# Patient Record
Sex: FEMALE | Race: WHITE | NOT HISPANIC OR LATINO | Employment: OTHER | URBAN - METROPOLITAN AREA
[De-identification: names, ages, dates, MRNs, and addresses within clinical notes are randomized per-mention and may not be internally consistent; named-entity substitution may affect disease eponyms.]

---

## 2017-09-29 ENCOUNTER — CONVERSION ENCOUNTER (OUTPATIENT)
Dept: MAMMOGRAPHY | Facility: HOSPITAL | Age: 62
End: 2017-09-29

## 2018-01-19 ENCOUNTER — OFFICE VISIT CONVERTED (OUTPATIENT)
Dept: ORTHOPEDIC SURGERY | Facility: CLINIC | Age: 63
End: 2018-01-19
Attending: PHYSICIAN ASSISTANT

## 2018-02-16 ENCOUNTER — OFFICE VISIT CONVERTED (OUTPATIENT)
Dept: ORTHOPEDIC SURGERY | Facility: CLINIC | Age: 63
End: 2018-02-16
Attending: ORTHOPAEDIC SURGERY

## 2018-04-13 ENCOUNTER — OFFICE VISIT CONVERTED (OUTPATIENT)
Dept: INTERNAL MEDICINE | Facility: CLINIC | Age: 63
End: 2018-04-13
Attending: INTERNAL MEDICINE

## 2018-04-25 ENCOUNTER — OFFICE VISIT CONVERTED (OUTPATIENT)
Dept: INTERNAL MEDICINE | Facility: CLINIC | Age: 63
End: 2018-04-25
Attending: PHYSICIAN ASSISTANT

## 2018-04-27 ENCOUNTER — OFFICE VISIT CONVERTED (OUTPATIENT)
Dept: ORTHOPEDIC SURGERY | Facility: CLINIC | Age: 63
End: 2018-04-27
Attending: PHYSICIAN ASSISTANT

## 2018-05-25 ENCOUNTER — CONVERSION ENCOUNTER (OUTPATIENT)
Dept: INTERNAL MEDICINE | Facility: CLINIC | Age: 63
End: 2018-05-25

## 2018-05-25 ENCOUNTER — OFFICE VISIT CONVERTED (OUTPATIENT)
Dept: INTERNAL MEDICINE | Facility: CLINIC | Age: 63
End: 2018-05-25
Attending: INTERNAL MEDICINE

## 2018-08-06 ENCOUNTER — OFFICE VISIT CONVERTED (OUTPATIENT)
Dept: INTERNAL MEDICINE | Facility: CLINIC | Age: 63
End: 2018-08-06
Attending: INTERNAL MEDICINE

## 2018-08-24 ENCOUNTER — OFFICE VISIT CONVERTED (OUTPATIENT)
Dept: CARDIOLOGY | Facility: CLINIC | Age: 63
End: 2018-08-24
Attending: INTERNAL MEDICINE

## 2018-08-31 ENCOUNTER — OFFICE VISIT CONVERTED (OUTPATIENT)
Dept: INTERNAL MEDICINE | Facility: CLINIC | Age: 63
End: 2018-08-31
Attending: INTERNAL MEDICINE

## 2018-11-06 ENCOUNTER — OFFICE VISIT CONVERTED (OUTPATIENT)
Dept: ORTHOPEDIC SURGERY | Facility: CLINIC | Age: 63
End: 2018-11-06
Attending: ORTHOPAEDIC SURGERY

## 2018-11-06 ENCOUNTER — CONVERSION ENCOUNTER (OUTPATIENT)
Dept: MAMMOGRAPHY | Facility: HOSPITAL | Age: 63
End: 2018-11-06

## 2018-11-30 ENCOUNTER — CONVERSION ENCOUNTER (OUTPATIENT)
Dept: INTERNAL MEDICINE | Facility: CLINIC | Age: 63
End: 2018-11-30

## 2018-11-30 ENCOUNTER — OFFICE VISIT CONVERTED (OUTPATIENT)
Dept: INTERNAL MEDICINE | Facility: CLINIC | Age: 63
End: 2018-11-30
Attending: INTERNAL MEDICINE

## 2018-12-03 ENCOUNTER — CONVERSION ENCOUNTER (OUTPATIENT)
Dept: MAMMOGRAPHY | Facility: HOSPITAL | Age: 63
End: 2018-12-03

## 2019-01-07 ENCOUNTER — HOSPITAL ENCOUNTER (OUTPATIENT)
Dept: PREADMISSION TESTING | Facility: HOSPITAL | Age: 64
Discharge: HOME | End: 2019-01-07
Attending: ORTHOPAEDIC SURGERY

## 2019-01-07 LAB
ANION GAP SERPL CALC-SCNC: 16 MMOL/L (ref 8–19)
APTT BLD: 27 S (ref 22.2–34.2)
BASOPHILS # BLD AUTO: 0.03 10*3/UL (ref 0–0.2)
BASOPHILS NFR BLD AUTO: 0.31 % (ref 0–3)
BUN SERPL-MCNC: 23 MG/DL (ref 5–25)
BUN/CREAT SERPL: 30 {RATIO} (ref 6–20)
CALCIUM SERPL-MCNC: 9.9 MG/DL (ref 8.7–10.4)
CHLORIDE SERPL-SCNC: 102 MMOL/L (ref 99–111)
CONV CO2: 26 MMOL/L (ref 22–32)
CREAT UR-MCNC: 0.77 MG/DL (ref 0.5–0.9)
EOSINOPHIL # BLD AUTO: 0.18 10*3/UL (ref 0–0.7)
EOSINOPHIL # BLD AUTO: 2.12 % (ref 0–7)
ERYTHROCYTE [DISTWIDTH] IN BLOOD BY AUTOMATED COUNT: 12.7 % (ref 11.5–14.5)
GFR SERPLBLD BASED ON 1.73 SQ M-ARVRAT: >60 ML/MIN/{1.73_M2}
GLUCOSE SERPL-MCNC: 110 MG/DL (ref 65–99)
HBA1C MFR BLD: 14.4 G/DL (ref 12–16)
HCT VFR BLD AUTO: 42.5 % (ref 37–47)
INR PPP: 1.01 (ref 2–3)
LYMPHOCYTES # BLD AUTO: 1.36 10*3/UL (ref 1–5)
MCH RBC QN AUTO: 29.6 PG (ref 27–31)
MCHC RBC AUTO-ENTMCNC: 33.8 G/DL (ref 33–37)
MCV RBC AUTO: 87.6 FL (ref 81–99)
MONOCYTES # BLD AUTO: 0.44 10*3/UL (ref 0.2–1.2)
MONOCYTES NFR BLD AUTO: 5.17 % (ref 3–10)
NEUTROPHILS # BLD AUTO: 6.58 10*3/UL (ref 2–8)
NEUTROPHILS NFR BLD AUTO: 76.6 % (ref 30–85)
NRBC BLD AUTO-RTO: 0 % (ref 0–0.01)
OSMOLALITY SERPL CALC.SUM OF ELEC: 294 MOSM/KG (ref 273–304)
PLATELET # BLD AUTO: 360 10*3/UL (ref 130–400)
PMV BLD AUTO: 6.2 FL (ref 7.4–10.4)
POTASSIUM SERPL-SCNC: 3.5 MMOL/L (ref 3.5–5.3)
PROTHROMBIN TIME: 10.5 S (ref 9.4–12)
RBC # BLD AUTO: 4.85 10*6/UL (ref 4.2–5.4)
SODIUM SERPL-SCNC: 140 MMOL/L (ref 135–147)
VARIANT LYMPHS NFR BLD MANUAL: 15.8 % (ref 20–45)
WBC # BLD AUTO: 8.59 10*3/UL (ref 4.8–10.8)

## 2019-02-05 ENCOUNTER — OFFICE VISIT CONVERTED (OUTPATIENT)
Dept: ORTHOPEDIC SURGERY | Facility: CLINIC | Age: 64
End: 2019-02-05
Attending: PHYSICIAN ASSISTANT

## 2019-02-26 ENCOUNTER — HOSPITAL ENCOUNTER (OUTPATIENT)
Dept: OTHER | Facility: HOSPITAL | Age: 64
Discharge: HOME | End: 2019-02-26
Attending: INTERNAL MEDICINE

## 2019-02-26 LAB
25(OH)D3 SERPL-MCNC: 46.1 NG/ML (ref 30–100)
ALBUMIN SERPL-MCNC: 3.9 G/DL (ref 3.5–5)
ALBUMIN/GLOB SERPL: 1.4 {RATIO} (ref 1.4–2.6)
ALP SERPL-CCNC: 120 U/L (ref 43–160)
ALT SERPL-CCNC: 14 U/L (ref 10–40)
ANION GAP SERPL CALC-SCNC: 19 MMOL/L (ref 8–19)
AST SERPL-CCNC: 14 U/L (ref 15–50)
BASOPHILS # BLD AUTO: 0.11 10*3/UL (ref 0–0.2)
BASOPHILS NFR BLD AUTO: 1.4 % (ref 0–3)
BILIRUB SERPL-MCNC: 0.22 MG/DL (ref 0.2–1.3)
BUN SERPL-MCNC: 24 MG/DL (ref 5–25)
BUN/CREAT SERPL: 31 {RATIO} (ref 6–20)
CALCIUM SERPL-MCNC: 10.2 MG/DL (ref 8.7–10.4)
CHLORIDE SERPL-SCNC: 101 MMOL/L (ref 99–111)
CHOLEST SERPL-MCNC: 214 MG/DL (ref 107–200)
CHOLEST/HDLC SERPL: 3.8 {RATIO} (ref 3–6)
CONV ABS IMM GRAN: 0.02 10*3/UL (ref 0–0.2)
CONV CO2: 26 MMOL/L (ref 22–32)
CONV IMMATURE GRAN: 0.3 % (ref 0–1.8)
CONV TOTAL PROTEIN: 6.7 G/DL (ref 6.3–8.2)
CREAT UR-MCNC: 0.78 MG/DL (ref 0.5–0.9)
DEPRECATED RDW RBC AUTO: 45.2 FL (ref 36.4–46.3)
EOSINOPHIL # BLD AUTO: 0.47 10*3/UL (ref 0–0.7)
EOSINOPHIL # BLD AUTO: 5.9 % (ref 0–7)
ERYTHROCYTE [DISTWIDTH] IN BLOOD BY AUTOMATED COUNT: 13.9 % (ref 11.7–14.4)
EST. AVERAGE GLUCOSE BLD GHB EST-MCNC: 108 MG/DL
GFR SERPLBLD BASED ON 1.73 SQ M-ARVRAT: >60 ML/MIN/{1.73_M2}
GLOBULIN UR ELPH-MCNC: 2.8 G/DL (ref 2–3.5)
GLUCOSE SERPL-MCNC: 98 MG/DL (ref 65–99)
HBA1C MFR BLD: 13.2 G/DL (ref 12–16)
HBA1C MFR BLD: 5.4 % (ref 3.5–5.7)
HCT VFR BLD AUTO: 41.7 % (ref 37–47)
HDLC SERPL-MCNC: 57 MG/DL (ref 40–60)
IRON SATN MFR SERPL: 18 % (ref 20–55)
IRON SERPL-MCNC: 57 UG/DL (ref 60–170)
LDLC SERPL CALC-MCNC: 132 MG/DL (ref 70–100)
LYMPHOCYTES # BLD AUTO: 1.77 10*3/UL (ref 1–5)
MCH RBC QN AUTO: 28.4 PG (ref 27–31)
MCHC RBC AUTO-ENTMCNC: 31.7 G/DL (ref 33–37)
MCV RBC AUTO: 89.9 FL (ref 81–99)
MONOCYTES # BLD AUTO: 0.51 10*3/UL (ref 0.2–1.2)
MONOCYTES NFR BLD AUTO: 6.4 % (ref 3–10)
NEUTROPHILS # BLD AUTO: 5.09 10*3/UL (ref 2–8)
NEUTROPHILS NFR BLD AUTO: 63.8 % (ref 30–85)
NRBC CBCN: 0 % (ref 0–0.7)
OSMOLALITY SERPL CALC.SUM OF ELEC: 298 MOSM/KG (ref 273–304)
PLATELET # BLD AUTO: 419 10*3/UL (ref 130–400)
PMV BLD AUTO: 9.3 FL (ref 9.4–12.3)
POTASSIUM SERPL-SCNC: 3.7 MMOL/L (ref 3.5–5.3)
RBC # BLD AUTO: 4.64 10*6/UL (ref 4.2–5.4)
SODIUM SERPL-SCNC: 142 MMOL/L (ref 135–147)
TIBC SERPL-MCNC: 309 UG/DL (ref 245–450)
TRANSFERRIN SERPL-MCNC: 216 MG/DL (ref 250–380)
TRIGL SERPL-MCNC: 123 MG/DL (ref 40–150)
VARIANT LYMPHS NFR BLD MANUAL: 22.2 % (ref 20–45)
VIT B12 SERPL-MCNC: 431 PG/ML (ref 211–911)
VLDLC SERPL-MCNC: 25 MG/DL (ref 5–37)
WBC # BLD AUTO: 7.97 10*3/UL (ref 4.8–10.8)

## 2019-02-27 ENCOUNTER — HOSPITAL ENCOUNTER (OUTPATIENT)
Dept: PHYSICAL THERAPY | Facility: CLINIC | Age: 64
Setting detail: RECURRING SERIES
Discharge: HOME | End: 2019-04-05
Attending: ORTHOPAEDIC SURGERY

## 2019-03-01 ENCOUNTER — OFFICE VISIT CONVERTED (OUTPATIENT)
Dept: INTERNAL MEDICINE | Facility: CLINIC | Age: 64
End: 2019-03-01
Attending: INTERNAL MEDICINE

## 2019-03-01 ENCOUNTER — CONVERSION ENCOUNTER (OUTPATIENT)
Dept: INTERNAL MEDICINE | Facility: CLINIC | Age: 64
End: 2019-03-01

## 2019-03-05 ENCOUNTER — CONVERSION ENCOUNTER (OUTPATIENT)
Dept: ORTHOPEDIC SURGERY | Facility: CLINIC | Age: 64
End: 2019-03-05

## 2019-03-05 ENCOUNTER — OFFICE VISIT CONVERTED (OUTPATIENT)
Dept: ORTHOPEDIC SURGERY | Facility: CLINIC | Age: 64
End: 2019-03-05
Attending: PHYSICIAN ASSISTANT

## 2019-04-16 ENCOUNTER — CONVERSION ENCOUNTER (OUTPATIENT)
Dept: ORTHOPEDIC SURGERY | Facility: CLINIC | Age: 64
End: 2019-04-16

## 2019-04-16 ENCOUNTER — OFFICE VISIT CONVERTED (OUTPATIENT)
Dept: ORTHOPEDIC SURGERY | Facility: CLINIC | Age: 64
End: 2019-04-16
Attending: PHYSICIAN ASSISTANT

## 2019-05-23 ENCOUNTER — CONVERSION ENCOUNTER (OUTPATIENT)
Dept: INTERNAL MEDICINE | Facility: CLINIC | Age: 64
End: 2019-05-23

## 2019-05-23 ENCOUNTER — OFFICE VISIT CONVERTED (OUTPATIENT)
Dept: INTERNAL MEDICINE | Facility: CLINIC | Age: 64
End: 2019-05-23
Attending: INTERNAL MEDICINE

## 2019-05-23 ENCOUNTER — HOSPITAL ENCOUNTER (OUTPATIENT)
Dept: OTHER | Facility: HOSPITAL | Age: 64
Discharge: HOME | End: 2019-05-23
Attending: INTERNAL MEDICINE

## 2019-05-29 ENCOUNTER — HOSPITAL ENCOUNTER (OUTPATIENT)
Dept: OTHER | Facility: HOSPITAL | Age: 64
Discharge: HOME | End: 2019-05-29
Attending: INTERNAL MEDICINE

## 2019-05-29 LAB
25(OH)D3 SERPL-MCNC: 50.1 NG/ML (ref 30–100)
ALBUMIN SERPL-MCNC: 4.1 G/DL (ref 3.5–5)
ALBUMIN/GLOB SERPL: 1.3 {RATIO} (ref 1.4–2.6)
ALP SERPL-CCNC: 137 U/L (ref 43–160)
ALT SERPL-CCNC: 12 U/L (ref 10–40)
ANION GAP SERPL CALC-SCNC: 17 MMOL/L (ref 8–19)
AST SERPL-CCNC: 13 U/L (ref 15–50)
BASOPHILS # BLD AUTO: 0.07 10*3/UL (ref 0–0.2)
BASOPHILS NFR BLD AUTO: 0.7 % (ref 0–3)
BILIRUB SERPL-MCNC: 0.28 MG/DL (ref 0.2–1.3)
BUN SERPL-MCNC: 27 MG/DL (ref 5–25)
BUN/CREAT SERPL: 28 {RATIO} (ref 6–20)
CALCIUM SERPL-MCNC: 9.8 MG/DL (ref 8.7–10.4)
CHLORIDE SERPL-SCNC: 98 MMOL/L (ref 99–111)
CHOLEST SERPL-MCNC: 228 MG/DL (ref 107–200)
CHOLEST/HDLC SERPL: 3.7 {RATIO} (ref 3–6)
CONV ABS IMM GRAN: 0.05 10*3/UL (ref 0–0.2)
CONV CO2: 27 MMOL/L (ref 22–32)
CONV IMMATURE GRAN: 0.5 % (ref 0–1.8)
CONV TOTAL PROTEIN: 7.2 G/DL (ref 6.3–8.2)
CREAT UR-MCNC: 0.96 MG/DL (ref 0.5–0.9)
DEPRECATED RDW RBC AUTO: 48.2 FL (ref 36.4–46.3)
EOSINOPHIL # BLD AUTO: 0.36 10*3/UL (ref 0–0.7)
EOSINOPHIL # BLD AUTO: 3.8 % (ref 0–7)
ERYTHROCYTE [DISTWIDTH] IN BLOOD BY AUTOMATED COUNT: 14.6 % (ref 11.7–14.4)
EST. AVERAGE GLUCOSE BLD GHB EST-MCNC: 108 MG/DL
GFR SERPLBLD BASED ON 1.73 SQ M-ARVRAT: >60 ML/MIN/{1.73_M2}
GLOBULIN UR ELPH-MCNC: 3.1 G/DL (ref 2–3.5)
GLUCOSE SERPL-MCNC: 84 MG/DL (ref 65–99)
HBA1C MFR BLD: 13.1 G/DL (ref 12–16)
HBA1C MFR BLD: 5.4 % (ref 3.5–5.7)
HCT VFR BLD AUTO: 43.1 % (ref 37–47)
HDLC SERPL-MCNC: 61 MG/DL (ref 40–60)
IRON SATN MFR SERPL: 22 % (ref 20–55)
IRON SERPL-MCNC: 75 UG/DL (ref 60–170)
LDLC SERPL CALC-MCNC: 150 MG/DL (ref 70–100)
LYMPHOCYTES # BLD AUTO: 1.91 10*3/UL (ref 1–5)
MCH RBC QN AUTO: 27.6 PG (ref 27–31)
MCHC RBC AUTO-ENTMCNC: 30.4 G/DL (ref 33–37)
MCV RBC AUTO: 90.7 FL (ref 81–99)
MONOCYTES # BLD AUTO: 0.62 10*3/UL (ref 0.2–1.2)
MONOCYTES NFR BLD AUTO: 6.5 % (ref 3–10)
NEUTROPHILS # BLD AUTO: 6.57 10*3/UL (ref 2–8)
NEUTROPHILS NFR BLD AUTO: 68.6 % (ref 30–85)
NRBC CBCN: 0 % (ref 0–0.7)
OSMOLALITY SERPL CALC.SUM OF ELEC: 290 MOSM/KG (ref 273–304)
PLATELET # BLD AUTO: 450 10*3/UL (ref 130–400)
PMV BLD AUTO: 9.8 FL (ref 9.4–12.3)
POTASSIUM SERPL-SCNC: 4.1 MMOL/L (ref 3.5–5.3)
RBC # BLD AUTO: 4.75 10*6/UL (ref 4.2–5.4)
SODIUM SERPL-SCNC: 138 MMOL/L (ref 135–147)
TIBC SERPL-MCNC: 339 UG/DL (ref 245–450)
TRANSFERRIN SERPL-MCNC: 237 MG/DL (ref 250–380)
TRIGL SERPL-MCNC: 85 MG/DL (ref 40–150)
VARIANT LYMPHS NFR BLD MANUAL: 19.9 % (ref 20–45)
VIT B12 SERPL-MCNC: 388 PG/ML (ref 211–911)
VLDLC SERPL-MCNC: 17 MG/DL (ref 5–37)
WBC # BLD AUTO: 9.58 10*3/UL (ref 4.8–10.8)

## 2019-06-04 ENCOUNTER — HOSPITAL ENCOUNTER (OUTPATIENT)
Dept: OTHER | Facility: HOSPITAL | Age: 64
Discharge: HOME | End: 2019-06-04
Attending: INTERNAL MEDICINE

## 2019-06-04 ENCOUNTER — OFFICE VISIT CONVERTED (OUTPATIENT)
Dept: INTERNAL MEDICINE | Facility: CLINIC | Age: 64
End: 2019-06-04
Attending: INTERNAL MEDICINE

## 2019-06-04 LAB
PTH-INTACT SERPL-MCNC: 42 PG/ML (ref 11.1–79.5)
TSH SERPL-ACNC: 1.46 M[IU]/L (ref 0.27–4.2)

## 2019-06-13 ENCOUNTER — HOSPITAL ENCOUNTER (OUTPATIENT)
Dept: MAMMOGRAPHY | Facility: HOSPITAL | Age: 64
Discharge: HOME | End: 2019-06-13
Attending: INTERNAL MEDICINE

## 2019-06-17 ENCOUNTER — HOSPITAL ENCOUNTER (OUTPATIENT)
Dept: CT IMAGING | Facility: HOSPITAL | Age: 64
Discharge: HOME | End: 2019-06-17
Attending: INTERNAL MEDICINE

## 2019-10-04 ENCOUNTER — HOSPITAL ENCOUNTER (OUTPATIENT)
Dept: OTHER | Facility: HOSPITAL | Age: 64
Discharge: HOME | End: 2019-10-04
Attending: INTERNAL MEDICINE

## 2019-10-04 ENCOUNTER — OFFICE VISIT CONVERTED (OUTPATIENT)
Dept: INTERNAL MEDICINE | Facility: CLINIC | Age: 64
End: 2019-10-04
Attending: INTERNAL MEDICINE

## 2019-10-04 ENCOUNTER — CONVERSION ENCOUNTER (OUTPATIENT)
Dept: INTERNAL MEDICINE | Facility: CLINIC | Age: 64
End: 2019-10-04

## 2019-10-04 LAB
25(OH)D3 SERPL-MCNC: 54.7 NG/ML (ref 30–100)
ALBUMIN SERPL-MCNC: 4.4 G/DL (ref 3.5–5)
ALBUMIN/GLOB SERPL: 1.5 {RATIO} (ref 1.4–2.6)
ALP SERPL-CCNC: 142 U/L (ref 43–160)
ALT SERPL-CCNC: 16 U/L (ref 10–40)
ANION GAP SERPL CALC-SCNC: 18 MMOL/L (ref 8–19)
AST SERPL-CCNC: 19 U/L (ref 15–50)
BASOPHILS # BLD AUTO: 0.1 10*3/UL (ref 0–0.2)
BASOPHILS NFR BLD AUTO: 1.1 % (ref 0–3)
BILIRUB SERPL-MCNC: 0.23 MG/DL (ref 0.2–1.3)
BUN SERPL-MCNC: 21 MG/DL (ref 5–25)
BUN/CREAT SERPL: 24 {RATIO} (ref 6–20)
CALCIUM SERPL-MCNC: 10.2 MG/DL (ref 8.7–10.4)
CHLORIDE SERPL-SCNC: 101 MMOL/L (ref 99–111)
CHOLEST SERPL-MCNC: 214 MG/DL (ref 107–200)
CHOLEST/HDLC SERPL: 3.8 {RATIO} (ref 3–6)
CONV ABS IMM GRAN: 0.04 10*3/UL (ref 0–0.2)
CONV CO2: 26 MMOL/L (ref 22–32)
CONV IMMATURE GRAN: 0.4 % (ref 0–1.8)
CONV TOTAL PROTEIN: 7.3 G/DL (ref 6.3–8.2)
CREAT UR-MCNC: 0.88 MG/DL (ref 0.5–0.9)
DEPRECATED RDW RBC AUTO: 43.7 FL (ref 36.4–46.3)
EOSINOPHIL # BLD AUTO: 0.28 10*3/UL (ref 0–0.7)
EOSINOPHIL # BLD AUTO: 3 % (ref 0–7)
ERYTHROCYTE [DISTWIDTH] IN BLOOD BY AUTOMATED COUNT: 13.4 % (ref 11.7–14.4)
EST. AVERAGE GLUCOSE BLD GHB EST-MCNC: 108 MG/DL
GFR SERPLBLD BASED ON 1.73 SQ M-ARVRAT: >60 ML/MIN/{1.73_M2}
GLOBULIN UR ELPH-MCNC: 2.9 G/DL (ref 2–3.5)
GLUCOSE SERPL-MCNC: 81 MG/DL (ref 65–99)
HBA1C MFR BLD: 5.4 % (ref 3.5–5.7)
HCT VFR BLD AUTO: 43.5 % (ref 37–47)
HDLC SERPL-MCNC: 56 MG/DL (ref 40–60)
HGB BLD-MCNC: 13.5 G/DL (ref 12–16)
LDLC SERPL CALC-MCNC: 137 MG/DL (ref 70–100)
LYMPHOCYTES # BLD AUTO: 2.09 10*3/UL (ref 1–5)
LYMPHOCYTES NFR BLD AUTO: 22.5 % (ref 20–45)
MCH RBC QN AUTO: 27.8 PG (ref 27–31)
MCHC RBC AUTO-ENTMCNC: 31 G/DL (ref 33–37)
MCV RBC AUTO: 89.7 FL (ref 81–99)
MONOCYTES # BLD AUTO: 0.56 10*3/UL (ref 0.2–1.2)
MONOCYTES NFR BLD AUTO: 6 % (ref 3–10)
NEUTROPHILS # BLD AUTO: 6.2 10*3/UL (ref 2–8)
NEUTROPHILS NFR BLD AUTO: 67 % (ref 30–85)
NRBC CBCN: 0 % (ref 0–0.7)
OSMOLALITY SERPL CALC.SUM OF ELEC: 294 MOSM/KG (ref 273–304)
PLATELET # BLD AUTO: 467 10*3/UL (ref 130–400)
PMV BLD AUTO: 9.5 FL (ref 9.4–12.3)
POTASSIUM SERPL-SCNC: 3.8 MMOL/L (ref 3.5–5.3)
RBC # BLD AUTO: 4.85 10*6/UL (ref 4.2–5.4)
SODIUM SERPL-SCNC: 141 MMOL/L (ref 135–147)
TRIGL SERPL-MCNC: 107 MG/DL (ref 40–150)
VLDLC SERPL-MCNC: 21 MG/DL (ref 5–37)
WBC # BLD AUTO: 9.27 10*3/UL (ref 4.8–10.8)

## 2019-10-24 ENCOUNTER — CONVERSION ENCOUNTER (OUTPATIENT)
Dept: OTOLARYNGOLOGY | Facility: CLINIC | Age: 64
End: 2019-10-24

## 2019-10-24 ENCOUNTER — OFFICE VISIT CONVERTED (OUTPATIENT)
Dept: OTOLARYNGOLOGY | Facility: CLINIC | Age: 64
End: 2019-10-24
Attending: OTOLARYNGOLOGY

## 2019-12-09 ENCOUNTER — HOSPITAL ENCOUNTER (OUTPATIENT)
Dept: SLEEP MEDICINE | Facility: HOSPITAL | Age: 64
Discharge: HOME | End: 2019-12-09
Attending: INTERNAL MEDICINE

## 2019-12-09 ENCOUNTER — OUTSIDE FACILITY SERVICE (OUTPATIENT)
Dept: SLEEP MEDICINE | Facility: HOSPITAL | Age: 64
End: 2019-12-09

## 2019-12-09 PROCEDURE — 99244 OFF/OP CNSLTJ NEW/EST MOD 40: CPT | Performed by: INTERNAL MEDICINE

## 2019-12-16 ENCOUNTER — HOSPITAL ENCOUNTER (OUTPATIENT)
Dept: SLEEP MEDICINE | Facility: HOSPITAL | Age: 64
Discharge: HOME | End: 2019-12-16
Attending: INTERNAL MEDICINE

## 2019-12-16 ENCOUNTER — HOSPITAL ENCOUNTER (OUTPATIENT)
Dept: MAMMOGRAPHY | Facility: HOSPITAL | Age: 64
Discharge: HOME | End: 2019-12-16
Attending: INTERNAL MEDICINE

## 2019-12-18 ENCOUNTER — OUTSIDE FACILITY SERVICE (OUTPATIENT)
Dept: SLEEP MEDICINE | Facility: HOSPITAL | Age: 64
End: 2019-12-18

## 2019-12-18 PROCEDURE — 95806 SLEEP STUDY UNATT&RESP EFFT: CPT | Performed by: INTERNAL MEDICINE

## 2019-12-30 ENCOUNTER — OUTSIDE FACILITY SERVICE (OUTPATIENT)
Dept: SLEEP MEDICINE | Facility: HOSPITAL | Age: 64
End: 2019-12-30

## 2019-12-30 ENCOUNTER — HOSPITAL ENCOUNTER (OUTPATIENT)
Dept: SLEEP MEDICINE | Facility: HOSPITAL | Age: 64
Discharge: HOME | End: 2019-12-30
Attending: INTERNAL MEDICINE

## 2019-12-30 PROCEDURE — 99213 OFFICE O/P EST LOW 20 MIN: CPT | Performed by: INTERNAL MEDICINE

## 2020-01-28 ENCOUNTER — OFFICE VISIT CONVERTED (OUTPATIENT)
Dept: ORTHOPEDIC SURGERY | Facility: CLINIC | Age: 65
End: 2020-01-28
Attending: ORTHOPAEDIC SURGERY

## 2020-01-30 ENCOUNTER — OFFICE VISIT CONVERTED (OUTPATIENT)
Dept: OTOLARYNGOLOGY | Facility: CLINIC | Age: 65
End: 2020-01-30
Attending: OTOLARYNGOLOGY

## 2020-02-03 ENCOUNTER — HOSPITAL ENCOUNTER (OUTPATIENT)
Dept: MAMMOGRAPHY | Facility: HOSPITAL | Age: 65
Discharge: HOME | End: 2020-02-03
Attending: INTERNAL MEDICINE

## 2020-02-06 ENCOUNTER — OFFICE VISIT CONVERTED (OUTPATIENT)
Dept: INTERNAL MEDICINE | Facility: CLINIC | Age: 65
End: 2020-02-06
Attending: INTERNAL MEDICINE

## 2020-02-06 ENCOUNTER — HOSPITAL ENCOUNTER (OUTPATIENT)
Dept: OTHER | Facility: HOSPITAL | Age: 65
Discharge: HOME | End: 2020-02-06
Attending: INTERNAL MEDICINE

## 2020-02-06 LAB
25(OH)D3 SERPL-MCNC: 57 NG/ML (ref 30–100)
ALBUMIN SERPL-MCNC: 4.3 G/DL (ref 3.5–5)
ALBUMIN/GLOB SERPL: 1.5 {RATIO} (ref 1.4–2.6)
ALP SERPL-CCNC: 130 U/L (ref 43–160)
ALT SERPL-CCNC: 14 U/L (ref 10–40)
ANION GAP SERPL CALC-SCNC: 21 MMOL/L (ref 8–19)
AST SERPL-CCNC: 14 U/L (ref 15–50)
BASOPHILS # BLD AUTO: 0.03 10*3/UL (ref 0–0.2)
BASOPHILS NFR BLD AUTO: 0.2 % (ref 0–3)
BILIRUB SERPL-MCNC: <0.15 MG/DL (ref 0.2–1.3)
BUN SERPL-MCNC: 23 MG/DL (ref 5–25)
BUN/CREAT SERPL: 29 {RATIO} (ref 6–20)
CALCIUM SERPL-MCNC: 10.5 MG/DL (ref 8.7–10.4)
CHLORIDE SERPL-SCNC: 102 MMOL/L (ref 99–111)
CHOLEST SERPL-MCNC: 216 MG/DL (ref 107–200)
CHOLEST/HDLC SERPL: 3.3 {RATIO} (ref 3–6)
CONV ABS IMM GRAN: 0.18 10*3/UL (ref 0–0.2)
CONV CO2: 23 MMOL/L (ref 22–32)
CONV IMMATURE GRAN: 1 % (ref 0–1.8)
CONV TOTAL PROTEIN: 7.2 G/DL (ref 6.3–8.2)
CREAT UR-MCNC: 0.79 MG/DL (ref 0.5–0.9)
DEPRECATED RDW RBC AUTO: 44.8 FL (ref 36.4–46.3)
EOSINOPHIL # BLD AUTO: 0 % (ref 0–7)
EOSINOPHIL # BLD AUTO: 0 10*3/UL (ref 0–0.7)
ERYTHROCYTE [DISTWIDTH] IN BLOOD BY AUTOMATED COUNT: 14.3 % (ref 11.7–14.4)
GFR SERPLBLD BASED ON 1.73 SQ M-ARVRAT: >60 ML/MIN/{1.73_M2}
GLOBULIN UR ELPH-MCNC: 2.9 G/DL (ref 2–3.5)
GLUCOSE SERPL-MCNC: 116 MG/DL (ref 65–99)
HCT VFR BLD AUTO: 42.7 % (ref 37–47)
HDLC SERPL-MCNC: 66 MG/DL (ref 40–60)
HGB BLD-MCNC: 13.5 G/DL (ref 12–16)
LDLC SERPL CALC-MCNC: 138 MG/DL (ref 70–100)
LYMPHOCYTES # BLD AUTO: 1.14 10*3/UL (ref 1–5)
LYMPHOCYTES NFR BLD AUTO: 6.6 % (ref 20–45)
MCH RBC QN AUTO: 27.2 PG (ref 27–31)
MCHC RBC AUTO-ENTMCNC: 31.6 G/DL (ref 33–37)
MCV RBC AUTO: 86.1 FL (ref 81–99)
MONOCYTES # BLD AUTO: 0.6 10*3/UL (ref 0.2–1.2)
MONOCYTES NFR BLD AUTO: 3.5 % (ref 3–10)
NEUTROPHILS # BLD AUTO: 15.28 10*3/UL (ref 2–8)
NEUTROPHILS NFR BLD AUTO: 88.7 % (ref 30–85)
NRBC CBCN: 0 % (ref 0–0.7)
OSMOLALITY SERPL CALC.SUM OF ELEC: 299 MOSM/KG (ref 273–304)
PLATELET # BLD AUTO: 530 10*3/UL (ref 130–400)
PMV BLD AUTO: 9.3 FL (ref 9.4–12.3)
POTASSIUM SERPL-SCNC: 4.3 MMOL/L (ref 3.5–5.3)
RBC # BLD AUTO: 4.96 10*6/UL (ref 4.2–5.4)
SODIUM SERPL-SCNC: 142 MMOL/L (ref 135–147)
TRIGL SERPL-MCNC: 61 MG/DL (ref 40–150)
VIT B12 SERPL-MCNC: 628 PG/ML (ref 211–911)
VLDLC SERPL-MCNC: 12 MG/DL (ref 5–37)
WBC # BLD AUTO: 17.23 10*3/UL (ref 4.8–10.8)

## 2020-03-02 ENCOUNTER — OUTSIDE FACILITY SERVICE (OUTPATIENT)
Dept: SLEEP MEDICINE | Facility: HOSPITAL | Age: 65
End: 2020-03-02

## 2020-03-02 ENCOUNTER — HOSPITAL ENCOUNTER (OUTPATIENT)
Dept: SLEEP MEDICINE | Facility: HOSPITAL | Age: 65
Discharge: HOME | End: 2020-03-02
Attending: INTERNAL MEDICINE

## 2020-03-02 PROCEDURE — 99213 OFFICE O/P EST LOW 20 MIN: CPT | Performed by: INTERNAL MEDICINE

## 2020-04-09 ENCOUNTER — CONVERSION ENCOUNTER (OUTPATIENT)
Dept: ORTHOPEDIC SURGERY | Facility: CLINIC | Age: 65
End: 2020-04-09

## 2020-04-09 ENCOUNTER — TELEPHONE CONVERTED (OUTPATIENT)
Dept: ORTHOPEDIC SURGERY | Facility: CLINIC | Age: 65
End: 2020-04-09
Attending: PHYSICIAN ASSISTANT

## 2020-04-21 ENCOUNTER — OFFICE VISIT CONVERTED (OUTPATIENT)
Dept: ORTHOPEDIC SURGERY | Facility: CLINIC | Age: 65
End: 2020-04-21
Attending: PHYSICIAN ASSISTANT

## 2020-05-18 ENCOUNTER — HOSPITAL ENCOUNTER (OUTPATIENT)
Dept: OTHER | Facility: HOSPITAL | Age: 65
Discharge: HOME | End: 2020-05-18
Attending: PHYSICIAN ASSISTANT

## 2020-05-20 ENCOUNTER — HOSPITAL ENCOUNTER (OUTPATIENT)
Dept: OTHER | Facility: HOSPITAL | Age: 65
Discharge: HOME | End: 2020-05-20
Attending: PHYSICIAN ASSISTANT

## 2020-05-20 ENCOUNTER — OFFICE VISIT CONVERTED (OUTPATIENT)
Dept: INTERNAL MEDICINE | Facility: CLINIC | Age: 65
End: 2020-05-20
Attending: PHYSICIAN ASSISTANT

## 2020-05-20 LAB
ALBUMIN SERPL-MCNC: 4.2 G/DL (ref 3.5–5)
ALBUMIN/GLOB SERPL: 1.4 {RATIO} (ref 1.4–2.6)
ALP SERPL-CCNC: 134 U/L (ref 43–160)
ALT SERPL-CCNC: 14 U/L (ref 10–40)
ANION GAP SERPL CALC-SCNC: 15 MMOL/L (ref 8–19)
AST SERPL-CCNC: 15 U/L (ref 15–50)
BASOPHILS # BLD AUTO: 0.09 10*3/UL (ref 0–0.2)
BASOPHILS NFR BLD AUTO: 1.1 % (ref 0–3)
BILIRUB SERPL-MCNC: <0.15 MG/DL (ref 0.2–1.3)
BUN SERPL-MCNC: 19 MG/DL (ref 5–25)
BUN/CREAT SERPL: 23 {RATIO} (ref 6–20)
CALCIUM SERPL-MCNC: 9.6 MG/DL (ref 8.7–10.4)
CHLORIDE SERPL-SCNC: 103 MMOL/L (ref 99–111)
CONV ABS IMM GRAN: 0.02 10*3/UL (ref 0–0.2)
CONV CO2: 25 MMOL/L (ref 22–32)
CONV IMMATURE GRAN: 0.2 % (ref 0–1.8)
CONV TOTAL PROTEIN: 7.1 G/DL (ref 6.3–8.2)
CREAT UR-MCNC: 0.81 MG/DL (ref 0.5–0.9)
DEPRECATED RDW RBC AUTO: 48.1 FL (ref 36.4–46.3)
EOSINOPHIL # BLD AUTO: 0.23 10*3/UL (ref 0–0.7)
EOSINOPHIL # BLD AUTO: 2.7 % (ref 0–7)
ERYTHROCYTE [DISTWIDTH] IN BLOOD BY AUTOMATED COUNT: 15.5 % (ref 11.7–14.4)
GFR SERPLBLD BASED ON 1.73 SQ M-ARVRAT: >60 ML/MIN/{1.73_M2}
GLOBULIN UR ELPH-MCNC: 2.9 G/DL (ref 2–3.5)
GLUCOSE SERPL-MCNC: 107 MG/DL (ref 65–99)
HCT VFR BLD AUTO: 41.9 % (ref 37–47)
HGB BLD-MCNC: 12.8 G/DL (ref 12–16)
LYMPHOCYTES # BLD AUTO: 1.74 10*3/UL (ref 1–5)
LYMPHOCYTES NFR BLD AUTO: 20.5 % (ref 20–45)
MCH RBC QN AUTO: 26.2 PG (ref 27–31)
MCHC RBC AUTO-ENTMCNC: 30.5 G/DL (ref 33–37)
MCV RBC AUTO: 85.9 FL (ref 81–99)
MONOCYTES # BLD AUTO: 0.64 10*3/UL (ref 0.2–1.2)
MONOCYTES NFR BLD AUTO: 7.5 % (ref 3–10)
NEUTROPHILS # BLD AUTO: 5.77 10*3/UL (ref 2–8)
NEUTROPHILS NFR BLD AUTO: 68 % (ref 30–85)
NRBC CBCN: 0 % (ref 0–0.7)
OSMOLALITY SERPL CALC.SUM OF ELEC: 291 MOSM/KG (ref 273–304)
PLATELET # BLD AUTO: 478 10*3/UL (ref 130–400)
PMV BLD AUTO: 9.4 FL (ref 9.4–12.3)
POTASSIUM SERPL-SCNC: 3.6 MMOL/L (ref 3.5–5.3)
RBC # BLD AUTO: 4.88 10*6/UL (ref 4.2–5.4)
SODIUM SERPL-SCNC: 139 MMOL/L (ref 135–147)
TSH SERPL-ACNC: 1.61 M[IU]/L (ref 0.27–4.2)
WBC # BLD AUTO: 8.49 10*3/UL (ref 4.8–10.8)

## 2020-05-21 LAB — 25(OH)D3 SERPL-MCNC: 59.2 NG/ML (ref 30–100)

## 2020-05-26 ENCOUNTER — OFFICE VISIT CONVERTED (OUTPATIENT)
Dept: ORTHOPEDIC SURGERY | Facility: CLINIC | Age: 65
End: 2020-05-26
Attending: PHYSICIAN ASSISTANT

## 2020-05-26 LAB
B MICROTI DNA BLD QL NAA+PROBE: NOT DETECTED
CONV ANAPLASMA PHAGOCYTOPHILUM PCR: NOT DETECTED
CONV BABESIA SPECIES PCR: NOT DETECTED
CONV EHRLICHIA EWINGII CANIS PCR: NOT DETECTED
CONV EHRLICHIA MURIS LIKE PCR: NOT DETECTED
E CHAFFEENSIS DNA BLD QL NAA+PROBE: NOT DETECTED

## 2020-06-08 ENCOUNTER — TELEMEDICINE CONVERTED (OUTPATIENT)
Dept: INTERNAL MEDICINE | Facility: CLINIC | Age: 65
End: 2020-06-08
Attending: INTERNAL MEDICINE

## 2020-06-30 LAB — SARS-COV-2 RNA SPEC QL NAA+PROBE: NOT DETECTED

## 2020-07-01 ENCOUNTER — HOSPITAL ENCOUNTER (OUTPATIENT)
Dept: PERIOP | Facility: HOSPITAL | Age: 65
Setting detail: HOSPITAL OUTPATIENT SURGERY
Discharge: HOME | End: 2020-07-01
Attending: ORTHOPAEDIC SURGERY

## 2020-07-01 LAB
ANION GAP SERPL CALC-SCNC: 20 MMOL/L (ref 8–19)
BUN SERPL-MCNC: 19 MG/DL (ref 5–25)
BUN/CREAT SERPL: 23 {RATIO} (ref 6–20)
CALCIUM SERPL-MCNC: 10.1 MG/DL (ref 8.7–10.4)
CHLORIDE SERPL-SCNC: 97 MMOL/L (ref 99–111)
CONV CO2: 25 MMOL/L (ref 22–32)
CREAT UR-MCNC: 0.83 MG/DL (ref 0.5–0.9)
GFR SERPLBLD BASED ON 1.73 SQ M-ARVRAT: >60 ML/MIN/{1.73_M2}
GLUCOSE SERPL-MCNC: 108 MG/DL (ref 65–99)
OSMOLALITY SERPL CALC.SUM OF ELEC: 291 MOSM/KG (ref 273–304)
POTASSIUM SERPL-SCNC: 3.4 MMOL/L (ref 3.5–5.3)
SODIUM SERPL-SCNC: 139 MMOL/L (ref 135–147)

## 2020-07-16 ENCOUNTER — OFFICE VISIT CONVERTED (OUTPATIENT)
Dept: ORTHOPEDIC SURGERY | Facility: CLINIC | Age: 65
End: 2020-07-16
Attending: PHYSICIAN ASSISTANT

## 2020-07-16 ENCOUNTER — CONVERSION ENCOUNTER (OUTPATIENT)
Dept: ORTHOPEDIC SURGERY | Facility: CLINIC | Age: 65
End: 2020-07-16

## 2020-09-18 ENCOUNTER — OFFICE VISIT CONVERTED (OUTPATIENT)
Dept: ORTHOPEDIC SURGERY | Facility: CLINIC | Age: 65
End: 2020-09-18
Attending: ORTHOPAEDIC SURGERY

## 2020-10-19 ENCOUNTER — HOSPITAL ENCOUNTER (OUTPATIENT)
Dept: OTHER | Facility: HOSPITAL | Age: 65
Discharge: HOME | End: 2020-10-19
Attending: INTERNAL MEDICINE

## 2020-10-19 ENCOUNTER — OFFICE VISIT CONVERTED (OUTPATIENT)
Dept: INTERNAL MEDICINE | Facility: CLINIC | Age: 65
End: 2020-10-19
Attending: INTERNAL MEDICINE

## 2020-10-19 LAB
ALBUMIN SERPL-MCNC: 4 G/DL (ref 3.5–5)
ALBUMIN/GLOB SERPL: 1.4 {RATIO} (ref 1.4–2.6)
ALP SERPL-CCNC: 134 U/L (ref 43–160)
ALT SERPL-CCNC: 11 U/L (ref 10–40)
ANION GAP SERPL CALC-SCNC: 16 MMOL/L (ref 8–19)
AST SERPL-CCNC: 14 U/L (ref 15–50)
BASOPHILS # BLD AUTO: 0.09 10*3/UL (ref 0–0.2)
BASOPHILS NFR BLD AUTO: 1.1 % (ref 0–3)
BILIRUB SERPL-MCNC: 0.16 MG/DL (ref 0.2–1.3)
BUN SERPL-MCNC: 23 MG/DL (ref 5–25)
BUN/CREAT SERPL: 24 {RATIO} (ref 6–20)
CALCIUM SERPL-MCNC: 9.7 MG/DL (ref 8.7–10.4)
CHLORIDE SERPL-SCNC: 103 MMOL/L (ref 99–111)
CHOLEST SERPL-MCNC: 233 MG/DL (ref 107–200)
CHOLEST/HDLC SERPL: 3.9 {RATIO} (ref 3–6)
CONV ABS IMM GRAN: 0.02 10*3/UL (ref 0–0.2)
CONV CO2: 25 MMOL/L (ref 22–32)
CONV IMMATURE GRAN: 0.2 % (ref 0–1.8)
CONV TOTAL PROTEIN: 6.9 G/DL (ref 6.3–8.2)
CREAT UR-MCNC: 0.94 MG/DL (ref 0.5–0.9)
DEPRECATED RDW RBC AUTO: 45.5 FL (ref 36.4–46.3)
EOSINOPHIL # BLD AUTO: 0.3 10*3/UL (ref 0–0.7)
EOSINOPHIL # BLD AUTO: 3.5 % (ref 0–7)
ERYTHROCYTE [DISTWIDTH] IN BLOOD BY AUTOMATED COUNT: 14.2 % (ref 11.7–14.4)
GFR SERPLBLD BASED ON 1.73 SQ M-ARVRAT: >60 ML/MIN/{1.73_M2}
GLOBULIN UR ELPH-MCNC: 2.9 G/DL (ref 2–3.5)
GLUCOSE SERPL-MCNC: 91 MG/DL (ref 65–99)
HCT VFR BLD AUTO: 41.5 % (ref 37–47)
HDLC SERPL-MCNC: 60 MG/DL (ref 40–60)
HGB BLD-MCNC: 12.7 G/DL (ref 12–16)
LDLC SERPL CALC-MCNC: 154 MG/DL (ref 70–100)
LYMPHOCYTES # BLD AUTO: 1.96 10*3/UL (ref 1–5)
LYMPHOCYTES NFR BLD AUTO: 22.9 % (ref 20–45)
MCH RBC QN AUTO: 26.8 PG (ref 27–31)
MCHC RBC AUTO-ENTMCNC: 30.6 G/DL (ref 33–37)
MCV RBC AUTO: 87.6 FL (ref 81–99)
MONOCYTES # BLD AUTO: 0.55 10*3/UL (ref 0.2–1.2)
MONOCYTES NFR BLD AUTO: 6.4 % (ref 3–10)
NEUTROPHILS # BLD AUTO: 5.63 10*3/UL (ref 2–8)
NEUTROPHILS NFR BLD AUTO: 65.9 % (ref 30–85)
NRBC CBCN: 0 % (ref 0–0.7)
OSMOLALITY SERPL CALC.SUM OF ELEC: 293 MOSM/KG (ref 273–304)
PLATELET # BLD AUTO: 481 10*3/UL (ref 130–400)
PMV BLD AUTO: 9.6 FL (ref 9.4–12.3)
POTASSIUM SERPL-SCNC: 3.9 MMOL/L (ref 3.5–5.3)
RBC # BLD AUTO: 4.74 10*6/UL (ref 4.2–5.4)
SODIUM SERPL-SCNC: 140 MMOL/L (ref 135–147)
TRIGL SERPL-MCNC: 97 MG/DL (ref 40–150)
VLDLC SERPL-MCNC: 19 MG/DL (ref 5–37)
WBC # BLD AUTO: 8.55 10*3/UL (ref 4.8–10.8)

## 2020-10-20 ENCOUNTER — HOSPITAL ENCOUNTER (OUTPATIENT)
Dept: OTHER | Facility: HOSPITAL | Age: 65
Discharge: HOME | End: 2020-10-20
Attending: INTERNAL MEDICINE

## 2020-10-20 ENCOUNTER — OFFICE VISIT CONVERTED (OUTPATIENT)
Dept: ORTHOPEDIC SURGERY | Facility: CLINIC | Age: 65
End: 2020-10-20
Attending: ORTHOPAEDIC SURGERY

## 2020-10-20 LAB — 25(OH)D3 SERPL-MCNC: 50.7 NG/ML (ref 30–100)

## 2020-11-10 ENCOUNTER — HOSPITAL ENCOUNTER (OUTPATIENT)
Dept: PREADMISSION TESTING | Facility: HOSPITAL | Age: 65
Discharge: HOME | End: 2020-11-10
Attending: ORTHOPAEDIC SURGERY

## 2020-11-10 LAB
ALBUMIN SERPL-MCNC: 4 G/DL (ref 3.5–5)
ALBUMIN/GLOB SERPL: 1.5 {RATIO} (ref 1.4–2.6)
ALP SERPL-CCNC: 118 U/L (ref 43–160)
ALT SERPL-CCNC: 11 U/L (ref 10–40)
ANION GAP SERPL CALC-SCNC: 13 MMOL/L (ref 8–19)
APTT BLD: 26.2 S (ref 22.2–34.2)
AST SERPL-CCNC: 13 U/L (ref 15–50)
BASOPHILS # BLD AUTO: 0.06 10*3/UL (ref 0–0.2)
BASOPHILS NFR BLD AUTO: 0.7 % (ref 0–3)
BILIRUB SERPL-MCNC: <0.15 MG/DL (ref 0.2–1.3)
BUN SERPL-MCNC: 33 MG/DL (ref 5–25)
BUN/CREAT SERPL: 33 {RATIO} (ref 6–20)
CALCIUM SERPL-MCNC: 10.4 MG/DL (ref 8.7–10.4)
CHLORIDE SERPL-SCNC: 103 MMOL/L (ref 99–111)
CONV ABS IMM GRAN: 0.03 10*3/UL (ref 0–0.2)
CONV CO2: 28 MMOL/L (ref 22–32)
CONV IMMATURE GRAN: 0.4 % (ref 0–1.8)
CONV TOTAL PROTEIN: 6.7 G/DL (ref 6.3–8.2)
CREAT UR-MCNC: 0.99 MG/DL (ref 0.5–0.9)
DEPRECATED RDW RBC AUTO: 44.3 FL (ref 36.4–46.3)
EOSINOPHIL # BLD AUTO: 0.31 10*3/UL (ref 0–0.7)
EOSINOPHIL # BLD AUTO: 3.8 % (ref 0–7)
ERYTHROCYTE [DISTWIDTH] IN BLOOD BY AUTOMATED COUNT: 14.3 % (ref 11.7–14.4)
EST. AVERAGE GLUCOSE BLD GHB EST-MCNC: 131 MG/DL
GFR SERPLBLD BASED ON 1.73 SQ M-ARVRAT: >60 ML/MIN/{1.73_M2}
GLOBULIN UR ELPH-MCNC: 2.7 G/DL (ref 2–3.5)
GLUCOSE SERPL-MCNC: 97 MG/DL (ref 65–99)
HBA1C MFR BLD: 6.2 % (ref 3.5–5.7)
HCT VFR BLD AUTO: 39.7 % (ref 37–47)
HGB BLD-MCNC: 12.3 G/DL (ref 12–16)
INR PPP: 0.91 (ref 2–3)
LYMPHOCYTES # BLD AUTO: 1.62 10*3/UL (ref 1–5)
LYMPHOCYTES NFR BLD AUTO: 20.1 % (ref 20–45)
MCH RBC QN AUTO: 26.6 PG (ref 27–31)
MCHC RBC AUTO-ENTMCNC: 31 G/DL (ref 33–37)
MCV RBC AUTO: 85.7 FL (ref 81–99)
MONOCYTES # BLD AUTO: 0.54 10*3/UL (ref 0.2–1.2)
MONOCYTES NFR BLD AUTO: 6.7 % (ref 3–10)
NEUTROPHILS # BLD AUTO: 5.51 10*3/UL (ref 2–8)
NEUTROPHILS NFR BLD AUTO: 68.3 % (ref 30–85)
NRBC CBCN: 0 % (ref 0–0.7)
OSMOLALITY SERPL CALC.SUM OF ELEC: 297 MOSM/KG (ref 273–304)
PLATELET # BLD AUTO: 377 10*3/UL (ref 130–400)
PMV BLD AUTO: 8.7 FL (ref 9.4–12.3)
POTASSIUM SERPL-SCNC: 3.9 MMOL/L (ref 3.5–5.3)
PROTHROMBIN TIME: 10 S (ref 9.4–12)
RBC # BLD AUTO: 4.63 10*6/UL (ref 4.2–5.4)
SODIUM SERPL-SCNC: 140 MMOL/L (ref 135–147)
WBC # BLD AUTO: 8.07 10*3/UL (ref 4.8–10.8)

## 2020-11-16 ENCOUNTER — HOSPITAL ENCOUNTER (OUTPATIENT)
Dept: PREADMISSION TESTING | Facility: HOSPITAL | Age: 65
Discharge: HOME | End: 2020-11-16
Attending: ORTHOPAEDIC SURGERY

## 2020-11-18 ENCOUNTER — CONVERSION ENCOUNTER (OUTPATIENT)
Dept: NEUROLOGY | Facility: CLINIC | Age: 65
End: 2020-11-18

## 2020-11-18 ENCOUNTER — OFFICE VISIT CONVERTED (OUTPATIENT)
Dept: NEUROLOGY | Facility: CLINIC | Age: 65
End: 2020-11-18
Attending: PSYCHIATRY & NEUROLOGY

## 2020-11-19 LAB — SARS-COV-2 RNA SPEC QL NAA+PROBE: NOT DETECTED

## 2020-11-20 ENCOUNTER — HOSPITAL ENCOUNTER (OUTPATIENT)
Dept: PERIOP | Facility: HOSPITAL | Age: 65
Setting detail: HOSPITAL OUTPATIENT SURGERY
Discharge: HOME | End: 2020-11-21
Attending: INTERNAL MEDICINE

## 2020-11-21 LAB
ANION GAP SERPL CALC-SCNC: 13 MMOL/L (ref 8–19)
BASOPHILS # BLD AUTO: 0.02 10*3/UL (ref 0–0.2)
BASOPHILS NFR BLD AUTO: 0.2 % (ref 0–3)
BUN SERPL-MCNC: 21 MG/DL (ref 5–25)
BUN/CREAT SERPL: 27 {RATIO} (ref 6–20)
CALCIUM SERPL-MCNC: 8.7 MG/DL (ref 8.7–10.4)
CHLORIDE SERPL-SCNC: 95 MMOL/L (ref 99–111)
CONV ABS IMM GRAN: 0.03 10*3/UL (ref 0–0.2)
CONV CO2: 25 MMOL/L (ref 22–32)
CONV IMMATURE GRAN: 0.3 % (ref 0–1.8)
CREAT UR-MCNC: 0.79 MG/DL (ref 0.5–0.9)
DEPRECATED RDW RBC AUTO: 42.9 FL (ref 36.4–46.3)
EOSINOPHIL # BLD AUTO: 0.07 10*3/UL (ref 0–0.7)
EOSINOPHIL # BLD AUTO: 0.7 % (ref 0–7)
ERYTHROCYTE [DISTWIDTH] IN BLOOD BY AUTOMATED COUNT: 14 % (ref 11.7–14.4)
GFR SERPLBLD BASED ON 1.73 SQ M-ARVRAT: >60 ML/MIN/{1.73_M2}
GLUCOSE SERPL-MCNC: 133 MG/DL (ref 65–99)
HCT VFR BLD AUTO: 33.3 % (ref 37–47)
HGB BLD-MCNC: 10.5 G/DL (ref 12–16)
LYMPHOCYTES # BLD AUTO: 1.27 10*3/UL (ref 1–5)
LYMPHOCYTES NFR BLD AUTO: 12.7 % (ref 20–45)
MCH RBC QN AUTO: 26.9 PG (ref 27–31)
MCHC RBC AUTO-ENTMCNC: 31.5 G/DL (ref 33–37)
MCV RBC AUTO: 85.2 FL (ref 81–99)
MONOCYTES # BLD AUTO: 0.63 10*3/UL (ref 0.2–1.2)
MONOCYTES NFR BLD AUTO: 6.3 % (ref 3–10)
NEUTROPHILS # BLD AUTO: 7.99 10*3/UL (ref 2–8)
NEUTROPHILS NFR BLD AUTO: 79.8 % (ref 30–85)
NRBC CBCN: 0 % (ref 0–0.7)
OSMOLALITY SERPL CALC.SUM OF ELEC: 273 MOSM/KG (ref 273–304)
PLATELET # BLD AUTO: 321 10*3/UL (ref 130–400)
PMV BLD AUTO: 9.3 FL (ref 9.4–12.3)
POTASSIUM SERPL-SCNC: 3.5 MMOL/L (ref 3.5–5.3)
RBC # BLD AUTO: 3.91 10*6/UL (ref 4.2–5.4)
SODIUM SERPL-SCNC: 129 MMOL/L (ref 135–147)
WBC # BLD AUTO: 10.01 10*3/UL (ref 4.8–10.8)

## 2020-12-04 ENCOUNTER — OFFICE VISIT CONVERTED (OUTPATIENT)
Dept: ORTHOPEDIC SURGERY | Facility: CLINIC | Age: 65
End: 2020-12-04
Attending: PHYSICIAN ASSISTANT

## 2021-01-04 ENCOUNTER — OFFICE VISIT CONVERTED (OUTPATIENT)
Dept: INTERNAL MEDICINE | Facility: CLINIC | Age: 66
End: 2021-01-04
Attending: INTERNAL MEDICINE

## 2021-01-04 ENCOUNTER — CONVERSION ENCOUNTER (OUTPATIENT)
Dept: INTERNAL MEDICINE | Facility: CLINIC | Age: 66
End: 2021-01-04

## 2021-01-05 ENCOUNTER — CONVERSION ENCOUNTER (OUTPATIENT)
Dept: ORTHOPEDIC SURGERY | Facility: CLINIC | Age: 66
End: 2021-01-05

## 2021-01-05 ENCOUNTER — OFFICE VISIT CONVERTED (OUTPATIENT)
Dept: ORTHOPEDIC SURGERY | Facility: CLINIC | Age: 66
End: 2021-01-05
Attending: PHYSICIAN ASSISTANT

## 2021-01-26 ENCOUNTER — OFFICE VISIT CONVERTED (OUTPATIENT)
Dept: ORTHOPEDIC SURGERY | Facility: CLINIC | Age: 66
End: 2021-01-26
Attending: PHYSICIAN ASSISTANT

## 2021-03-02 ENCOUNTER — CONVERSION ENCOUNTER (OUTPATIENT)
Dept: ORTHOPEDIC SURGERY | Facility: CLINIC | Age: 66
End: 2021-03-02

## 2021-03-02 ENCOUNTER — OFFICE VISIT CONVERTED (OUTPATIENT)
Dept: ORTHOPEDIC SURGERY | Facility: CLINIC | Age: 66
End: 2021-03-02
Attending: PHYSICIAN ASSISTANT

## 2021-03-03 ENCOUNTER — HOSPITAL ENCOUNTER (OUTPATIENT)
Dept: SLEEP MEDICINE | Facility: HOSPITAL | Age: 66
Discharge: HOME | End: 2021-03-03
Attending: INTERNAL MEDICINE

## 2021-03-03 ENCOUNTER — OUTSIDE FACILITY SERVICE (OUTPATIENT)
Dept: SLEEP MEDICINE | Facility: HOSPITAL | Age: 66
End: 2021-03-03

## 2021-03-03 PROCEDURE — 99213 OFFICE O/P EST LOW 20 MIN: CPT | Performed by: INTERNAL MEDICINE

## 2021-03-09 ENCOUNTER — HOSPITAL ENCOUNTER (OUTPATIENT)
Dept: VACCINE CLINIC | Facility: HOSPITAL | Age: 66
Discharge: HOME | End: 2021-03-09
Attending: INTERNAL MEDICINE

## 2021-03-30 ENCOUNTER — HOSPITAL ENCOUNTER (OUTPATIENT)
Dept: VACCINE CLINIC | Facility: HOSPITAL | Age: 66
Discharge: HOME | End: 2021-03-30
Attending: INTERNAL MEDICINE

## 2021-04-28 ENCOUNTER — HOSPITAL ENCOUNTER (OUTPATIENT)
Dept: MAMMOGRAPHY | Facility: HOSPITAL | Age: 66
Discharge: HOME | End: 2021-04-28
Attending: INTERNAL MEDICINE

## 2021-05-04 ENCOUNTER — HOSPITAL ENCOUNTER (OUTPATIENT)
Dept: OTHER | Facility: HOSPITAL | Age: 66
Discharge: HOME | End: 2021-05-04
Attending: INTERNAL MEDICINE

## 2021-05-04 ENCOUNTER — OFFICE VISIT CONVERTED (OUTPATIENT)
Dept: INTERNAL MEDICINE | Facility: CLINIC | Age: 66
End: 2021-05-04
Attending: INTERNAL MEDICINE

## 2021-05-04 LAB
ALBUMIN SERPL-MCNC: 4.4 G/DL (ref 3.5–5)
ALBUMIN/GLOB SERPL: 1.5 {RATIO} (ref 1.4–2.6)
ALP SERPL-CCNC: 159 U/L (ref 43–160)
ALT SERPL-CCNC: 11 U/L (ref 10–40)
ANION GAP SERPL CALC-SCNC: 14 MMOL/L (ref 8–19)
AST SERPL-CCNC: 15 U/L (ref 15–50)
BASOPHILS # BLD AUTO: 0.07 10*3/UL (ref 0–0.2)
BASOPHILS NFR BLD AUTO: 0.9 % (ref 0–3)
BILIRUB SERPL-MCNC: 0.23 MG/DL (ref 0.2–1.3)
BUN SERPL-MCNC: 22 MG/DL (ref 5–25)
BUN/CREAT SERPL: 24 {RATIO} (ref 6–20)
CALCIUM SERPL-MCNC: 9.5 MG/DL (ref 8.7–10.4)
CHLORIDE SERPL-SCNC: 102 MMOL/L (ref 99–111)
CONV ABS IMM GRAN: 0.02 10*3/UL (ref 0–0.2)
CONV CO2: 28 MMOL/L (ref 22–32)
CONV IMMATURE GRAN: 0.3 % (ref 0–1.8)
CONV TOTAL PROTEIN: 7.4 G/DL (ref 6.3–8.2)
CREAT UR-MCNC: 0.93 MG/DL (ref 0.5–0.9)
DEPRECATED RDW RBC AUTO: 48.4 FL (ref 36.4–46.3)
EOSINOPHIL # BLD AUTO: 0.31 10*3/UL (ref 0–0.7)
EOSINOPHIL # BLD AUTO: 3.9 % (ref 0–7)
ERYTHROCYTE [DISTWIDTH] IN BLOOD BY AUTOMATED COUNT: 16 % (ref 11.7–14.4)
FOLATE SERPL-MCNC: 5.7 NG/ML (ref 4.8–20)
GFR SERPLBLD BASED ON 1.73 SQ M-ARVRAT: >60 ML/MIN/{1.73_M2}
GLOBULIN UR ELPH-MCNC: 3 G/DL (ref 2–3.5)
GLUCOSE SERPL-MCNC: 88 MG/DL (ref 65–99)
HCT VFR BLD AUTO: 40.4 % (ref 37–47)
HGB BLD-MCNC: 12.1 G/DL (ref 12–16)
LYMPHOCYTES # BLD AUTO: 1.76 10*3/UL (ref 1–5)
LYMPHOCYTES NFR BLD AUTO: 22.4 % (ref 20–45)
MCH RBC QN AUTO: 24.9 PG (ref 27–31)
MCHC RBC AUTO-ENTMCNC: 30 G/DL (ref 33–37)
MCV RBC AUTO: 83.1 FL (ref 81–99)
MONOCYTES # BLD AUTO: 0.6 10*3/UL (ref 0.2–1.2)
MONOCYTES NFR BLD AUTO: 7.6 % (ref 3–10)
NEUTROPHILS # BLD AUTO: 5.1 10*3/UL (ref 2–8)
NEUTROPHILS NFR BLD AUTO: 64.9 % (ref 30–85)
NRBC CBCN: 0 % (ref 0–0.7)
OSMOLALITY SERPL CALC.SUM OF ELEC: 293 MOSM/KG (ref 273–304)
PLATELET # BLD AUTO: 477 10*3/UL (ref 130–400)
PMV BLD AUTO: 9.5 FL (ref 9.4–12.3)
POTASSIUM SERPL-SCNC: 4 MMOL/L (ref 3.5–5.3)
RBC # BLD AUTO: 4.86 10*6/UL (ref 4.2–5.4)
SODIUM SERPL-SCNC: 140 MMOL/L (ref 135–147)
T4 FREE SERPL-MCNC: 1.1 NG/DL (ref 0.9–1.8)
TSH SERPL-ACNC: 1.69 M[IU]/L (ref 0.27–4.2)
VIT B12 SERPL-MCNC: 430 PG/ML (ref 211–911)
WBC # BLD AUTO: 7.86 10*3/UL (ref 4.8–10.8)

## 2021-05-05 LAB
25(OH)D3 SERPL-MCNC: 48.3 NG/ML (ref 30–100)
IRON SATN MFR SERPL: 12 % (ref 20–55)
IRON SERPL-MCNC: 51 UG/DL (ref 60–170)
TIBC SERPL-MCNC: 435 UG/DL (ref 245–450)
TRANSFERRIN SERPL-MCNC: 304 MG/DL (ref 250–380)

## 2021-05-10 NOTE — H&P
History and Physical      Patient Name: Casandra Arango   Patient ID: [id number]   Sex: Female   YOB: 1955    Primary Care Provider: Teresa Herman MD   Referring Provider: Teresa Herman MD    Visit Date: November 18, 2020    Provider: Amauri Nunn MD   Location: [facility]   Location Address: [address] [place] [address]  [id number]   Location Phone: [phone]          Chief Complaint     BUE numbness tingling pain and weakness       History Of Present Illness  Casandra Arango is a 65 year old /White female who presents today to [facility] today referred from Teresa Herman MD.      65-year-old woman evaluated for bilateral hand numbness and tingling for the past 6 months.  She states that she has weakness in her hands and fingers that comes and goes.  She states that it happens only when she has a tingling sensation in her fingers and it happens anytime during the daytime but is a lot of activities such as holding the phone, book, practicing panel.  It happens when she is doing to use her hand.  It is the same numbness and tingling in the left and the right hand.  The symptoms of tingling only goes up to her elbows.  There are no radicular symptoms of pain from her neck going down her arms or fingers.  She has had an MRI of the cervical spine showing at C3-C4 moderate right and mild to moderate left neuroforaminal narrowing, C4-C5 moderate left and mild right neuroforaminal narrowing, C5-C6 moderate bilateral neuroforaminal narrowing, C6-C7 no neuroforaminal narrowing, C7-T1 no neural foraminal narrowing       Past Medical History  Allergic rhinitis; Anemia, Unspecified; Anxiety; Arthritis; Chronic cough; Closed nondisplaced fracture of distal phalanx of right great toe with routine healing, subsequent encounter; Closed nondisplaced fracture of distal phalanx of right great toe, initial encounter; Edema of right lower extremity;  GERD (gastroesophageal reflux disease); Globus sensation; Hemorrhoids; Hyperlipemia; Hypertension; Insomnia, unspecified insomnia; Kidney stones; Major depressive disorder, recurrent, severe without psychotic features; Migraine Headaches; Night sweats; Primary osteoarthritis of both knees; Primary osteoarthritis of left knee; Primary osteoarthritis of shoulder, Left; Reflux Disease; Seasonal allergies; SOB (shortness of breath); Vitamin D deficiency         Past Surgical History  Arm ORIF; Arthoscopic knee surgery; Artificial Joints/Limbs; Back; Back Surgery, Lumbar; Colonoscopy; Hemorrhoid surgery; Hip replacement, right; Joint Surgery; Kidney Stone Surgery, Unspecified; Tibial Fracture; Tonsillectomy         Medication List  Bactrim -160 mg oral tablet; bupropion HCl 150 mg oral tablet sustained-release 12 hr; Caltrate 600 plus D 600 mg (1,500 mg)-800 unit oral tablet,chewable; cetirizine 10 mg oral tablet; chlorthalidone 25 mg oral tablet; ciclopirox 0.77 % topical suspension; desvenlafaxine succinate 50 mg oral tablet extended release 24 hr; fluticasone propionate 50 mcg/actuation nasal spray,suspension; hydroxyzine HCl 25 mg oral tablet; naproxen 500 mg oral tablet; Norco 5-325 mg oral tablet; omeprazole 40 mg oral capsule,delayed release(DR/EC); rizatriptan 10 mg oral tablet         Allergy List  Darvocet-N 100; Hydromet; Lamictal         Family Medical History  Family history of skin cancer; Family history of kidney cancer; Family history of diabetes mellitus (DM)         Reproductive History   2 Para 2 0 0 0 & Postmenopausal       Social History  Alcohol (Current some day); lives alone; Recreational Drug Use (Never); Tobacco (Never); ; Working         Immunizations  Name Date Admin   Hepatitis A 2018   Influenza 10/19/2020   Influenza 10/04/2019   Influenza 2018   Influenza 2017   Influenza 2016   Influenza 10/09/2015         Review of  "Systems  · Constitutional  o Denies  o : chills, excessive sweating, fatigue, fever, sycope/passing out, weight gain, weight loss  · Eyes  o Denies  o : changes in vision, blurry vision, double vision  · HENT  o Denies  o : loss of hearing, ringing in the ears, ear aches, sore throat, nasal congestion, sinus pain, nose bleeds, seasonal allergies  · Cardiovascular  o Denies  o : blood clots, swollen legs, anemia, easy burising or bleeding, transfusions  · Respiratory  o Denies  o : shortness of breath, dry cough, productive cough, pneumonia, COPD  · Gastrointestinal  o Denies  o : difficulty swallowing, reflux  · Genitourinary  o Denies  o : incontinence  · Neurologic  o Admits  o : difficulty with sleep, numbness/tingling/paresthesia , weakness  o Denies  o : headache, seizure, stroke, tremor, loss of balance, falls, dizziness/vertigo, difficulty with coordination, difficulty with dexterity  · Musculoskeletal  o Admits  o : neck stiffness/pain, weakness  o Denies  o : swollen lymph nodes, muscle aches, joint pain, spasms, sciatica, pain radiating in arm, pain radiating in leg, low back pain  · Endocrine  o Denies  o : diabetes, thyroid disorder  · Psychiatric  o Admits  o : anxiety, depression      Vitals  Date Time BP Position Site L\R Cuff Size HR RR TEMP (F) WT  HT  BMI kg/m2 BSA m2 O2 Sat FR L/min FiO2 HC       11/18/2020 09:20 AM 95/67 Sitting    100 - R 18  209lbs 0oz 5'  7\" 32.73 2.12             Physical Examination     There is no weakness of the upper extremities individual muscle testing.  Reflexes are decreased in the right biceps and brachioradialis compared to the left side.  Phalen sign is positive.  Pressure at the wrist produce tingling in both hands.           Assessment  · Carpal tunnel syndrome     354.0/G56.00  I have given her wrist splints to wear nightly for the next month. She is to wear during the daytime as well intermittently. She is to make a follow-up appointment with us in the next " month. If there is no significant provement of her hand numbness I will refer to orthopedic surgery for carpal tunnel injections versus surgery.    30 minutes was spent for this low complexity encounter more than half the time was spent face-to-face with the patient for examination, counseling, planning and recommendations.  · Numbness and tingling       Anesthesia of skin     782.0/R20.0  Paresthesia of skin     782.0/R20.2  Nerve conduction studies abnormal and shows electrophysiologic evidence for mild right carpal tunnel syndrome.  · Radiculopathy of cervical spine     723.4/M54.12  MRI of the cervical spine shows evidence of neuroforaminal narrowing and stenosis on multiple levels which does not correspond clinically to her symptomatology. I discussed with her that that is a separate problem and is clinically not significant at this time. I will reevaluate her again in 1 month's time for follow-up.      Plan  · Orders  o Nerve conduction studies; 7-8 studies (02795) - 354.0/G56.00, 782.0/R20.0, 782.0/R20.2, 723.4/M54.12 - 11/18/2020  · Medications  o Medications have been Reconciled  o Transition of Care or Provider Policy  · Instructions  o Encouraged to follow-up with Primary Care Provider for preventative care.            Electronically Signed by: Amauri Nunn MD -Author on November 18, 2020 10:32:46 AM

## 2021-05-12 NOTE — PROGRESS NOTES
"   Quick Note      Patient Name: Casandra Arango   Patient ID: [id number]   Sex: Female   YOB: 1955    Primary Care Provider: Teresa Herman MD   Referring Provider: Teresa Herman MD    Visit Date: April 9, 2020    Provider: Herminia Man PA-C   Location: [facility]   Location Address: [address] [place] [address]  [id number]   Location Phone: [phone]          History Of Present Illness  TELEHEALTH VISIT  Chief Complaint: Follow up right knee pain.   Casandra Arango is a 64 year old /White female who is presenting for evaluation via telehealth visit. Verbal consent obtained before beginning visit.   Provider spent 5 minutes with the patient during telehealth visit.   The following staff were present during this visit: Herminia Man PA-C   Past Medical History/Overview of Patient Symptoms     She complains of right knee pain that started about a month ago without injury. Pain is over medial knee. She denies effusion/erythema. She states some warmth. She has burning sensation in medial joint line. Sharp pain with twisting/turning/pivoting. She also has stiffness with prolonged flexion while seated. She states it is similar to pain she had with previous left meniscus tear. She is s/p left TKA January 2019. She has tried rest, ice, elevation, Naproxen. She uses a cane. She has pain when she sleeps with her knees stacked and has to sleep on her back to be comfortable.     Assessment: right knee pain  Plan: Medrol Dosepack, meniscus tear HEP. Follow up 10-14 days if no improvement, obtain standing x-ray and consider injection.       Vitals  Date Time BP Position Site L\R Cuff Size HR RR TEMP (F) WT  HT  BMI kg/m2 BSA m2 O2 Sat HC       04/09/2020 09:40 AM         221lbs 0oz 5'  7\" 34.61 2.18               Plan  · Medications  o Medications have been Reconciled  o Transition of Care or Provider Policy  · Instructions  o Plan Of Care:             Electronically Signed by: Herminia Man PA-C " -Author on April 9, 2020 10:00:12 AM  Electronically Co-signed by: Kar Denny MD -Reviewer on April 9, 2020 01:08:25 PM

## 2021-05-12 NOTE — PROGRESS NOTES
Progress Note      Patient Name: Casandra Arango   Patient ID: [id number]   Sex: Female   YOB: 1955    Primary Care Provider: Teresa Herman MD   Referring Provider: Teresa Herman MD    Visit Date: April 21, 2020    Provider: Herminia Man PA-C   Location: [facility]   Location Address: [address] [place] [address]  [id number]   Location Phone: [phone]          Chief Complaint  · Right knee pain      History Of Present Illness  Casandra Arango is a 64 year old /White female who presents today to [facility].      She is here for evaluation of right knee pain. She states Medrol dosepack helped for a few days after finishing course. She complains of pain over medial joint line that is burning quality. She states pain with pivoting, squatting and when she lies with knees stacked. She denies injury.       Past Medical History  Allergic rhinitis; Anemia, Unspecified; Anxiety; Arthritis; Chronic cough; Closed nondisplaced fracture of distal phalanx of right great toe with routine healing, subsequent encounter; Closed nondisplaced fracture of distal phalanx of right great toe, initial encounter; Edema of right lower extremity; GERD (gastroesophageal reflux disease); Globus sensation; Hemorrhoids; Hyperlipemia; Hypertension; Insomnia, unspecified insomnia; Kidney stones; Major depressive disorder, recurrent, severe without psychotic features; Migraine Headaches; Night sweats; Primary osteoarthritis of both knees; Primary osteoarthritis of left knee; Primary osteoarthritis of shoulder, Left; Reflux Disease; Seasonal allergies; SOB (shortness of breath); Vitamin D deficiency         Past Surgical History  Arm ORIF; Arthoscopic knee surgery; Artificial Joints/Limbs; Back; Back Surgery, Lumbar; Colonoscopy; Hemorrhoid surgery; Hip replacement, right; Joint Surgery; Kidney Stone Surgery, Unspecified; Tibial Fracture; Tonsillectomy         Medication List  Caltrate 600 plus D 600  "mg (1,500 mg)-800 unit oral tablet,chewable; cetirizine 10 mg oral tablet; chlorthalidone 25 mg oral tablet; ciclopirox 0.77 % topical suspension; fluticasone propionate 50 mcg/actuation nasal spray,suspension; hydroxyzine HCl 25 mg oral tablet; Medrol (Guy) 4 mg oral tablets,dose pack; naproxen 500 mg oral tablet; omeprazole 40 mg oral capsule,delayed release(DR/EC); Pristiq 50 mg oral tablet extended release 24 hr; rizatriptan 10 mg oral tablet; Wellbutrin  mg oral tablet sustained-release 12 hr         Allergy List  Darvocet-N 100; Hydromet; Lamictal         Family Medical History  Family history of skin cancer; Family history of kidney cancer; Family history of diabetes mellitus (DM)         Reproductive History   2 Para 2 0 0 0 & Postmenopausal       Social History  Alcohol (Current some day); lives alone; Recreational Drug Use (Never); Tobacco (Never); ; Working         Review of Systems  · Constitutional  o Denies  o : fever, chills, weight loss  · Cardiovascular  o Denies  o : chest pain, shortness of breath  · Gastrointestinal  o Denies  o : liver disease, heartburn, nausea, blood in stools  · Genitourinary  o Denies  o : painful urination, blood in urine  · Integument  o Denies  o : rash, itching  · Neurologic  o Denies  o : headache, weakness, loss of consciousness  · Musculoskeletal  o Admits  o : painful, swollen joints  · Psychiatric  o Denies  o : drug/alcohol addiction, anxiety, depression      Vitals  Date Time BP Position Site L\R Cuff Size HR RR TEMP (F) WT  HT  BMI kg/m2 BSA m2 O2 Sat        2020 02:10 PM         221lbs 8oz 5'  7\" 34.69 2.18           Physical Examination  · Constitutional  o Appearance  o : well developed, well-nourished, no obvious deformities present  · Head and Face  o Head  o :   § Inspection  § : normocephalic  o Face  o :   § Inspection  § : no facial lesions  · Eyes  o Conjunctivae  o : conjunctivae normal  o Sclerae  o : sclerae white  · Ears, " Nose, Mouth and Throat  o Ears  o :   § External Ears  § : appearance within normal limits  § Hearing  § : intact  o Nose  o :   § External Nose  § : appearance normal  · Neck  o Inspection/Palpation  o : normal appearance  o Range of Motion  o : full range of motion  · Respiratory  o Respiratory Effort  o : breathing unlabored  o Inspection of Chest  o : normal appearance  o Auscultation of Lungs  o : no audible wheezing or rales  · Cardiovascular  o Heart  o : regular rate  · Gastrointestinal  o Abdominal Examination  o : soft and non-tender  · Skin and Subcutaneous Tissue  o General Inspection  o : intact, no rashes  · Psychiatric  o General  o : Alert and oriented x3  o Judgement and Insight  o : judgment and insight intact  o Mood and Affect  o : mood normal, affect appropriate  · Right Knee  o Inspection  o : Trace effusion. Tender medial joint line. Near full ROM. + Asia's. + Apleys'. Stable to varus/valgus stress. Negative Lachman's. Antalgic gait. Good tone of quads and hamstrings. Neurovasculalry intact. Calf supple/nontender.   · Injection Note/Aspiration Note  o Site  o : right knee  o Procedure  o : Procedure: After educating the patient, patient gave consent for procedure. After using Chloraprep, the joint space was injected. The patient tolerated the procedure well.   o Medication  o : 80 mg of DepoMedrol with 9cc of 1% Lidocaine  · In Office Procedures  o View  o : AP/LATERAL/SUNRISE   o Site  o : right, knee  o Indication  o : Right knee pain   o Study  o : X-rays ordered, taken in the office, and reviewed today.  o Xray  o : Medial joint space narrowing.   o Comparative Data  o : Comparative Data found and reviewed today           Assessment  · Primary osteoarthritis of right knee     715.16/M17.11  · MMT (medial meniscus tear)     836.0/S83.249A  · Right knee pain, unspecified chronicity     719.46/M25.561      Plan  · Orders  o Depo-Medrol injection 80mg () - - 04/21/2020   Lot [id number]  manufactured by [employer] 05 2021  o Knee Intra-articular Injection without US Guidance [facility] (23044) - - 04/21/2020   Lot [id number] manufactured by [employer] 07 2021 Administered by Herminia BURCH  o Knee (Right) [facility] Preferred View (94779-IB) - 719.46/M25.561 - 04/21/2020  · Instructions  o Reviewed the patient's Past Medical, Social, and Family history as well as the ROS at today's visit, no changes.  o Call or return if worsening symptoms.  o Steroid injection right knee. She may call for MRI order and to schedule follow up if no improvement with injection. Otherwise, follow Up PRN.            Electronically Signed by: MARCIN Ibarra-SHARRON -Author on May 11, 2020 12:50:44 PM  Electronically Co-signed by: Kar Denny MD -Reviewer on May 12, 2020 11:14:56 AM

## 2021-05-13 NOTE — PROGRESS NOTES
Progress Note      Patient Name: Casandra Arango   Patient ID: [id number]   Sex: Female   YOB: 1955    Primary Care Provider: Teresa Herman MD   Referring Provider: Teresa Herman MD    Visit Date: May 26, 2020    Provider: Herminia Man PA-C   Location: [facility]   Location Address: [address] [place] [address]  [id number]   Location Phone: [phone]          Chief Complaint  · Follow up right knee pain      History Of Present Illness  Casandra Arango is a 64 year old /White female who presents today to [facility].      She is here for follow up for right knee pain. She had MRI that revealed medial/lateral meniscus tears and chondromalacia.       Past Medical History  Allergic rhinitis; Anemia, Unspecified; Anxiety; Arthritis; Chronic cough; Closed nondisplaced fracture of distal phalanx of right great toe with routine healing, subsequent encounter; Closed nondisplaced fracture of distal phalanx of right great toe, initial encounter; Edema of right lower extremity; GERD (gastroesophageal reflux disease); Globus sensation; Hemorrhoids; Hyperlipemia; Hypertension; Insomnia, unspecified insomnia; Kidney stones; Major depressive disorder, recurrent, severe without psychotic features; Migraine Headaches; Night sweats; Primary osteoarthritis of both knees; Primary osteoarthritis of left knee; Primary osteoarthritis of shoulder, Left; Reflux Disease; Seasonal allergies; SOB (shortness of breath); Vitamin D deficiency         Past Surgical History  Arm ORIF; Arthoscopic knee surgery; Artificial Joints/Limbs; Back; Back Surgery, Lumbar; Colonoscopy; Hemorrhoid surgery; Hip replacement, right; Joint Surgery; Kidney Stone Surgery, Unspecified; Tibial Fracture; Tonsillectomy         Medication List  Caltrate 600 plus D 600 mg (1,500 mg)-800 unit oral tablet,chewable; cetirizine 10 mg oral tablet; chlorthalidone 25 mg oral tablet; ciclopirox 0.77 % topical suspension;  "fluticasone propionate 50 mcg/actuation nasal spray,suspension; hydroxyzine HCl 25 mg oral tablet; naproxen 500 mg oral tablet; Norco 5-325 mg oral tablet; omeprazole 40 mg oral capsule,delayed release(DR/EC); Pristiq 50 mg oral tablet extended release 24 hr; rizatriptan 10 mg oral tablet; Wellbutrin  mg oral tablet sustained-release 12 hr         Allergy List  Darvocet-N 100; Hydromet; Lamictal       Allergies Reconciled  Family Medical History  Family history of skin cancer; Family history of kidney cancer; Family history of diabetes mellitus (DM)         Reproductive History   2 Para 2 0 0 0 & Postmenopausal       Social History  Alcohol (Current some day); lives alone; Recreational Drug Use (Never); Tobacco (Never); ; Working         Review of Systems  · Constitutional  o Denies  o : fever, chills, weight loss  · Cardiovascular  o Denies  o : chest pain, shortness of breath  · Gastrointestinal  o Denies  o : liver disease, heartburn, nausea, blood in stools  · Genitourinary  o Denies  o : painful urination, blood in urine  · Integument  o Denies  o : rash, itching  · Neurologic  o Denies  o : headache, weakness, loss of consciousness  · Musculoskeletal  o Admits  o : painful, swollen joints  · Psychiatric  o Denies  o : drug/alcohol addiction, anxiety, depression      Vitals  Date Time BP Position Site L\R Cuff Size HR RR TEMP (F) WT  HT  BMI kg/m2 BSA m2 O2 Sat        2020 02:46 PM      74 - R   222lbs 16oz 5'  7\" 34.93 2.19 98 %          Physical Examination  · Constitutional  o Appearance  o : well developed, well-nourished, no obvious deformities present  · Head and Face  o Head  o :   § Inspection  § : normocephalic  o Face  o :   § Inspection  § : no facial lesions  · Eyes  o Conjunctivae  o : conjunctivae normal  o Sclerae  o : sclerae white  · Ears, Nose, Mouth and Throat  o Ears  o :   § External Ears  § : appearance within normal limits  § Hearing  § : intact  o Nose  o : "   § External Nose  § : appearance normal  · Neck  o Inspection/Palpation  o : normal appearance  o Range of Motion  o : full range of motion  · Respiratory  o Respiratory Effort  o : breathing unlabored  o Inspection of Chest  o : normal appearance  o Auscultation of Lungs  o : no audible wheezing or rales  · Cardiovascular  o Heart  o : regular rate  · Gastrointestinal  o Abdominal Examination  o : soft and non-tender  · Skin and Subcutaneous Tissue  o General Inspection  o : intact, no rashes  · Psychiatric  o General  o : Alert and oriented x3  o Judgement and Insight  o : judgment and insight intact  o Mood and Affect  o : mood normal, affect appropriate  · Right Knee  o Inspection  o : Trace effusion. Tender medial joint line. Near full ROM. + Asia's. + Apleys'. Stable to varus/valgus stress. Negative Lachman's. Antalgic gait. Good tone of quads and hamstrings. Neurovasculalry intact. Calf supple/nontender. Injection Note/Aspiration Note          Assessment  · Primary osteoarthritis of right knee     715.16/M17.11  · MMT (medial meniscus tear)     836.0/S83.249A  · Pain: Knee     719.46/M25.569      Plan  · Medications  o Medications have been Reconciled  o Transition of Care or Provider Policy  · Instructions  o Dr. Denny saw and examined the patient and agrees with plan.   o X-rays and MRI reviewed by Dr. Denny.  o Reviewed the patient's Past Medical, Social, and Family history as well as the ROS at today's visit, no changes.  o Call or return if worsening symptoms.  o Discussed surgery.  o Risks/benefits discussed with patient including, but not limited to: infection, bleeding, neurovascular damage, re-rupture, aesthetic deformity, need for further surgery, and death.  o She will proceed with right knee arthroscopy.   o Electronically Identified Patient Education Materials Provided Electronically            Electronically Signed by: Herminia Man PA-C -Author on May 26, 2020 03:46:55 PM

## 2021-05-13 NOTE — PROGRESS NOTES
"   Progress Note      Patient Name: Casandra Arango   Patient ID: [id number]   Sex: Female   YOB: 1955    Primary Care Provider: Teresa Herman MD   Referring Provider: Teresa Herman MD    Visit Date: October 19, 2020    Provider: Teresa Herman MD   Location: [facility]   Location Address: [address] [place] [address]  [id number]   Location Phone: [phone]          Chief Complaint  · \"I'm here for a regular check up and refills\"  · \"would like a handicap sticker for car\"  · \"neck, low back pain and knee pain      History Of Present Illness  Casandra Arango is a 64 year old /White female who presents for evaluation and treatment of:      chronic issues.    States that she had an earache recently, but it has gone away    torn meniscus was replaced and the burning in her knee/leg has stopped since then  states that when she goes to pt she can do all the exercises, but she can't put a lot of weight on her leg and she is limping as well  she is having trouble with doing daily activies because she can't put a lot of weight on the leg  the knee hurts, it isn't unstable but it does seem to shift a little  she is now having right hip pain  she is having sciatic nerve pain that is only on the right side  her right knee is 3cm shorter    some numbness in her arms and hand both hands  significant pain in the back of her neck    she is currently taking piano lessons    states that she is able to take the omeprazole daily instead of BID  cpap is working well for her    has labs for today       Past Medical History  Disease Name Date Onset Notes   Allergic rhinitis --  --    Anemia, Unspecified --  --    Anxiety --  --    Arthritis --  --    Chronic cough --  --    Closed nondisplaced fracture of distal phalanx of right great toe with routine healing, subsequent encounter 01/19/2018 --    Closed nondisplaced fracture of distal phalanx of right great toe, initial " encounter 12/26/2017 --    Edema of right lower extremity 10/10/2015 Discussed continuing elevation of the foot and wearing compression stockings   GERD (gastroesophageal reflux disease) --  --    Globus sensation --  --    Hemorrhoids --  --    Hyperlipemia --  --    Hypertension --  --    Insomnia, unspecified insomnia 10/10/2015 Told her to take Abilify during the morning rather than in the evening and see if that helps with the insomnia however if it does not will need to consider further treatment   Kidney stones --  --    Major depressive disorder, recurrent, severe without psychotic features 10/10/2015 --    Migraine Headaches --  --    Night sweats --  --    Primary osteoarthritis of both knees 10/10/2015 --    Primary osteoarthritis of left knee 12/26/2017 --    Primary osteoarthritis of shoulder, Left 12/12/2017 --    Reflux Disease --  --    Seasonal allergies --  --    SOB (shortness of breath) --  --    Vitamin D deficiency 01/07/2015 --          Past Surgical History  Procedure Name Date Notes   Arm ORIF 8/1/2000 Right arm ORIF by Dr. Tinoco   Arthoscopic knee surgery --  --    Artificial Joints/Limbs --  --    Back --  --    Back Surgery, Lumbar --  --    Colonoscopy 2011 --    Hemorrhoid surgery --  --    Hip replacement, right 12/16/2013 Dr. Denny   Joint Surgery --  --    Kidney Stone Surgery, Unspecified --  --    Tibial Fracture 9/11/2013 Left tibal ORIF by Dr. Denny   Tonsillectomy --  --          Medication List  Name Date Started Instructions   bupropion HCl 150 mg oral tablet sustained-release 12 hr 08/12/2020 TAKE 1 TABLET BY MOUTH EVERY TWELVE HOURS   Caltrate 600 plus D 600 mg (1,500 mg)-800 unit oral tablet,chewable  chew 1 tablet by oral route daily   cetirizine 10 mg oral tablet 10/19/2020 TAKE 1 TABLET (10 MG) BY ORAL ROUTE ONCE DAILY   chlorthalidone 25 mg oral tablet 10/19/2020 TAKE ONE TABLET BY MOUTH DAILY. for 90 days   ciclopirox 0.77 % topical suspension 10/19/2020 apply to the  affected and surrounding areas of skin by topical route 2 times per day in the morning and evening   desvenlafaxine succinate 50 mg oral tablet extended release 24 hr 10/19/2020 TAKE 1 TABLET (50 MG) BY MOUTH DAILY for 90 days   fluticasone propionate 50 mcg/actuation nasal spray,suspension 10/19/2020 spray 1 - 2 sprays in each nostril by intranasal route once daily   hydroxyzine HCl 25 mg oral tablet 10/19/2020 one tab po daily for 90 days   naproxen 500 mg oral tablet 2020 TAKE 1 TABLET BY MOUTH TWICE DAILY WITH FOOD   Norco 5-325 mg oral tablet 2020 take 1 tablet by oral route every 8 hours as needed   omeprazole 40 mg oral capsule,delayed release(DR/EC) 2020 take 1 capsule by oral route 2 times a day for 30 days   rizatriptan 10 mg oral tablet 2019 take 1 tablet (10 mg) by oral route once, may repeat at 2 hour intervals; do not exceed 30 mg in 24 hours         Allergy List  Allergen Name Date Reaction Notes   Darvocet-N 100  Rash to arms, torso --    Hydromet --  Itching on Face, Head, and Neck --    Lamictal  Int hives X 6+ months --        Allergies Reconciled  Family Medical History  Disease Name Relative/Age Notes   Family history of skin cancer Mother/   --    Family history of kidney cancer Father/   --    Family history of diabetes mellitus (DM) Sister/   --          Reproductive History  Menstrual   Menopause Status: Postmenopausal Age Menopause: 55   Pregnancy Summary   Total Pregnancies: 2 Full Term: 2 Premature: 0   Ab Induced: 0 Ab Spontaneous: 0 Ectopics: 0   Multiples: 0 Livin         Social History  Finding Status Start/Stop Quantity Notes   Alcohol Current some day --/-- --  --    lives alone --  --/-- --  --    Recreational Drug Use Never --/-- --  no   Tobacco Never --/-- --  --     --  --/-- --  --    Working --  --/-- --  --          Immunizations  NameDate Admin Mfg Trade Name Lot Number Route Inj VIS Given VIS Publication   Hepatitis [id number] HEATHER  "HAVRIX-ADULT  IM RD 04/25/2018 10/25/2011   Comments: PT TOLERATED WELL   Vmzvbootn43/04/2019 SKB Fluzone [employer] [id number] IM RD 10/04/2019    Comments: pt tolerated well and left office in stable condition, FRANKLYN Cheatham         Review of Systems  · Constitutional  o Denies  o : fever, fatigue, weight loss, weight gain  · Cardiovascular  o Denies  o : lower extremity edema, claudication, chest pressure, palpitations  · Respiratory  o Denies  o : shortness of breath, wheezing, frequent cough, hemoptysis, dyspnea on exertion  · Gastrointestinal  o Denies  o : nausea, vomiting, diarrhea, constipation, abdominal pain      Vitals  Date Time BP Position Site L\R Cuff Size HR RR TEMP (F) WT  HT  BMI kg/m2 BSA m2 O2 Sat FR L/min FiO2 HC       02/06/2020 01:15 /80 Sitting    108 - R  97.6 222lbs 6oz 5'  7\" 34.83 2.18 95 %  21%    05/20/2020 03:29 /80 Sitting    86 - R 15 97.8 213lbs 8oz 5'  7\" 33.44 2.14 98 %      10/19/2020 01:10 /82 Sitting    74 - R 16 98.3 213lbs 6oz 5'  7\" 33.42 2.14 98 %  21%          Physical Examination  · Constitutional  o Appearance  o : no acute distress, well-nourished  · Head and Face  o Head  o :   § Inspection  § : atraumatic, normocephalic  · Eyes  o Eyes  o : extraocular movements intact, no scleral icterus, no conjunctival injection  · Ears, Nose, Mouth and Throat  o Ears  o :   § External Ears  § : normal  o Nose  o :   § Intranasal Exam  § : nares patent  o Oral Cavity  o :   § Oral Mucosa  § : moist mucous membranes  · Respiratory  o Respiratory Effort  o : breathing comfortably, symmetric chest rise  o Auscultation of Lungs  o : clear to asculatation bilaterally, no wheezes, rales, or rhonchii  · Cardiovascular  o Heart  o :   § Auscultation of Heart  § : regular rate and rhythm, no murmurs, rubs, or gallops  o Peripheral Vascular System  o :   § Extremities  § : no edema  · Neurologic  o Mental Status Examination  o :   § Orientation  § : grossly oriented to " person, place and time  o Gait and Station  o :   § Gait Screening  § : normal gait  · Psychiatric  o General  o : normal mood and affect              Assessment  · Need for influenza vaccination     V04.81/Z23  · Thoracic back pain     724.1/M54.6  · Leg length discrepancy     736.81/M21.70  will discuss this further with Dr. Denny  consider shoe lift vs possible knee replacement  · Cervicalgia     723.1/M54.2  neck pain  · Hand tingling     782.0/R20.2  · Radiculopathy     729.2/M54.10       xrays of neck and tspine    otherwise chronic issues stable  cont meds for now     Problems Reconciled  Plan  · Orders  o Thoracic Spine (3 view) [facility] (74530) - 724.1/M54.6 - 10/19/2020  o ACO-39: Current medications updated and reviewed (, 1159F) - - 10/19/2020  o Cervical Spine Complete [facility] ([id number]) - 723.1/M54.2 - 10/19/2020   consider MRI if negative due to hand pain   DONE IN CLINIC  o Thoracic Spine (3 view) [facility] ([id number]) - 724.1/M54.6 - 10/19/2020   DONE IN CLINIC  o MRI cervical spine multi-sequence wo contrast ([id number]) - 782.0/R20.2, 729.2/M54.10 - 10/19/2020  · Medications  o cetirizine 10 mg oral tablet   SIG: TAKE 1 TABLET (10 MG) BY ORAL ROUTE ONCE DAILY   DISP: (90) Tablet with 1 refills  Refilled on 10/19/2020     o chlorthalidone 25 mg oral tablet   SIG: TAKE ONE TABLET BY MOUTH DAILY. for 90 days   DISP: (90) Tablet with 1 refills  Refilled on 10/19/2020     o ciclopirox 0.77 % topical suspension   SIG: apply to the affected and surrounding areas of skin by topical route 2 times per day in the morning and evening   DISP: (1) Bottle with 2 refills  Refilled on 10/19/2020     o desvenlafaxine succinate 50 mg oral tablet extended release 24 hr   SIG: TAKE 1 TABLET (50 MG) BY MOUTH DAILY for 90 days   DISP: (90) Tablet with 1 refills  Refilled on 10/19/2020     o fluticasone propionate 50 mcg/actuation nasal spray,suspension   SIG: spray 1 - 2 sprays in each nostril by intranasal route once daily    DISP: (1) Gram with 2 refills  Refilled on 10/19/2020     o hydroxyzine HCl 25 mg oral tablet   SIG: one tab po daily for 90 days   DISP: (90) Tablet with 1 refills  Refilled on 10/19/2020     o Pristiq 50 mg oral tablet extended release 24 hr   SIG: TAKE 1 TABLET (50 MG) BY MOUTH DAILY for 90 days   DISP: (90) Tablet with 1 refills  Discontinued on 10/19/2020     · Instructions  o Patient was educated/instructed on their diagnosis, treatment and medications prior to discharge from the clinic today.  · Disposition  o 2 Month Follow Up  o Labs drawn in house            Electronically Signed by: Teresa Herman MD -Author on October 19, 2020 05:27:53 PM

## 2021-05-13 NOTE — PROGRESS NOTES
Progress Note      Patient Name: Casandra Arango   Patient ID: [id number]   Sex: Female   YOB: 1955    Primary Care Provider: Teresa Herman MD   Referring Provider: Teresa Herman MD    Visit Date: October 20, 2020    Provider: Kar Denny MD   Location: [facility]   Location Address: [address] [place] [address]  [id number]   Location Phone: [phone]          Chief Complaint  · Right Knee Pain      History Of Present Illness  Casandra Arango is a 65 year old /White female who presents today to [facility].      Patient presents today with a follow-up of right knee pain. Patient had a Right Knee Arthroscopy, Partial Medial Meniscectomy, Partial Lateral Meniscectomy, Chondroplasty on 7/1/20 done by Dr. Denyn. Patient has a history of left total knee. Patient states the lift in her shoe is not enough to even out her legs because one leg is shorter than the other. Patient states that she is having pain again and it's progressively gotten worse. Patient states that her knee was the only source of her pain but has recently started to shoot down her shin and causes her hip pain as well. Patient ambulates with a cane.       Past Medical History  Allergic rhinitis; Anemia, Unspecified; Anxiety; Arthritis; Chronic cough; Closed nondisplaced fracture of distal phalanx of right great toe with routine healing, subsequent encounter; Closed nondisplaced fracture of distal phalanx of right great toe, initial encounter; Edema of right lower extremity; GERD (gastroesophageal reflux disease); Globus sensation; Hemorrhoids; Hyperlipemia; Hypertension; Insomnia, unspecified insomnia; Kidney stones; Major depressive disorder, recurrent, severe without psychotic features; Migraine Headaches; Night sweats; Primary osteoarthritis of both knees; Primary osteoarthritis of left knee; Primary osteoarthritis of shoulder, Left; Reflux Disease; Seasonal allergies; SOB (shortness of breath);  Vitamin D deficiency         Past Surgical History  Arm ORIF; Arthoscopic knee surgery; Artificial Joints/Limbs; Back; Back Surgery, Lumbar; Colonoscopy; Hemorrhoid surgery; Hip replacement, right; Joint Surgery; Kidney Stone Surgery, Unspecified; Tibial Fracture; Tonsillectomy         Medication List  bupropion HCl 150 mg oral tablet sustained-release 12 hr; Caltrate 600 plus D 600 mg (1,500 mg)-800 unit oral tablet,chewable; cetirizine 10 mg oral tablet; chlorthalidone 25 mg oral tablet; ciclopirox 0.77 % topical suspension; desvenlafaxine succinate 50 mg oral tablet extended release 24 hr; fluticasone propionate 50 mcg/actuation nasal spray,suspension; hydroxyzine HCl 25 mg oral tablet; naproxen 500 mg oral tablet; Norco 5-325 mg oral tablet; omeprazole 40 mg oral capsule,delayed release(DR/EC); rizatriptan 10 mg oral tablet         Allergy List  Darvocet-N 100; Hydromet; Lamictal       Allergies Reconciled  Family Medical History  Family history of skin cancer; Family history of kidney cancer; Family history of diabetes mellitus (DM)         Reproductive History   2 Para 2 0 0 0 & Postmenopausal       Social History  Alcohol (Current some day); lives alone; Recreational Drug Use (Never); Tobacco (Never); ; Working         Immunizations  Name Date Admin   Hepatitis A 2018   Influenza 10/19/2020   Influenza 10/04/2019   Influenza 2018   Influenza 2017   Influenza 2016   Influenza 10/09/2015         Review of Systems  · Constitutional  o Denies  o : fever, chills, weight loss  · Cardiovascular  o Denies  o : chest pain, shortness of breath  · Gastrointestinal  o Denies  o : liver disease, heartburn, nausea, blood in stools  · Genitourinary  o Denies  o : painful urination, blood in urine  · Integument  o Denies  o : rash, itching  · Neurologic  o Denies  o : headache, weakness, loss of consciousness  · Musculoskeletal  o Denies  o : painful, swollen  "joints  · Psychiatric  o Denies  o : drug/alcohol addiction, anxiety, depression      Vitals  Date Time BP Position Site L\R Cuff Size HR RR TEMP (F) WT  HT  BMI kg/m2 BSA m2 O2 Sat FR L/min FiO2 HC       10/20/2020 01:24 PM         212lbs 16oz 5'  7\" 33.36 2.14             Physical Examination  · Constitutional  o Appearance  o : well developed, well-nourished, no obvious deformities present  · Head and Face  o Head  o :   § Inspection  § : normocephalic  o Face  o :   § Inspection  § : no facial lesions  · Eyes  o Conjunctivae  o : conjunctivae normal  o Sclerae  o : sclerae white  · Ears, Nose, Mouth and Throat  o Ears  o :   § External Ears  § : appearance within normal limits  § Hearing  § : intact  o Nose  o :   § External Nose  § : appearance normal  · Neck  o Inspection/Palpation  o : normal appearance  o Range of Motion  o : full range of motion  · Respiratory  o Respiratory Effort  o : breathing unlabored  o Inspection of Chest  o : normal appearance  o Auscultation of Lungs  o : no audible wheezing or rales  · Cardiovascular  o Heart  o : regular rate  · Gastrointestinal  o Abdominal Examination  o : soft and non-tender  · Skin and Subcutaneous Tissue  o General Inspection  o : intact, no rashes  · Psychiatric  o General  o : Alert and oriented x3  o Judgement and Insight  o : judgment and insight intact  o Mood and Affect  o : mood normal, affect appropriate  · Right Knee  o Inspection  o : Sensation grossly intact. Neurovascular intact. Pulses normal. Flexion and extension from 5-115 degrees. Positive for crepitus. Antalgic gait. Skin intact. Calf supple, non-tender. Tender medial and lateral joint line. Good strength in quadriceps, hamstrings, dorsiflexors, and plantar flexors. Full weight-bearing. No swelling, skin discoloration or atrophy.   · In Office Procedures  o View  o : LAT/SUNRISE/STANDING   o Site  o : right, knee  o Indication  o : Right knee pain  o Study  o : X-rays ordered, taken in the " office, and reviewed today.  o Xray  o : Advanced degenerative changes consistent with osteoarthritis. Mild varus deformity present. Negative signs for fracture or dislocation.           Assessment  · Primary osteoarthritis of right knee     715.16/M17.11  · Right knee pain, unspecified chronicity     719.46/M25.561      Plan  · Orders  o Knee (Right) [facility] ([id number]) - 719.46/M25.561 - 10/20/2020  · Medications  o Medications have been Reconciled  o Transition of Care or Provider Policy  · Instructions  o Dr. Denny saw and examined the patient and agrees with plan.   o X-rays reviewed by Dr. Denny.  o Reviewed the patient's Past Medical, Social, and Family history as well as the ROS at today's visit, no changes.  o Call or return if worsening symptoms.  o Discussed surgery.  o Risks/benefits discussed with patient including, but not limited to: infection, bleeding, neurovascular damage, malunion, nonunion, aesthetic deformity, need for further surgery, and death.  o Discussed with patient the implant type being used during surgery and patient understands and desires to proceed.  o Surgery pamphlet given.  o Exercise handout given.  o Follow Up post-op.  o This note was transcribed by Ora Sloan.   o Discussed diagnosis and treatment options with the patient. Discussed knee replacement, PT and injections. Patient wishes to proceed with a right total knee arthroplasty.            Electronically Signed by: Ora Sloan-, Other -Author on October 22, 2020 08:51:21 AM  Electronically Co-signed by: Kar Denny MD -Reviewer on October 22, 2020 11:39:24 AM

## 2021-05-13 NOTE — PROGRESS NOTES
Progress Note      Patient Name: Casandra Arango   Patient ID: [id number]   Sex: Female   YOB: 1955    Primary Care Provider: Teresa Herman MD   Referring Provider: Teresa Herman MD    Visit Date: July 16, 2020    Provider: Herminia Man PA-C   Location: [facility]   Location Address: [address] [place] [address]  [id number]   Location Phone: [phone]          Chief Complaint  · Right knee pain      History Of Present Illness  Casandra Arango is a 64 year old /White female who presents today to [facility].      She is s/p right knee arthroscopic pMMT, pLMT, PFC, MFCC 7/1/20 by Dr. Denny. She is making progress in PT. She states her pain is much better.       Past Medical History  Allergic rhinitis; Anemia, Unspecified; Anxiety; Arthritis; Chronic cough; Closed nondisplaced fracture of distal phalanx of right great toe with routine healing, subsequent encounter; Closed nondisplaced fracture of distal phalanx of right great toe, initial encounter; Edema of right lower extremity; GERD (gastroesophageal reflux disease); Globus sensation; Hemorrhoids; Hyperlipemia; Hypertension; Insomnia, unspecified insomnia; Kidney stones; Major depressive disorder, recurrent, severe without psychotic features; Migraine Headaches; Night sweats; Primary osteoarthritis of both knees; Primary osteoarthritis of left knee; Primary osteoarthritis of shoulder, Left; Reflux Disease; Seasonal allergies; SOB (shortness of breath); Vitamin D deficiency         Past Surgical History  Arm ORIF; Arthoscopic knee surgery; Artificial Joints/Limbs; Back; Back Surgery, Lumbar; Colonoscopy; Hemorrhoid surgery; Hip replacement, right; Joint Surgery; Kidney Stone Surgery, Unspecified; Tibial Fracture; Tonsillectomy         Medication List  Caltrate 600 plus D 600 mg (1,500 mg)-800 unit oral tablet,chewable; cetirizine 10 mg oral tablet; chlorthalidone 25 mg oral tablet; ciclopirox 0.77 % topical  "suspension; fluticasone propionate 50 mcg/actuation nasal spray,suspension; hydroxyzine HCl 25 mg oral tablet; naproxen 500 mg oral tablet; Norco 5-325 mg oral tablet; omeprazole 40 mg oral capsule,delayed release(DR/EC); Pristiq 50 mg oral tablet extended release 24 hr; rizatriptan 10 mg oral tablet; Wellbutrin  mg oral tablet sustained-release 12 hr         Allergy List  Darvocet-N 100; Hydromet; Lamictal       Allergies Reconciled  Family Medical History  Family history of skin cancer; Family history of kidney cancer; Family history of diabetes mellitus (DM)         Reproductive History   2 Para 2 0 0 0 & Postmenopausal       Social History  Alcohol (Current some day); lives alone; Recreational Drug Use (Never); Tobacco (Never); ; Working         Review of Systems  · Constitutional  o Denies  o : fever, chills, weight loss  · Cardiovascular  o Denies  o : chest pain, shortness of breath  · Gastrointestinal  o Denies  o : liver disease, heartburn, nausea, blood in stools  · Genitourinary  o Denies  o : painful urination, blood in urine  · Integument  o Denies  o : rash, itching  · Neurologic  o Denies  o : headache, weakness, loss of consciousness  · Musculoskeletal  o Admits  o : painful, swollen joints  · Psychiatric  o Denies  o : drug/alcohol addiction, anxiety, depression      Vitals  Date Time BP Position Site L\R Cuff Size HR RR TEMP (F) WT  HT  BMI kg/m2 BSA m2 O2 Sat        2020 02:07 PM         216lbs 0oz 5'  7\" 33.83 2.15           Physical Examination  · Constitutional  o Appearance  o : well developed, well-nourished, no obvious deformities present  · Head and Face  o Head  o :   § Inspection  § : normocephalic  o Face  o :   § Inspection  § : no facial lesions  · Eyes  o Conjunctivae  o : conjunctivae normal  o Sclerae  o : sclerae white  · Ears, Nose, Mouth and Throat  o Ears  o :   § External Ears  § : appearance within normal limits  § Hearing  § : intact  o Nose  o : "   § External Nose  § : appearance normal  · Neck  o Inspection/Palpation  o : normal appearance  o Range of Motion  o : full range of motion  · Respiratory  o Respiratory Effort  o : breathing unlabored  o Inspection of Chest  o : normal appearance  o Auscultation of Lungs  o : no audible wheezing or rales  · Cardiovascular  o Heart  o : regular rate  · Gastrointestinal  o Abdominal Examination  o : soft and non-tender  · Skin and Subcutaneous Tissue  o General Inspection  o : intact, no rashes  · Psychiatric  o General  o : Alert and oriented x3  o Judgement and Insight  o : judgment and insight intact  o Mood and Affect  o : mood normal, affect appropriate  · Right Knee  o Inspection  o : Well healed incisions. No signs of infection. Full ROM. Calf supple/nontender. Negative Kevin's. Ambulates with cane.           Assessment  · Aftercare following surgery of the muskuloskeletal system     V54.81  · Right knee pain, unspecified chronicity     719.46/M25.561      Plan  · Medications  o Medications have been Reconciled  o Transition of Care or Provider Policy  · Instructions  o Reviewed the patient's Past Medical, Social, and Family history as well as the ROS at today's visit, no changes.  o Call or return if worsening symptoms.  o She will continue PT 2-3 weeks more. Follow up as needed.            Electronically Signed by: MARCIN Ibarra-C -Author on July 16, 2020 02:33:11 PM

## 2021-05-13 NOTE — PROGRESS NOTES
Progress Note      Patient Name: Casandra Arango   Patient ID: [id number]   Sex: Female   YOB: 1955    Primary Care Provider: Teresa Herman MD   Referring Provider: Teresa Herman MD    Visit Date: May 20, 2020    Provider: Griselda Price PA-C   Location: [facility]   Location Address: [address] [place] [address], [address]  [place], [place]  [id number]   Location Phone: [phone]          Chief Complaint  · knee pain      History Of Present Illness  Casandra Arango is a 64 year old /White female who presents for evaluation and treatment of:      right knee pain x2 months. Sees Dr. Denny. Had MRI on Monday. Naproxen not helping.  MRI showed complex tear of medial and lateral meniscus.  She is to follow up with Ortho next week.  Patient states that the pain she has been having in her knee has caused her lots of stress and causing her to not sleep well.    Patient is inquiring about testing for Lyme disease as she has been bitten with a tick within the past 6 months and is concerned because she has been experiencing more fatigue, insomnia, memory changes and occasional neuropathy in her hands when sleeping.  She denies any recent tick bites or having a tick attached for long periods of time.       Past Medical History  Disease Name Date Onset Notes   Allergic rhinitis --  --    Anemia, Unspecified --  --    Anxiety --  --    Arthritis --  --    Chronic cough --  --    Closed nondisplaced fracture of distal phalanx of right great toe with routine healing, subsequent encounter 01/19/2018 --    Closed nondisplaced fracture of distal phalanx of right great toe, initial encounter 12/26/2017 --    Edema of right lower extremity 10/10/2015 Discussed continuing elevation of the foot and wearing compression stockings   GERD (gastroesophageal reflux disease) --  --    Globus sensation --  --    Hemorrhoids --  --    Hyperlipemia --  --    Hypertension --  --    Insomnia, unspecified  insomnia 10/10/2015 Told her to take Abilify during the morning rather than in the evening and see if that helps with the insomnia however if it does not will need to consider further treatment   Kidney stones --  --    Major depressive disorder, recurrent, severe without psychotic features 10/10/2015 --    Migraine Headaches --  --    Night sweats --  --    Primary osteoarthritis of both knees 10/10/2015 --    Primary osteoarthritis of left knee 12/26/2017 --    Primary osteoarthritis of shoulder, Left 12/12/2017 --    Reflux Disease --  --    Seasonal allergies --  --    SOB (shortness of breath) --  --    Vitamin D deficiency 01/07/2015 --          Past Surgical History  Procedure Name Date Notes   Arm ORIF 8/1/2000 Right arm ORIF by Dr. Tinoco   Arthoscopic knee surgery --  --    Artificial Joints/Limbs --  --    Back --  --    Back Surgery, Lumbar --  --    Colonoscopy 2011 --    Hemorrhoid surgery --  --    Hip replacement, right 12/16/2013 Dr. Denny   Joint Surgery --  --    Kidney Stone Surgery, Unspecified --  --    Tibial Fracture 9/11/2013 Left tibal ORIF by Dr. Denny   Tonsillectomy --  --          Medication List  Name Date Started Instructions   Caltrate 600 plus D 600 mg (1,500 mg)-800 unit oral tablet,chewable  chew 1 tablet by oral route daily   cetirizine 10 mg oral tablet 08/30/2019 TAKE 1 TABLET (10 MG) BY ORAL ROUTE ONCE DAILY   chlorthalidone 25 mg oral tablet 02/20/2020 TAKE ONE TABLET BY MOUTH DAILY.   ciclopirox 0.77 % topical suspension 02/06/2020 apply to the affected and surrounding areas of skin by topical route 2 times per day in the morning and evening   fluticasone propionate 50 mcg/actuation nasal spray,suspension 01/20/2020 spray 1 - 2 sprays in each nostril by intranasal route once daily   hydroxyzine HCl 25 mg oral tablet 12/05/2019 one tab po daily   naproxen 500 mg oral tablet 04/02/2020 TAKE ONE TABLET BY MOUTH TWICE DAILY WITH FOOD   omeprazole 40 mg oral capsule,delayed  release(DR/EC) 2020 take 1 capsule by oral route 2 times a day for 30 days   Pristiq 50 mg oral tablet extended release 24 hr 2020 TAKE 1 TABLET (50 MG) BY MOUTH DAILY   rizatriptan 10 mg oral tablet 2019 take 1 tablet (10 mg) by oral route once, may repeat at 2 hour intervals; do not exceed 30 mg in 24 hours   Wellbutrin  mg oral tablet sustained-release 12 hr 2020 Take 1 tablet by mouth every 12 hours         Allergy List  Allergen Name Date Reaction Notes   Darvocet-N 100  Rash to arms, torso --    Hydromet --  Itching on Face, Head, and Neck --    Lamictal  Int hives X 6+ months --          Family Medical History  Disease Name Relative/Age Notes   Family history of skin cancer Mother/   --    Family history of kidney cancer Father/   --    Family history of diabetes mellitus (DM) Sister/   --          Reproductive History  Menstrual   Menopause Status: Postmenopausal Age Menopause: 55   Pregnancy Summary   Total Pregnancies: 2 Full Term: 2 Premature: 0   Ab Induced: 0 Ab Spontaneous: 0 Ectopics: 0   Multiples: 0 Livin         Social History  Finding Status Start/Stop Quantity Notes   Alcohol Current some day --/-- --  --    lives alone --  --/-- --  --    Recreational Drug Use Never --/-- --  no   Tobacco Never --/-- --  --     --  --/-- --  --    Working --  --/-- --  --          Immunizations  NameDate Admin Mfg Trade Name Lot Number Route Inj VIS Given VIS Publication   Hepatitis [id number] SKB HAVRIX-ADULT  IM RD 2018 10/25/2011   Comments: PT TOLERATED WELL   Pyvchbbol25/2019 SKB Fluzone Quadrivalent [id number] IM RD 10/04/2019    Comments: pt tolerated well and left office in stable condition, FRANKLYN Cheatham   Vgukqmpdi13/30/2018 SKB Fluzone Quadrivalent [id number] IM LD 2018   Comments: Pt tolerated well, left office in stable condition   Tqdkpgtor86/03/2017 SKB Fluarix, quadrivalent, preservative free [id number] IM RD 2017  "08/19/2014   Comments: pt tolerated well, left office in stable condition   Fwgvdaytu43/02/2016 SKB Fluarix, quadrivalent, preservative free [id number] IM LT 12/02/2016 08/19/2014   Comments: Patient tolerated well, left office in stable condition.   Brybjmkfh85/09/2015 SKB Fluarix, quadrivalent, preservative free [id number] IM RA 10/09/2015 08/19/2014   Comments: Patient given vaccine and left office in stable condition         Review of Systems  · Constitutional  o Admits  o : fatigue  o Denies  o : fever, weight loss, weight gain  · Cardiovascular  o Denies  o : lower extremity edema, claudication, chest pressure, palpitations  · Respiratory  o Denies  o : shortness of breath, wheezing, frequent cough, hemoptysis, dyspnea on exertion  · Gastrointestinal  o Denies  o : nausea, vomiting, diarrhea, constipation, abdominal pain  · Musculoskeletal  o Admits  o : knee pain      Vitals  Date Time BP Position Site L\R Cuff Size HR RR TEMP (F) WT  HT  BMI kg/m2 BSA m2 O2 Sat        10/04/2019 01:14 /86 Sitting    90 - R  98.6 218lbs 2oz 5'  7\" 34.16 2.16 96 %    10/24/2019 11:00 /78 Sitting    83 - R 16 99.3 218lbs 0oz 5'  7\" 34.14 2.16 98 %    02/06/2020 01:15 /80 Sitting    108 - R  97.6 222lbs 6oz 5'  7\" 34.83 2.18 95 %    05/20/2020 03:29 /80 Sitting    86 - R 15 97.8 213lbs 8oz 5'  7\" 33.44 2.14 98 %          Physical Examination  · Constitutional  o Appearance  o : no acute distress, well-nourished  · Head and Face  o Head  o :   § Inspection  § : atraumatic, normocephalic  · Eyes  o Eyes  o : extraocular movements intact, no scleral icterus, no conjunctival injection  · Ears, Nose, Mouth and Throat  o Ears  o :   § External Ears  § : normal  o Nose  o :   § Intranasal Exam  § : nares patent  o Oral Cavity  o :   § Oral Mucosa  § : moist mucous membranes  · Respiratory  o Respiratory Effort  o : breathing comfortably, symmetric chest rise  o Auscultation of Lungs  o : clear to asculatation " bilaterally, no wheezes, rales, or rhonchii  · Cardiovascular  o Heart  o :   § Auscultation of Heart  § : regular rate and rhythm, no murmurs, rubs, or gallops  o Peripheral Vascular System  o :   § Extremities  § : no edema  · Skin and Subcutaneous Tissue  o General Inspection  o : no lesions present, no areas of discoloration, skin turgor normal  · Neurologic  o Mental Status Examination  o :   § Orientation  § : grossly oriented to person, place and time  o Gait and Station  o :   § Gait Screening  § : normal gait  · Psychiatric  o General  o : normal mood and affect     MSK- R knee TTP, worse along joint line with some gait difficulty               Assessment  · Fatigue     780.79/R53.83  Most likely due to knee pain and stress along with this and would expect for fatigue, insomnia, and memory issues to improve as knee pain improves, however; will go ahead and check other labs which could be contributing. Follow up as needed, call for any questions or concerns.  · Knee pain     719.46/M25.569  · Right knee pain     719.46/M25.561  Toradol injection done in office. Will have patient follow up with Ortho for further treatment.   · Tick bite       Bitten or stung by nonvenomous insect and other nonvenomous arthropods, initial encounter     919.4/W57.XXXA  less likely to be causing vague symptoms but will go ahead and check tick titers due to patient recently moving to farm and being exposed to ticks.       Plan  · Orders  o CBC with Auto Diff [facility] ([id number]) - 780.79/R53.83 - 05/20/2020  o CMP [facility] ([id number]) - 780.79/R53.83 - 05/20/2020  o TSH [facility] ([id number]) - 780.79/R53.83 - 05/20/2020  o Vitamin D (25-Hydroxy) Level (52505) - 780.79/R53.83 - 05/20/2020  o ACO-39: Current medications updated and reviewed () - - 05/20/2020  o Tick-borne disease panel (33587, 01981, 04682) - 780.79/R53.83, 919.4/W57.XXXA - 05/20/2020  o IM/SQ - Injection Fee [facility] ([id number]) - - 05/20/2020  o 4.00 - Toradol Injection 60mg (-9) - -  05/20/2020   Injection - Toradol 60 mg; Dose: 2ml; Site: Right Gluteus; Route: intramuscular; Date: 05/20/2020 16:21:57; Exp: 10/01/2021; Lot: [id number]; Mfg: [employer]; TradeName: Toradol IM; Location: [facility]; Administered By: Kathe Gary LPN; Comment: pt tolerated well. Left office in stable condition.  · Medications  o Medications have been Reconciled  o Transition of Care or Provider Policy  · Instructions  o Patient was educated/instructed on their diagnosis, treatment and medications prior to discharge from the clinic today.  o Patient instructed to seek medical attention urgently for new or worsening symptoms.  o Call the office with any concerns or questions.  · Disposition  o Call or Return if symptoms worsen or persist.  o follow up as needed            Electronically Signed by: Griselda Price PA-C -Author on May 20, 2020 04:53:44 PM

## 2021-05-13 NOTE — PROGRESS NOTES
"   Progress Note      Patient Name: Casandra Arango   Patient ID: [id number]   Sex: Female   YOB: 1955    Primary Care Provider: Teresa Herman MD   Referring Provider: Teresa Herman MD    Visit Date: June 8, 2020    Provider: Teresa Herman MD   Location: [facility]   Location Address: [address] [place] [address]  [id number]   Location Phone: [phone]          Chief Complaint  · \"im doing a follow up today\"  · \"im having trouble out of my right knee\"      History Of Present Illness  Video Conferencing Visit  Casandra Arango is a 64 year old /White female who is presenting for evaluation via video conferencing via [employer]. Verbal consent obtained before beginning visit.   The following staff were present during this visit: Teresa Herman MD   Casandra Arango is a 64 year old /White female who presents for evaluation and treatment of:      chronic issues.    right knee pain-  reviewed MRI with her  states that she is having a burning sensation with her knee  they are planning on doing surgery soon    no chest pain  no trouble breating  no leg swelling    stress  well controlled    211lbs       Past Medical History  Disease Name Date Onset Notes   Allergic rhinitis --  --    Anemia, Unspecified --  --    Anxiety --  --    Arthritis --  --    Chronic cough --  --    Closed nondisplaced fracture of distal phalanx of right great toe with routine healing, subsequent encounter 01/19/2018 --    Closed nondisplaced fracture of distal phalanx of right great toe, initial encounter 12/26/2017 --    Edema of right lower extremity 10/10/2015 Discussed continuing elevation of the foot and wearing compression stockings   GERD (gastroesophageal reflux disease) --  --    Globus sensation --  --    Hemorrhoids --  --    Hyperlipemia --  --    Hypertension --  --    Insomnia, unspecified insomnia 10/10/2015 Told her to take Abilify during the morning rather than " in the evening and see if that helps with the insomnia however if it does not will need to consider further treatment   Kidney stones --  --    Major depressive disorder, recurrent, severe without psychotic features 10/10/2015 --    Migraine Headaches --  --    Night sweats --  --    Primary osteoarthritis of both knees 10/10/2015 --    Primary osteoarthritis of left knee 12/26/2017 --    Primary osteoarthritis of shoulder, Left 12/12/2017 --    Reflux Disease --  --    Seasonal allergies --  --    SOB (shortness of breath) --  --    Vitamin D deficiency 01/07/2015 --          Past Surgical History  Procedure Name Date Notes   Arm ORIF 8/1/2000 Right arm ORIF by Dr. Tinoco   Arthoscopic knee surgery --  --    Artificial Joints/Limbs --  --    Back --  --    Back Surgery, Lumbar --  --    Colonoscopy 2011 --    Hemorrhoid surgery --  --    Hip replacement, right 12/16/2013 Dr. Denny   Joint Surgery --  --    Kidney Stone Surgery, Unspecified --  --    Tibial Fracture 9/11/2013 Left tibal ORIF by Dr. Denny   Tonsillectomy --  --          Medication List  Name Date Started Instructions   Caltrate 600 plus D 600 mg (1,500 mg)-800 unit oral tablet,chewable  chew 1 tablet by oral route daily   cetirizine 10 mg oral tablet 08/30/2019 TAKE 1 TABLET (10 MG) BY ORAL ROUTE ONCE DAILY   chlorthalidone 25 mg oral tablet 02/20/2020 TAKE ONE TABLET BY MOUTH DAILY.   ciclopirox 0.77 % topical suspension 02/06/2020 apply to the affected and surrounding areas of skin by topical route 2 times per day in the morning and evening   fluticasone propionate 50 mcg/actuation nasal spray,suspension 01/20/2020 spray 1 - 2 sprays in each nostril by intranasal route once daily   hydroxyzine HCl 25 mg oral tablet 12/05/2019 one tab po daily   naproxen 500 mg oral tablet 04/02/2020 TAKE ONE TABLET BY MOUTH TWICE DAILY WITH FOOD   Norco 5-325 mg oral tablet 06/11/2020 take 1 tablet by oral route every 8 hours as needed   omeprazole 40 mg oral  capsule,delayed release(DR/EC) 2020 take 1 capsule by oral route 2 times a day for 30 days   Pristiq 50 mg oral tablet extended release 24 hr 2020 TAKE 1 TABLET (50 MG) BY MOUTH DAILY   rizatriptan 10 mg oral tablet 2019 take 1 tablet (10 mg) by oral route once, may repeat at 2 hour intervals; do not exceed 30 mg in 24 hours   Wellbutrin  mg oral tablet sustained-release 12 hr 2020 Take 1 tablet by mouth every 12 hours         Allergy List  Allergen Name Date Reaction Notes   Darvocet-N 100  Rash to arms, torso --    Hydromet --  Itching on Face, Head, and Neck --    Lamictal  Int hives X 6+ months --        Allergies Reconciled  Family Medical History  Disease Name Relative/Age Notes   Family history of skin cancer Mother/   --    Family history of kidney cancer Father/   --    Family history of diabetes mellitus (DM) Sister/   --          Reproductive History  Menstrual   Menopause Status: Postmenopausal Age Menopause: 55   Pregnancy Summary   Total Pregnancies: 2 Full Term: 2 Premature: 0   Ab Induced: 0 Ab Spontaneous: 0 Ectopics: 0   Multiples: 0 Livin         Social History  Finding Status Start/Stop Quantity Notes   Alcohol Current some day --/-- --  --    lives alone --  --/-- --  --    Recreational Drug Use Never --/-- --  no   Tobacco Never --/-- --  --     --  --/-- --  --    Working --  --/-- --  --          Immunizations  NameDate Admin Mfg Trade Name Lot Number Route Inj VIS Given VIS Publication   Hepatitis [id number] SKB HAVRIX-ADULT  IM RD 2018 10/25/2011   Comments: PT TOLERATED WELL   Wkyrphnrs07/2019 SKB Fluzone Quadrivalent [id number] IM RD 10/04/2019    Comments: pt tolerated well and left office in stable condition, FRANKLYN Cheatham   Nfrdtczfb17/30/2018 SKB Fluzone Quadrivalent [id number] IM LD 2018   Comments: Pt tolerated well, left office in stable condition   Bhkhgqbch32/03/2017 SKB Fluarix, quadrivalent, preservative  free [id number] IM RD 11/03/2017 08/19/2014   Comments: pt tolerated well, left office in stable condition   Kvbxnksxq37/02/2016 SKB Fluarix, quadrivalent, preservative free [id number] IM LT 12/02/2016 08/19/2014   Comments: Patient tolerated well, left office in stable condition.   Hhnufyggr04/09/2015 SKB Fluarix, quadrivalent, preservative free [id number] IM RA 10/09/2015 08/19/2014   Comments: Patient given vaccine and left office in stable condition         Review of Systems  · Constitutional  o Denies  o : fever, fatigue, weight loss, weight gain  · Cardiovascular  o Denies  o : lower extremity edema, claudication, chest pressure, palpitations  · Respiratory  o Denies  o : shortness of breath, wheezing, cough, hemoptysis, dyspnea on exertion  · Gastrointestinal  o Denies  o : nausea, vomiting, diarrhea, constipation, abdominal pain      Physical Examination     Gen: well-nourished, no acute distress  HENT: atraumatic, normocephalic  Eyes: extraocular movements intact, no scleral icterus  Lung: breathing comfortably, no cough  Skin: no visible rash, no lesions  Neuro: grossly oriented to person, place, and time. no facial droop   Psych: normal mood and affect               Assessment  · Vitamin D deficiency     268.9/E55.9  · Major depressive disorder, recurrent, severe without psychotic features     296.33/F33.2  · Arthritis     716.90/M19.90  · Anxiety     300.02/F41.1  · Hyperlipemia     272.4/E78.5  · Hypertension     401.9/I10       doing great currently  cont current meds  she will work on diet     Problems Reconciled  Plan  · Orders  o Vitamin D Level (64139) - 268.9/E55.9 - 09/14/2020  o CBC with Auto Diff [facility] ([id number]) - 716.90/M19.90, 401.9/I10, 272.4/E78.5 - 09/14/2020  o CMP [facility] ([id number]) - 716.90/M19.90, 401.9/I10, 272.4/E78.5 - 09/14/2020  o Lipid Panel [facility] (19911) - 272.4/E78.5 - 09/14/2020  o ACO-39: Current medications updated and reviewed () - - 06/08/2020  · Medications  o Medications have been  Reconciled  o Transition of Care or Provider Policy  · Instructions  o Patient was educated/instructed on their diagnosis, treatment and medications prior to discharge from the clinic today.            Electronically Signed by: Teresa Herman MD -Author on June 14, 2020 07:03:17 PM

## 2021-05-13 NOTE — PROGRESS NOTES
Progress Note      Patient Name: Casandra Arango   Patient ID: [id number]   Sex: Female   YOB: 1955    Primary Care Provider: Teresa Herman MD   Referring Provider: Teresa Herman MD    Visit Date: September 18, 2020    Provider: Kar Denny MD   Location: [facility]   Location Address: [address] [place] [address]  [id number]   Location Phone: [phone]          Chief Complaint  · Right Knee Pain      History Of Present Illness  Casandra Arango is a 64 year old /White female who presents today to [facility]. Patient is here for evaluation of her right knee. She had a knee scope about 2.5 months ago, still struggling with pain and range of motion. She is going to physical therapy.       Past Medical History  Allergic rhinitis; Anemia, Unspecified; Anxiety; Arthritis; Chronic cough; Closed nondisplaced fracture of distal phalanx of right great toe with routine healing, subsequent encounter; Closed nondisplaced fracture of distal phalanx of right great toe, initial encounter; Edema of right lower extremity; GERD (gastroesophageal reflux disease); Globus sensation; Hemorrhoids; Hyperlipemia; Hypertension; Insomnia, unspecified insomnia; Kidney stones; Major depressive disorder, recurrent, severe without psychotic features; Migraine Headaches; Night sweats; Primary osteoarthritis of both knees; Primary osteoarthritis of left knee; Primary osteoarthritis of shoulder, Left; Reflux Disease; Seasonal allergies; SOB (shortness of breath); Vitamin D deficiency         Past Surgical History  Arm ORIF; Arthoscopic knee surgery; Artificial Joints/Limbs; Back; Back Surgery, Lumbar; Colonoscopy; Hemorrhoid surgery; Hip replacement, right; Joint Surgery; Kidney Stone Surgery, Unspecified; Tibial Fracture; Tonsillectomy         Medication List  bupropion HCl 150 mg oral tablet sustained-release 12 hr; Caltrate 600 plus D 600 mg (1,500 mg)-800 unit oral tablet,chewable;  "cetirizine 10 mg oral tablet; chlorthalidone 25 mg oral tablet; ciclopirox 0.77 % topical suspension; desvenlafaxine succinate 50 mg oral tablet extended release 24 hr; fluticasone propionate 50 mcg/actuation nasal spray,suspension; hydroxyzine HCl 25 mg oral tablet; naproxen 500 mg oral tablet; Norco 5-325 mg oral tablet; omeprazole 40 mg oral capsule,delayed release(DR/EC); Pristiq 50 mg oral tablet extended release 24 hr; rizatriptan 10 mg oral tablet; Wellbutrin  mg oral tablet sustained-release 12 hr         Allergy List  Darvocet-N 100; Hydromet; Lamictal         Family Medical History  Family history of skin cancer; Family history of kidney cancer; Family history of diabetes mellitus (DM)         Reproductive History   2 Para 2 0 0 0 & Postmenopausal       Social History  Alcohol (Current some day); lives alone; Recreational Drug Use (Never); Tobacco (Never); ; Working         Review of Systems  · Constitutional  o Denies  o : fever, chills, weight loss  · Cardiovascular  o Denies  o : chest pain, shortness of breath  · Gastrointestinal  o Denies  o : liver disease, heartburn, nausea, blood in stools  · Genitourinary  o Denies  o : painful urination, blood in urine  · Integument  o Denies  o : rash, itching  · Neurologic  o Denies  o : headache, weakness, loss of consciousness  · Musculoskeletal  o Denies  o : painful, swollen joints  · Psychiatric  o Denies  o : drug/alcohol addiction, anxiety, depression      Vitals  Date Time BP Position Site L\R Cuff Size HR RR TEMP (F) WT  HT  BMI kg/m2 BSA m2 O2 Sat        2020 09:34 AM      78 - R   213lbs 16oz 5'  7\" 33.52 2.14 98 %          Physical Examination  · Constitutional  o Appearance  o : well developed, well-nourished, no obvious deformities present  · Head and Face  o Head  o :   § Inspection  § : normocephalic  o Face  o :   § Inspection  § : no facial lesions  · Eyes  o Conjunctivae  o : conjunctivae normal  o Sclerae  o : " sclerae white  · Ears, Nose, Mouth and Throat  o Ears  o :   § External Ears  § : appearance within normal limits  § Hearing  § : intact  o Nose  o :   § External Nose  § : appearance normal  · Neck  o Inspection/Palpation  o : normal appearance  o Range of Motion  o : full range of motion  · Respiratory  o Respiratory Effort  o : breathing unlabored  o Inspection of Chest  o : normal appearance  o Auscultation of Lungs  o : no audible wheezing or rales  · Cardiovascular  o Heart  o : regular rate  · Gastrointestinal  o Abdominal Examination  o : soft and non-tender  · Skin and Subcutaneous Tissue  o General Inspection  o : intact, no rashes  · Psychiatric  o General  o : Alert and oriented x3  o Judgement and Insight  o : judgment and insight intact  o Mood and Affect  o : mood normal, affect appropriate  · Right Knee  o Inspection  o : She has range of motion about 5-120. She has crepitus. Sensation intact. Pulse is normal. Neurovascularly intact.           Assessment  · Primary osteoarthritis of right knee     715.16/M17.11  · Right knee pain, unspecified chronicity     719.46/M25.561      Plan  · Medications  o Medications have been Reconciled  o Transition of Care or Provider Policy  · Instructions  o Reviewed the patient's Past Medical, Social, and Family history as well as the ROS at today's visit, no changes.  o Call or return if worsening symptoms.  o This note is transcribed by Jackie Morrison mc  o Continue therapy. See her back in about a month.             Electronically Signed by: Jackie Morrison, -Author on September 21, 2020 10:46:10 AM  Electronically Co-signed by: Kar Denny MD -Reviewer on September 22, 2020 08:24:52 AM

## 2021-05-13 NOTE — PROGRESS NOTES
Progress Note      Patient Name: Casandra Arango   Patient ID: [id number]   Sex: Female   YOB: 1955    Primary Care Provider: Teresa Herman MD   Referring Provider: Teresa Herman MD    Visit Date: December 4, 2020    Provider: Elaina Arguelles PA-C   Location: [facility]   Location Address: [address] [place] [address]  [id number]   Location Phone: [phone]          Chief Complaint  · right knee pain      History Of Present Illness  Casandra Arango is a 65 year old /White female who presents today to [facility].      Patient is status post right total knee arthroplasty performed 11/20/20 by Dr. Denny. Patient is doing very well with mild pain. Patient denies calf pain. Patient is attending physical therapy at [facility].       Past Medical History  Allergic rhinitis; Anemia, Unspecified; Anxiety; Arthritis; Chronic cough; Closed nondisplaced fracture of distal phalanx of right great toe with routine healing, subsequent encounter; Closed nondisplaced fracture of distal phalanx of right great toe, initial encounter; Edema of right lower extremity; GERD (gastroesophageal reflux disease); Globus sensation; Hemorrhoids; Hyperlipemia; Hypertension; Insomnia, unspecified insomnia; Kidney stones; Major depressive disorder, recurrent, severe without psychotic features; Migraine Headaches; Night sweats; Primary osteoarthritis of both knees; Primary osteoarthritis of left knee; Primary osteoarthritis of shoulder, Left; Reflux Disease; Seasonal allergies; SOB (shortness of breath); Vitamin D deficiency         Past Surgical History  Arm ORIF; Arthoscopic knee surgery; Artificial Joints/Limbs; Back; Back Surgery, Lumbar; Colonoscopy; Hemorrhoid surgery; Hip replacement, right; Joint Surgery; Kidney Stone Surgery, Unspecified; Tibial Fracture; Tonsillectomy         Medication List  Bactrim -160 mg oral tablet; bupropion HCl 150 mg oral tablet sustained-release 12  "hr; Caltrate 600 plus D 600 mg (1,500 mg)-800 unit oral tablet,chewable; cetirizine 10 mg oral tablet; chlorthalidone 25 mg oral tablet; ciclopirox 0.77 % topical suspension; desvenlafaxine succinate 50 mg oral tablet extended release 24 hr; fluticasone propionate 50 mcg/actuation nasal spray,suspension; hydroxyzine HCl 25 mg oral tablet; naproxen 500 mg oral tablet; Norco 5-325 mg oral tablet; omeprazole 40 mg oral capsule,delayed release(DR/EC); Percocet 7.5-325 mg oral tablet; rizatriptan 10 mg oral tablet         Allergy List  Darvocet-N 100; Hydromet; Lamictal         Family Medical History  Family history of skin cancer; Family history of kidney cancer; Family history of diabetes mellitus (DM)         Reproductive History   2 Para 2 0 0 0 & Postmenopausal       Social History  Alcohol (Current some day); lives alone; Recreational Drug Use (Never); Tobacco (Never); ; Working         Review of Systems  · Constitutional  o Denies  o : fever, chills, weight loss  · Cardiovascular  o Denies  o : chest pain, shortness of breath  · Gastrointestinal  o Denies  o : liver disease, heartburn, nausea, blood in stools  · Genitourinary  o Denies  o : painful urination, blood in urine  · Integument  o Denies  o : rash, itching  · Neurologic  o Denies  o : headache, weakness, loss of consciousness  · Musculoskeletal  o Denies  o : painful, swollen joints  · Psychiatric  o Denies  o : drug/alcohol addiction, anxiety, depression      Vitals  Date Time BP Position Site L\R Cuff Size HR RR TEMP (F) WT  HT  BMI kg/m2 BSA m2 O2 Sat FR L/min FiO2        2020 09:59 AM      90 - R   204lbs 16oz 5'  7\" 32.11 2.1 98 %            Physical Examination  · Constitutional  o Appearance  o : well developed, well-nourished, no obvious deformities present  · Head and Face  o Head  o :   § Inspection  § : normocephalic  o Face  o :   § Inspection  § : no facial lesions  · Eyes  o Conjunctivae  o : conjunctivae " normal  o Sclerae  o : sclerae white  · Ears, Nose, Mouth and Throat  o Ears  o :   § External Ears  § : appearance within normal limits  § Hearing  § : intact  o Nose  o :   § External Nose  § : appearance normal  · Neck  o Inspection/Palpation  o : normal appearance  o Range of Motion  o : full range of motion  · Respiratory  o Respiratory Effort  o : breathing unlabored  o Inspection of Chest  o : normal appearance  o Auscultation of Lungs  o : no audible wheezing or rales  · Cardiovascular  o Heart  o : regular rate  · Gastrointestinal  o Abdominal Examination  o : soft and non-tender  · Skin and Subcutaneous Tissue  o General Inspection  o : intact, no rashes  · Psychiatric  o General  o : Alert and oriented x3  o Judgement and Insight  o : judgment and insight intact  o Mood and Affect  o : mood normal, affect appropriate  · In Office Procedures  o View  o : AP/LATERAL/SUNRISE   o Site  o : right, knee  o Indication  o : Right knee pain   o Study  o : X-rays ordered, taken in the office, and reviewed today.  o Xray  o : stable implant  o Comparative Data  o : Comparative Data found and reviewed today   · Right Knee-Street  o Inspection  o : incision well healed without signs of infection, no limping gait, weight bearing, no swelling, no ecchymosis, no atrophy, neutral alignment  o Palpation  o : no medial joint line tenderness, no lateral joint line tenderness, no patellar tendon tenderness, no pain of MCL, no pain at LCL  o ROM  o : full extension, full flexion  o Strength  o : weak extension, weak flexion   o Special Tests  o : negative varus stress, negaitve valgus stress  o Neurovascular  o : Full sensation, Dorsal Pedal Pulse 2+, posteriror tibialis pulse 2+          Assessment  · Aftercare;following joint replacement     V54.81/Z47.1  · Right knee pain, unspecified chronicity     719.46/M25.561      Plan  · Orders  o Knee (Right) [facility] ([id number]) - 719.46/M25.561 -  12/04/2020  · Medications  o Medications have been Reconciled  o Transition of Care or Provider Policy  · Instructions  o Staples removed in clinic today.  o Reviewed the patient's Past Medical, Social, and Family history as well as the ROS at today's visit, no changes.  o Call or return if worsening symptoms.  o X-ray ordered, taken and reviewed at this visit.  o Follow Up in 4 weeks.   o Electronically Identified Patient Education Materials Provided Electronically     Continue physical therapy             Electronically Signed by: Elaina Arguelles PA-C -Author on December 4, 2020 10:30:01 AM  Electronically Co-signed by: Kar Denny MD -Reviewer on December 5, 2020 10:43:22 AM

## 2021-05-14 VITALS — BODY MASS INDEX: 32.18 KG/M2 | OXYGEN SATURATION: 97 % | HEIGHT: 67 IN | WEIGHT: 205 LBS | HEART RATE: 84 BPM

## 2021-05-14 VITALS
HEART RATE: 74 BPM | RESPIRATION RATE: 16 BRPM | OXYGEN SATURATION: 98 % | DIASTOLIC BLOOD PRESSURE: 82 MMHG | SYSTOLIC BLOOD PRESSURE: 134 MMHG | BODY MASS INDEX: 33.49 KG/M2 | WEIGHT: 213.37 LBS | TEMPERATURE: 98.3 F | HEIGHT: 67 IN

## 2021-05-14 VITALS — OXYGEN SATURATION: 98 % | HEIGHT: 67 IN | HEART RATE: 90 BPM | BODY MASS INDEX: 32.18 KG/M2 | WEIGHT: 205 LBS

## 2021-05-14 VITALS
RESPIRATION RATE: 18 BRPM | HEART RATE: 100 BPM | HEIGHT: 67 IN | DIASTOLIC BLOOD PRESSURE: 67 MMHG | BODY MASS INDEX: 32.8 KG/M2 | SYSTOLIC BLOOD PRESSURE: 95 MMHG | WEIGHT: 209 LBS

## 2021-05-14 VITALS — WEIGHT: 213 LBS | BODY MASS INDEX: 33.43 KG/M2 | HEIGHT: 67 IN

## 2021-05-14 VITALS
HEART RATE: 91 BPM | DIASTOLIC BLOOD PRESSURE: 86 MMHG | SYSTOLIC BLOOD PRESSURE: 122 MMHG | HEIGHT: 67 IN | TEMPERATURE: 99.1 F | WEIGHT: 208.25 LBS | BODY MASS INDEX: 32.69 KG/M2 | RESPIRATION RATE: 16 BRPM | OXYGEN SATURATION: 100 %

## 2021-05-14 VITALS — BODY MASS INDEX: 32.55 KG/M2 | HEIGHT: 67 IN | OXYGEN SATURATION: 93 % | WEIGHT: 207.37 LBS | HEART RATE: 95 BPM

## 2021-05-14 VITALS — HEIGHT: 67 IN | BODY MASS INDEX: 33.59 KG/M2 | OXYGEN SATURATION: 98 % | WEIGHT: 214 LBS | HEART RATE: 78 BPM

## 2021-05-14 VITALS — BODY MASS INDEX: 33.27 KG/M2 | HEIGHT: 67 IN | OXYGEN SATURATION: 98 % | HEART RATE: 86 BPM | WEIGHT: 212 LBS

## 2021-05-14 NOTE — PROGRESS NOTES
Progress Note      Patient Name: Casandra Arango   Patient ID: [id number]   Sex: Female   YOB: 1955    Primary Care Provider: Teresa Herman MD   Referring Provider: Teresa Herman MD    Visit Date: January 4, 2021    Provider: Teresa Herman MD   Location: [facility]   Location Address: [address] [place] [address]  [id number]   Location Phone: [phone]          Chief Complaint  · f/u  · has had a 2nd sister that has been dx with breast cancer      History Of Present Illness  Casandra Arango is a 65 year old /White female who presents for evaluation and treatment of:      chronic issues    had a total knee replacement in the right knee  she has been using a walker and a cane  she has been driving  she follows up with them tomorrow  she did get an infection in one of her staples  she is having a bit more pain in her     has been wearing braces on her wrists bilaterally  it is helping with her wrists    stress-  somewhat high with her sister recently being diagnosed with breast cancer, however overall doing well  doesn't want to change meds at this time             Past Medical History  Disease Name Date Onset Notes   Allergic rhinitis --  --    Anemia, Unspecified --  --    Anxiety --  --    Arthritis --  --    Chronic cough --  --    Closed nondisplaced fracture of distal phalanx of right great toe with routine healing, subsequent encounter 01/19/2018 --    Closed nondisplaced fracture of distal phalanx of right great toe, initial encounter 12/26/2017 --    Edema of right lower extremity 10/10/2015 Discussed continuing elevation of the foot and wearing compression stockings   GERD (gastroesophageal reflux disease) --  --    Globus sensation --  --    Hemorrhoids --  --    Hyperlipemia --  --    Hypertension --  --    Insomnia, unspecified insomnia 10/10/2015 Told her to take Abilify during the morning rather than in the evening and see if that  helps with the insomnia however if it does not will need to consider further treatment   Kidney stones --  --    Major depressive disorder, recurrent, severe without psychotic features 10/10/2015 --    Migraine Headaches --  --    Night sweats --  --    Primary osteoarthritis of both knees 10/10/2015 --    Primary osteoarthritis of left knee 12/26/2017 --    Primary osteoarthritis of shoulder, Left 12/12/2017 --    Reflux Disease --  --    Seasonal allergies --  --    SOB (shortness of breath) --  --    Vitamin D deficiency 01/07/2015 --          Past Surgical History  Procedure Name Date Notes   Arm ORIF 8/1/2000 Right arm ORIF by Dr. Tinoco   Arthoscopic knee surgery --  --    Artificial Joints/Limbs --  --    Back --  --    Back Surgery, Lumbar --  --    Colonoscopy 2011 --    Hemorrhoid surgery --  --    Hip replacement, right 12/16/2013 Dr. Denny   Joint Surgery --  --    Kidney Stone Surgery, Unspecified --  --    Tibial Fracture 9/11/2013 Left tibal ORIF by Dr. Denny   Tonsillectomy --  --          Medication List  Name Date Started Instructions   bupropion HCl 150 mg oral tablet sustained-release 12 hr 12/08/2020 TAKE 1 TABLET BY MOUTH EVERY TWELVE HOURS   Caltrate 600 plus D 600 mg (1,500 mg)-800 unit oral tablet,chewable  chew 1 tablet by oral route daily   cetirizine 10 mg oral tablet 10/19/2020 TAKE 1 TABLET (10 MG) BY ORAL ROUTE ONCE DAILY   chlorthalidone 25 mg oral tablet 10/19/2020 TAKE ONE TABLET BY MOUTH DAILY. for 90 days   ciclopirox 0.77 % topical suspension 10/19/2020 apply to the affected and surrounding areas of skin by topical route 2 times per day in the morning and evening   desvenlafaxine succinate 50 mg oral tablet extended release 24 hr 10/19/2020 TAKE 1 TABLET (50 MG) BY MOUTH DAILY for 90 days   fluticasone propionate 50 mcg/actuation nasal spray,suspension 10/19/2020 spray 1 - 2 sprays in each nostril by intranasal route once daily   hydroxyzine HCl 25 mg oral tablet 10/19/2020 one  tab po daily for 90 days   naproxen 500 mg oral tablet 2020 TAKE 1 TABLET BY MOUTH TWICE DAILY WITH FOOD   Norco 5-325 mg oral tablet 2020 take 1 tablet by oral route every 8 hours as needed   omeprazole 40 mg oral capsule,delayed release(DR/EC) 10/28/2020 TAKE 1 CAPSULE BY MOUTH BY MOUTH TWICE DAILY.   Percocet 7.5-325 mg oral tablet 2020 take 1 tablet by oral route every 6 hours as needed   rizatriptan 10 mg oral tablet 2019 take 1 tablet (10 mg) by oral route once, may repeat at 2 hour intervals; do not exceed 30 mg in 24 hours         Allergy List  Allergen Name Date Reaction Notes   Darvocet-N 100  Rash to arms, torso --    Hydromet --  Itching on Face, Head, and Neck --    Lamictal  Int hives X 6+ months --        Allergies Reconciled  Family Medical History  Disease Name Relative/Age Notes   Family history of skin cancer Mother/   --    Family history of kidney cancer Father/   --    Family history of diabetes mellitus (DM) Sister/   --          Reproductive History  Menstrual   Menopause Status: Postmenopausal Age Menopause: 55   Pregnancy Summary   Total Pregnancies: 2 Full Term: 2 Premature: 0   Ab Induced: 0 Ab Spontaneous: 0 Ectopics: 0   Multiples: 0 Livin         Social History  Finding Status Start/Stop Quantity Notes   Alcohol Current some day --/-- --  --    lives alone --  --/-- --  --    Recreational Drug Use Never --/-- --  no   Tobacco Never --/-- --  --     --  --/-- --  --    Working --  --/-- --  --          Immunizations  NameDate Admin Mfg Trade Name Lot Number Route Inj VIS Given VIS Publication   Hepatitis [id number] SKB HAVRIX-ADULT  IM RD 2018 10/25/2011   Comments: PT TOLERATED WELL   [id number]/ SEQ AFLURIA [id number] IM RD 10/21/2020 2012   Comments: pt tolerated well and left office in stable condition, FRANKLYN Cheatham         Review of Systems  · Constitutional  o Denies  o : fever, fatigue, weight loss, weight  "gain  · Cardiovascular  o Denies  o : lower extremity edema, claudication, chest pressure, palpitations  · Respiratory  o Denies  o : shortness of breath, wheezing, frequent cough, hemoptysis, dyspnea on exertion  · Gastrointestinal  o Denies  o : nausea, vomiting, diarrhea, constipation, abdominal pain      Vitals  Date Time BP Position Site L\R Cuff Size HR RR TEMP (F) WT  HT  BMI kg/m2 BSA m2 O2 Sat FR L/min FiO2 HC       11/18/2020 09:20 AM 95/67 Sitting    100 - R 18  209lbs 0oz 5'  7\" 32.73 2.12       12/04/2020 09:59 AM      90 - R   204lbs 16oz 5'  7\" 32.11 2.1 98 %      01/04/2021 02:09 /86 Sitting    91 - R 16 99.1 208lbs 4oz 5'  7\" 32.62 2.11 100 %  21%          Physical Examination  · Constitutional  o Appearance  o : no acute distress, well-nourished  · Head and Face  o Head  o :   § Inspection  § : atraumatic, normocephalic  · Eyes  o Eyes  o : extraocular movements intact, no scleral icterus, no conjunctival injection  · Ears, Nose, Mouth and Throat  o Ears  o :   § External Ears  § : normal  o Nose  o :   § Intranasal Exam  § : nares patent  o Oral Cavity  o :   § Oral Mucosa  § : moist mucous membranes  · Respiratory  o Respiratory Effort  o : breathing comfortably, symmetric chest rise  o Auscultation of Lungs  o : clear to asculatation bilaterally, no wheezes, rales, or rhonchii  · Cardiovascular  o Heart  o :   § Auscultation of Heart  § : regular rate and rhythm, no murmurs, rubs, or gallops  o Peripheral Vascular System  o :   § Extremities  § : no edema  · Neurologic  o Mental Status Examination  o :   § Orientation  § : grossly oriented to person, place and time  o Gait and Station  o :   § Gait Screening  § : normal gait  · Psychiatric  o General  o : normal mood and affect          Assessment  · Major depressive disorder, recurrent, severe without psychotic features     296.33/F33.2  well controlled  cont current meds  · Vitamin D " deficiency     268.9/E55.9  · Arthritis     716.90/M19.90  doing well post op  follow up with   · Anxiety     300.02/F41.1  · Hypertension     401.9/I10  stable  cont to monitor  · Visit for screening mammogram     V76.12/Z12.31  mammo  · Carpal tunnel syndrome     354.0/G56.00  cont braces    Problems Reconciled  Plan  · Orders  o Screening Mammography; Bilateral 3D (68132, , 76127) - V76.12/Z12.31 - 01/04/2021  o ACO-20: Screening Mammography documented and reviewed [facility] () - - 01/04/2021  o ACO-39: Current medications updated and reviewed (, 1159F) - - 01/04/2021  · Medications  o Medications have been Reconciled  o Transition of Care or Provider Policy  · Instructions  o Patient was educated/instructed on their diagnosis, treatment and medications prior to discharge from the clinic today.  · Disposition  o 4 Month Follow Up            Electronically Signed by: Teresa Herman MD -Author on January 4, 2021 10:59:41 PM

## 2021-05-14 NOTE — PROGRESS NOTES
Progress Note      Patient Name: Casandra Arango   Patient ID: [id number]   Sex: Female   YOB: 1955    Primary Care Provider: Teresa Herman MD   Referring Provider: Teresa Herman MD    Visit Date: March 2, 2021    Provider: Herminia Man PA-C   Location: [facility]   Location Address: [address] [place] [address]  [id number]   Location Phone: [phone]          Chief Complaint  · Follow up right knee replacement      History Of Present Illness  Casandra Arango is a 65 year old /White female who presents today to [facility].      She is status post right total knee arthroplasty performed 11/20/20 by Dr. Denny. She is pleased with her outcome.             Past Medical History  Allergic rhinitis; Anemia, Unspecified; Anxiety; Arthritis; Chronic cough; Closed nondisplaced fracture of distal phalanx of right great toe with routine healing, subsequent encounter; Closed nondisplaced fracture of distal phalanx of right great toe, initial encounter; Edema of right lower extremity; GERD (gastroesophageal reflux disease); Globus sensation; Hemorrhoids; Hyperlipemia; Hypertension; Insomnia, unspecified insomnia; Kidney stones; Major depressive disorder, recurrent, severe without psychotic features; Migraine Headaches; Night sweats; Primary osteoarthritis of both knees; Primary osteoarthritis of left knee; Primary osteoarthritis of shoulder, Left; Reflux Disease; Seasonal allergies; SOB (shortness of breath); Vitamin D deficiency         Past Surgical History  Arm ORIF; Arthoscopic knee surgery; Artificial Joints/Limbs; Back; Back Surgery, Lumbar; Colonoscopy; Hemorrhoid surgery; Hip replacement, right; Joint Surgery; Kidney Stone Surgery, Unspecified; Tibial Fracture; Tonsillectomy         Medication List  Bactrim -160 mg oral tablet; bupropion HCl 150 mg oral tablet sustained-release 12 hr; Caltrate 600 plus D 600 mg (1,500 mg)-800 unit oral tablet,chewable;  "cetirizine 10 mg oral tablet; chlorthalidone 25 mg oral tablet; ciclopirox 0.77 % topical suspension; desvenlafaxine succinate 50 mg oral tablet extended release 24 hr; Diflucan 150 mg oral tablet; fluticasone propionate 50 mcg/actuation nasal spray,suspension; hydroxyzine HCl 25 mg oral tablet; Keflex 500 mg oral capsule; naproxen 500 mg oral tablet; Norco 5-325 mg oral tablet; omeprazole 40 mg oral capsule,delayed release(DR/EC); Percocet 7.5-325 mg oral tablet; rizatriptan 10 mg oral tablet; Voltaren 1 % topical gel         Allergy List  Darvocet-N 100; Hydromet; Lamictal       Allergies Reconciled  Family Medical History  Family history of skin cancer; Family history of kidney cancer; Family history of diabetes mellitus (DM)         Reproductive History   2 Para 2 0 0 0 & Postmenopausal       Social History  Alcohol (Current some day); lives alone; Recreational Drug Use (Never); Tobacco (Never); ; Working         Review of Systems  · Constitutional  o Denies  o : fever, chills, weight loss  · Cardiovascular  o Denies  o : chest pain, shortness of breath  · Gastrointestinal  o Denies  o : liver disease, heartburn, nausea, blood in stools  · Genitourinary  o Denies  o : painful urination, blood in urine  · Integument  o Denies  o : rash, itching  · Neurologic  o Denies  o : headache, weakness, loss of consciousness  · Musculoskeletal  o Admits  o : painful, swollen joints  · Psychiatric  o Denies  o : drug/alcohol addiction, anxiety, depression      Vitals  Date Time BP Position Site L\R Cuff Size HR RR TEMP (F) WT  HT  BMI kg/m2 BSA m2 O2 Sat FR L/min FiO2        2021 11:14 AM      86 - R   212lbs 0oz 5'  7\" 33.2 2.13 98 %            Physical Examination  · Constitutional  o Appearance  o : well developed, well-nourished, no obvious deformities present  · Head and Face  o Head  o :   § Inspection  § : normocephalic  o Face  o :   § Inspection  § : no facial lesions  · Eyes  o Conjunctivae  o : " conjunctivae normal  o Sclerae  o : sclerae white  · Ears, Nose, Mouth and Throat  o Ears  o :   § External Ears  § : appearance within normal limits  § Hearing  § : intact  o Nose  o :   § External Nose  § : appearance normal  · Neck  o Inspection/Palpation  o : normal appearance  o Range of Motion  o : full range of motion  · Respiratory  o Respiratory Effort  o : breathing unlabored  o Inspection of Chest  o : normal appearance  o Auscultation of Lungs  o : no audible wheezing or rales  · Cardiovascular  o Heart  o : regular rate  · Gastrointestinal  o Abdominal Examination  o : soft and non-tender  · Skin and Subcutaneous Tissue  o General Inspection  o : intact, no rashes  · Psychiatric  o General  o : Alert and oriented x3  o Judgement and Insight  o : judgment and insight intact  o Mood and Affect  o : mood normal, affect appropriate  · Right Knee  o Inspection  o : Well healed incision. Full extension. Flexion 125 degrees. Patella well tracking. calf supple/nontender. Negative Cody's. Neurovascularly intact.   · In Office Procedures  o View  o : AP/LATERAL/SUNRISE   o Site  o : right, knee  o Indication  o : Right knee pain   o Study  o : X-rays ordered, taken in the office, and reviewed today.  o Xray  o : Well aligned right TKA  o Comparative Data  o : Comparative Data found and reviewed today           Assessment  · Aftercare following right knee joint replacement surgery       Aftercare following joint replacement surgery     V54.81/Z47.1  Presence of right artificial knee joint     V54.81/Z96.651  · Pain: Knee     719.46/M25.569      Plan  · Orders  o Knee (Right) [facility] ([id number]) - 719.46/M25.569 - 03/02/2021  · Medications  o Medications have been Reconciled  o Transition of Care or Provider Policy  · Instructions  o Reviewed the patient's Past Medical, Social, and Family history as well as the ROS at today's visit, no changes.  o Call or return if worsening symptoms.  o Continue PT.  Follow up for one year check and x-ray.  o Electronically Identified Patient Education Materials Provided Electronically            Electronically Signed by: Herminia Man PA-C -Author on March 2, 2021 03:24:04 PM

## 2021-05-14 NOTE — PROGRESS NOTES
Progress Note      Patient Name: Casandra Arango   Patient ID: [id number]   Sex: Female   YOB: 1955    Primary Care Provider: Teresa Herman MD   Referring Provider: Teresa Herman MD    Visit Date: January 26, 2021    Provider: Elaina Arguelles PA-C   Location: [facility]   Location Address: [address] [place] [address]  [id number]   Location Phone: [phone]          Chief Complaint  · Follow up right knee replacement      History Of Present Illness  Casandra Arango is a 65 year old /White female who presents today to [facility].      Patient is status post right total knee arthroplasty performed 11/20/20 by Dr. Denny. Patient completed Keflex 500mg and Bactrim /160mg. Patient states minimal pain in right knee. Patient completed physical therapy at [facility].             Past Medical History  Allergic rhinitis; Anemia, Unspecified; Anxiety; Arthritis; Chronic cough; Closed nondisplaced fracture of distal phalanx of right great toe with routine healing, subsequent encounter; Closed nondisplaced fracture of distal phalanx of right great toe, initial encounter; Edema of right lower extremity; GERD (gastroesophageal reflux disease); Globus sensation; Hemorrhoids; Hyperlipemia; Hypertension; Insomnia, unspecified insomnia; Kidney stones; Major depressive disorder, recurrent, severe without psychotic features; Migraine Headaches; Night sweats; Primary osteoarthritis of both knees; Primary osteoarthritis of left knee; Primary osteoarthritis of shoulder, Left; Reflux Disease; Seasonal allergies; SOB (shortness of breath); Vitamin D deficiency         Past Surgical History  Arm ORIF; Arthoscopic knee surgery; Artificial Joints/Limbs; Back; Back Surgery, Lumbar; Colonoscopy; Hemorrhoid surgery; Hip replacement, right; Joint Surgery; Kidney Stone Surgery, Unspecified; Tibial Fracture; Tonsillectomy         Medication List  Bactrim -160 mg oral tablet; bupropion HCl  ----- Message from Molly Landis NP sent at 6/11/2018  4:49 PM CDT -----  Please call and notify patient of xray results.  Follow up with Spine Dr provided on AVS - they may need a MRI as there is a slight compression of 2 of the vertebrae.       "150 mg oral tablet sustained-release 12 hr; Caltrate 600 plus D 600 mg (1,500 mg)-800 unit oral tablet,chewable; cetirizine 10 mg oral tablet; chlorthalidone 25 mg oral tablet; ciclopirox 0.77 % topical suspension; desvenlafaxine succinate 50 mg oral tablet extended release 24 hr; Diflucan 150 mg oral tablet; fluticasone propionate 50 mcg/actuation nasal spray,suspension; hydroxyzine HCl 25 mg oral tablet; Keflex 500 mg oral capsule; naproxen 500 mg oral tablet; Norco 5-325 mg oral tablet; omeprazole 40 mg oral capsule,delayed release(DR/EC); Percocet 7.5-325 mg oral tablet; rizatriptan 10 mg oral tablet; Voltaren 1 % topical gel         Allergy List  Darvocet-N 100; Hydromet; Lamictal         Family Medical History  Family history of skin cancer; Family history of kidney cancer; Family history of diabetes mellitus (DM)         Reproductive History   2 Para 2 0 0 0 & Postmenopausal       Social History  Alcohol (Current some day); lives alone; Recreational Drug Use (Never); Tobacco (Never); ; Working         Review of Systems  · Constitutional  o Denies  o : fever, chills, weight loss  · Cardiovascular  o Denies  o : chest pain, shortness of breath  · Gastrointestinal  o Denies  o : liver disease, heartburn, nausea, blood in stools  · Genitourinary  o Denies  o : painful urination, blood in urine  · Integument  o Denies  o : rash, itching  · Neurologic  o Denies  o : headache, weakness, loss of consciousness  · Musculoskeletal  o Denies  o : painful, swollen joints  · Psychiatric  o Denies  o : drug/alcohol addiction, anxiety, depression      Vitals  Date Time BP Position Site L\R Cuff Size HR RR TEMP (F) WT  HT  BMI kg/m2 BSA m2 O2 Sat FR L/min FiO2 HC       2021 10:22 AM      84 - R   204lbs 16oz 5'  7\" 32.11 2.1 97 %            Physical Examination  · Constitutional  o Appearance  o : well developed, well-nourished, no obvious deformities present  · Head and Face  o Head  o :   § Inspection  § : " normocephalic  o Face  o :   § Inspection  § : no facial lesions  · Eyes  o Conjunctivae  o : conjunctivae normal  o Sclerae  o : sclerae white  · Ears, Nose, Mouth and Throat  o Ears  o :   § External Ears  § : appearance within normal limits  § Hearing  § : intact  o Nose  o :   § External Nose  § : appearance normal  · Neck  o Inspection/Palpation  o : normal appearance  o Range of Motion  o : full range of motion  · Respiratory  o Respiratory Effort  o : breathing unlabored  o Inspection of Chest  o : normal appearance  o Auscultation of Lungs  o : no audible wheezing or rales  · Cardiovascular  o Heart  o : regular rate  · Gastrointestinal  o Abdominal Examination  o : soft and non-tender  · Skin and Subcutaneous Tissue  o General Inspection  o : intact, no rashes  · Psychiatric  o General  o : Alert and oriented x3  o Judgement and Insight  o : judgment and insight intact  o Mood and Affect  o : mood normal, affect appropriate  · Right Knee-Street  o Inspection  o : scar well healed, no limping gait, weight bearing, no swelling, no ecchymosis, no atrophy, neutral alignment  o Palpation  o : no medial joint line tenderness, no lateral joint line tenderness, no patellar tendon tenderness, no pain of MCL, no pain at LCL  o ROM  o : full extension, full flexion  o Strength  o : full extension, full flexion  o Special Tests  o : negative varus stress, negaitve valgus stress  o Neurovascular  o : Full sensation, Dorsal Pedal Pulse 2+, posteriror tibialis pulse 2+          Assessment  · Aftercare;following joint replacement     V54.81/Z47.1  · Pain: Knee     719.46/M25.569      Plan  · Medications  o Medications have been Reconciled  o Transition of Care or Provider Policy  · Instructions  o Reviewed the patient's Past Medical, Social, and Family history as well as the ROS at today's visit, no changes.  o Call or return if worsening symptoms.  o Follow Up in 4 weeks.   o Electronically Identified Patient Education  Materials Provided Electronically     Follow up 4 weeks, x ray             Electronically Signed by: Elaina Arguelles PA-C -Author on January 26, 2021 10:34:46 AM  Electronically Co-signed by: Kar Denny MD -Reviewer on January 26, 2021 09:50:06 PM

## 2021-05-14 NOTE — PROGRESS NOTES
Progress Note      Patient Name: Casandra Arango   Patient ID: [id number]   Sex: Female   YOB: 1955    Primary Care Provider: Teresa Herman MD   Referring Provider: Teresa Herman MD    Visit Date: January 5, 2021    Provider: Elaina Arguelles PA-C   Location: [facility]   Location Address: [address] [place] [address]  [id number]   Location Phone: [phone]          Chief Complaint  · Follow up right knee replacement      History Of Present Illness  Casandra Arango is a 65 year old /White female who presents today to [facility].      Patient is status post right total knee arthroplasty performed 11/20/20 by Dr. Denny. Patient was doing very well until 1 week ago when the mid incision became red and painful. Patient states taking Keflex 500mg threes times a day for ten days. Patient states mild swelling in the posterior knee resulting in reduced knee flexion. Patient denies fever or drainage.       Past Medical History  Allergic rhinitis; Anemia, Unspecified; Anxiety; Arthritis; Chronic cough; Closed nondisplaced fracture of distal phalanx of right great toe with routine healing, subsequent encounter; Closed nondisplaced fracture of distal phalanx of right great toe, initial encounter; Edema of right lower extremity; GERD (gastroesophageal reflux disease); Globus sensation; Hemorrhoids; Hyperlipemia; Hypertension; Insomnia, unspecified insomnia; Kidney stones; Major depressive disorder, recurrent, severe without psychotic features; Migraine Headaches; Night sweats; Primary osteoarthritis of both knees; Primary osteoarthritis of left knee; Primary osteoarthritis of shoulder, Left; Reflux Disease; Seasonal allergies; SOB (shortness of breath); Vitamin D deficiency         Past Surgical History  Arm ORIF; Arthoscopic knee surgery; Artificial Joints/Limbs; Back; Back Surgery, Lumbar; Colonoscopy; Hemorrhoid surgery; Hip replacement, right; Joint Surgery; Kidney  "Stone Surgery, Unspecified; Tibial Fracture; Tonsillectomy         Medication List  bupropion HCl 150 mg oral tablet sustained-release 12 hr; Caltrate 600 plus D 600 mg (1,500 mg)-800 unit oral tablet,chewable; cetirizine 10 mg oral tablet; chlorthalidone 25 mg oral tablet; ciclopirox 0.77 % topical suspension; desvenlafaxine succinate 50 mg oral tablet extended release 24 hr; fluticasone propionate 50 mcg/actuation nasal spray,suspension; hydroxyzine HCl 25 mg oral tablet; naproxen 500 mg oral tablet; Norco 5-325 mg oral tablet; omeprazole 40 mg oral capsule,delayed release(DR/EC); Percocet 7.5-325 mg oral tablet; rizatriptan 10 mg oral tablet         Allergy List  Darvocet-N 100; Hydromet; Lamictal         Family Medical History  Family history of skin cancer; Family history of kidney cancer; Family history of diabetes mellitus (DM)         Reproductive History   2 Para 2 0 0 0 & Postmenopausal       Social History  Alcohol (Current some day); lives alone; Recreational Drug Use (Never); Tobacco (Never); ; Working         Review of Systems  · Constitutional  o Denies  o : fever, chills, weight loss  · Cardiovascular  o Denies  o : chest pain, shortness of breath  · Gastrointestinal  o Denies  o : liver disease, heartburn, nausea, blood in stools  · Genitourinary  o Denies  o : painful urination, blood in urine  · Integument  o Denies  o : rash, itching  · Neurologic  o Denies  o : headache, weakness, loss of consciousness  · Musculoskeletal  o Denies  o : painful, swollen joints  · Psychiatric  o Denies  o : drug/alcohol addiction, anxiety, depression      Vitals  Date Time BP Position Site L\R Cuff Size HR RR TEMP (F) WT  HT  BMI kg/m2 BSA m2 O2 Sat FR L/min FiO2 HC       2021 11:09 AM      95 - R   207lbs 6oz 5'  7\" 32.48 2.11 93 %            Physical Examination  · Constitutional  o Appearance  o : well developed, well-nourished, no obvious deformities present  · Head and Face  o Head  o : "   § Inspection  § : normocephalic  o Face  o :   § Inspection  § : no facial lesions  · Eyes  o Conjunctivae  o : conjunctivae normal  o Sclerae  o : sclerae white  · Ears, Nose, Mouth and Throat  o Ears  o :   § External Ears  § : appearance within normal limits  § Hearing  § : intact  o Nose  o :   § External Nose  § : appearance normal  · Neck  o Inspection/Palpation  o : normal appearance  o Range of Motion  o : full range of motion  · Respiratory  o Respiratory Effort  o : breathing unlabored  o Inspection of Chest  o : normal appearance  o Auscultation of Lungs  o : no audible wheezing or rales  · Cardiovascular  o Heart  o : regular rate  · Gastrointestinal  o Abdominal Examination  o : soft and non-tender  · Skin and Subcutaneous Tissue  o General Inspection  o : intact, no rashes  · Psychiatric  o General  o : Alert and oriented x3  o Judgement and Insight  o : judgment and insight intact  o Mood and Affect  o : mood normal, affect appropriate  · Right Knee-Street  o Inspection  o : mid suture abscess without drainage, no limping gait, weight bearing, no swelling, no ecchymosis, no atrophy, neutral alignment  o Palpation  o : popliteal cyst, no medial joint line tenderness, no lateral joint line tenderness, no patellar tendon tenderness, no pain of MCL, no pain at LCL  o ROM  o : full extension, 115 flexion  o Strength  o : full extension, full flexion  o Special Tests  o : negative varus stress, negaitve valgus stress  o Neurovascular  o : Full sensation, Dorsal Pedal Pulse 2+, posteriror tibialis pulse 2+          Assessment  · Aftercare;following joint replacement     V54.81/Z47.1  · Pain: Knee     719.46/M25.569  · Popliteal cyst     727.51/M71.20  · Abscess     682.9/L02.91      Plan  · Medications  o Medications have been Reconciled  o Transition of Care or Provider Policy  · Instructions  o Reviewed the patient's Past Medical, Social, and Family history as well as the ROS at today's visit, no  changes.  o Call or return if worsening symptoms.  o Follow Up in 2 weeks.  o Electronically Identified Patient Education Materials Provided Electronically     Prescribed:  Keflex 500mg one po TID x 5 days  Bactrim /160mg one po BID x 5 days  Diflucan 150mg one po x 1 day  Voltaren Gel 2 RF             Electronically Signed by: Elaina Arguelles PA-C -Author on January 5, 2021 12:03:38 PM  Electronically Co-signed by: Kar Denny MD -Reviewer on January 5, 2021 06:57:13 PM

## 2021-05-15 VITALS
RESPIRATION RATE: 16 BRPM | OXYGEN SATURATION: 98 % | TEMPERATURE: 99.3 F | DIASTOLIC BLOOD PRESSURE: 78 MMHG | HEART RATE: 83 BPM | BODY MASS INDEX: 34.21 KG/M2 | HEIGHT: 67 IN | SYSTOLIC BLOOD PRESSURE: 126 MMHG | WEIGHT: 218 LBS

## 2021-05-15 VITALS
DIASTOLIC BLOOD PRESSURE: 70 MMHG | SYSTOLIC BLOOD PRESSURE: 126 MMHG | RESPIRATION RATE: 16 BRPM | OXYGEN SATURATION: 98 % | TEMPERATURE: 98.4 F | HEIGHT: 67 IN | HEART RATE: 87 BPM

## 2021-05-15 VITALS
TEMPERATURE: 98 F | DIASTOLIC BLOOD PRESSURE: 88 MMHG | SYSTOLIC BLOOD PRESSURE: 134 MMHG | OXYGEN SATURATION: 99 % | BODY MASS INDEX: 33.12 KG/M2 | HEART RATE: 89 BPM | WEIGHT: 211 LBS | RESPIRATION RATE: 16 BRPM | HEIGHT: 67 IN

## 2021-05-15 VITALS — HEIGHT: 67 IN | WEIGHT: 221 LBS | BODY MASS INDEX: 34.69 KG/M2

## 2021-05-15 VITALS — WEIGHT: 215.25 LBS | BODY MASS INDEX: 33.79 KG/M2 | HEIGHT: 67 IN | OXYGEN SATURATION: 97 % | HEART RATE: 90 BPM

## 2021-05-15 VITALS
WEIGHT: 222.37 LBS | HEART RATE: 108 BPM | OXYGEN SATURATION: 95 % | SYSTOLIC BLOOD PRESSURE: 144 MMHG | DIASTOLIC BLOOD PRESSURE: 80 MMHG | TEMPERATURE: 97.6 F | BODY MASS INDEX: 34.9 KG/M2 | HEIGHT: 67 IN

## 2021-05-15 VITALS
HEART RATE: 90 BPM | BODY MASS INDEX: 34.24 KG/M2 | DIASTOLIC BLOOD PRESSURE: 86 MMHG | SYSTOLIC BLOOD PRESSURE: 124 MMHG | OXYGEN SATURATION: 96 % | TEMPERATURE: 98.6 F | HEIGHT: 67 IN | WEIGHT: 218.12 LBS

## 2021-05-15 VITALS
SYSTOLIC BLOOD PRESSURE: 134 MMHG | HEART RATE: 86 BPM | RESPIRATION RATE: 16 BRPM | OXYGEN SATURATION: 99 % | HEIGHT: 67 IN | DIASTOLIC BLOOD PRESSURE: 82 MMHG | WEIGHT: 211.25 LBS | TEMPERATURE: 97.9 F | BODY MASS INDEX: 33.16 KG/M2

## 2021-05-15 VITALS — BODY MASS INDEX: 34.76 KG/M2 | WEIGHT: 221.5 LBS | HEIGHT: 67 IN

## 2021-05-15 VITALS — HEART RATE: 74 BPM | BODY MASS INDEX: 35 KG/M2 | HEIGHT: 67 IN | WEIGHT: 223 LBS | OXYGEN SATURATION: 98 %

## 2021-05-15 VITALS
WEIGHT: 213.5 LBS | OXYGEN SATURATION: 98 % | BODY MASS INDEX: 33.51 KG/M2 | DIASTOLIC BLOOD PRESSURE: 80 MMHG | HEIGHT: 67 IN | TEMPERATURE: 97.8 F | RESPIRATION RATE: 15 BRPM | HEART RATE: 86 BPM | SYSTOLIC BLOOD PRESSURE: 138 MMHG

## 2021-05-15 VITALS — BODY MASS INDEX: 33.9 KG/M2 | HEIGHT: 67 IN | WEIGHT: 216 LBS

## 2021-05-15 VITALS — HEART RATE: 78 BPM | WEIGHT: 218 LBS | OXYGEN SATURATION: 98 % | HEIGHT: 67 IN | BODY MASS INDEX: 34.21 KG/M2

## 2021-05-16 VITALS — BODY MASS INDEX: 36.73 KG/M2 | OXYGEN SATURATION: 98 % | WEIGHT: 234 LBS | HEIGHT: 67 IN | HEART RATE: 58 BPM

## 2021-05-16 VITALS
WEIGHT: 234 LBS | HEIGHT: 67 IN | BODY MASS INDEX: 36.73 KG/M2 | HEART RATE: 96 BPM | DIASTOLIC BLOOD PRESSURE: 98 MMHG | SYSTOLIC BLOOD PRESSURE: 120 MMHG

## 2021-05-16 VITALS
OXYGEN SATURATION: 96 % | RESPIRATION RATE: 16 BRPM | HEIGHT: 67 IN | DIASTOLIC BLOOD PRESSURE: 84 MMHG | TEMPERATURE: 97.9 F | BODY MASS INDEX: 36.73 KG/M2 | HEART RATE: 113 BPM | SYSTOLIC BLOOD PRESSURE: 156 MMHG | WEIGHT: 234 LBS

## 2021-05-16 VITALS — WEIGHT: 230.25 LBS | OXYGEN SATURATION: 98 % | HEIGHT: 67 IN | HEART RATE: 84 BPM | BODY MASS INDEX: 36.14 KG/M2

## 2021-05-16 VITALS — WEIGHT: 229.25 LBS | HEART RATE: 73 BPM | BODY MASS INDEX: 35.98 KG/M2 | OXYGEN SATURATION: 98 % | HEIGHT: 67 IN

## 2021-05-16 VITALS — HEIGHT: 67 IN | BODY MASS INDEX: 36.26 KG/M2 | WEIGHT: 231 LBS

## 2021-05-16 VITALS — WEIGHT: 231 LBS | HEART RATE: 83 BPM | HEIGHT: 67 IN | OXYGEN SATURATION: 98 % | BODY MASS INDEX: 36.26 KG/M2

## 2021-05-16 VITALS
HEIGHT: 67 IN | TEMPERATURE: 97.9 F | HEART RATE: 89 BPM | OXYGEN SATURATION: 100 % | WEIGHT: 215 LBS | RESPIRATION RATE: 16 BRPM | DIASTOLIC BLOOD PRESSURE: 74 MMHG | SYSTOLIC BLOOD PRESSURE: 128 MMHG | BODY MASS INDEX: 33.74 KG/M2

## 2021-05-16 VITALS
WEIGHT: 229 LBS | BODY MASS INDEX: 35.94 KG/M2 | SYSTOLIC BLOOD PRESSURE: 128 MMHG | DIASTOLIC BLOOD PRESSURE: 70 MMHG | TEMPERATURE: 98.6 F | OXYGEN SATURATION: 98 % | HEART RATE: 78 BPM | HEIGHT: 67 IN

## 2021-05-16 VITALS
WEIGHT: 230.12 LBS | TEMPERATURE: 97.8 F | BODY MASS INDEX: 36.12 KG/M2 | DIASTOLIC BLOOD PRESSURE: 73 MMHG | OXYGEN SATURATION: 99 % | HEIGHT: 67 IN | SYSTOLIC BLOOD PRESSURE: 144 MMHG | HEART RATE: 99 BPM | RESPIRATION RATE: 16 BRPM

## 2021-05-16 VITALS
DIASTOLIC BLOOD PRESSURE: 86 MMHG | BODY MASS INDEX: 36.41 KG/M2 | WEIGHT: 232 LBS | OXYGEN SATURATION: 96 % | HEIGHT: 67 IN | TEMPERATURE: 98.5 F | RESPIRATION RATE: 16 BRPM | SYSTOLIC BLOOD PRESSURE: 124 MMHG | HEART RATE: 103 BPM

## 2021-05-16 VITALS
BODY MASS INDEX: 35.96 KG/M2 | HEART RATE: 110 BPM | DIASTOLIC BLOOD PRESSURE: 92 MMHG | HEIGHT: 67 IN | TEMPERATURE: 97.4 F | RESPIRATION RATE: 16 BRPM | SYSTOLIC BLOOD PRESSURE: 136 MMHG | OXYGEN SATURATION: 98 % | WEIGHT: 229.12 LBS

## 2021-05-16 VITALS — BODY MASS INDEX: 33.98 KG/M2 | OXYGEN SATURATION: 96 % | WEIGHT: 216.5 LBS | HEART RATE: 107 BPM | HEIGHT: 67 IN

## 2021-05-16 VITALS
SYSTOLIC BLOOD PRESSURE: 142 MMHG | OXYGEN SATURATION: 96 % | TEMPERATURE: 97 F | BODY MASS INDEX: 37.08 KG/M2 | HEIGHT: 67 IN | HEART RATE: 92 BPM | RESPIRATION RATE: 16 BRPM | WEIGHT: 236.25 LBS | DIASTOLIC BLOOD PRESSURE: 86 MMHG

## 2021-06-09 RX ORDER — HYDROXYZINE HYDROCHLORIDE 25 MG/1
TABLET, FILM COATED ORAL
Qty: 90 TABLET | Refills: 0 | Status: SHIPPED | OUTPATIENT
Start: 2021-06-09 | End: 2022-05-20 | Stop reason: SDUPTHER

## 2021-06-09 RX ORDER — NAPROXEN 500 MG/1
TABLET ORAL
Qty: 60 TABLET | Refills: 1 | Status: SHIPPED | OUTPATIENT
Start: 2021-06-09 | End: 2021-08-11

## 2021-06-09 RX ORDER — DESVENLAFAXINE SUCCINATE 50 MG/1
TABLET, EXTENDED RELEASE ORAL
Qty: 90 TABLET | Refills: 1 | Status: SHIPPED | OUTPATIENT
Start: 2021-06-09 | End: 2021-11-03 | Stop reason: SDUPTHER

## 2021-06-16 RX ORDER — BUPROPION HYDROCHLORIDE 150 MG/1
TABLET, EXTENDED RELEASE ORAL
COMMUNITY
Start: 2021-01-06 | End: 2021-06-16 | Stop reason: SDUPTHER

## 2021-06-17 RX ORDER — BUPROPION HYDROCHLORIDE 150 MG/1
150 TABLET, EXTENDED RELEASE ORAL 2 TIMES DAILY
Qty: 90 TABLET | Refills: 0 | Status: SHIPPED | OUTPATIENT
Start: 2021-06-17 | End: 2021-07-27

## 2021-06-21 ENCOUNTER — TELEPHONE (OUTPATIENT)
Dept: INTERNAL MEDICINE | Facility: CLINIC | Age: 66
End: 2021-06-21

## 2021-06-21 DIAGNOSIS — M25.511 RIGHT SHOULDER PAIN, UNSPECIFIED CHRONICITY: Primary | ICD-10-CM

## 2021-06-21 NOTE — TELEPHONE ENCOUNTER
Caller: Casandra Arango    Relationship: Self    Best call back number: [phone]    What is the best time to reach you: ANYTIME    Who are you requesting to speak with (clinical staff, provider,  specific staff member): CLINICAL      What was the call regarding: THE PATIENT STATED SHE IS HAVING PAIN IN HER RIGHT SHOULDER. SHE STATED THE LAST TIME HER PCP GAVE HER A STEROID INJECTION IN HER LEFT SHOULDER AND IT WORKED WELL. THE PATIENT STATED THE PAIN IN HER LEFT SHOULDER HAS NOT RETURNED. SHE WOULD LIKE TO KNOW IF PCP WOULD LIKE TO DO AN X-RAY OR AN INJECTION IN HER RIGHT SHOULDER. SHE WOULD LIKE A CALL BACK ASAP.    Do you require a callback: YES

## 2021-06-21 NOTE — TELEPHONE ENCOUNTER
Start with an x-ray and then depending on what the result looks like we can potentially do an injection please place order for x-ray and route back she can walk-in to what ever diagnostic centers closest to her

## 2021-06-21 NOTE — TELEPHONE ENCOUNTER
Patient said it has been bothering her for a month and she had reached back a few times while driving and that was the only thing that she could think of and wanted to know if she could get a steroid shot btu wanted to know if you wanted an x-ray or something first. She cant lift or sleep on that side and wasn't sure what you wanted to do. Patient has tried naproxen and tylenol arthritis.  No relief.

## 2021-06-24 ENCOUNTER — HOSPITAL ENCOUNTER (OUTPATIENT)
Dept: GENERAL RADIOLOGY | Facility: HOSPITAL | Age: 66
Discharge: HOME | End: 2021-06-24
Admitting: INTERNAL MEDICINE

## 2021-06-24 DIAGNOSIS — M25.511 RIGHT SHOULDER PAIN, UNSPECIFIED CHRONICITY: ICD-10-CM

## 2021-06-24 PROCEDURE — 73030 X-RAY EXAM OF SHOULDER: CPT | Performed by: RADIOLOGY

## 2021-06-24 PROCEDURE — 73030 X-RAY EXAM OF SHOULDER: CPT

## 2021-07-13 DIAGNOSIS — M25.561 RIGHT KNEE PAIN, UNSPECIFIED CHRONICITY: Primary | ICD-10-CM

## 2021-07-14 RX ORDER — CETIRIZINE HYDROCHLORIDE 10 MG/1
10 TABLET ORAL DAILY
Qty: 90 TABLET | Refills: 1 | Status: SHIPPED | OUTPATIENT
Start: 2021-07-14 | End: 2021-11-03 | Stop reason: SDUPTHER

## 2021-07-15 VITALS
BODY MASS INDEX: 33.76 KG/M2 | HEIGHT: 67 IN | WEIGHT: 215.12 LBS | DIASTOLIC BLOOD PRESSURE: 82 MMHG | TEMPERATURE: 98.7 F | HEART RATE: 86 BPM | SYSTOLIC BLOOD PRESSURE: 120 MMHG | OXYGEN SATURATION: 95 %

## 2021-07-27 ENCOUNTER — TELEPHONE (OUTPATIENT)
Dept: INTERNAL MEDICINE | Facility: CLINIC | Age: 66
End: 2021-07-27

## 2021-07-27 DIAGNOSIS — M25.511 RIGHT SHOULDER PAIN, UNSPECIFIED CHRONICITY: Primary | ICD-10-CM

## 2021-07-27 DIAGNOSIS — M13.811 OTHER SPECIFIED ARTHRITIS, RIGHT SHOULDER: ICD-10-CM

## 2021-07-27 RX ORDER — BUPROPION HYDROCHLORIDE 150 MG/1
TABLET, EXTENDED RELEASE ORAL
Qty: 90 TABLET | Refills: 0 | Status: SHIPPED | OUTPATIENT
Start: 2021-07-27 | End: 2021-09-03

## 2021-07-27 NOTE — TELEPHONE ENCOUNTER
Caller: Casandra Arango    Relationship: Self    Best call back number: [phone]    What is the best time to reach you: ANYTIME    Who are you requesting to speak with (clinical staff, provider,  specific staff member): DR. STEARNS OR CLINICAL STAFF         What was the call regarding: THE PATIENT HAS RECEIVED A LETTER IN MAIL, STATING HER CT SCAN RESULTS WERE ABNORMAL AND TO FOLLOW UP WITH THE OFFICE.     Do you require a callback: YES

## 2021-08-03 ENCOUNTER — OFFICE VISIT (OUTPATIENT)
Dept: ORTHOPEDIC SURGERY | Facility: CLINIC | Age: 66
End: 2021-08-03

## 2021-08-03 VITALS — WEIGHT: 209 LBS | HEIGHT: 67 IN | OXYGEN SATURATION: 98 % | HEART RATE: 76 BPM | BODY MASS INDEX: 32.8 KG/M2

## 2021-08-03 DIAGNOSIS — M25.511 RIGHT SHOULDER PAIN, UNSPECIFIED CHRONICITY: Primary | ICD-10-CM

## 2021-08-03 PROCEDURE — 99213 OFFICE O/P EST LOW 20 MIN: CPT | Performed by: PHYSICIAN ASSISTANT

## 2021-08-03 PROCEDURE — 20610 DRAIN/INJ JOINT/BURSA W/O US: CPT | Performed by: PHYSICIAN ASSISTANT

## 2021-08-03 RX ORDER — METHYLPREDNISOLONE ACETATE 80 MG/ML
80 INJECTION, SUSPENSION INTRA-ARTICULAR; INTRALESIONAL; INTRAMUSCULAR; SOFT TISSUE
Status: COMPLETED | OUTPATIENT
Start: 2021-08-03 | End: 2021-08-03

## 2021-08-03 RX ORDER — LIDOCAINE HYDROCHLORIDE 10 MG/ML
9 INJECTION, SOLUTION INFILTRATION; PERINEURAL
Status: COMPLETED | OUTPATIENT
Start: 2021-08-03 | End: 2021-08-03

## 2021-08-03 RX ADMIN — METHYLPREDNISOLONE ACETATE 80 MG: 80 INJECTION, SUSPENSION INTRA-ARTICULAR; INTRALESIONAL; INTRAMUSCULAR; SOFT TISSUE at 11:44

## 2021-08-03 RX ADMIN — LIDOCAINE HYDROCHLORIDE 9 ML: 10 INJECTION, SOLUTION INFILTRATION; PERINEURAL at 11:44

## 2021-08-03 NOTE — PROGRESS NOTES
"Chief Complaint  Initial Evaluation and Pain of the Right Shoulder    Subjective          Casandra Arango presents to [facility] for evaluation of right shoulder pain.  Patient started noticing pain diffusely in the shoulder in June.  She denies any known injury but states that she was getting a back massage and they pulled both of her arms backwards into extension as she was laying on her stomach, she states that she had significant pain in both shoulders at that time.  Since then she has noted some achiness in the shoulder.  She takes naproxen and uses Voltaren gel for pain relief.  Her primary doctor ordered an x-ray that showed degenerative changes in the shoulder.     Objective   Vital Signs:   Pulse 76   Ht 170.2 cm (67\")   Wt 94.8 kg (209 lb)   SpO2 98%   BMI 32.73 kg/m²       Physical Exam  Constitutional:       Appearance: Normal appearance. He is well-developed and normal weight.   HENT:      Head: Normocephalic.      Right Ear: Hearing and external ear normal.      Left Ear: Hearing and external ear normal.      Nose: Nose normal.   Eyes:      Conjunctiva/sclera: Conjunctivae normal.   Cardiovascular:      Rate and Rhythm: Normal rate.   Pulmonary:      Effort: Pulmonary effort is normal.      Breath sounds: No wheezing or rales.   Abdominal:      Palpations: Abdomen is soft.      Tenderness: There is no abdominal tenderness.   Musculoskeletal:      Cervical back: Normal range of motion.   Skin:     Findings: No rash.   Neurological:      Mental Status: He is alert and oriented to person, place, and time.   Psychiatric:         Mood and Affect: Mood and affect normal.         Judgment: Judgment normal.     Ortho Exam  Right shoulder: Skin intact, no swelling, no tenderness to palpation, sensation light touch intact, active forward flexion to 170, abduction to 150 and internal rotation to L5.  Positive empty can, negative full can, positive crossover testing.  Good " range of motion right elbow wrist and digits.  Radial pulse 2+.  Result Review :            Imaging Results (Most Recent)     None      X-ray shoulder 2+ view right: 6/24/2021 glenohumeral articulation is maintained.  Slight narrowing of the AC joint.  Maintenance of the coracoclavicular distance.  Degenerative osteophytic spur inferior glenoid and humeral articulation.  There may be slight widening of the subacromial bursa.  No fracture or malalignment.  No significant joint effusion.  No acute fracture or malalignment.  Incidentally noted is a calcified granuloma in the right upper lobe. Conclusion: Osteophytic degenerative spurring of the GH articulation and mild AC degenerative narrowing.    Large Joint Arthrocentesis: R subacromial bursa  Date/Time: 8/3/2021 11:44 AM  Consent given by: patient  Site marked: site marked  Timeout: Immediately prior to procedure a time out was called to verify the correct patient, procedure, equipment, support staff and site/side marked as required   Supporting Documentation  Indications: pain   Procedure Details  Location: shoulder - R subacromial bursa  Needle gauge: 21g.  Medications administered: 9 mL lidocaine 1 %; 80 mg methylPREDNISolone acetate 80 MG/ML  Patient tolerance: patient tolerated the procedure well with no immediate complications            Assessment and Plan    Problem List Items Addressed This Visit        Musculoskeletal and Injuries    Right shoulder pain - Primary    Current Assessment & Plan     Treatment options discussed with patient today, patient elected to receive right shoulder steroid injection, tolerated well, risk and benefits discussed, recommend resting icing and elevating over the next 1 to 2 days following the injection.  Patient will begin doing some home exercises and will continue taking naproxen.  We will follow up in 4 to 6 weeks for recheck.         Relevant Orders    Large Joint Arthrocentesis: R subacromial bursa          Follow Up    Return in about 6 weeks (around 9/14/2021) for Recheck.  Patient Instructions   recommend resting icing and elevating over the next 1 to 2 days following the injection.  Patient will begin doing some home exercises and will continue taking naproxen.  We will follow up in 4 to 6 weeks for recheck.      Patient was given instructions and counseling regarding her condition or for health maintenance advice. Please see specific information pulled into the AVS if appropriate.

## 2021-08-03 NOTE — ASSESSMENT & PLAN NOTE
Treatment options discussed with patient today, patient elected to receive right shoulder steroid injection, tolerated well, risk and benefits discussed, recommend resting icing and elevating over the next 1 to 2 days following the injection.  Patient will begin doing some home exercises and will continue taking naproxen.  We will follow up in 4 to 6 weeks for recheck.

## 2021-08-03 NOTE — PATIENT INSTRUCTIONS
recommend resting icing and elevating over the next 1 to 2 days following the injection.  Patient will begin doing some home exercises and will continue taking naproxen.  We will follow up in 4 to 6 weeks for recheck.

## 2021-08-11 RX ORDER — CHLORTHALIDONE 25 MG/1
TABLET ORAL
Qty: 90 TABLET | Refills: 0 | Status: SHIPPED | OUTPATIENT
Start: 2021-08-11 | End: 2021-11-03 | Stop reason: SDUPTHER

## 2021-08-11 RX ORDER — NAPROXEN 500 MG/1
TABLET ORAL
Qty: 60 TABLET | Refills: 1 | Status: SHIPPED | OUTPATIENT
Start: 2021-08-11 | End: 2021-10-05

## 2021-09-01 ENCOUNTER — TELEMEDICINE (OUTPATIENT)
Dept: INTERNAL MEDICINE | Facility: CLINIC | Age: 66
End: 2021-09-01

## 2021-09-01 DIAGNOSIS — B86 SCABIES: Primary | ICD-10-CM

## 2021-09-01 PROCEDURE — 99213 OFFICE O/P EST LOW 20 MIN: CPT | Performed by: INTERNAL MEDICINE

## 2021-09-01 RX ORDER — PERMETHRIN 50 MG/G
CREAM TOPICAL ONCE
Qty: 60 G | Refills: 0 | Status: SHIPPED | OUTPATIENT
Start: 2021-09-01 | End: 2021-09-01

## 2021-09-01 NOTE — PROGRESS NOTES
This was an audio and video enabled telemedicine encounter.    Patient agrees to participate in a telemedicine evaluation performed by Dulce Brown MD.   Patient authorizes the electronic transmission of medical information and/or videoconference session. Patient understands that he/she can still pursue face-to-face consultation if desired. Patient understands that as with any technology, telemedicine does have its limitations. There is no guarantee, therefore, that this telemedicine session will eliminate the need for patient to see a provider in person if the provider feels a physical exams or vital signs are neccessary to care for the patient. Patient understands and would like to proceed with telemedicine visit at this time.    Chief Complaint  Rash    Subjective          Casandra Arango presents to [facility]  Patient states that over the last week she has developed a very itchy rash on her legs.  She states that this started behind her knees and has spread down to her ankles.  She has been using hydrocortisone cream with minimal relief.  She states that the itching gets worse whenever she takes a hot shower.    She states that her son had fleas in his apartment and they have been moving him into their home.  She states that they have most of his belongings in garbage bags and have washed his sheets and bedding multiple times.  He is also having a similar rash.    She states that her granddaughter who sometimes naps with her has bumps and itching on her wrists and finger webs.      Objective   Vital Signs:   There were no vitals taken for this visit.    Physical Exam  Constitutional:       Appearance: Normal appearance.   HENT:      Head: Normocephalic and atraumatic.   Eyes:      General: No scleral icterus.        Right eye: No discharge.         Left eye: No discharge.      Conjunctiva/sclera: Conjunctivae normal.   Pulmonary:      Effort: Pulmonary  effort is normal.   Skin:     Findings: Rash (Confluent papular rash around the ankles with a somewhat linear pattern.  Discrete papules scattered over the lower legs and in the popliteal fossa.) present. No lesion.   Neurological:      Mental Status: She is alert and oriented to person, place, and time. Mental status is at baseline.   Psychiatric:         Mood and Affect: Mood normal.         Behavior: Behavior normal.         Thought Content: Thought content normal.         Judgment: Judgment normal.        Result Review :          Procedures      Assessment and Plan    Diagnoses and all orders for this visit:    1. Scabies (Primary)  Assessment & Plan:  Suspect scabies as the etiology of her rash given her granddaughters more classic presentation and history of close contact.  We will treat with topical permethrin.  Advised patient that all household members will need to be treated as well.  Advised patient to follow-up in 1 to 2 weeks if symptoms or not improving.      Other orders  -     permethrin (ELIMITE) 5 % cream; Apply  topically to the appropriate area as directed 1 (One) Time for 1 dose. Apply from neck to soles of feet. Leave on 8-14 hours, then wash off.  Dispense: 60 g; Refill: 0      Follow Up   Return in about 1 week (around 9/8/2021) for Recheck.  Patient was given instructions and counseling regarding her condition or for health maintenance advice. Please see specific information pulled into the AVS if appropriate.

## 2021-09-01 NOTE — ASSESSMENT & PLAN NOTE
Suspect scabies as the etiology of her rash given her granddaughters more classic presentation and history of close contact.  We will treat with topical permethrin.  Advised patient that all household members will need to be treated as well.  Advised patient to follow-up in 1 to 2 weeks if symptoms or not improving.

## 2021-09-03 RX ORDER — BUPROPION HYDROCHLORIDE 150 MG/1
TABLET, EXTENDED RELEASE ORAL
Qty: 90 TABLET | Refills: 0 | Status: SHIPPED | OUTPATIENT
Start: 2021-09-03 | End: 2021-10-18

## 2021-10-05 ENCOUNTER — OFFICE VISIT (OUTPATIENT)
Dept: ORTHOPEDIC SURGERY | Facility: CLINIC | Age: 66
End: 2021-10-05

## 2021-10-05 ENCOUNTER — TELEPHONE (OUTPATIENT)
Dept: NEUROLOGY | Facility: CLINIC | Age: 66
End: 2021-10-05

## 2021-10-05 VITALS — HEART RATE: 86 BPM | HEIGHT: 68 IN | BODY MASS INDEX: 31.22 KG/M2 | WEIGHT: 206 LBS | OXYGEN SATURATION: 98 %

## 2021-10-05 DIAGNOSIS — M25.511 RIGHT SHOULDER PAIN, UNSPECIFIED CHRONICITY: Primary | ICD-10-CM

## 2021-10-05 PROCEDURE — 99213 OFFICE O/P EST LOW 20 MIN: CPT | Performed by: PHYSICIAN ASSISTANT

## 2021-10-05 RX ORDER — MELOXICAM 15 MG/1
15 TABLET ORAL DAILY
Qty: 30 TABLET | Refills: 2 | Status: SHIPPED | OUTPATIENT
Start: 2021-10-05 | End: 2021-10-13

## 2021-10-05 NOTE — TELEPHONE ENCOUNTER
Caller: Casandra Arango    Relationship: Self    Best call back number: [phone]    What form or medical record are you requesting: EMG RESULTS FROM November. I DO NOT SEE THESE IN CHART    Who is requesting this form or medical record from you: DR.MARCUS PÉREZ (ORTHOPAEDIC SURGEON) ATTN: KOKO    How would you like to receive the form or medical records (pick-up, mail, fax): FAX  If fax, what is the fax number: [phone]    Timeframe paperwork needed: NA    Additional notes: PLEASE ADVISE. OR COULD UPLOAD TO [facility] IF UNABLE TO FAX AS  IS ON Epic ALSO.

## 2021-10-05 NOTE — PATIENT INSTRUCTIONS
Patient states she had great relief following steroid injection in the right shoulder.  She would like to follow-up on an as-needed basis moving forward.  She will begin taking Mobic, recommend gentle range of motion exercises at home.    Patient has a history of bilateral carpal tunnel diagnosed with nerve conduction studies by Dr. Nunn.  Patient states she plans to obtain her EMG/NCS and make a follow-up appointment to address her wrist pain and numbness.

## 2021-10-05 NOTE — PROGRESS NOTES
"Chief Complaint  Pain of the Right Shoulder    Subjective          Casandra Arango presents to [facility] for follow-up on right shoulder pain.  Pain began in June 2021 without any known injury.  She does state that she got a back massage around that time, they pulled both of her arms backwards into extension as she was lying on her stomach and she has had significant pain in the right shoulder since.  Her primary ordered an x-ray of the shoulder showing degenerative changes.  She has been taking naproxen and using Voltaren gel but states that she does not notice much pain relief with taking these medications.  At her last visit 8/3/2021 patient received a right shoulder steroid injection which she states provided great relief and she has noted increased range of motion since then.    Additionally, the patient states that she saw Dr. Nunn last year for bilateral wrist pain and hand numbness.  She states they did a nerve conduction study showing carpal tunnel syndrome.  She states he gave her some wrist braces to wear.  She states she noticed all of her fingers go numb especially during sleep or if she is doing things like using her phone.  Objective   Allergies   Allergen Reactions   • Nabumetone Itching   • Hydrocodone-Homatropine Itching   • Lamotrigine Hives   • Propoxyphene Unknown - Low Severity     Darvocet   • Terbinafine Rash       Vital Signs:   Pulse 86   Ht 171.5 cm (67.5\")   Wt 93.4 kg (206 lb)   SpO2 98%   BMI 31.79 kg/m²       Physical Exam  Constitutional:       Appearance: Normal appearance. Patient is well-developed and normal weight.   HENT:      Head: Normocephalic.      Right Ear: Hearing and external ear normal.      Left Ear: Hearing and external ear normal.      Nose: Nose normal.   Eyes:      Conjunctiva/sclera: Conjunctivae normal.   Cardiovascular:      Rate and Rhythm: Normal rate.   Pulmonary:      Effort: Pulmonary effort is normal.      Breath " sounds: No wheezing or rales.   Abdominal:      Palpations: Abdomen is soft.      Tenderness: There is no abdominal tenderness.   Musculoskeletal:      Cervical back: Normal range of motion.   Skin:     Findings: No rash.   Neurological:      Mental Status: Patient is alert and oriented to person, place, and time.   Psychiatric:         Mood and Affect: Mood and affect normal.         Judgment: Judgment normal.     Ortho Exam  Right shoulder: Skin intact without swelling, no tenderness, full flexion and abduction, internal rotation to L1, sensation light touch intact, radial pulses 2+, good range of motion right elbow wrist and digits.  Positive crossover testing.  Result Review :            Imaging Results (Most Recent)     None                Assessment and Plan    Problem List Items Addressed This Visit        Musculoskeletal and Injuries    Right shoulder pain - Primary    Current Assessment & Plan     Patient states she had great relief following steroid injection in the right shoulder.  She would like to follow-up on an as-needed basis moving forward.  She will begin taking Mobic, recommend gentle range of motion exercises at home.    Patient has a history of bilateral carpal tunnel diagnosed with nerve conduction studies by Dr. Nunn.  Patient states she plans to obtain her EMG/NCS and make a follow-up appointment to address her wrist pain and numbness.               Follow Up   Return if symptoms worsen or fail to improve.  Patient Instructions   Patient states she had great relief following steroid injection in the right shoulder.  She would like to follow-up on an as-needed basis moving forward.  She will begin taking Mobic, recommend gentle range of motion exercises at home.    Patient has a history of bilateral carpal tunnel diagnosed with nerve conduction studies by Dr. Nunn.  Patient states she plans to obtain her EMG/NCS and make a follow-up appointment to address her wrist pain and  numbness.    Patient was given instructions and counseling regarding her condition or for health maintenance advice. Please see specific information pulled into the AVS if appropriate.

## 2021-10-14 ENCOUNTER — APPOINTMENT (OUTPATIENT)
Dept: CT IMAGING | Facility: HOSPITAL | Age: 66
End: 2021-10-14

## 2021-10-14 ENCOUNTER — HOSPITAL ENCOUNTER (OUTPATIENT)
Facility: HOSPITAL | Age: 66
Discharge: HOME | End: 2021-10-15
Attending: EMERGENCY MEDICINE | Admitting: UROLOGY

## 2021-10-14 DIAGNOSIS — N20.1 CALCULUS OF URETER: Primary | ICD-10-CM

## 2021-10-14 LAB
ALBUMIN SERPL-MCNC: 4.3 G/DL (ref 3.5–5.2)
ALBUMIN/GLOB SERPL: 1.4 G/DL
ALP SERPL-CCNC: 130 U/L (ref 39–117)
ALT SERPL W P-5'-P-CCNC: 12 U/L (ref 1–33)
ANION GAP SERPL CALCULATED.3IONS-SCNC: 12.8 MMOL/L (ref 5–15)
AST SERPL-CCNC: 13 U/L (ref 1–32)
BACTERIA UR QL AUTO: ABNORMAL /HPF
BASOPHILS # BLD AUTO: 0.12 10*3/MM3 (ref 0–0.2)
BASOPHILS NFR BLD AUTO: 0.9 % (ref 0–1.5)
BILIRUB SERPL-MCNC: 0.3 MG/DL (ref 0–1.2)
BILIRUB UR QL STRIP: NEGATIVE
BUN SERPL-MCNC: 35 MG/DL (ref 8–23)
BUN/CREAT SERPL: 31.8 (ref 7–25)
CALCIUM SPEC-SCNC: 10 MG/DL (ref 8.6–10.5)
CHLORIDE SERPL-SCNC: 102 MMOL/L (ref 98–107)
CLARITY UR: ABNORMAL
CO2 SERPL-SCNC: 25.2 MMOL/L (ref 22–29)
COLOR UR: YELLOW
CREAT SERPL-MCNC: 1.1 MG/DL (ref 0.57–1)
DEPRECATED RDW RBC AUTO: 48.6 FL (ref 37–54)
EOSINOPHIL # BLD AUTO: 0.2 10*3/MM3 (ref 0–0.4)
EOSINOPHIL NFR BLD AUTO: 1.4 % (ref 0.3–6.2)
ERYTHROCYTE [DISTWIDTH] IN BLOOD BY AUTOMATED COUNT: 15.1 % (ref 12.3–15.4)
GFR SERPL CREATININE-BSD FRML MDRD: 50 ML/MIN/1.73
GLOBULIN UR ELPH-MCNC: 3.1 GM/DL
GLUCOSE SERPL-MCNC: 114 MG/DL (ref 65–99)
GLUCOSE UR STRIP-MCNC: NEGATIVE MG/DL
HCT VFR BLD AUTO: 40.6 % (ref 34–46.6)
HGB BLD-MCNC: 13 G/DL (ref 12–15.9)
HGB UR QL STRIP.AUTO: ABNORMAL
HOLD SPECIMEN: NORMAL
HOLD SPECIMEN: NORMAL
HYALINE CASTS UR QL AUTO: ABNORMAL /LPF
IMM GRANULOCYTES # BLD AUTO: 0.12 10*3/MM3 (ref 0–0.05)
IMM GRANULOCYTES NFR BLD AUTO: 0.9 % (ref 0–0.5)
KETONES UR QL STRIP: NEGATIVE
LEUKOCYTE ESTERASE UR QL STRIP.AUTO: ABNORMAL
LIPASE SERPL-CCNC: 14 U/L (ref 13–60)
LYMPHOCYTES # BLD AUTO: 1.29 10*3/MM3 (ref 0.7–3.1)
LYMPHOCYTES NFR BLD AUTO: 9.3 % (ref 19.6–45.3)
MCH RBC QN AUTO: 28.4 PG (ref 26.6–33)
MCHC RBC AUTO-ENTMCNC: 32 G/DL (ref 31.5–35.7)
MCV RBC AUTO: 88.6 FL (ref 79–97)
MONOCYTES # BLD AUTO: 0.75 10*3/MM3 (ref 0.1–0.9)
MONOCYTES NFR BLD AUTO: 5.4 % (ref 5–12)
NEUTROPHILS NFR BLD AUTO: 11.44 10*3/MM3 (ref 1.7–7)
NEUTROPHILS NFR BLD AUTO: 82.1 % (ref 42.7–76)
NITRITE UR QL STRIP: NEGATIVE
NRBC BLD AUTO-RTO: 0 /100 WBC (ref 0–0.2)
PH UR STRIP.AUTO: 8 [PH] (ref 5–8)
PLATELET # BLD AUTO: 452 10*3/MM3 (ref 140–450)
PMV BLD AUTO: 8.6 FL (ref 6–12)
POTASSIUM SERPL-SCNC: 4.1 MMOL/L (ref 3.5–5.2)
PROT SERPL-MCNC: 7.4 G/DL (ref 6–8.5)
PROT UR QL STRIP: ABNORMAL
RBC # BLD AUTO: 4.58 10*6/MM3 (ref 3.77–5.28)
RBC # UR: ABNORMAL /HPF
REF LAB TEST METHOD: ABNORMAL
SODIUM SERPL-SCNC: 140 MMOL/L (ref 136–145)
SP GR UR STRIP: 1.02 (ref 1–1.03)
SQUAMOUS #/AREA URNS HPF: ABNORMAL /HPF
UROBILINOGEN UR QL STRIP: ABNORMAL
WBC # BLD AUTO: 13.92 10*3/MM3 (ref 3.4–10.8)
WBC UR QL AUTO: ABNORMAL /HPF
WHOLE BLOOD HOLD SPECIMEN: NORMAL
WHOLE BLOOD HOLD SPECIMEN: NORMAL

## 2021-10-14 PROCEDURE — 25010000002 ONDANSETRON PER 1 MG: Performed by: EMERGENCY MEDICINE

## 2021-10-14 PROCEDURE — 83690 ASSAY OF LIPASE: CPT

## 2021-10-14 PROCEDURE — 96374 THER/PROPH/DIAG INJ IV PUSH: CPT

## 2021-10-14 PROCEDURE — 99284 EMERGENCY DEPT VISIT MOD MDM: CPT

## 2021-10-14 PROCEDURE — 36415 COLL VENOUS BLD VENIPUNCTURE: CPT

## 2021-10-14 PROCEDURE — 85025 COMPLETE CBC W/AUTO DIFF WBC: CPT

## 2021-10-14 PROCEDURE — 96361 HYDRATE IV INFUSION ADD-ON: CPT

## 2021-10-14 PROCEDURE — 25010000002 MORPHINE PER 10 MG: Performed by: EMERGENCY MEDICINE

## 2021-10-14 PROCEDURE — 96375 TX/PRO/DX INJ NEW DRUG ADDON: CPT

## 2021-10-14 PROCEDURE — 74177 CT ABD & PELVIS W/CONTRAST: CPT

## 2021-10-14 PROCEDURE — 0 IOPAMIDOL PER 1 ML: Performed by: EMERGENCY MEDICINE

## 2021-10-14 PROCEDURE — 96360 HYDRATION IV INFUSION INIT: CPT

## 2021-10-14 PROCEDURE — G0378 HOSPITAL OBSERVATION PER HR: HCPCS

## 2021-10-14 PROCEDURE — 80053 COMPREHEN METABOLIC PANEL: CPT

## 2021-10-14 PROCEDURE — 81001 URINALYSIS AUTO W/SCOPE: CPT

## 2021-10-14 RX ORDER — SODIUM CHLORIDE 0.9 % (FLUSH) 0.9 %
10 SYRINGE (ML) INJECTION AS NEEDED
Status: DISCONTINUED | OUTPATIENT
Start: 2021-10-14 | End: 2021-10-15

## 2021-10-14 RX ORDER — SODIUM CHLORIDE 0.9 % (FLUSH) 0.9 %
10 SYRINGE (ML) INJECTION AS NEEDED
Status: DISCONTINUED | OUTPATIENT
Start: 2021-10-14 | End: 2021-10-15 | Stop reason: HOSPADM

## 2021-10-14 RX ORDER — ONDANSETRON 2 MG/ML
4 INJECTION INTRAMUSCULAR; INTRAVENOUS ONCE
Status: COMPLETED | OUTPATIENT
Start: 2021-10-14 | End: 2021-10-14

## 2021-10-14 RX ADMIN — IOPAMIDOL 100 ML: 755 INJECTION, SOLUTION INTRAVENOUS at 23:21

## 2021-10-14 RX ADMIN — MORPHINE SULFATE 4 MG: 4 INJECTION INTRAVENOUS at 22:30

## 2021-10-14 RX ADMIN — ONDANSETRON 4 MG: 2 INJECTION INTRAMUSCULAR; INTRAVENOUS at 22:29

## 2021-10-14 RX ADMIN — SODIUM CHLORIDE 1000 ML: 9 INJECTION, SOLUTION INTRAVENOUS at 22:27

## 2021-10-15 ENCOUNTER — ANESTHESIA (OUTPATIENT)
Dept: PERIOP | Facility: HOSPITAL | Age: 66
End: 2021-10-15

## 2021-10-15 ENCOUNTER — ANESTHESIA EVENT (OUTPATIENT)
Dept: PERIOP | Facility: HOSPITAL | Age: 66
End: 2021-10-15

## 2021-10-15 ENCOUNTER — READMISSION MANAGEMENT (OUTPATIENT)
Dept: CALL CENTER | Facility: HOSPITAL | Age: 66
End: 2021-10-15

## 2021-10-15 ENCOUNTER — APPOINTMENT (OUTPATIENT)
Dept: GENERAL RADIOLOGY | Facility: HOSPITAL | Age: 66
End: 2021-10-15

## 2021-10-15 VITALS
WEIGHT: 213.19 LBS | HEART RATE: 82 BPM | HEIGHT: 68 IN | SYSTOLIC BLOOD PRESSURE: 117 MMHG | RESPIRATION RATE: 16 BRPM | OXYGEN SATURATION: 96 % | BODY MASS INDEX: 32.31 KG/M2 | DIASTOLIC BLOOD PRESSURE: 50 MMHG | TEMPERATURE: 97.8 F

## 2021-10-15 PROBLEM — I10 ESSENTIAL HYPERTENSION: Status: ACTIVE | Noted: 2021-10-15

## 2021-10-15 PROBLEM — N20.1 CALCULUS OF URETER: Status: ACTIVE | Noted: 2021-10-15

## 2021-10-15 PROBLEM — N13.2 URETERAL STONE WITH HYDRONEPHROSIS: Status: ACTIVE | Noted: 2021-10-15

## 2021-10-15 PROBLEM — N20.1 CALCULUS OF URETER: Status: RESOLVED | Noted: 2021-10-15 | Resolved: 2021-10-15

## 2021-10-15 PROBLEM — K21.9 GERD WITHOUT ESOPHAGITIS: Status: ACTIVE | Noted: 2021-10-15

## 2021-10-15 PROBLEM — N13.2 URETERAL STONE WITH HYDRONEPHROSIS: Status: RESOLVED | Noted: 2021-10-15 | Resolved: 2021-10-15

## 2021-10-15 PROBLEM — N20.0 NEPHROLITHIASIS: Status: ACTIVE | Noted: 2021-10-15

## 2021-10-15 PROBLEM — E66.9 OBESITY (BMI 30-39.9): Status: ACTIVE | Noted: 2021-10-15

## 2021-10-15 PROBLEM — N13.9 OBSTRUCTIVE UROPATHY: Status: ACTIVE | Noted: 2021-10-15

## 2021-10-15 LAB
ALBUMIN SERPL-MCNC: 3.5 G/DL (ref 3.5–5.2)
ALBUMIN/GLOB SERPL: 1.5 G/DL
ALP SERPL-CCNC: 107 U/L (ref 39–117)
ALT SERPL W P-5'-P-CCNC: 9 U/L (ref 1–33)
ANION GAP SERPL CALCULATED.3IONS-SCNC: 7.9 MMOL/L (ref 5–15)
AST SERPL-CCNC: 11 U/L (ref 1–32)
BASOPHILS # BLD AUTO: 0.08 10*3/MM3 (ref 0–0.2)
BASOPHILS NFR BLD AUTO: 0.6 % (ref 0–1.5)
BILIRUB SERPL-MCNC: 0.2 MG/DL (ref 0–1.2)
BUN SERPL-MCNC: 30 MG/DL (ref 8–23)
BUN/CREAT SERPL: 32.6 (ref 7–25)
CALCIUM SPEC-SCNC: 8.8 MG/DL (ref 8.6–10.5)
CHLORIDE SERPL-SCNC: 106 MMOL/L (ref 98–107)
CO2 SERPL-SCNC: 26.1 MMOL/L (ref 22–29)
CREAT SERPL-MCNC: 0.92 MG/DL (ref 0.57–1)
DEPRECATED RDW RBC AUTO: 48.1 FL (ref 37–54)
EOSINOPHIL # BLD AUTO: 0.14 10*3/MM3 (ref 0–0.4)
EOSINOPHIL NFR BLD AUTO: 1.1 % (ref 0.3–6.2)
ERYTHROCYTE [DISTWIDTH] IN BLOOD BY AUTOMATED COUNT: 15.1 % (ref 12.3–15.4)
GFR SERPL CREATININE-BSD FRML MDRD: 61 ML/MIN/1.73
GLOBULIN UR ELPH-MCNC: 2.3 GM/DL
GLUCOSE SERPL-MCNC: 105 MG/DL (ref 65–99)
HCT VFR BLD AUTO: 34.5 % (ref 34–46.6)
HGB BLD-MCNC: 11.3 G/DL (ref 12–15.9)
IMM GRANULOCYTES # BLD AUTO: 0.07 10*3/MM3 (ref 0–0.05)
IMM GRANULOCYTES NFR BLD AUTO: 0.5 % (ref 0–0.5)
LAB AP CASE REPORT: NORMAL
LAB AP CLINICAL INFORMATION: NORMAL
LYMPHOCYTES # BLD AUTO: 2.18 10*3/MM3 (ref 0.7–3.1)
LYMPHOCYTES NFR BLD AUTO: 16.8 % (ref 19.6–45.3)
MCH RBC QN AUTO: 29.1 PG (ref 26.6–33)
MCHC RBC AUTO-ENTMCNC: 32.8 G/DL (ref 31.5–35.7)
MCV RBC AUTO: 88.9 FL (ref 79–97)
MONOCYTES # BLD AUTO: 0.98 10*3/MM3 (ref 0.1–0.9)
MONOCYTES NFR BLD AUTO: 7.5 % (ref 5–12)
NEUTROPHILS NFR BLD AUTO: 73.5 % (ref 42.7–76)
NEUTROPHILS NFR BLD AUTO: 9.56 10*3/MM3 (ref 1.7–7)
NRBC BLD AUTO-RTO: 0 /100 WBC (ref 0–0.2)
PATH REPORT.FINAL DX SPEC: NORMAL
PATH REPORT.GROSS SPEC: NORMAL
PLATELET # BLD AUTO: 358 10*3/MM3 (ref 140–450)
PMV BLD AUTO: 8.8 FL (ref 6–12)
POTASSIUM SERPL-SCNC: 3.6 MMOL/L (ref 3.5–5.2)
PROT SERPL-MCNC: 5.8 G/DL (ref 6–8.5)
RBC # BLD AUTO: 3.88 10*6/MM3 (ref 3.77–5.28)
SODIUM SERPL-SCNC: 140 MMOL/L (ref 136–145)
WBC # BLD AUTO: 13.01 10*3/MM3 (ref 3.4–10.8)

## 2021-10-15 PROCEDURE — 25010000002 KETOROLAC TROMETHAMINE PER 15 MG: Performed by: NURSE ANESTHETIST, CERTIFIED REGISTERED

## 2021-10-15 PROCEDURE — 82365 CALCULUS SPECTROSCOPY: CPT | Performed by: UROLOGY

## 2021-10-15 PROCEDURE — 52356 CYSTO/URETERO W/LITHOTRIPSY: CPT | Performed by: UROLOGY

## 2021-10-15 PROCEDURE — 74018 RADEX ABDOMEN 1 VIEW: CPT

## 2021-10-15 PROCEDURE — 25010000002 DEXAMETHASONE PER 1 MG: Performed by: NURSE ANESTHETIST, CERTIFIED REGISTERED

## 2021-10-15 PROCEDURE — 94799 UNLISTED PULMONARY SVC/PX: CPT

## 2021-10-15 PROCEDURE — G0378 HOSPITAL OBSERVATION PER HR: HCPCS

## 2021-10-15 PROCEDURE — 85025 COMPLETE CBC W/AUTO DIFF WBC: CPT | Performed by: FAMILY MEDICINE

## 2021-10-15 PROCEDURE — 88300 SURGICAL PATH GROSS: CPT | Performed by: UROLOGY

## 2021-10-15 PROCEDURE — 76000 FLUOROSCOPY <1 HR PHYS/QHP: CPT

## 2021-10-15 PROCEDURE — 25010000002 PROPOFOL 10 MG/ML EMULSION: Performed by: NURSE ANESTHETIST, CERTIFIED REGISTERED

## 2021-10-15 PROCEDURE — C1769 GUIDE WIRE: HCPCS | Performed by: UROLOGY

## 2021-10-15 PROCEDURE — 80053 COMPREHEN METABOLIC PANEL: CPT | Performed by: FAMILY MEDICINE

## 2021-10-15 PROCEDURE — C1758 CATHETER, URETERAL: HCPCS | Performed by: UROLOGY

## 2021-10-15 PROCEDURE — 99220 PR INITIAL OBSERVATION CARE/DAY 70 MINUTES: CPT | Performed by: FAMILY MEDICINE

## 2021-10-15 PROCEDURE — 99203 OFFICE O/P NEW LOW 30 MIN: CPT | Performed by: UROLOGY

## 2021-10-15 PROCEDURE — 25010000003 MEPERIDINE PER 100 MG: Performed by: NURSE ANESTHETIST, CERTIFIED REGISTERED

## 2021-10-15 PROCEDURE — C2617 STENT, NON-COR, TEM W/O DEL: HCPCS | Performed by: UROLOGY

## 2021-10-15 PROCEDURE — 25010000002 LEVOFLOXACIN PER 250 MG: Performed by: UROLOGY

## 2021-10-15 PROCEDURE — 25010000002 ONDANSETRON PER 1 MG: Performed by: NURSE ANESTHETIST, CERTIFIED REGISTERED

## 2021-10-15 PROCEDURE — 25010000002 FENTANYL CITRATE (PF) 50 MCG/ML SOLUTION: Performed by: NURSE ANESTHETIST, CERTIFIED REGISTERED

## 2021-10-15 DEVICE — STNT URETRL CLASSC DBL PIG 6F 26CM: Type: IMPLANTABLE DEVICE | Site: URETER | Status: FUNCTIONAL

## 2021-10-15 RX ORDER — ONDANSETRON 2 MG/ML
4 INJECTION INTRAMUSCULAR; INTRAVENOUS ONCE AS NEEDED
Status: DISCONTINUED | OUTPATIENT
Start: 2021-10-15 | End: 2021-10-15 | Stop reason: HOSPADM

## 2021-10-15 RX ORDER — AMOXICILLIN 250 MG
2 CAPSULE ORAL 2 TIMES DAILY
Status: DISCONTINUED | OUTPATIENT
Start: 2021-10-15 | End: 2021-10-15 | Stop reason: HOSPADM

## 2021-10-15 RX ORDER — NALOXONE HCL 0.4 MG/ML
0.4 VIAL (ML) INJECTION
Status: DISCONTINUED | OUTPATIENT
Start: 2021-10-15 | End: 2021-10-15 | Stop reason: HOSPADM

## 2021-10-15 RX ORDER — FENTANYL CITRATE 50 UG/ML
INJECTION, SOLUTION INTRAMUSCULAR; INTRAVENOUS AS NEEDED
Status: DISCONTINUED | OUTPATIENT
Start: 2021-10-15 | End: 2021-10-15 | Stop reason: SURG

## 2021-10-15 RX ORDER — SODIUM CHLORIDE, SODIUM LACTATE, POTASSIUM CHLORIDE, CALCIUM CHLORIDE 600; 310; 30; 20 MG/100ML; MG/100ML; MG/100ML; MG/100ML
9 INJECTION, SOLUTION INTRAVENOUS CONTINUOUS PRN
Status: DISCONTINUED | OUTPATIENT
Start: 2021-10-15 | End: 2021-10-15 | Stop reason: HOSPADM

## 2021-10-15 RX ORDER — ONDANSETRON 4 MG/1
4 TABLET, FILM COATED ORAL EVERY 6 HOURS PRN
Status: DISCONTINUED | OUTPATIENT
Start: 2021-10-15 | End: 2021-10-15 | Stop reason: HOSPADM

## 2021-10-15 RX ORDER — CHOLECALCIFEROL (VITAMIN D3) 125 MCG
5 CAPSULE ORAL NIGHTLY PRN
Status: DISCONTINUED | OUTPATIENT
Start: 2021-10-15 | End: 2021-10-15 | Stop reason: HOSPADM

## 2021-10-15 RX ORDER — KETOROLAC TROMETHAMINE 30 MG/ML
INJECTION, SOLUTION INTRAMUSCULAR; INTRAVENOUS AS NEEDED
Status: DISCONTINUED | OUTPATIENT
Start: 2021-10-15 | End: 2021-10-15 | Stop reason: SURG

## 2021-10-15 RX ORDER — LIDOCAINE HYDROCHLORIDE 20 MG/ML
INJECTION, SOLUTION INFILTRATION; PERINEURAL AS NEEDED
Status: DISCONTINUED | OUTPATIENT
Start: 2021-10-15 | End: 2021-10-15 | Stop reason: SURG

## 2021-10-15 RX ORDER — POLYETHYLENE GLYCOL 3350 17 G/17G
17 POWDER, FOR SOLUTION ORAL DAILY PRN
Status: DISCONTINUED | OUTPATIENT
Start: 2021-10-15 | End: 2021-10-15 | Stop reason: HOSPADM

## 2021-10-15 RX ORDER — PROMETHAZINE HYDROCHLORIDE 25 MG/1
25 SUPPOSITORY RECTAL ONCE AS NEEDED
Status: DISCONTINUED | OUTPATIENT
Start: 2021-10-15 | End: 2021-10-15 | Stop reason: HOSPADM

## 2021-10-15 RX ORDER — ACETAMINOPHEN 650 MG/1
650 SUPPOSITORY RECTAL EVERY 4 HOURS PRN
Status: DISCONTINUED | OUTPATIENT
Start: 2021-10-15 | End: 2021-10-15 | Stop reason: HOSPADM

## 2021-10-15 RX ORDER — MEPERIDINE HYDROCHLORIDE 25 MG/ML
12.5 INJECTION INTRAMUSCULAR; INTRAVENOUS; SUBCUTANEOUS
Status: DISCONTINUED | OUTPATIENT
Start: 2021-10-15 | End: 2021-10-15 | Stop reason: HOSPADM

## 2021-10-15 RX ORDER — BUPROPION HYDROCHLORIDE 150 MG/1
150 TABLET, EXTENDED RELEASE ORAL 2 TIMES DAILY
Status: DISCONTINUED | OUTPATIENT
Start: 2021-10-15 | End: 2021-10-15 | Stop reason: HOSPADM

## 2021-10-15 RX ORDER — BISACODYL 10 MG
10 SUPPOSITORY, RECTAL RECTAL DAILY PRN
Status: DISCONTINUED | OUTPATIENT
Start: 2021-10-15 | End: 2021-10-15 | Stop reason: HOSPADM

## 2021-10-15 RX ORDER — MORPHINE SULFATE 2 MG/ML
2 INJECTION, SOLUTION INTRAMUSCULAR; INTRAVENOUS
Status: DISCONTINUED | OUTPATIENT
Start: 2021-10-15 | End: 2021-10-15 | Stop reason: HOSPADM

## 2021-10-15 RX ORDER — SODIUM CHLORIDE 0.9 % (FLUSH) 0.9 %
10 SYRINGE (ML) INJECTION AS NEEDED
Status: DISCONTINUED | OUTPATIENT
Start: 2021-10-15 | End: 2021-10-15 | Stop reason: HOSPADM

## 2021-10-15 RX ORDER — LEVOFLOXACIN 5 MG/ML
500 INJECTION, SOLUTION INTRAVENOUS
Status: COMPLETED | OUTPATIENT
Start: 2021-10-15 | End: 2021-10-15

## 2021-10-15 RX ORDER — FLUTICASONE PROPIONATE 50 MCG
2 SPRAY, SUSPENSION (ML) NASAL 2 TIMES DAILY
Status: DISCONTINUED | OUTPATIENT
Start: 2021-10-15 | End: 2021-10-15 | Stop reason: HOSPADM

## 2021-10-15 RX ORDER — PROPOFOL 10 MG/ML
VIAL (ML) INTRAVENOUS AS NEEDED
Status: DISCONTINUED | OUTPATIENT
Start: 2021-10-15 | End: 2021-10-15 | Stop reason: SURG

## 2021-10-15 RX ORDER — PANTOPRAZOLE SODIUM 40 MG/1
40 TABLET, DELAYED RELEASE ORAL EVERY MORNING
Status: DISCONTINUED | OUTPATIENT
Start: 2021-10-15 | End: 2021-10-15 | Stop reason: HOSPADM

## 2021-10-15 RX ORDER — SODIUM CHLORIDE, SODIUM LACTATE, POTASSIUM CHLORIDE, CALCIUM CHLORIDE 600; 310; 30; 20 MG/100ML; MG/100ML; MG/100ML; MG/100ML
100 INJECTION, SOLUTION INTRAVENOUS CONTINUOUS
Status: ACTIVE | OUTPATIENT
Start: 2021-10-15 | End: 2021-10-15

## 2021-10-15 RX ORDER — HYDROXYZINE HYDROCHLORIDE 25 MG/1
25 TABLET, FILM COATED ORAL DAILY
Status: DISCONTINUED | OUTPATIENT
Start: 2021-10-15 | End: 2021-10-15 | Stop reason: HOSPADM

## 2021-10-15 RX ORDER — CALCIUM CARBONATE 200(500)MG
2 TABLET,CHEWABLE ORAL 2 TIMES DAILY PRN
Status: DISCONTINUED | OUTPATIENT
Start: 2021-10-15 | End: 2021-10-15 | Stop reason: HOSPADM

## 2021-10-15 RX ORDER — OXYCODONE HYDROCHLORIDE 5 MG/1
5 TABLET ORAL
Status: DISCONTINUED | OUTPATIENT
Start: 2021-10-15 | End: 2021-10-15 | Stop reason: HOSPADM

## 2021-10-15 RX ORDER — MAGNESIUM HYDROXIDE 1200 MG/15ML
LIQUID ORAL AS NEEDED
Status: DISCONTINUED | OUTPATIENT
Start: 2021-10-15 | End: 2021-10-15 | Stop reason: HOSPADM

## 2021-10-15 RX ORDER — DEXAMETHASONE SODIUM PHOSPHATE 4 MG/ML
INJECTION, SOLUTION INTRA-ARTICULAR; INTRALESIONAL; INTRAMUSCULAR; INTRAVENOUS; SOFT TISSUE AS NEEDED
Status: DISCONTINUED | OUTPATIENT
Start: 2021-10-15 | End: 2021-10-15 | Stop reason: SURG

## 2021-10-15 RX ORDER — ACETAMINOPHEN 325 MG/1
650 TABLET ORAL EVERY 4 HOURS PRN
Status: DISCONTINUED | OUTPATIENT
Start: 2021-10-15 | End: 2021-10-15 | Stop reason: HOSPADM

## 2021-10-15 RX ORDER — ROCURONIUM BROMIDE 10 MG/ML
INJECTION, SOLUTION INTRAVENOUS AS NEEDED
Status: DISCONTINUED | OUTPATIENT
Start: 2021-10-15 | End: 2021-10-15 | Stop reason: SURG

## 2021-10-15 RX ORDER — CHLORTHALIDONE 25 MG/1
25 TABLET ORAL DAILY
Status: DISCONTINUED | OUTPATIENT
Start: 2021-10-15 | End: 2021-10-15 | Stop reason: HOSPADM

## 2021-10-15 RX ORDER — SODIUM CHLORIDE 0.9 % (FLUSH) 0.9 %
10 SYRINGE (ML) INJECTION EVERY 12 HOURS SCHEDULED
Status: DISCONTINUED | OUTPATIENT
Start: 2021-10-15 | End: 2021-10-15 | Stop reason: HOSPADM

## 2021-10-15 RX ORDER — ONDANSETRON 2 MG/ML
INJECTION INTRAMUSCULAR; INTRAVENOUS AS NEEDED
Status: DISCONTINUED | OUTPATIENT
Start: 2021-10-15 | End: 2021-10-15 | Stop reason: SURG

## 2021-10-15 RX ORDER — TRAMADOL HYDROCHLORIDE 50 MG/1
50 TABLET ORAL EVERY 8 HOURS PRN
Qty: 15 TABLET | Refills: 0 | Status: SHIPPED | OUTPATIENT
Start: 2021-10-15 | End: 2021-11-03

## 2021-10-15 RX ORDER — ONDANSETRON 2 MG/ML
4 INJECTION INTRAMUSCULAR; INTRAVENOUS EVERY 6 HOURS PRN
Status: DISCONTINUED | OUTPATIENT
Start: 2021-10-15 | End: 2021-10-15 | Stop reason: HOSPADM

## 2021-10-15 RX ORDER — PROMETHAZINE HYDROCHLORIDE 12.5 MG/1
25 TABLET ORAL ONCE AS NEEDED
Status: DISCONTINUED | OUTPATIENT
Start: 2021-10-15 | End: 2021-10-15 | Stop reason: HOSPADM

## 2021-10-15 RX ORDER — BISACODYL 5 MG/1
5 TABLET, DELAYED RELEASE ORAL DAILY PRN
Status: DISCONTINUED | OUTPATIENT
Start: 2021-10-15 | End: 2021-10-15 | Stop reason: HOSPADM

## 2021-10-15 RX ORDER — ACETAMINOPHEN 160 MG/5ML
650 SOLUTION ORAL EVERY 4 HOURS PRN
Status: DISCONTINUED | OUTPATIENT
Start: 2021-10-15 | End: 2021-10-15 | Stop reason: HOSPADM

## 2021-10-15 RX ORDER — CETIRIZINE HYDROCHLORIDE 10 MG/1
10 TABLET ORAL DAILY
Status: DISCONTINUED | OUTPATIENT
Start: 2021-10-15 | End: 2021-10-15 | Stop reason: HOSPADM

## 2021-10-15 RX ORDER — VENLAFAXINE HYDROCHLORIDE 75 MG/1
75 CAPSULE, EXTENDED RELEASE ORAL
Status: DISCONTINUED | OUTPATIENT
Start: 2021-10-15 | End: 2021-10-15 | Stop reason: HOSPADM

## 2021-10-15 RX ADMIN — SODIUM CHLORIDE, POTASSIUM CHLORIDE, SODIUM LACTATE AND CALCIUM CHLORIDE: 600; 310; 30; 20 INJECTION, SOLUTION INTRAVENOUS at 06:48

## 2021-10-15 RX ADMIN — SUGAMMADEX 200 MG: 100 INJECTION, SOLUTION INTRAVENOUS at 06:53

## 2021-10-15 RX ADMIN — MEPERIDINE HYDROCHLORIDE 12.5 MG: 25 INJECTION INTRAMUSCULAR; INTRAVENOUS; SUBCUTANEOUS at 07:17

## 2021-10-15 RX ADMIN — SODIUM CHLORIDE, POTASSIUM CHLORIDE, SODIUM LACTATE AND CALCIUM CHLORIDE: 600; 310; 30; 20 INJECTION, SOLUTION INTRAVENOUS at 06:00

## 2021-10-15 RX ADMIN — ROCURONIUM BROMIDE 50 MG: 10 INJECTION INTRAVENOUS at 06:19

## 2021-10-15 RX ADMIN — PROPOFOL 150 MG: 10 INJECTION, EMULSION INTRAVENOUS at 06:19

## 2021-10-15 RX ADMIN — LIDOCAINE HYDROCHLORIDE 100 MG: 20 INJECTION, SOLUTION INFILTRATION; PERINEURAL at 06:19

## 2021-10-15 RX ADMIN — PANTOPRAZOLE SODIUM 40 MG: 40 TABLET, DELAYED RELEASE ORAL at 08:44

## 2021-10-15 RX ADMIN — ONDANSETRON 4 MG: 2 INJECTION INTRAMUSCULAR; INTRAVENOUS at 06:25

## 2021-10-15 RX ADMIN — FENTANYL CITRATE 50 MCG: 50 INJECTION INTRAMUSCULAR; INTRAVENOUS at 06:19

## 2021-10-15 RX ADMIN — LEVOFLOXACIN 500 MG: 5 INJECTION, SOLUTION INTRAVENOUS at 06:15

## 2021-10-15 RX ADMIN — KETOROLAC TROMETHAMINE 30 MG: 30 INJECTION, SOLUTION INTRAMUSCULAR; INTRAVENOUS at 06:53

## 2021-10-15 RX ADMIN — CETIRIZINE HYDROCHLORIDE 10 MG: 10 TABLET ORAL at 08:42

## 2021-10-15 RX ADMIN — BUPROPION HYDROCHLORIDE 150 MG: 150 TABLET, EXTENDED RELEASE ORAL at 08:41

## 2021-10-15 RX ADMIN — SODIUM CHLORIDE, POTASSIUM CHLORIDE, SODIUM LACTATE AND CALCIUM CHLORIDE 100 ML/HR: 600; 310; 30; 20 INJECTION, SOLUTION INTRAVENOUS at 02:56

## 2021-10-15 RX ADMIN — DEXAMETHASONE SODIUM PHOSPHATE 4 MG: 4 INJECTION INTRA-ARTICULAR; INTRALESIONAL; INTRAMUSCULAR; INTRAVENOUS; SOFT TISSUE at 06:25

## 2021-10-15 RX ADMIN — Medication 10 ML: at 08:48

## 2021-10-15 NOTE — ANESTHESIA PREPROCEDURE EVALUATION
Anesthesia Evaluation     Patient summary reviewed and Nursing notes reviewed   no history of anesthetic complications:  NPO Solid Status: > 8 hours  NPO Liquid Status: > 2 hours           Airway   Mallampati: III  TM distance: >3 FB  Neck ROM: full  No difficulty expected  Dental      Pulmonary - normal exam    breath sounds clear to auscultation  (+) shortness of breath,   Cardiovascular - normal exam  Exercise tolerance: good (4-7 METS)    Rhythm: regular    (+) hypertension, hyperlipidemia,       Neuro/Psych  (+) headaches, psychiatric history,     GI/Hepatic/Renal/Endo    (+) obesity,  GERD,  renal disease stones,     Musculoskeletal     Abdominal    Substance History - negative use     OB/GYN negative ob/gyn ROS         Other   arthritis,           Results for NIYAH DUGGANE (MRN [id number]) as of 10/15/2021 05:04   Ref. Range 10/14/2021 20:10   Glucose Latest Ref Range: 65 - 99 mg/dL 114 (H)   Sodium Latest Ref Range: 136 - 145 mmol/L 140   Potassium Latest Ref Range: 3.5 - 5.2 mmol/L 4.1   CO2 Latest Ref Range: 22.0 - 29.0 mmol/L 25.2   Chloride Latest Ref Range: 98 - 107 mmol/L 102   Anion Gap Latest Ref Range: 5.0 - 15.0 mmol/L 12.8   Creatinine Latest Ref Range: 0.57 - 1.00 mg/dL 1.10 (H)   BUN Latest Ref Range: 8 - 23 mg/dL 35 (H)   BUN/Creatinine Ratio Latest Ref Range: 7.0 - 25.0  31.8 (H)   Calcium Latest Ref Range: 8.6 - 10.5 mg/dL 10.0   eGFR Non African Am Latest Ref Range: >60 mL/min/1.73 50 (L)   Alkaline Phosphatase Latest Ref Range: 39 - 117 U/L 130 (H)   Total Protein Latest Ref Range: 6.0 - 8.5 g/dL 7.4   ALT (SGPT) Latest Ref Range: 1 - 33 U/L 12   AST (SGOT) Latest Ref Range: 1 - 32 U/L 13   Total Bilirubin Latest Ref Range: 0.0 - 1.2 mg/dL 0.3   Albumin Latest Ref Range: 3.50 - 5.20 g/dL 4.30   Globulin Latest Units: gm/dL 3.1   A/G Ratio Latest Units: g/dL 1.4     Results for NIYAH DUGGAN (MRN [id number]) as of 10/15/2021 05:04   Ref. Range 10/14/2021 20:10   Hemoglobin  Latest Ref Range: 12.0 - 15.9 g/dL 13.0   Hematocrit Latest Ref Range: 34.0 - 46.6 % 40.6     ekg nsr abnormal t wave abnormalities                 Anesthesia Plan    ASA 3 - emergent     general   (Patient understands anesthesia not responsible for dental damage.)  intravenous induction     Anesthetic plan, all risks, benefits, and alternatives have been provided, discussed and informed consent has been obtained with: patient.  Use of blood products discussed with patient .   Plan discussed with CRNA.

## 2021-10-15 NOTE — ANESTHESIA POSTPROCEDURE EVALUATION
Patient: Casandra Arango    Procedure Summary     Date: 10/15/21 Room / Location: [facility]  / [facility] OR    Anesthesia Start: 0612 Anesthesia Stop: 0705    Procedure: Cystoscopy with left ureteroscopy with laser and left ureteral stent placement (Left ) Diagnosis:       Calculus of ureter      (Calculus of ureter [N20.1])    Surgeons: Reuben Yuan MD Provider: Vinh Abbott MD    Anesthesia Type: general ASA Status: 3 - Emergent          Anesthesia Type: general    Vitals  Vitals Value Taken Time   /61 10/15/21 0755   Temp 36.1 °C (97 °F) 10/15/21 0703   Pulse 68 10/15/21 0755   Resp 16 10/15/21 0748   SpO2 95 % 10/15/21 0755   Vitals shown include unvalidated device data.        Post Anesthesia Care and Evaluation    Patient location during evaluation: bedside  Patient participation: complete - patient participated  Level of consciousness: awake and alert  Pain management: adequate  Airway patency: patent  Anesthetic complications: No anesthetic complications  PONV Status: none  Cardiovascular status: acceptable  Respiratory status: acceptable  Hydration status: acceptable

## 2021-10-15 NOTE — ED PROVIDER NOTES
"Time: 9:38 PM EDT  Arrived by: private car  History provided by: pt  History is limited by: N/A     History of Present Illness:  Patient is a 65 y.o. year old female that presents to the emergency department with ABDOMINAL PAIN.    Pt states she has had left flank pain with left sided abdominal pain that started around 1 pm today. Pt complains of abdominal distention, nausea, and vomiting, but denies any fever.   Pt has hx of kidney stones.       History provided by:  Patient   used: No    Abdominal Pain  Pain location:  LUQ and LLQ  Pain quality comment:  \"pain\"  Pain radiates to:  L flank  Pain severity:  Moderate  Onset quality:  Sudden  Duration:  8 hours  Timing:  Constant  Progression:  Unchanged  Chronicity:  New  Relieved by:  None tried  Worsened by:  Nothing  Ineffective treatments:  None tried  Associated symptoms: nausea and vomiting    Associated symptoms: no chills, no diarrhea, no dysuria, no fever, no hematuria, no shortness of breath and no sore throat        Similar Symptoms Previously: none  Recently seen: not recently seen in this ED    Patient Care Team  Primary Care Provider: Teresa Herman MD    Past Medical History:     Allergies   Allergen Reactions   • Nabumetone Itching   • Celebrex [Celecoxib] Other (See Comments)     Eye problem   • Hydrocodone-Homatropine Itching   • Lamotrigine Hives   • Propoxyphene Unknown - Low Severity     Darvocet  Other reaction(s): Rash to arms, torso   • Terbinafine Rash     Past Medical History:   Diagnosis Date   • Acid reflux    • Allergic rhinitis    • Anemia, unspecified    • Anxiety    • Arthritis    • Chronic cough    • Closed nondisplaced fracture of distal phalanx of right great toe 01/19/2018    WITH ROUTINE HEALING , SUBSEQUENT ENCOUNTER    • Closed nondisplaced fracture of distal phalanx of right great toe 12/26/2017    INITIAL ENCOUNTER    • Edema of right lower extremity 10/10/2015    DICUSSED CONTINUING ELEVATION OF THE " FOOT AND WEARING COMPRESSION STOCKIGS   • GERD (gastroesophageal reflux disease)    • Globus sensation    • Hemorrhoids    • Hyperlipemia    • Hypertension    • Insomnia, unspecified 10/10/2015    TOLD HER TO TAKE ABLIFY DURING THE MORNING RATHER THAN IN THE EVENING AND SEE IF THAT HELPS WITH THE INSOMNIA HOWEVER IF IT DOES NOT WILL NEED TO CONSIDER FURTHER TREATMENT.    • Kidney stones    • Major depressive disorder, recurrent (HCC) 10/10/2015    SEVERE WITHOUT PSYCHOTIC FEATURES   • Migraine headache    • Night sweats    • Primary osteoarthritis of both knees 10/10/2015   • Primary osteoarthritis of left knee 12/26/2017   • Primary osteoarthritis of left shoulder 12/12/2017   • Seasonal allergies    • SOB (shortness of breath)    • Vitamin D deficiency 01/07/2015     Past Surgical History:   Procedure Laterality Date   • BACK SURGERY      LUMBAR   • COLONOSCOPY  2011   • HEMORRHOIDECTOMY     • KIDNEY SURGERY      KIDNEY STONE SURGERY UNSPECIFIED    • KNEE SURGERY      ARTHROSCOPIC KNEE SURGERY   • OTHER SURGICAL HISTORY Right 08/01/2000    ARM ORIF BY DR. BECERRA   • OTHER SURGICAL HISTORY      ARTIFICAL JOINTS/LIMBS   • OTHER SURGICAL HISTORY      JOINT SURGERY   • TIBIA FRACTURE SURGERY  09/11/2013    LEFT TIBIAL ORIF BY DR. PÉREZ   • TONSILLECTOMY     • TOTAL HIP ARTHROPLASTY Right 12/16/2013          Family History   Problem Relation Age of Onset   • Skin cancer Mother    • Kidney cancer Father    • Diabetes Sister        Home Medications:  Prior to Admission medications    Medication Sig Start Date End Date Taking? Authorizing Provider   acetaminophen (TYLENOL) 500 MG tablet Tylenol Extra Strength 500 mg oral tablet take 1 tablet (500 mg) by oral route every 6 hours as needed   Suspended    Emergency, Nurse Jozef, RN   buPROPion SR (WELLBUTRIN SR) 150 MG 12 hr tablet TAKE 1 TABLET BY MOUTH TWICE DAILY 9/3/21   Teresa Herman MD   cetirizine (zyrTEC) 10 MG tablet Take 1 tablet by mouth Daily.  7/14/21   Teresa Herman MD   cetirizine (zyrTEC) 10 MG tablet cetirizine 10 mg tablet    Nurse Jozef Contreras RN   chlorthalidone (HYGROTON) 25 MG tablet TAKE ONE TABLET BY MOUTH DAILY 8/11/21   Teresa Herman MD   chlorthalidone (HYGROTON) 25 MG tablet chlorthalidone 25 mg tablet 5/14/21   Nurse Jozef Contreras RN   ciclopirox (LOPROX) 0.77 % suspension APPLY TO THE AFFECTED AND SURROUNDING AREAS OF SKIN TWICE DAILY IN THE MORNING AND EVENING 9/18/21   Nurse Jozef Contreras RN   desvenlafaxine (PRISTIQ) 50 MG 24 hr tablet TAKE 1 TABLET BY MOUTH DAILY. 6/9/21   Teresa Herman MD   desvenlafaxine (PRISTIQ) 50 MG 24 hr tablet desvenlafaxine succinate ER 50 mg tablet,extended release 24 hr    Nurse Jozef Contreras RN   Diclofenac Sodium (VOLTAREN) 1 % gel gel APPLY 2 GRAMS TO AFFECTED AREA(S) TOPICALLY FOUR TIMES DAILY 7/13/21   Kar Denny MD   fluticasone (FLONASE) 50 MCG/ACT nasal spray fluticasone propionate 50 mcg/actuation nasal spray,suspension 2/9/21   Nurse Jozef Contreras RN   hydrOXYzine (ATARAX) 25 MG tablet TAKE 1 TABLET BY MOUTH DAILY 6/9/21   Teresa Herman MD   naproxen (NAPROSYN) 500 MG tablet naproxen 500 mg oral tablet TAKE 1 TABLET BY MOUTH TWICE DAILY WITH FOOD 3/26/2021  Active 3/26/21   EmergencyNurse Jozef RN   omeprazole (priLOSEC) 40 MG capsule omeprazole 40 mg oral capsule,delayed release(DR/EC) TAKE 1 CAPSULE BY MOUTH BY MOUTH TWICE DAILY. 3/8/2021  Active 3/8/21   Nurse Jozef Contreras RN   ondansetron (ZOFRAN) 4 MG tablet ondansetron HCl 4 mg tablet    Nurse Jozef Contreras RN   ondansetron ODT (Zofran ODT) 4 MG disintegrating tablet Zofran ODT 4 mg oral tablet,disintegrating take 1 tablet by oral route prn   Suspended    Emergency, Nurse Jozef RN   penciclovir (Denavir) 1 % cream Denavir 1 % topical cream    Emergency, Nurse Epic, RN   rizatriptan (MAXALT) 10 MG tablet rizatriptan 10 mg tablet    Emergency, Nurse Epic, RN   valACYclovir (VALTREX) 500 MG  "tablet valacyclovir 500 mg tablet    Emergency, Nurse Jozef, RN        Social History:   Social History     Tobacco Use   • Smoking status: Never Smoker   • Smokeless tobacco: Never Used   Vaping Use   • Vaping Use: Never used   Substance Use Topics   • Alcohol use: Not Currently     Comment: CURRENT SOME DAY    • Drug use: Never     Recent travel: not applicable     Review of Systems:  Review of Systems   Constitutional: Negative for chills and fever.   HENT: Negative for congestion, ear pain, rhinorrhea, sore throat, tinnitus and trouble swallowing.    Eyes: Negative for photophobia and pain.   Respiratory: Negative for choking and shortness of breath.    Gastrointestinal: Positive for abdominal distention, abdominal pain, nausea and vomiting. Negative for diarrhea.   Endocrine: Negative for polydipsia.   Genitourinary: Positive for flank pain. Negative for difficulty urinating, dysuria and hematuria.   Musculoskeletal: Negative for back pain, joint swelling and neck pain.   Skin: Negative for rash.   Neurological: Negative for dizziness, seizures, speech difficulty, weakness, light-headedness, numbness and headaches.   Hematological: Negative for adenopathy.   Psychiatric/Behavioral: Negative for behavioral problems, confusion, self-injury and suicidal ideas.        Physical Exam:  /68 (BP Location: Right arm, Patient Position: Lying)   Pulse 71   Temp 98 °F (36.7 °C) (Oral)   Resp 20   Ht 172.7 cm (67.99\")   Wt 96.7 kg (213 lb 3 oz)   SpO2 97%   BMI 32.42 kg/m²     Physical Exam  Vitals and nursing note reviewed.   Constitutional:       General: She is awake.      Appearance: Normal appearance.   HENT:      Head: Normocephalic and atraumatic.      Right Ear: External ear normal.      Left Ear: External ear normal.      Nose: Nose normal.      Mouth/Throat:      Mouth: Mucous membranes are moist.      Pharynx: Oropharynx is clear.   Eyes:      General: Lids are normal.      Extraocular Movements: " Extraocular movements intact.      Conjunctiva/sclera: Conjunctivae normal.      Pupils: Pupils are equal, round, and reactive to light.   Cardiovascular:      Rate and Rhythm: Normal rate and regular rhythm.      Pulses: Normal pulses.      Heart sounds: Normal heart sounds.   Pulmonary:      Effort: Pulmonary effort is normal.      Breath sounds: Normal breath sounds and air entry.   Abdominal:      Palpations: Abdomen is soft.      Tenderness: There is abdominal tenderness in the left upper quadrant and left lower quadrant. There is left CVA tenderness.   Musculoskeletal:         General: Normal range of motion.      Cervical back: Full passive range of motion without pain, normal range of motion and neck supple.   Skin:     General: Skin is warm and dry.   Neurological:      General: No focal deficit present.      Mental Status: She is alert and oriented to person, place, and time. Mental status is at baseline.      Cranial Nerves: Cranial nerves are intact.      Sensory: Sensation is intact.      Motor: Motor function is intact.      Coordination: Coordination is intact.   Psychiatric:         Attention and Perception: Attention and perception normal.         Mood and Affect: Mood and affect normal.         Speech: Speech normal.         Behavior: Behavior normal. Behavior is cooperative.         Thought Content: Thought content normal.         Cognition and Memory: Cognition and memory normal.                Medications in the Emergency Department:  Medications   sodium chloride 0.9 % flush 10 mL (has no administration in time range)   buPROPion SR (WELLBUTRIN SR) 12 hr tablet 150 mg (has no administration in time range)   cetirizine (zyrTEC) tablet 10 mg (has no administration in time range)   chlorthalidone (HYGROTON) tablet 25 mg (has no administration in time range)   venlafaxine XR (EFFEXOR-XR) 24 hr capsule 75 mg (has no administration in time range)   fluticasone (FLONASE) 50 MCG/ACT nasal spray 2 spray  (has no administration in time range)   hydrOXYzine (ATARAX) tablet 25 mg (has no administration in time range)   pantoprazole (PROTONIX) EC tablet 40 mg (has no administration in time range)   sodium chloride 0.9 % flush 10 mL (has no administration in time range)   sodium chloride 0.9 % flush 10 mL (has no administration in time range)   lactated ringers infusion (100 mL/hr Intravenous New Bag 10/15/21 0256)   acetaminophen (TYLENOL) tablet 650 mg (has no administration in time range)     Or   acetaminophen (TYLENOL) 160 MG/5ML solution 650 mg (has no administration in time range)     Or   acetaminophen (TYLENOL) suppository 650 mg (has no administration in time range)   calcium carbonate (TUMS) chewable tablet 500 mg (200 mg elemental) (has no administration in time range)   sennosides-docusate (PERICOLACE) 8.6-50 MG per tablet 2 tablet (has no administration in time range)     And   polyethylene glycol (MIRALAX) packet 17 g (has no administration in time range)     And   bisacodyl (DULCOLAX) EC tablet 5 mg (has no administration in time range)     And   bisacodyl (DULCOLAX) suppository 10 mg (has no administration in time range)   ondansetron (ZOFRAN) tablet 4 mg (has no administration in time range)     Or   ondansetron (ZOFRAN) injection 4 mg (has no administration in time range)   melatonin tablet 5 mg (has no administration in time range)   morphine injection 2 mg (has no administration in time range)     And   naloxone (NARCAN) injection 0.4 mg (has no administration in time range)   levoFLOXacin (LEVAQUIN) 500 mg/100 mL D5W (premix) (LEVAQUIN) 500 mg (has no administration in time range)   lactated ringers infusion (has no administration in time range)   sodium chloride 0.9 % bolus 1,000 mL (0 mL Intravenous Stopped 10/15/21 0256)   morphine injection 4 mg (4 mg Intravenous Given 10/14/21 2230)   ondansetron (ZOFRAN) injection 4 mg (4 mg Intravenous Given 10/14/21 2229)   iopamidol (ISOVUE-370) 76 %  injection 100 mL (100 mL Intravenous Given 10/14/21 2321)        Labs  Lab Results (last 24 hours)     Procedure Component Value Units Date/Time    CBC & Differential [[id number]]  (Abnormal) Collected: 10/14/21 2010    Specimen: Blood Updated: 10/14/21 2022    Narrative:      The following orders were created for panel order CBC & Differential.  Procedure                               Abnormality         Status                     ---------                               -----------         ------                     CBC Auto Differential[[id number]]        Abnormal            Final result                 Please view results for these tests on the individual orders.    Comprehensive Metabolic Panel [[id number]]  (Abnormal) Collected: 10/14/21 2010    Specimen: Blood Updated: 10/14/21 2057     Glucose 114 mg/dL      BUN 35 mg/dL      Creatinine 1.10 mg/dL      Sodium 140 mmol/L      Potassium 4.1 mmol/L      Chloride 102 mmol/L      CO2 25.2 mmol/L      Calcium 10.0 mg/dL      Total Protein 7.4 g/dL      Albumin 4.30 g/dL      ALT (SGPT) 12 U/L      AST (SGOT) 13 U/L      Alkaline Phosphatase 130 U/L      Total Bilirubin 0.3 mg/dL      eGFR Non African Amer 50 mL/min/1.73      Globulin 3.1 gm/dL      A/G Ratio 1.4 g/dL      BUN/Creatinine Ratio 31.8     Anion Gap 12.8 mmol/L     Narrative:      GFR Normal >60  Chronic Kidney Disease <60  Kidney Failure <15      Lipase [[id number]]  (Normal) Collected: 10/14/21 2010    Specimen: Blood Updated: 10/14/21 2057     Lipase 14 U/L     CBC Auto Differential [[id number]]  (Abnormal) Collected: 10/14/21 2010    Specimen: Blood Updated: 10/14/21 2022     WBC 13.92 10*3/mm3      RBC 4.58 10*6/mm3      Hemoglobin 13.0 g/dL      Hematocrit 40.6 %      MCV 88.6 fL      MCH 28.4 pg      MCHC 32.0 g/dL      RDW 15.1 %      RDW-SD 48.6 fl      MPV 8.6 fL      Platelets 452 10*3/mm3      Neutrophil % 82.1 %      Lymphocyte % 9.3 %      Monocyte % 5.4 %      Eosinophil % 1.4 %      Basophil  % 0.9 %      Immature Grans % 0.9 %      Neutrophils, Absolute 11.44 10*3/mm3      Lymphocytes, Absolute 1.29 10*3/mm3      Monocytes, Absolute 0.75 10*3/mm3      Eosinophils, Absolute 0.20 10*3/mm3      Basophils, Absolute 0.12 10*3/mm3      Immature Grans, Absolute 0.12 10*3/mm3      nRBC 0.0 /100 WBC     Urinalysis With Microscopic If Indicated (No Culture) - Urine, Clean Catch [[id number]]  (Abnormal) Collected: 10/14/21 2233    Specimen: Urine, Clean Catch Updated: 10/14/21 2304     Color, UA Yellow     Appearance, UA Turbid     pH, UA 8.0     Specific Gravity, UA 1.020     Glucose, UA Negative     Ketones, UA Negative     Bilirubin, UA Negative     Blood, UA Small (1+)     Protein, UA Trace     Leuk Esterase, UA Trace     Nitrite, UA Negative     Urobilinogen, UA 1.0 E.U./dL    Urinalysis, Microscopic Only - Urine, Clean Catch [[id number]]  (Abnormal) Collected: 10/14/21 2233    Specimen: Urine, Clean Catch Updated: 10/14/21 2304     RBC, UA Too Numerous to Count /HPF      WBC, UA 3-5 /HPF      Bacteria, UA None Seen /HPF      Squamous Epithelial Cells, UA 3-6 /HPF      Hyaline Casts, UA None Seen /LPF      Methodology Automated Microscopy    CBC Auto Differential [[id number]]  (Abnormal) Collected: 10/15/21 0457    Specimen: Blood Updated: 10/15/21 0529     WBC 13.01 10*3/mm3      RBC 3.88 10*6/mm3      Hemoglobin 11.3 g/dL      Hematocrit 34.5 %      MCV 88.9 fL      MCH 29.1 pg      MCHC 32.8 g/dL      RDW 15.1 %      RDW-SD 48.1 fl      MPV 8.8 fL      Platelets 358 10*3/mm3      Neutrophil % 73.5 %      Lymphocyte % 16.8 %      Monocyte % 7.5 %      Eosinophil % 1.1 %      Basophil % 0.6 %      Immature Grans % 0.5 %      Neutrophils, Absolute 9.56 10*3/mm3      Lymphocytes, Absolute 2.18 10*3/mm3      Monocytes, Absolute 0.98 10*3/mm3      Eosinophils, Absolute 0.14 10*3/mm3      Basophils, Absolute 0.08 10*3/mm3      Immature Grans, Absolute 0.07 10*3/mm3      nRBC 0.0 /100 WBC     Comprehensive Metabolic  Panel [[id number]]  (Abnormal) Collected: 10/15/21 0457    Specimen: Blood Updated: 10/15/21 0555     Glucose 105 mg/dL      BUN 30 mg/dL      Creatinine 0.92 mg/dL      Sodium 140 mmol/L      Potassium 3.6 mmol/L      Chloride 106 mmol/L      CO2 26.1 mmol/L      Calcium 8.8 mg/dL      Total Protein 5.8 g/dL      Albumin 3.50 g/dL      ALT (SGPT) 9 U/L      AST (SGOT) 11 U/L      Alkaline Phosphatase 107 U/L      Total Bilirubin 0.2 mg/dL      eGFR Non African Amer 61 mL/min/1.73      Globulin 2.3 gm/dL      A/G Ratio 1.5 g/dL      BUN/Creatinine Ratio 32.6     Anion Gap 7.9 mmol/L     Narrative:      GFR Normal >60  Chronic Kidney Disease <60  Kidney Failure <15             Imaging:  CT Abdomen Pelvis With Contrast    Result Date: 10/14/2021  PROCEDURE: CT ABDOMEN PELVIS W CONTRAST  COMPARISON: [facility], CT, ABD PEL W/O CONTRAST, 11/01/2013, 18:40.  INDICATIONS: abdominal pain.  LEFT FLANK PAIN  TECHNIQUE: After obtaining the patient's consent, CT images were created with non-ionic intravenous contrast material.   PROTOCOL:   Standard imaging protocol performed    RADIATION:   DLP: 1172.8 mGy*cm   Automated exposure control was utilized to minimize radiation dose.  FINDINGS:  Lung bases without consolidation.  There is a moderate hiatal hernia.  Negative for pericardial or pleural effusion.  The liver is normal in size and contour.  There is a cyst in the left hepatic lobe measuring 3.1 cm.  Small cyst in the right hepatic lobe measuring 1 cm.  The spleen is normal in size.  Normal adrenal glands.  Pancreas without findings of pancreatitis.  Gallbladder present.  No pericholecystic inflammation.  There is severe left hydronephrosis secondary to two adjacent obstructing calculi in the distal left ureter measuring 4 mm best seen on coronal image 98. Bladder is thin-walled.  Extensive left perinephric inflammatory stranding.  Prostate normal in size.  No obstructive uropathy on the right.  Negative for  pneumoperitoneum.  No bowel obstruction.  No organized fluid collection.  Normal appendix.  The portal vein, splenic vein, and superior mesenteric vein are patent.  Mild atherosclerotic calcifications involving the abdominal aorta and branch vasculature.  There is a right hip prosthesis noted.  Mild to moderate left hip osteoarthritis.  Multilevel disc disease in the lumbar spine most advanced at L4-5 and L5-S1 with multilevel facet arthropathy.  No aggressive osseous lesion or fracture.  CONCLUSION:  1. Two adjacent 4 mm obstructing calculi in the distal left ureter resulting in severe left hydroureteronephrosis. 2. Moderate hiatal hernia. 3. Status post hysterectomy. 4. Additional chronic findings above.    ANDRIY GODOY MD       Electronically Signed and Approved By: ANDRIY GODOY MD on 10/14/2021 at 23:55               Procedures:  Procedures    Progress                            Medical Decision Making:  MDM     Due to patient's continued left lower quadrant abdominal pain in the setting of multiple ureteral calculi patient was discussed with Dr. Yuan of urology who recommends admission and will likely take patient to operating room for intervention.  Patient discussed with hospitalist for admission.  The patient´s CBC was reviewed and shows no abnormalities of critical concern. Of note, there is no anemia requiring a blood transfusion and the platelet count is acceptable.  The patient´s CMP was reviewed and shows no abnormalities of critical concern. Of note, the patient´s sodium and potassium are acceptable. The patient´s liver enzymes are unremarkable. The patient´s renal function (creatinine) is preserved. The patient has a normal anion gap.  Urinalysis is negative for evidence of infection.  The patient's airway is intact, vital signs, and respiratory status are safe for admission at this time.    Final diagnoses:   Calculus of ureter        Disposition:  ED Disposition     ED Disposition Condition  Comment    Decision to Admit  Level of Care: Med/Surg [1]   Diagnosis: Calculus of ureter [592.1.ICD-9-CM]   Admitting Physician: SAM JONES [[id number]]   Attending Physician: SAM JONES [[id number]]            Documentation assistance provided by Selam Adame acting as scribe for Amauri Sanchez MD. Information recorded by the scribe was done at my direction and has been verified and validated by me.          Selam Adame  10/14/21 2149       Amauri Sanchez MD  10/15/21 0618

## 2021-10-15 NOTE — H&P
History and Physical   Casandra Arango , :  1955, MRN:  [id number]    Chief complaint: Left side pain.    Assessment/Plan       Ureteral stone with hydronephrosis    Obesity (BMI 30-39.9)    GERD without esophagitis    Essential hypertension      • Ureteral stone with hydronephrosis: The patient has 2 obstructing left ureteral stones measuring approximately 4 mm with severe left-sided hydroureteronephrosis.  Urology consulted, plans to see the patient in the morning to evaluate for possible surgical intervention.    • Obesity: BMI of 31.78.  Recommend improve diet and activity resolution of acute illness.    • Gastroesophageal reflux disease: On PPI at home.  Will continue.    • Hypertension: Blood pressure is well controlled currently.  The patient is on chlorthalidone at home, will continue.    • Depression with anxiety: Continue patient on home Wellbutrin, venlafaxine.    • Clinical course will dictate further medical management.    DVT Prophylaxis: Chemoprophylaxis held pending procedure, SCDs  Code Status: Full    Reviewed patients labs and imaging, and discussed with patient and nurse at bedside.    Patient risk level:  Patient comorbidities and risk factors make patient a poor candidate for outpatient management due to concerns of deterioration.    Total time spent on admission: 70 minutes    History of Present illness     Casandra Arango is a 65 y.o. old female patient who presents with left back, left lower abdominal pain.  Patient has a history of nephrolithiasis, anxiety with depression, gastroesophageal reflux disease, osteoarthritis, hypertension, hyperlipidemia.    The patient states she began having severe left-sided back pain radiating into her left groin and lower abdomen that started approximately 2 PM on the day of presentation.  She denies fevers or chills.  She reports associated nausea and vomiting.  She reports a history of kidney stones, but reports her symptoms today were  more intense and severe than previous episodes.  For this reason she presented to the emergency department.    ED course: On arrival to the ED the patient was noted to have normal vital signs.  Work-up showed a slightly elevated white blood cell count at 13,900.  Urinalysis was positive for hematuria, remainder of blood work was otherwise normal.  The patient had a CT of the abdomen and pelvis that showed 2 adjacent 4 mm stones obstructing the left ureter causing severe left hydroureteronephrosis.  Urology was consulted, plans to evaluate the patient in the morning.  The hospitalist service was asked admit the patient for further evaluation and management.    Old records were reviewed which revealed last admission of the hospitals in November 2020 for right knee arthroscopy.    Review of Systems     General:  Denies fevers, chills, weight loss/gain or night sweats.  HEENT: Denies dysphagia, hearing changes, rhinorrhea, visual changes or nasal congestion.  Respiratory: Denies cough, dyspnea, sputum changes, wheezing or hemoptysis.  Denies pleuritic chest pain.  Cardiovascular: Denies chest pain, palpitations, dyspnea on exertion or orthopnea. Denies chest pressure. Denies lower extremity edema.  Gastrointestinal: Reports left lower quadrant abdominal pain with nausea, vomiting.  Denies diarrhea.  Denies bright red blood per rectum, melena or cramping.  Reports left flank pain.  Genitourinary: Denies dysuria, frequency or hesitancy. Denies incontinence, urgency or hematuria.  Musculoskeletal: Denies joint effusions, joint tenderness, joint stiffness or myalgias.  Endocrine: Denies heat or cold intolerance. Denies fatigue.   Hematologic: Denies bleeding, bruising or swollen glands.  Psychiatric: Denies anxiety or depression.  Neurologic: Denies confusion, headaches, numbness or visual changes.  Skin: Denies rash, edema or open wounds.    Past Medical/Surgical/Social/Family History/Allergies     Past Medical History:    Diagnosis Date   • Acid reflux    • Allergic rhinitis    • Anemia, unspecified    • Anxiety    • Arthritis    • Chronic cough    • Closed nondisplaced fracture of distal phalanx of right great toe 01/19/2018    WITH ROUTINE HEALING , SUBSEQUENT ENCOUNTER    • Closed nondisplaced fracture of distal phalanx of right great toe 12/26/2017    INITIAL ENCOUNTER    • Edema of right lower extremity 10/10/2015    DICUSSED CONTINUING ELEVATION OF THE FOOT AND WEARING COMPRESSION STOCKIGS   • GERD (gastroesophageal reflux disease)    • Globus sensation    • Hemorrhoids    • Hyperlipemia    • Hypertension    • Insomnia, unspecified 10/10/2015    TOLD HER TO TAKE ABLIFY DURING THE MORNING RATHER THAN IN THE EVENING AND SEE IF THAT HELPS WITH THE INSOMNIA HOWEVER IF IT DOES NOT WILL NEED TO CONSIDER FURTHER TREATMENT.    • Kidney stones    • Major depressive disorder, recurrent (HCC) 10/10/2015    SEVERE WITHOUT PSYCHOTIC FEATURES   • Migraine headache    • Night sweats    • Primary osteoarthritis of both knees 10/10/2015   • Primary osteoarthritis of left knee 12/26/2017   • Primary osteoarthritis of left shoulder 12/12/2017   • Seasonal allergies    • SOB (shortness of breath)    • Vitamin D deficiency 01/07/2015       Past Surgical History:   Procedure Laterality Date   • BACK SURGERY      LUMBAR   • COLONOSCOPY  2011   • HEMORRHOIDECTOMY     • KIDNEY SURGERY      KIDNEY STONE SURGERY UNSPECIFIED    • KNEE SURGERY      ARTHROSCOPIC KNEE SURGERY   • OTHER SURGICAL HISTORY Right 08/01/2000    ARM ORIF BY DR. BECERRA   • OTHER SURGICAL HISTORY      ARTIFICAL JOINTS/LIMBS   • OTHER SURGICAL HISTORY      JOINT SURGERY   • TIBIA FRACTURE SURGERY  09/11/2013    LEFT TIBIAL ORIF BY DR. PÉREZ   • TONSILLECTOMY     • TOTAL HIP ARTHROPLASTY Right 12/16/2013            Social History     Socioeconomic History   • Marital status:    Tobacco Use   • Smoking status: Never Smoker   • Smokeless tobacco: Never Used   Vaping Use    • Vaping Use: Never used   Substance and Sexual Activity   • Alcohol use: Not Currently     Comment: CURRENT SOME DAY    • Drug use: Never       Family History   Problem Relation Age of Onset   • Skin cancer Mother    • Kidney cancer Father    • Diabetes Sister        Allergies   Allergen Reactions   • Nabumetone Itching   • Celebrex [Celecoxib] Other (See Comments)     Eye problem   • Hydrocodone-Homatropine Itching   • Lamotrigine Hives   • Propoxyphene Unknown - Low Severity     Darvocet  Other reaction(s): Rash to arms, torso   • Terbinafine Rash       The above past medical history, past surgical history, social history, family history allergies and home medications were personally reviewed by me today and corrected as needed  Home Medications   (Not in a hospital admission)    Physical Exam   Vital signs:  Temperature Blood Pressure Heart Rate Resp Rate SpO2   Temp: 98.2 °F (36.8 °C) BP: 150/78 Heart Rate: 82 Resp: 22 SpO2: 96 %     HEENT: Head is atraumatic, extraocular movements are intact. Oropharynx is normal.  Neck: Supple, no cervical lymphadenopathy.  Cardiovascular: Regular rate and rhythm. No murmurs, gallops or rubs. No peripheral edema.   Lungs: Clear to auscultation bilaterally.  No diminished breath sounds, rales, crackles or wheezes.  Abdomen: Normal bowel sounds in all 4 quadrants. No rebound, guarding or tenderness.    Chest: Normal inspection. Equal rise and fall. No retractions noted.  Constitutional: Well-nourished, well-developed, in no apparent distress.  Resting comfortably in bed.  Lymphatic: No cervical lymphadenopathy.  Extremities: 5/5 upper and lower extremity strength. Normal range of motion.  No clubbing, cyanosis or edema.  Neurological: Alert and oriented x3. No numbness or tingling.  Psychological: Normal mood and affect. Well kempt. Normal eye contact.  Skin: No rash, cellulitis, swelling or bruising.    Labs     Results from last 7 days   Lab Units 10/14/21  2010   WBC  10*3/mm3 13.92*   HEMOGLOBIN g/dL 13.0   HEMATOCRIT % 40.6   PLATELETS 10*3/mm3 452*     Results from last 7 days   Lab Units 10/14/21  2010   SODIUM mmol/L 140   POTASSIUM mmol/L 4.1   CHLORIDE mmol/L 102   CO2 mmol/L 25.2   BUN mg/dL 35*   CREATININE mg/dL 1.10*   CALCIUM mg/dL 10.0     Results from last 7 days   Lab Units 10/14/21  2010   ALT (SGPT) U/L 12   AST (SGOT) U/L 13   ALK PHOS U/L 130*   BILIRUBIN mg/dL 0.3                   Invalid input(s): CPK, MASSCKMB        I reviewed patient's labs from today and also previous labs while reviewing old records   Imaging and Other procedures   I reviewed patient's imaging and other testing from today and also in the past including but not limited to imaging, previous imaging, EKG, previous EKGs, echocardiogram, and other procedures if any and previously done by reviewing old records  Current Medications   Scheduled Meds:   Continuous Infusions:     10/15/21  00:45 EDT

## 2021-10-15 NOTE — PLAN OF CARE
Goal Outcome Evaluation:  Plan of Care Reviewed With: patient        Progress: improving  Outcome Summary: Patient is being discharged home.

## 2021-10-15 NOTE — CONSULTS
[facility]   UROLOGY Consult Note    Patient Name: Casandra Arango  : 1955  MRN: [id number]  Primary Care Physician:  Teresa Herman MD  Referring Physician: No Known Provider  Date of admission: 10/14/2021    Subjective   Subjective     Chief Complaint: Left ureteral stone/left flank pain    HPI:  Casandra Arango is a 65 y.o. female  left back, left lower abdominal pain. istory of nephrolithiasis, anxiety with depression, gastroesophageal reflux disease, osteoarthritis, hypertension, hyperlipidemia.  CT finds 2 distal left ureteral stones    10/14/2021 CT abdomen/pelvis with - to 4 mm berlin calculi in distal left ureter.  Severe left hydro-.  No other stones images reviewed    10/14/2021 creatinine 1.1, GFR 50, urine nitrite negative, no bacteria        Left-sided flank pain started severely earlier today. Radiating to her left groin. 8/10. Stabbing in nature nothing makes the pain worse, pain medicine makes the pain better    No fevers or chills.    Review of Systems     10 systems reviewed and are negative other than what is listed in the HPI    Personal History     Past Medical History:   Diagnosis Date   • Acid reflux    • Allergic rhinitis    • Anemia, unspecified    • Anxiety    • Arthritis    • Chronic cough    • Closed nondisplaced fracture of distal phalanx of right great toe 2018    WITH ROUTINE HEALING , SUBSEQUENT ENCOUNTER    • Closed nondisplaced fracture of distal phalanx of right great toe 2017    INITIAL ENCOUNTER    • Edema of right lower extremity 10/10/2015    DICUSSED CONTINUING ELEVATION OF THE FOOT AND WEARING COMPRESSION STOCKIGS   • GERD (gastroesophageal reflux disease)    • Globus sensation    • Hemorrhoids    • Hyperlipemia    • Hypertension    • Insomnia, unspecified 10/10/2015    TOLD HER TO TAKE ABLIFY DURING THE MORNING RATHER THAN IN THE EVENING AND SEE IF THAT HELPS WITH THE INSOMNIA HOWEVER IF IT DOES NOT WILL NEED TO CONSIDER FURTHER  TREATMENT.    • Kidney stones    • Major depressive disorder, recurrent (HCC) 10/10/2015    SEVERE WITHOUT PSYCHOTIC FEATURES   • Migraine headache    • Night sweats    • Primary osteoarthritis of both knees 10/10/2015   • Primary osteoarthritis of left knee 12/26/2017   • Primary osteoarthritis of left shoulder 12/12/2017   • Seasonal allergies    • SOB (shortness of breath)    • Vitamin D deficiency 01/07/2015       Past Surgical History:   Procedure Laterality Date   • BACK SURGERY      LUMBAR   • COLONOSCOPY  2011   • HEMORRHOIDECTOMY     • KIDNEY SURGERY      KIDNEY STONE SURGERY UNSPECIFIED    • KNEE SURGERY      ARTHROSCOPIC KNEE SURGERY   • OTHER SURGICAL HISTORY Right 08/01/2000    ARM ORIF BY DR. BECERRA   • OTHER SURGICAL HISTORY      ARTIFICAL JOINTS/LIMBS   • OTHER SURGICAL HISTORY      JOINT SURGERY   • TIBIA FRACTURE SURGERY  09/11/2013    LEFT TIBIAL ORIF BY DR. PÉREZ   • TONSILLECTOMY     • TOTAL HIP ARTHROPLASTY Right 12/16/2013            Family History: family history includes Diabetes in her sister; Kidney cancer in her father; Skin cancer in her mother. Otherwise pertinent FHx was reviewed and not pertinent to current issue.    Social History:  reports that she has never smoked. She has never used smokeless tobacco. She reports previous alcohol use. She reports that she does not use drugs.    Home Medications:  Diclofenac Sodium, acetaminophen, buPROPion SR, cetirizine, chlorthalidone, ciclopirox, desvenlafaxine, fluticasone, hydrOXYzine, naproxen, omeprazole, ondansetron, ondansetron ODT, penciclovir, rizatriptan, and valACYclovir    Allergies:  Allergies   Allergen Reactions   • Nabumetone Itching   • Celebrex [Celecoxib] Other (See Comments)     Eye problem   • Hydrocodone-Homatropine Itching   • Lamotrigine Hives   • Propoxyphene Unknown - Low Severity     Darvocet  Other reaction(s): Rash to arms, torso   • Terbinafine Rash       Objective    Objective     Vitals:   Temp:  [98 °F  (36.7 °C)-98.2 °F (36.8 °C)] 98 °F (36.7 °C)  Heart Rate:  [71-85] 71  Resp:  [20-22] 20  BP: (136-150)/(68-78) 136/68    Physical Exam:   Constitutional: Awake, alert    Respiratory: Clear to auscultation bilaterally, nonlabored respirations    Cardiovascular: RRR, no murmurs, rubs, or gallops, palpable pedal pulses bilaterally   Gastrointestinal: Positive bowel sounds, soft, nontender, nondistended   Musculoskeletal: No bilateral ankle edema, no clubbing or cyanosis to extremities   Psychiatric: Appropriate affect, cooperative   Neurologic: Oriented x 3, strength symmetric in all extremities,  Skin: No rashes     Result Review    Result Review:  I have personally reviewed the results from the time of this admission to 10/15/2021 03:42 EDT and agree with these findings:  []  Laboratory  []  Microbiology  []  Radiology  []  EKG/Telemetry   []  Cardiology/Vascular   []  Pathology  []  Old records  []  Other:      Assessment/Plan   Assessment / Plan     Brief Patient Summary:  Casandra Arango is a 65 y.o. female     Left obstructing ureteral stone    Intractable left flank pain  Active Hospital Problems:  Active Hospital Problems    Diagnosis    • **Ureteral stone with hydronephrosis    • Obesity (BMI 30-39.9)    • GERD without esophagitis    • Essential hypertension        Plan:     CT images and read reviewed and discussed with the patient  After discussion of conservative versus surgical management we have decided on surgical removal   We will plan on going to the OR for cystoscopy with left ureteroscopy with laser and left ureteral stent placement. Risks and benefits were discussed including bleeding, infection and damage to the urinary system.  We also discussed the risk of anesthesia up to and including death.  Patient voiced understanding and would like to proceed.        Electronically signed by Reuben Yuan MD, 10/15/21, 3:42 AM EDT.

## 2021-10-15 NOTE — OUTREACH NOTE
Prep Survey      Responses   [facility] patient discharged from? [place]   Is LACE score < 7 ? Yes   Emergency Room discharge w/ pulse ox? No   Eligibility [facility] [place]   Date of Admission 10/14/21   Date of Discharge 10/15/21   Discharge Disposition Home or Self Care   Discharge diagnosis Ureteral stone with hydronephrosis,  Cystoscopy with left ureteroscopy with laser and left ureteral stent placement   Does the patient have one of the following disease processes/diagnoses(primary or secondary)? General Surgery   Does the patient have Home health ordered? No   Is there a DME ordered? No   Prep survey completed? Yes          Sonia Denise RN

## 2021-10-15 NOTE — OP NOTE
URETEROSCOPY LASER LITHOTRIPSY WITH STENT INSERTION  Procedure Report    Patient Name:  Casandra Arango  YOB: 1955    Date of Surgery:  10/15/2021      Pre-op Diagnosis:   Calculus of ureter [N20.1]       Postop diagnosis:    Same    Procedure/CPT® Codes:      Procedure(s):  Cystoscopy  Left ureteroscopy with laser and basket extraction of stone  Left ureteral stent placement 6 x 26 with no string    Staff:  Surgeon(s):  Reuben Yuan MD         Anesthesia: General    Estimated Blood Loss: none    Implants:    Implant Name Type Inv. Item Serial No.  Lot No. LRB No. Used Action   STNT URETRL CLASSC DBL PIG 6F 26CM - [id number] Stent STNT URETRL CLASSC DBL PIG 6F 26CM  [facility] [id number] Left 1 Implanted       Specimen:          Specimens     ID Source Type Tests Collected By Collected At Frozen?    1 Ureter, Left Calculus · STONE ANALYSIS   Reuben Yuan MD 10/15/21 0647     Description: LEFT URETERAL STONES              Findings:   8 mm mid ureteral stone, removed its entirety.  6 x 26 stent placed with no string    Complications: none    Description of Procedure: After informed consent patient taken to the operating room.  Patient was laid supine and placed under general anesthesia by the anesthesia team.  At this point patient was placed in dorsal lithotomy position and prepped and draped in normal sterile fashion.  A multidisciplinary timeout was undertaken documenting the correct patient site and procedure.  At this point a 22 rigid cystoscope was placed into the urethra . Bladder was normal.  At this point a Glidewire was placed up the left ureter without any issue under fluoroscopic guidance.  Semirigid ureteroscope was advanced up the left ureter. the stone was identified.  Stone was lasered into multiple small fragments with a 272 µm laser fiber and then these pieces were basketed out with a no tip nitinol basket.  I then took the semirigid ureteroscope  up and check the rest of the ureter, no further stones.  There were no further stones.         A 6 x 26 ureteral stent was then placed over the Glidewire through a rigid cystoscope without issue and had a good curl in the bladder under direct vision and a good curl in the left     renal pelvis under fluoroscopy.  Bladder was drained.  Patient tolerated the procedure well, he was taken to the postanesthesia care unit without issue.  No string          Reuben Yuan MD     Date: 10/15/2021  Time: 06:55 EDT

## 2021-10-15 NOTE — PLAN OF CARE
Goal Outcome Evaluation:  Plan of Care Reviewed With: patient        Progress: no change  Outcome Summary: VSS, pt arrived to unti from ED, no c/o pain, went down to surgery at 0500.

## 2021-10-15 NOTE — DISCHARGE SUMMARY
[facility]         DISCHARGE SUMMARY    Patient Name: Casandra Arango  : 1955  MRN: [id number]    Date of Admission: 10/14/2021  Date of Discharge:  10/15/21   Primary Care Physician: Teresa Herman MD    Consults     Date and Time Order Name Status Description    10/15/2021 12:24 AM Hospitalist (on-call MD unless specified) Completed     10/15/2021 12:06 AM Urology (on-call MD unless specified) Completed           Surgical Procedures Since Admission:  Procedure(s):  Cystoscopy with left ureteroscopy with laser and left ureteral stent placement  Surgeon:  Reuben Yuan MD  Status:  Day of Surgery  -------------------      Presenting Problem:   Calculus of ureter [N20.1]    Active and Resolved Hospital Problems:  Active Hospital Problems    Diagnosis POA   • Obesity (BMI 30-39.9) [E66.9] Yes   • GERD without esophagitis [K21.9] Yes   • Essential hypertension [I10] Yes      Resolved Hospital Problems    Diagnosis POA   • **Ureteral stone with hydronephrosis [N13.2] Yes   • Calculus of ureter [N20.1] Unknown         Hospital Course     Hospital Course:  Casandra Arango is a 65 y.o. female underwent left ureteroscopy with laser lithotripsy and stone removal.  Left ureteral stent placement.  Did well with her to be discharged home a few hours later    Day of Discharge     Vital Signs:  Temp:  [98 °F (36.7 °C)-98.2 °F (36.8 °C)] 98 °F (36.7 °C)  Heart Rate:  [71-85] 71  Resp:  [20-22] 20  BP: (136-150)/(68-78) 136/68  FiO2 (%):  [60 %] 60 %  Output by Drain (mL) 10/14/21 0701 - 10/14/21 1900 10/14/21 190 - 10/15/21 0700 10/15/21 07 - 10/15/21 0705 Range Total   Patient has no LDAs of requested type attached.         Pertinent  and/or Most Recent Results     LAB RESULTS:      Lab 10/15/21  0457 10/14/21  2010   WBC 13.01* 13.92*   HEMOGLOBIN 11.3* 13.0   HEMATOCRIT 34.5 40.6   PLATELETS 358 452*   NEUTROS ABS 9.56* 11.44*   IMMATURE GRANS (ABS) 0.07* 0.12*   LYMPHS ABS  2.18 1.29   MONOS ABS 0.98* 0.75   EOS ABS 0.14 0.20   MCV 88.9 88.6         Lab 10/15/21  0457 10/14/21  2010   SODIUM 140 140   POTASSIUM 3.6 4.1   CHLORIDE 106 102   CO2 26.1 25.2   ANION GAP 7.9 12.8   BUN 30* 35*   CREATININE 0.92 1.10*   GLUCOSE 105* 114*   CALCIUM 8.8 10.0         Lab 10/15/21  0457 10/14/21  2010   TOTAL PROTEIN 5.8* 7.4   ALBUMIN 3.50 4.30   GLOBULIN 2.3 3.1   ALT (SGPT) 9 12   AST (SGOT) 11 13   BILIRUBIN 0.2 0.3   ALK PHOS 107 130*   LIPASE  --  14             Brief Urine Lab Results  (Last result in the past 365 days)      Color   Clarity   Blood   Leuk Est   Nitrite   Protein   CREAT   Urine HCG        10/14/21 2233 Yellow   Turbid   Small (1+)   Trace   Negative   Trace               Microbiology Results (last 10 days)     ** No results found for the last 240 hours. **           Labs Pending at Discharge:  Pending Labs     Order Current Status    STONE ANALYSIS - Calculus, Ureter, Left Collected (10/15/21 0635)          Discharge Details        Discharge Medications      New Medications      Instructions Start Date   traMADol 50 MG tablet  Commonly known as: ULTRAM   50 mg, Oral, Every 8 Hours PRN         ASK your doctor about these medications      Instructions Start Date   buPROPion  MG 12 hr tablet  Commonly known as: WELLBUTRIN SR   TAKE 1 TABLET BY MOUTH TWICE DAILY      cetirizine 10 MG tablet  Commonly known as: zyrTEC   cetirizine 10 mg tablet      cetirizine 10 MG tablet  Commonly known as: zyrTEC   10 mg, Oral, Daily      chlorthalidone 25 MG tablet  Commonly known as: HYGROTON   chlorthalidone 25 mg tablet      chlorthalidone 25 MG tablet  Commonly known as: HYGROTON   TAKE ONE TABLET BY MOUTH DAILY      ciclopirox 0.77 % suspension  Commonly known as: LOPROX   APPLY TO THE AFFECTED AND SURROUNDING AREAS OF SKIN TWICE DAILY IN THE MORNING AND EVENING      Denavir 1 % cream  Generic drug: penciclovir   Denavir 1 % topical cream      desvenlafaxine 50 MG 24 hr  tablet  Commonly known as: PRISTIQ   desvenlafaxine succinate ER 50 mg tablet,extended release 24 hr      desvenlafaxine 50 MG 24 hr tablet  Commonly known as: PRISTIQ   TAKE 1 TABLET BY MOUTH DAILY.      Diclofenac Sodium 1 % gel gel  Commonly known as: VOLTAREN   APPLY 2 GRAMS TO AFFECTED AREA(S) TOPICALLY FOUR TIMES DAILY      fluticasone 50 MCG/ACT nasal spray  Commonly known as: FLONASE   fluticasone propionate 50 mcg/actuation nasal spray,suspension      hydrOXYzine 25 MG tablet  Commonly known as: ATARAX   TAKE 1 TABLET BY MOUTH DAILY      naproxen 500 MG tablet  Commonly known as: NAPROSYN   naproxen 500 mg oral tablet TAKE 1 TABLET BY MOUTH TWICE DAILY WITH FOOD 3/26/2021  Active      omeprazole 40 MG capsule  Commonly known as: priLOSEC   omeprazole 40 mg oral capsule,delayed release(DR/EC) TAKE 1 CAPSULE BY MOUTH BY MOUTH TWICE DAILY. 3/8/2021  Active      ondansetron 4 MG tablet  Commonly known as: ZOFRAN   ondansetron HCl 4 mg tablet      rizatriptan 10 MG tablet  Commonly known as: MAXALT   rizatriptan 10 mg tablet      TYLENOL 500 MG tablet  Generic drug: acetaminophen   Tylenol Extra Strength 500 mg oral tablet take 1 tablet (500 mg) by oral route every 6 hours as needed   Suspended      valACYclovir 500 MG tablet  Commonly known as: VALTREX   valacyclovir 500 mg tablet      Zofran ODT 4 MG disintegrating tablet  Generic drug: ondansetron ODT   Zofran ODT 4 mg oral tablet,disintegrating take 1 tablet by oral route prn   Suspended             Allergies   Allergen Reactions   • Nabumetone Itching   • Celebrex [Celecoxib] Other (See Comments)     Eye problem   • Hydrocodone-Homatropine Itching   • Lamotrigine Hives   • Propoxyphene Unknown - Low Severity     Darvocet  Other reaction(s): Rash to arms, torso   • Terbinafine Rash       Condition:    Good, stable    Physical exam:    Constitutional: Well-appearing, well-developed,  in no acute distress    Respiratory: Unlabored breathing    Abdominal:  Nontender, nondistended, no rigidity or guarding, no hepatosplenomegaly    Genitourinary:     Bladder nonpalpable     Neurologic: Grossly oriented to person, place and time, judgment and insight intact, normal mood and affect          Discharge Disposition:  Home or Self Care    Diet:  Hospital:  Diet Order   Procedures   • NPO Diet       Discharge Activity:       Future Appointments   Date Time Provider Department Center   10/15/2021  7:10 AM [facility] ER XR [facility]   11/3/2021  1:00 PM Teresa Herman MD [facility]         Follow-up in 1 week for cystoscopy with ureteral stent pull in office

## 2021-10-18 ENCOUNTER — TRANSITIONAL CARE MANAGEMENT TELEPHONE ENCOUNTER (OUTPATIENT)
Dept: CALL CENTER | Facility: HOSPITAL | Age: 66
End: 2021-10-18

## 2021-10-18 RX ORDER — NAPROXEN 500 MG/1
TABLET ORAL
Qty: 60 TABLET | Refills: 2 | Status: SHIPPED | OUTPATIENT
Start: 2021-10-18 | End: 2021-11-03 | Stop reason: SDUPTHER

## 2021-10-18 RX ORDER — CICLOPIROX OLAMINE 7.7 MG/100ML
SUSPENSION TOPICAL
Qty: 60 ML | Refills: 2 | Status: SHIPPED | OUTPATIENT
Start: 2021-10-18 | End: 2021-11-03 | Stop reason: SDUPTHER

## 2021-10-18 RX ORDER — BUPROPION HYDROCHLORIDE 150 MG/1
TABLET, EXTENDED RELEASE ORAL
Qty: 180 TABLET | Refills: 1 | Status: SHIPPED | OUTPATIENT
Start: 2021-10-18 | End: 2021-11-03

## 2021-10-18 NOTE — OUTREACH NOTE
Call Center TCM Note      Responses   [facility] patient discharged from? [facility]   Does the patient have one of the following disease processes/diagnoses(primary or secondary)? General Surgery   TCM attempt successful? No   Unsuccessful attempts Attempt 2          Lexii Nevarez RN    10/18/2021, 14:05 EDT

## 2021-10-18 NOTE — OUTREACH NOTE
Call Center TCM Note      Responses   [facility] patient discharged from? [place]   Does the patient have one of the following disease processes/diagnoses(primary or secondary)? General Surgery   TCM attempt successful? No  [verbal release for dtr]   Unsuccessful attempts Attempt 1  [Spoke with dtr who was at work, pt at home.  Pt doing well, offers no concerns.  Will attempt f/u with patient later today]          Lexii Nevarez RN    10/18/2021, 13:18 EDT

## 2021-10-19 ENCOUNTER — TRANSITIONAL CARE MANAGEMENT TELEPHONE ENCOUNTER (OUTPATIENT)
Dept: CALL CENTER | Facility: HOSPITAL | Age: 66
End: 2021-10-19

## 2021-10-19 NOTE — OUTREACH NOTE
Call Center TCM Note      Responses   [facility] patient discharged from? [facility]   Does the patient have one of the following disease processes/diagnoses(primary or secondary)? General Surgery   TCM attempt successful? Yes   Call start time 0946   Call end time 0946   Discharge diagnosis Ureteral stone with hydronephrosis,  Cystoscopy with left ureteroscopy with laser and left ureteral stent placement   Meds reviewed with patient/caregiver? Yes   Is the patient having any side effects they believe may be caused by any medication additions or changes? No   Does the patient have all medications related to this admission filled (includes all antibiotics, pain medications, etc.) Yes   Is the patient taking all medications as directed (includes completed medication regime)? Yes   Does the patient have a follow up appointment scheduled with their surgeon? Yes  [FU with urology on 10/26/21]   Has the patient kept scheduled appointments due by today? N/A   Comments Hosp dc fu apt on 11/3/21   Psychosocial issues? No   Did the patient receive a copy of their discharge instructions? Yes   Nursing interventions Reviewed instructions with patient   What is the patient's perception of their health status since discharge? Improving   Nursing interventions Nurse provided patient education   Is the patient/caregiver able to teach back steps to recovery at home? Set small, achievable goals for return to baseline health,  Rest and rebuild strength, gradually increase activity   If the patient is a current smoker, are they able to teach back resources for cessation? Not a smoker   Is the patient/caregiver able to teach back the hierarchy of who to call/visit for symptoms/problems? PCP, Specialist, Home health nurse, Urgent Care, ED, 911 Yes   TCM call completed? Yes          Susan Croft RN    10/19/2021, 09:48 EDT

## 2021-10-21 LAB
CALCIUM OXALATE DIHYDRATE MFR STONE IR: 30 %
COLOR STONE: NORMAL
COM MFR STONE: 70 %
COMPN STONE: NORMAL
LABORATORY COMMENT REPORT: NORMAL
Lab: NORMAL
Lab: NORMAL
PHOTO: NORMAL
SIZE STONE: NORMAL MM
SPEC SOURCE SUBJ: NORMAL
WT STONE: 74 MG

## 2021-10-26 ENCOUNTER — OFFICE VISIT (OUTPATIENT)
Dept: UROLOGY | Facility: CLINIC | Age: 66
End: 2021-10-26

## 2021-10-26 VITALS — WEIGHT: 213 LBS | RESPIRATION RATE: 17 BRPM | HEIGHT: 68 IN | BODY MASS INDEX: 32.28 KG/M2

## 2021-10-26 DIAGNOSIS — N20.0 NEPHROLITHIASIS: Primary | ICD-10-CM

## 2021-10-26 PROCEDURE — 52310 CYSTOSCOPY AND TREATMENT: CPT | Performed by: UROLOGY

## 2021-10-26 NOTE — PROGRESS NOTES
Cytoscopy with Stent Removal     Pre-Procedure Diagnosis: Nephrolithiasis    Post-Procedural Diagnosis: Same    Procedure Performed: Cystoscopy with Ureteral Stent Removal     Description of the Procedure:      was appropriately identified and brought to the cytoscopy suite. A timeout was undertaken documenting the correct patient, site, and procedure. The patient was prepped in a normal sterile fashion. A flexible cytoscope was then introduced into the bladder. The stent was identified and grasped with endoscopic graspers. The entire stent was removed and passed off the field. Patient tolerated the procedure well. There were no intraprocedural complications.        Assessment and Plan   Diagnoses and all orders for this visit:    1. Nephrolithiasis (Primary)          Current Outpatient Medications:   •  acetaminophen (TYLENOL) 500 MG tablet, Tylenol Extra Strength 500 mg oral tablet take 1 tablet (500 mg) by oral route every 6 hours as needed   Suspended, Disp: , Rfl:   •  buPROPion SR (WELLBUTRIN SR) 150 MG 12 hr tablet, TAKE 1 TABLET BY MOUTH TWICE DAILY, Disp: 180 tablet, Rfl: 1  •  cetirizine (zyrTEC) 10 MG tablet, Take 1 tablet by mouth Daily., Disp: 90 tablet, Rfl: 1  •  cetirizine (zyrTEC) 10 MG tablet, cetirizine 10 mg tablet, Disp: , Rfl:   •  chlorthalidone (HYGROTON) 25 MG tablet, TAKE ONE TABLET BY MOUTH DAILY, Disp: 90 tablet, Rfl: 0  •  chlorthalidone (HYGROTON) 25 MG tablet, chlorthalidone 25 mg tablet, Disp: , Rfl:   •  ciclopirox (LOPROX) 0.77 % suspension, APPLY TO THE AFFECTED AND SURROUNDING AREAS OF SKIN TWICE DAILY IN THE MORNING AND EVENING, Disp: 60 mL, Rfl: 2  •  desvenlafaxine (PRISTIQ) 50 MG 24 hr tablet, TAKE 1 TABLET BY MOUTH DAILY., Disp: 90 tablet, Rfl: 1  •  desvenlafaxine (PRISTIQ) 50 MG 24 hr tablet, desvenlafaxine succinate ER 50 mg tablet,extended release 24 hr, Disp: , Rfl:   •  Diclofenac Sodium (VOLTAREN) 1 % gel gel, APPLY 2 GRAMS TO AFFECTED AREA(S) TOPICALLY FOUR TIMES  DAILY, Disp: 100 g, Rfl: 2  •  fluticasone (FLONASE) 50 MCG/ACT nasal spray, fluticasone propionate 50 mcg/actuation nasal spray,suspension, Disp: , Rfl:   •  hydrOXYzine (ATARAX) 25 MG tablet, TAKE 1 TABLET BY MOUTH DAILY, Disp: 90 tablet, Rfl: 0  •  naproxen (NAPROSYN) 500 MG tablet, TAKE 1 TABLET BY MOUTH TWICE DAILY WITH FOOD, Disp: 60 tablet, Rfl: 2  •  omeprazole (priLOSEC) 40 MG capsule, omeprazole 40 mg oral capsule,delayed release(DR/EC) TAKE 1 CAPSULE BY MOUTH BY MOUTH TWICE DAILY. 3/8/2021  Active, Disp: , Rfl:   •  ondansetron (ZOFRAN) 4 MG tablet, ondansetron HCl 4 mg tablet, Disp: , Rfl:   •  ondansetron ODT (Zofran ODT) 4 MG disintegrating tablet, Zofran ODT 4 mg oral tablet,disintegrating take 1 tablet by oral route prn   Suspended, Disp: , Rfl:   •  penciclovir (Denavir) 1 % cream, Denavir 1 % topical cream, Disp: , Rfl:   •  rizatriptan (MAXALT) 10 MG tablet, rizatriptan 10 mg tablet, Disp: , Rfl:   •  traMADol (ULTRAM) 50 MG tablet, Take 1 tablet by mouth Every 8 (Eight) Hours As Needed for Moderate Pain ., Disp: 15 tablet, Rfl: 0  •  valACYclovir (VALTREX) 500 MG tablet, valacyclovir 500 mg tablet, Disp: , Rfl:       Follow-up in 1 month with renal ultrasound    Electronically Signed by: Reuben Yuan MD on 10/26/2021

## 2021-11-03 ENCOUNTER — OFFICE VISIT (OUTPATIENT)
Dept: INTERNAL MEDICINE | Facility: CLINIC | Age: 66
End: 2021-11-03

## 2021-11-03 VITALS
TEMPERATURE: 97.2 F | WEIGHT: 212 LBS | BODY MASS INDEX: 32.13 KG/M2 | SYSTOLIC BLOOD PRESSURE: 126 MMHG | HEIGHT: 68 IN | DIASTOLIC BLOOD PRESSURE: 86 MMHG | OXYGEN SATURATION: 98 % | RESPIRATION RATE: 12 BRPM | HEART RATE: 86 BPM

## 2021-11-03 DIAGNOSIS — I10 ESSENTIAL HYPERTENSION: Primary | ICD-10-CM

## 2021-11-03 DIAGNOSIS — M19.90 ARTHRITIS: ICD-10-CM

## 2021-11-03 DIAGNOSIS — E66.9 OBESITY (BMI 30-39.9): ICD-10-CM

## 2021-11-03 DIAGNOSIS — M79.674 PAIN OF TOE OF RIGHT FOOT: ICD-10-CM

## 2021-11-03 DIAGNOSIS — M79.671 RIGHT FOOT PAIN: ICD-10-CM

## 2021-11-03 DIAGNOSIS — E55.9 VITAMIN D DEFICIENCY: ICD-10-CM

## 2021-11-03 DIAGNOSIS — F33.2 MAJOR DEPRESSIVE DISORDER, RECURRENT, SEVERE WITHOUT PSYCHOTIC FEATURES (HCC): ICD-10-CM

## 2021-11-03 DIAGNOSIS — N20.0 NEPHROLITHIASIS: ICD-10-CM

## 2021-11-03 PROBLEM — E78.5 HYPERLIPEMIA: Status: ACTIVE | Noted: 2021-11-03

## 2021-11-03 PROBLEM — F41.9 ANXIETY: Status: ACTIVE | Noted: 2021-11-03

## 2021-11-03 PROBLEM — M51.36 DEGENERATION OF LUMBAR INTERVERTEBRAL DISC: Status: ACTIVE | Noted: 2021-11-03

## 2021-11-03 PROBLEM — M47.817 LUMBOSACRAL SPONDYLOSIS WITHOUT MYELOPATHY: Status: ACTIVE | Noted: 2018-08-10

## 2021-11-03 PROBLEM — M17.12 ARTHRITIS OF LEFT KNEE: Status: ACTIVE | Noted: 2021-11-03

## 2021-11-03 PROBLEM — Z86.39 HISTORY OF VITAMIN D DEFICIENCY: Status: ACTIVE | Noted: 2019-06-03

## 2021-11-03 PROBLEM — K64.9 HEMORRHOIDS: Status: ACTIVE | Noted: 2021-11-03

## 2021-11-03 PROBLEM — J30.2 SEASONAL ALLERGIC RHINITIS: Status: ACTIVE | Noted: 2021-11-03

## 2021-11-03 PROBLEM — J30.9 ALLERGIC RHINITIS: Status: ACTIVE | Noted: 2021-11-03

## 2021-11-03 PROBLEM — M51.369 DEGENERATION OF LUMBAR INTERVERTEBRAL DISC: Status: ACTIVE | Noted: 2021-11-03

## 2021-11-03 PROBLEM — M19.071 ARTHRITIS OF FIRST METATARSOPHALANGEAL (MTP) JOINT OF RIGHT FOOT: Status: ACTIVE | Noted: 2019-06-03

## 2021-11-03 LAB
25(OH)D3 SERPL-MCNC: 69.4 NG/ML (ref 30–100)
ALBUMIN SERPL-MCNC: 4.3 G/DL (ref 3.5–5.2)
ALBUMIN/GLOB SERPL: 1.4 G/DL
ALP SERPL-CCNC: 161 U/L (ref 39–117)
ALT SERPL W P-5'-P-CCNC: 7 U/L (ref 1–33)
ANION GAP SERPL CALCULATED.3IONS-SCNC: 8.5 MMOL/L (ref 5–15)
AST SERPL-CCNC: 16 U/L (ref 1–32)
BASOPHILS # BLD AUTO: 0.1 10*3/MM3 (ref 0–0.2)
BASOPHILS NFR BLD AUTO: 1.1 % (ref 0–1.5)
BILIRUB SERPL-MCNC: 0.2 MG/DL (ref 0–1.2)
BUN SERPL-MCNC: 28 MG/DL (ref 8–23)
BUN/CREAT SERPL: 30.8 (ref 7–25)
CALCIUM SPEC-SCNC: 10.1 MG/DL (ref 8.6–10.5)
CHLORIDE SERPL-SCNC: 101 MMOL/L (ref 98–107)
CHOLEST SERPL-MCNC: 252 MG/DL (ref 0–200)
CO2 SERPL-SCNC: 29.5 MMOL/L (ref 22–29)
CREAT SERPL-MCNC: 0.91 MG/DL (ref 0.57–1)
DEPRECATED RDW RBC AUTO: 43.2 FL (ref 37–54)
EOSINOPHIL # BLD AUTO: 0.47 10*3/MM3 (ref 0–0.4)
EOSINOPHIL NFR BLD AUTO: 5.2 % (ref 0.3–6.2)
ERYTHROCYTE [DISTWIDTH] IN BLOOD BY AUTOMATED COUNT: 13.8 % (ref 12.3–15.4)
GFR SERPL CREATININE-BSD FRML MDRD: 62 ML/MIN/1.73
GLOBULIN UR ELPH-MCNC: 3 GM/DL
GLUCOSE SERPL-MCNC: 84 MG/DL (ref 65–99)
HCT VFR BLD AUTO: 40.6 % (ref 34–46.6)
HDLC SERPL-MCNC: 61 MG/DL (ref 40–60)
HGB BLD-MCNC: 13.5 G/DL (ref 12–15.9)
IMM GRANULOCYTES # BLD AUTO: 0.04 10*3/MM3 (ref 0–0.05)
IMM GRANULOCYTES NFR BLD AUTO: 0.4 % (ref 0–0.5)
LDLC SERPL CALC-MCNC: 166 MG/DL (ref 0–100)
LDLC/HDLC SERPL: 2.67 {RATIO}
LYMPHOCYTES # BLD AUTO: 1.69 10*3/MM3 (ref 0.7–3.1)
LYMPHOCYTES NFR BLD AUTO: 18.8 % (ref 19.6–45.3)
MCH RBC QN AUTO: 28.7 PG (ref 26.6–33)
MCHC RBC AUTO-ENTMCNC: 33.3 G/DL (ref 31.5–35.7)
MCV RBC AUTO: 86.4 FL (ref 79–97)
MONOCYTES # BLD AUTO: 0.58 10*3/MM3 (ref 0.1–0.9)
MONOCYTES NFR BLD AUTO: 6.4 % (ref 5–12)
NEUTROPHILS NFR BLD AUTO: 6.13 10*3/MM3 (ref 1.7–7)
NEUTROPHILS NFR BLD AUTO: 68.1 % (ref 42.7–76)
NRBC BLD AUTO-RTO: 0 /100 WBC (ref 0–0.2)
PLATELET # BLD AUTO: 516 10*3/MM3 (ref 140–450)
PMV BLD AUTO: 9.2 FL (ref 6–12)
POTASSIUM SERPL-SCNC: 4.3 MMOL/L (ref 3.5–5.2)
PROT SERPL-MCNC: 7.3 G/DL (ref 6–8.5)
RBC # BLD AUTO: 4.7 10*6/MM3 (ref 3.77–5.28)
SODIUM SERPL-SCNC: 139 MMOL/L (ref 136–145)
TRIGL SERPL-MCNC: 141 MG/DL (ref 0–150)
TSH SERPL DL<=0.05 MIU/L-ACNC: 1.8 UIU/ML (ref 0.27–4.2)
VLDLC SERPL-MCNC: 25 MG/DL (ref 5–40)
WBC # BLD AUTO: 9.01 10*3/MM3 (ref 3.4–10.8)

## 2021-11-03 PROCEDURE — 80061 LIPID PANEL: CPT | Performed by: INTERNAL MEDICINE

## 2021-11-03 PROCEDURE — 84443 ASSAY THYROID STIM HORMONE: CPT | Performed by: INTERNAL MEDICINE

## 2021-11-03 PROCEDURE — 80053 COMPREHEN METABOLIC PANEL: CPT | Performed by: INTERNAL MEDICINE

## 2021-11-03 PROCEDURE — 82306 VITAMIN D 25 HYDROXY: CPT | Performed by: INTERNAL MEDICINE

## 2021-11-03 PROCEDURE — 1170F FXNL STATUS ASSESSED: CPT | Performed by: INTERNAL MEDICINE

## 2021-11-03 PROCEDURE — 1159F MED LIST DOCD IN RCRD: CPT | Performed by: INTERNAL MEDICINE

## 2021-11-03 PROCEDURE — 99214 OFFICE O/P EST MOD 30 MIN: CPT | Performed by: INTERNAL MEDICINE

## 2021-11-03 PROCEDURE — 85025 COMPLETE CBC W/AUTO DIFF WBC: CPT | Performed by: INTERNAL MEDICINE

## 2021-11-03 PROCEDURE — 90662 IIV NO PRSV INCREASED AG IM: CPT | Performed by: INTERNAL MEDICINE

## 2021-11-03 PROCEDURE — 36415 COLL VENOUS BLD VENIPUNCTURE: CPT | Performed by: INTERNAL MEDICINE

## 2021-11-03 PROCEDURE — G0402 INITIAL PREVENTIVE EXAM: HCPCS | Performed by: INTERNAL MEDICINE

## 2021-11-03 PROCEDURE — G0008 ADMIN INFLUENZA VIRUS VAC: HCPCS | Performed by: INTERNAL MEDICINE

## 2021-11-03 RX ORDER — ONDANSETRON 4 MG/1
4 TABLET, ORALLY DISINTEGRATING ORAL EVERY 8 HOURS PRN
Qty: 30 TABLET | Refills: 1 | Status: SHIPPED | OUTPATIENT
Start: 2021-11-03 | End: 2021-11-19 | Stop reason: SDUPTHER

## 2021-11-03 RX ORDER — DESVENLAFAXINE 100 MG/1
100 TABLET, EXTENDED RELEASE ORAL DAILY
Qty: 90 TABLET | Refills: 1 | Status: SHIPPED | OUTPATIENT
Start: 2021-11-03 | End: 2021-11-19 | Stop reason: SDUPTHER

## 2021-11-03 RX ORDER — RIZATRIPTAN BENZOATE 10 MG/1
10 TABLET ORAL ONCE AS NEEDED
Qty: 9 TABLET | Refills: 2 | Status: SHIPPED | OUTPATIENT
Start: 2021-11-03 | End: 2021-11-19 | Stop reason: SDUPTHER

## 2021-11-03 RX ORDER — ACETAMINOPHEN 325 MG/1
650 TABLET ORAL EVERY 6 HOURS PRN
Qty: 90 TABLET | Refills: 1 | Status: SHIPPED | OUTPATIENT
Start: 2021-11-03 | End: 2021-11-30 | Stop reason: SDUPTHER

## 2021-11-03 NOTE — PROGRESS NOTES
The ABCs of the Annual Wellness Visit  Welcome to Medicare Visit    Chief Complaint   Patient presents with   • Follow-up     Subjective {   History of Present Illness:  Casandra Arango is a 66 y.o. female who presents for a  Welcome to Medicare Visit.    The following portions of the patient's history were reviewed and   updated as appropriate: allergies, current medications, past family history, past medical history, past social history, past surgical history and problem list.     Compared to one year ago, the patient feels her physical   health is better.    Compared to one year ago, the patient feels her mental   health is worse.    Recent Hospitalizations:  She was admitted within the past 365 days at  Hospital for procedures, kidney surgery.      Current Medical Providers:  Patient Care Team:  Teresa Herman MD as PCP - General (Internal Medicine)    Outpatient Medications Prior to Visit   Medication Sig Dispense Refill   • cetirizine (zyrTEC) 10 MG tablet cetirizine 10 mg tablet     • chlorthalidone (HYGROTON) 25 MG tablet chlorthalidone 25 mg tablet     • fluticasone (FLONASE) 50 MCG/ACT nasal spray fluticasone propionate 50 mcg/actuation nasal spray,suspension     • hydrOXYzine (ATARAX) 25 MG tablet TAKE 1 TABLET BY MOUTH DAILY 90 tablet 0   • omeprazole (priLOSEC) 40 MG capsule omeprazole 40 mg oral capsule,delayed release(DR/EC) TAKE 1 CAPSULE BY MOUTH BY MOUTH TWICE DAILY. 3/8/2021  Active     • penciclovir (Denavir) 1 % cream Denavir 1 % topical cream     • valACYclovir (VALTREX) 500 MG tablet valacyclovir 500 mg tablet     • acetaminophen (TYLENOL) 500 MG tablet Tylenol Extra Strength 500 mg oral tablet take 1 tablet (500 mg) by oral route every 6 hours as needed   Suspended     • buPROPion SR (WELLBUTRIN SR) 150 MG 12 hr tablet TAKE 1 TABLET BY MOUTH TWICE DAILY 180 tablet 1   • desvenlafaxine (PRISTIQ) 50 MG 24 hr tablet desvenlafaxine succinate ER 50 mg tablet,extended release 24 hr     •  Diclofenac Sodium (VOLTAREN) 1 % gel gel APPLY 2 GRAMS TO AFFECTED AREA(S) TOPICALLY FOUR TIMES DAILY 100 g 2   • ondansetron ODT (Zofran ODT) 4 MG disintegrating tablet Zofran ODT 4 mg oral tablet,disintegrating take 1 tablet by oral route prn   Suspended     • rizatriptan (MAXALT) 10 MG tablet rizatriptan 10 mg tablet     • cetirizine (zyrTEC) 10 MG tablet Take 1 tablet by mouth Daily. 90 tablet 1   • chlorthalidone (HYGROTON) 25 MG tablet TAKE ONE TABLET BY MOUTH DAILY 90 tablet 0   • ciclopirox (LOPROX) 0.77 % suspension APPLY TO THE AFFECTED AND SURROUNDING AREAS OF SKIN TWICE DAILY IN THE MORNING AND EVENING 60 mL 2   • desvenlafaxine (PRISTIQ) 50 MG 24 hr tablet TAKE 1 TABLET BY MOUTH DAILY. 90 tablet 1   • naproxen (NAPROSYN) 500 MG tablet TAKE 1 TABLET BY MOUTH TWICE DAILY WITH FOOD 60 tablet 2   • ondansetron (ZOFRAN) 4 MG tablet ondansetron HCl 4 mg tablet     • traMADol (ULTRAM) 50 MG tablet Take 1 tablet by mouth Every 8 (Eight) Hours As Needed for Moderate Pain . 15 tablet 0     No facility-administered medications prior to visit.       No opioid medication identified on active medication list. I have reviewed chart for other potential  high risk medication/s and harmful drug interactions in the elderly.          Aspirin is not on active medication list.  not needed.    Patient Active Problem List   Diagnosis   • Right shoulder pain   • Scabies   • Obesity (BMI 30-39.9)   • GERD without esophagitis   • Essential hypertension   • Obstructive uropathy   • Nephrolithiasis   • Allergic rhinitis   • Seasonal allergic rhinitis   • Anxiety   • Arthritis   • Arthritis of first metatarsophalangeal (MTP) joint of right foot   • Arthritis of left knee   • Degeneration of lumbar intervertebral disc   • Hemorrhoids   • History of vitamin D deficiency   • Hyperlipemia   • Insomnia   • Lumbosacral spondylosis without myelopathy   • Major depressive disorder, recurrent, severe without psychotic features (HCC)   •  "Vitamin D deficiency     Advance Care Planning  Advance Directive is not on file.  ACP discussion was held with the patient during this visit. Patient has an advance directive (not in EMR), copy requested.          Objective      Vitals:    11/03/21 1300   BP: 126/86   Pulse: 86   Resp: 12   Temp: 97.2 °F (36.2 °C)   SpO2: 98%   Weight: 96.2 kg (212 lb)   Height: 172.7 cm (68\")     BMI Readings from Last 1 Encounters:   11/03/21 32.23 kg/m²   BMI is above normal parameters. Recommendations include: educational material and exercise counseling    Does the patient have evidence of cognitive impairment? No    Physical Exam    Lab Results   Component Value Date    TRIG 141 11/03/2021    HDL 61 (H) 11/03/2021     (H) 11/03/2021    VLDL 25 11/03/2021       Procedures       HEALTH RISK ASSESSMENT    Smoking Status:  Social History     Tobacco Use   Smoking Status Never Smoker   Smokeless Tobacco Never Used     Alcohol Consumption:  Social History     Substance and Sexual Activity   Alcohol Use Not Currently    Comment: CURRENT SOME DAY        Fall Risk Screen:    RAFFAELE Fall Risk Assessment was completed, and patient is at LOW risk for falls.Assessment completed on:11/3/2021    Depression Screen:   PHQ-2/PHQ-9 Depression Screening 11/3/2021   Little interest or pleasure in doing things 2   Feeling down, depressed, or hopeless 2   Trouble falling or staying asleep, or sleeping too much 2   Feeling tired or having little energy 3   Poor appetite or overeating 1   Feeling bad about yourself - or that you are a failure or have let yourself or your family down 0   Trouble concentrating on things, such as reading the newspaper or watching television 0   Moving or speaking so slowly that other people could have noticed. Or the opposite - being so fidgety or restless that you have been moving around a lot more than usual 0   Thoughts that you would be better off dead, or of hurting yourself in some way 0   Total Score 10 "   If you checked off any problems, how difficult have these problems made it for you to do your work, take care of things at home, or get along with other people? Not difficult at all       Health Habits and Functional and Cognitive Screening:  Functional & Cognitive Status 11/3/2021   Do you have difficulty preparing food and eating? No   Do you have difficulty bathing yourself, getting dressed or grooming yourself? No   Do you have difficulty using the toilet? No   Do you have difficulty moving around from place to place? No   Do you have trouble with steps or getting out of a bed or a chair? No   Current Diet Well Balanced Diet   Dental Exam Up to date   Eye Exam Up to date   Exercise (times per week) 0 times per week   Current Exercises Include No Regular Exercise   Do you need help using the phone?  No   Are you deaf or do you have serious difficulty hearing?  No   Do you need help with transportation? No   Do you need help shopping? No   Do you need help preparing meals?  No   Do you need help with housework?  No   Do you need help with laundry? No   Do you need help taking your medications? No   Do you need help managing money? No   Do you ever drive or ride in a car without wearing a seat belt? No   Have you felt unusual stress, anger or loneliness in the last month? Yes   Who do you live with? Child   If you need help, do you have trouble finding someone available to you? No   Have you been bothered in the last four weeks by sexual problems? No   Do you have difficulty concentrating, remembering or making decisions? No       Visual Acuity:    No exam data present    Age-appropriate Screening Schedule:  Refer to the list below for future screening recommendations based on patient's age, sex and/or medical conditions. Orders for these recommended tests are listed in the plan section. The patient has been provided with a written plan.    Health Maintenance   Topic Date Due   • ZOSTER VACCINE (1 of 2) Never  done   • DXA SCAN  12/26/2016   • LIPID PANEL  11/03/2022   • MAMMOGRAM  04/29/2023   • TDAP/TD VACCINES (2 - Tdap) 10/13/2031   • INFLUENZA VACCINE  Completed          Assessment/Plan   CMS Preventative Services Quick Reference  Risk Factors Identified During Encounter  Immunizations Discussed/Encouraged (specific Immunizations; Influenza  The above risks/problems have been discussed with the patient.  Pertinent information has been shared with the patient in the After Visit Summary.  Follow up plans and orders are seen below in the Assessment/Plan Section.    Diagnoses and all orders for this visit:    1. Essential hypertension (Primary)  Comments:  well controlled, cont current meds  Orders:  -     Comprehensive Metabolic Panel  -     CBC & Differential  -     TSH  -     Lipid Panel    2. Nephrolithiasis  Comments:  doing much better at this time  Orders:  -     Comprehensive Metabolic Panel  -     CBC & Differential  -     TSH  -     Lipid Panel    3. Obesity (BMI 30-39.9)  -     Lipid Panel    4. Vitamin D deficiency  Comments:  labs, adjust as needed  Orders:  -     Vitamin D 25 Hydroxy    5. Pain of toe of right foot    6. Right foot pain  Comments:  xray  Orders:  -     XR Foot 3+ View Right (In Office)    7. Major depressive disorder, recurrent, severe without psychotic features (HCC)  Assessment & Plan:  Will try adjusting pristiq and see how she does      8. Arthritis  Comments:  will refill tylenol as this has helped    Other orders  -     acetaminophen (Tylenol) 325 MG tablet; Take 2 tablets by mouth Every 6 (Six) Hours As Needed for Mild Pain .  Dispense: 90 tablet; Refill: 1  -     ondansetron ODT (Zofran ODT) 4 MG disintegrating tablet; Place 1 tablet on the tongue Every 8 (Eight) Hours As Needed for Nausea or Vomiting.  Dispense: 30 tablet; Refill: 1  -     rizatriptan (MAXALT) 10 MG tablet; Take 1 tablet by mouth 1 (One) Time As Needed for Migraine for up to 1 dose. May repeat in 2 hours if needed   Dispense: 9 tablet; Refill: 2  -     desvenlafaxine (PRISTIQ) 100 MG 24 hr tablet; Take 1 tablet by mouth Daily.  Dispense: 90 tablet; Refill: 1  -     Fluzone High-Dose 65+yrs      Follow Up:   Return in about 3 months (around 2/3/2022).     An After Visit Summary and PPPS were made available to the patient.
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
## 2021-11-04 DIAGNOSIS — M25.561 RIGHT KNEE PAIN, UNSPECIFIED CHRONICITY: ICD-10-CM

## 2021-11-16 RX ORDER — CHLORTHALIDONE 25 MG/1
TABLET ORAL
Qty: 90 TABLET | Refills: 0 | Status: SHIPPED | OUTPATIENT
Start: 2021-11-16 | End: 2021-11-30 | Stop reason: SDUPTHER

## 2021-11-17 ENCOUNTER — TELEPHONE (OUTPATIENT)
Dept: INTERNAL MEDICINE | Facility: CLINIC | Age: 66
End: 2021-11-17

## 2021-11-17 DIAGNOSIS — M25.561 RIGHT KNEE PAIN, UNSPECIFIED CHRONICITY: ICD-10-CM

## 2021-11-17 RX ORDER — VALACYCLOVIR HYDROCHLORIDE 500 MG/1
TABLET, FILM COATED ORAL
Status: CANCELLED | OUTPATIENT
Start: 2021-11-17

## 2021-11-17 RX ORDER — CHLORTHALIDONE 25 MG/1
25 TABLET ORAL DAILY
Qty: 90 TABLET | Refills: 0 | Status: CANCELLED | OUTPATIENT
Start: 2021-11-17

## 2021-11-17 RX ORDER — ACETAMINOPHEN 325 MG/1
650 TABLET ORAL EVERY 6 HOURS PRN
Qty: 90 TABLET | Refills: 1 | Status: CANCELLED | OUTPATIENT
Start: 2021-11-17

## 2021-11-17 RX ORDER — CETIRIZINE HYDROCHLORIDE 10 MG/1
TABLET ORAL
Status: CANCELLED | OUTPATIENT
Start: 2021-11-17

## 2021-11-17 RX ORDER — PENCICLOVIR 1 %
CREAM (GRAM) TOPICAL
Status: CANCELLED | OUTPATIENT
Start: 2021-11-17

## 2021-11-17 RX ORDER — FLUTICASONE PROPIONATE 50 MCG
SPRAY, SUSPENSION (ML) NASAL
Status: CANCELLED | OUTPATIENT
Start: 2021-11-17

## 2021-11-17 RX ORDER — OMEPRAZOLE 40 MG/1
CAPSULE, DELAYED RELEASE ORAL
Status: CANCELLED | OUTPATIENT
Start: 2021-11-17

## 2021-11-17 RX ORDER — DESVENLAFAXINE 100 MG/1
100 TABLET, EXTENDED RELEASE ORAL DAILY
Qty: 90 TABLET | Refills: 1 | Status: CANCELLED | OUTPATIENT
Start: 2021-11-17

## 2021-11-17 RX ORDER — ONDANSETRON 4 MG/1
4 TABLET, ORALLY DISINTEGRATING ORAL EVERY 8 HOURS PRN
Qty: 30 TABLET | Refills: 1 | Status: CANCELLED | OUTPATIENT
Start: 2021-11-17

## 2021-11-17 RX ORDER — RIZATRIPTAN BENZOATE 10 MG/1
10 TABLET ORAL ONCE AS NEEDED
Qty: 9 TABLET | Refills: 2 | Status: CANCELLED | OUTPATIENT
Start: 2021-11-17

## 2021-11-17 NOTE — TELEPHONE ENCOUNTER
Caller: Casandra Arango    Relationship: Self    Best call back number: [phone]    Requested Prescriptions:   Requested Prescriptions     Pending Prescriptions Disp Refills   • acetaminophen (Tylenol) 325 MG tablet 90 tablet 1     Sig: Take 2 tablets by mouth Every 6 (Six) Hours As Needed for Mild Pain .   • cetirizine (zyrTEC) 10 MG tablet     • chlorthalidone (HYGROTON) 25 MG tablet 90 tablet 0     Sig: Take 1 tablet by mouth Daily.   • desvenlafaxine (PRISTIQ) 100 MG 24 hr tablet 90 tablet 1     Sig: Take 1 tablet by mouth Daily.   • Diclofenac Sodium (VOLTAREN) 1 % gel gel 100 g 2     Sig: Apply  topically to the appropriate area as directed 2 (Two) Times a Day.   • fluticasone (FLONASE) 50 MCG/ACT nasal spray     • omeprazole (priLOSEC) 40 MG capsule     • ondansetron ODT (Zofran ODT) 4 MG disintegrating tablet 30 tablet 1     Sig: Place 1 tablet on the tongue Every 8 (Eight) Hours As Needed for Nausea or Vomiting.   • penciclovir (Denavir) 1 % cream     • rizatriptan (MAXALT) 10 MG tablet 9 tablet 2     Sig: Take 1 tablet by mouth 1 (One) Time As Needed for Migraine for up to 1 dose. May repeat in 2 hours if needed   • valACYclovir (VALTREX) 500 MG tablet      CYCLOPIROX TWICE DAILY FOR TOENAIL FUNGUS( NOT IN FILE)    NAPROXEN 2 TIMES DAILY (NOT ON LIST PATIENT NOT SURE OF DOSAGE)    Pharmacy where request should be sent: [facility] [place] [facility] -  [place] - [address] [facility] [address] [phone] [facility] [phone] FX     Additional details provided by patient: PATIENT WANTS ALL HER MEDICATION REFILLS TO BE SENT TO [facility] FROM NOW ON DUE TO INSURANCE CHANGING.    Does the patient have less than a 3 day supply:  [] Yes  [] No    Satya Graham Rep   11/17/21 11:16 EST

## 2021-11-19 DIAGNOSIS — M25.561 RIGHT KNEE PAIN, UNSPECIFIED CHRONICITY: ICD-10-CM

## 2021-11-19 RX ORDER — CETIRIZINE HYDROCHLORIDE 10 MG/1
10 TABLET ORAL DAILY
Qty: 30 TABLET | Refills: 3 | Status: SHIPPED | OUTPATIENT
Start: 2021-11-19 | End: 2021-11-30 | Stop reason: SDUPTHER

## 2021-11-19 RX ORDER — OMEPRAZOLE 40 MG/1
40 CAPSULE, DELAYED RELEASE ORAL DAILY
Qty: 60 CAPSULE | Refills: 3 | Status: SHIPPED | OUTPATIENT
Start: 2021-11-19 | End: 2021-11-30 | Stop reason: SDUPTHER

## 2021-11-19 RX ORDER — DESVENLAFAXINE 100 MG/1
100 TABLET, EXTENDED RELEASE ORAL DAILY
Qty: 90 TABLET | Refills: 1 | Status: SHIPPED | OUTPATIENT
Start: 2021-11-19 | End: 2021-11-30 | Stop reason: SDUPTHER

## 2021-11-19 RX ORDER — PENCICLOVIR 1 %
CREAM (GRAM) TOPICAL 2 TIMES DAILY
Qty: 1.5 G | Refills: 2 | Status: SHIPPED | OUTPATIENT
Start: 2021-11-19 | End: 2021-11-30 | Stop reason: SDUPTHER

## 2021-11-19 RX ORDER — ONDANSETRON 4 MG/1
4 TABLET, ORALLY DISINTEGRATING ORAL EVERY 8 HOURS PRN
Qty: 30 TABLET | Refills: 1 | Status: SHIPPED | OUTPATIENT
Start: 2021-11-19 | End: 2021-11-30 | Stop reason: SDUPTHER

## 2021-11-19 RX ORDER — FLUTICASONE PROPIONATE 50 MCG
2 SPRAY, SUSPENSION (ML) NASAL DAILY
Qty: 16 G | Refills: 1 | Status: SHIPPED | OUTPATIENT
Start: 2021-11-19 | End: 2021-11-30 | Stop reason: SDUPTHER

## 2021-11-19 RX ORDER — RIZATRIPTAN BENZOATE 10 MG/1
10 TABLET ORAL ONCE AS NEEDED
Qty: 9 TABLET | Refills: 2 | Status: SHIPPED | OUTPATIENT
Start: 2021-11-19 | End: 2021-11-30 | Stop reason: SDUPTHER

## 2021-11-22 ENCOUNTER — TELEPHONE (OUTPATIENT)
Dept: INTERNAL MEDICINE | Facility: CLINIC | Age: 66
End: 2021-11-22

## 2021-11-22 DIAGNOSIS — M25.561 RIGHT KNEE PAIN, UNSPECIFIED CHRONICITY: ICD-10-CM

## 2021-11-22 NOTE — TELEPHONE ENCOUNTER
Caller: Casandra Arangoe    Relationship: Self    Best call back number: [phone]    Requested Prescriptions            Pending Prescriptions Disp Refills   • acetaminophen (Tylenol) 325 MG tablet 90 tablet 1       Sig: Take 2 tablets by mouth Every 6 (Six) Hours As Needed for Mild Pain .   • cetirizine (zyrTEC) 10 MG tablet       • chlorthalidone (HYGROTON) 25 MG tablet 90 tablet 0       Sig: Take 1 tablet by mouth Daily.   • desvenlafaxine (PRISTIQ) 100 MG 24 hr tablet 90 tablet 1       Sig: Take 1 tablet by mouth Daily.   • Diclofenac Sodium (VOLTAREN) 1 % gel gel 100 g 2       Sig: Apply  topically to the appropriate area as directed 2 (Two) Times a Day.   • fluticasone (FLONASE) 50 MCG/ACT nasal spray       • omeprazole (priLOSEC) 40 MG capsule       • ondansetron ODT (Zofran ODT) 4 MG disintegrating tablet 30 tablet 1       Sig: Place 1 tablet on the tongue Every 8 (Eight) Hours As Needed for Nausea or Vomiting.   • penciclovir (Denavir) 1 % cream       • rizatriptan (MAXALT) 10 MG tablet 9 tablet 2       Sig: Take 1 tablet by mouth 1 (One) Time As Needed for Migraine for up to 1 dose. May repeat in 2 hours if needed   • valACYclovir (VALTREX) 500 MG tablet        CYCLOPIROX TWICE DAILY FOR TOENAIL FUNGUS( NOT IN FILE)     NAPROXEN 2 TIMES DAILY (NOT ON LIST PATIENT NOT SURE OF DOSAGE)    Pharmacy where request should be sent:    [facility] [place] [facility] -  [place], [address] - [phone] [facility] [phone] FX     Additional details provided by patient: PATIENT STATES THAT PHARMACY STATES THEY STILL DONT HAVE THE MEDICATIONS. THEY DONT CARRY THE DENOVIR CREAM AT THE PHARMACY AND NEEDS A NEW SCRIPT FOR THAT. ALSO THAT THE VALTREX NEEDS PRIOR AUTHORIZATION.     Does the patient have less than a 3 day supply:  [x] Yes  [] No

## 2021-11-24 ENCOUNTER — TELEPHONE (OUTPATIENT)
Dept: UROLOGY | Facility: CLINIC | Age: 66
End: 2021-11-24

## 2021-11-24 DIAGNOSIS — N20.0 NEPHROLITHIASIS: Primary | ICD-10-CM

## 2021-11-24 NOTE — TELEPHONE ENCOUNTER
----- Message from Elle Muhammad sent at 11/24/2021  1:41 PM EST -----  Patient scheduled for appt on 11/29/21, supposed to have had an ultrasound done. It does not look like the order for this was ever even put in. I am putting in an order now, just need to move patient appt out around 3 weeks

## 2021-11-29 ENCOUNTER — TELEPHONE (OUTPATIENT)
Dept: INTERNAL MEDICINE | Facility: CLINIC | Age: 66
End: 2021-11-29

## 2021-11-30 RX ORDER — CETIRIZINE HYDROCHLORIDE 10 MG/1
10 TABLET ORAL DAILY
Qty: 90 TABLET | Refills: 1 | Status: SHIPPED | OUTPATIENT
Start: 2021-11-30 | End: 2022-02-14 | Stop reason: SDUPTHER

## 2021-11-30 RX ORDER — FLUTICASONE PROPIONATE 50 MCG
2 SPRAY, SUSPENSION (ML) NASAL DAILY
Qty: 16 G | Refills: 1 | Status: SHIPPED | OUTPATIENT
Start: 2021-11-30 | End: 2023-05-03 | Stop reason: SDUPTHER

## 2021-11-30 RX ORDER — ONDANSETRON 4 MG/1
4 TABLET, ORALLY DISINTEGRATING ORAL EVERY 8 HOURS PRN
Qty: 30 TABLET | Refills: 1 | Status: SHIPPED | OUTPATIENT
Start: 2021-11-30 | End: 2022-05-20 | Stop reason: SDUPTHER

## 2021-11-30 RX ORDER — RIZATRIPTAN BENZOATE 10 MG/1
10 TABLET ORAL ONCE AS NEEDED
Qty: 9 TABLET | Refills: 2 | Status: SHIPPED | OUTPATIENT
Start: 2021-11-30 | End: 2022-02-14 | Stop reason: SDUPTHER

## 2021-11-30 RX ORDER — DESVENLAFAXINE 100 MG/1
100 TABLET, EXTENDED RELEASE ORAL DAILY
Qty: 90 TABLET | Refills: 1 | Status: SHIPPED | OUTPATIENT
Start: 2021-11-30 | End: 2022-02-14 | Stop reason: SDUPTHER

## 2021-11-30 RX ORDER — VALACYCLOVIR HYDROCHLORIDE 500 MG/1
500 TABLET, FILM COATED ORAL DAILY
Qty: 90 TABLET | Refills: 1 | Status: SHIPPED | OUTPATIENT
Start: 2021-11-30 | End: 2022-02-14 | Stop reason: SDUPTHER

## 2021-11-30 RX ORDER — CHLORTHALIDONE 25 MG/1
25 TABLET ORAL DAILY
Qty: 90 TABLET | Refills: 1 | Status: SHIPPED | OUTPATIENT
Start: 2021-11-30 | End: 2021-12-21 | Stop reason: SDUPTHER

## 2021-11-30 RX ORDER — ACETAMINOPHEN 325 MG/1
650 TABLET ORAL EVERY 6 HOURS PRN
Qty: 90 TABLET | Refills: 1 | Status: SHIPPED | OUTPATIENT
Start: 2021-11-30 | End: 2022-05-20 | Stop reason: SDUPTHER

## 2021-11-30 RX ORDER — PENCICLOVIR 10 MG/G
CREAM TOPICAL 2 TIMES DAILY
Qty: 1.5 G | Refills: 2 | Status: SHIPPED | OUTPATIENT
Start: 2021-11-30 | End: 2023-05-03

## 2021-11-30 RX ORDER — OMEPRAZOLE 40 MG/1
40 CAPSULE, DELAYED RELEASE ORAL DAILY
Qty: 90 CAPSULE | Refills: 1 | Status: SHIPPED | OUTPATIENT
Start: 2021-11-30 | End: 2022-02-14 | Stop reason: SDUPTHER

## 2021-12-07 ENCOUNTER — HOSPITAL ENCOUNTER (OUTPATIENT)
Dept: ULTRASOUND IMAGING | Facility: HOSPITAL | Age: 66
Discharge: HOME | End: 2021-12-07
Admitting: UROLOGY

## 2021-12-07 DIAGNOSIS — N20.0 NEPHROLITHIASIS: ICD-10-CM

## 2021-12-07 PROCEDURE — 76775 US EXAM ABDO BACK WALL LIM: CPT

## 2021-12-12 NOTE — PROGRESS NOTES
Chief Complaint    Urologic complaint    Subjective          Casandra Arango presents to [facility] UROLOGY  History of Present Illness     65 y.o. female  status post left ureteroscopy    Doing well, no pain    10/15/2021 left ureteroscopy-all stones removed, stent placed with no string  10/21 calcium oxalate monohydrate 70%, calcium oxalate dihydrate 30%    Patient is on chlorthalidone 25 mg daily already through PCP for hypertension.    Stent removed in the office     4 stones, 2 surgeries    Patient never been on medicine for kidney stone prevention.    Renal ultrasound looks good.    Previous    10/14/2021 CT abdomen/pelvis with - two 4 mm calculi in distal left ureter.  Severe left hydro-.  No other stones images reviewed     10/14/2021 creatinine 1.1, GFR 50, urine nitrite negative, no bacteria             Past History:  Medical History: has a past medical history of Acid reflux, Allergic rhinitis, Anemia, unspecified, Anxiety, Arthritis, Chronic cough, Closed nondisplaced fracture of distal phalanx of right great toe (01/19/2018), Closed nondisplaced fracture of distal phalanx of right great toe (12/26/2017), Edema of right lower extremity (10/10/2015), GERD (gastroesophageal reflux disease), Globus sensation, Hemorrhoids, Hyperlipemia, Hypertension, Insomnia, unspecified (10/10/2015), Kidney stones, Major depressive disorder, recurrent (HCC) (10/10/2015), Migraine headache, Night sweats, Primary osteoarthritis of both knees (10/10/2015), Primary osteoarthritis of left knee (12/26/2017), Primary osteoarthritis of left shoulder (12/12/2017), Seasonal allergies, SOB (shortness of breath), and Vitamin D deficiency (01/07/2015).   Surgical History: has a past surgical history that includes Other surgical history (Right, 08/01/2000); Knee surgery; Other surgical history; Back surgery; Colonoscopy (2011); Hemorrhoid surgery; Total hip arthroplasty (Right, 12/16/2013); Other surgical history;  Kidney surgery; Tibia fracture surgery (09/11/2013); Tonsillectomy; and ureteroscopy laser lithotripsy with stent insertion (Left, 10/15/2021).   Family History: family history includes Diabetes in her sister; Kidney cancer in her father; Skin cancer in her mother.   Social History: reports that she has never smoked. She has never used smokeless tobacco. She reports previous alcohol use. She reports that she does not use drugs.  Allergies: Nabumetone, Celebrex [celecoxib], Hydrocodone-homatropine, Lamotrigine, Propoxyphene, and Terbinafine       Current Outpatient Medications:   •  acetaminophen (Tylenol) 325 MG tablet, Take 2 tablets by mouth Every 6 (Six) Hours As Needed for Mild Pain ., Disp: 90 tablet, Rfl: 1  •  cetirizine (zyrTEC) 10 MG tablet, Take 1 tablet by mouth Daily., Disp: 90 tablet, Rfl: 1  •  chlorthalidone (HYGROTON) 25 MG tablet, Take 1 tablet by mouth Daily., Disp: 90 tablet, Rfl: 1  •  desvenlafaxine (PRISTIQ) 100 MG 24 hr tablet, Take 1 tablet by mouth Daily., Disp: 90 tablet, Rfl: 1  •  Diclofenac Sodium (VOLTAREN) 1 % gel gel, Apply  topically to the appropriate area as directed 2 (Two) Times a Day., Disp: 100 g, Rfl: 2  •  fluticasone (FLONASE) 50 MCG/ACT nasal spray, 2 sprays into the nostril(s) as directed by provider Daily., Disp: 16 g, Rfl: 1  •  hydrOXYzine (ATARAX) 25 MG tablet, TAKE 1 TABLET BY MOUTH DAILY, Disp: 90 tablet, Rfl: 0  •  omeprazole (priLOSEC) 40 MG capsule, Take 1 capsule by mouth Daily., Disp: 90 capsule, Rfl: 1  •  ondansetron ODT (Zofran ODT) 4 MG disintegrating tablet, Place 1 tablet on the tongue Every 8 (Eight) Hours As Needed for Nausea or Vomiting., Disp: 30 tablet, Rfl: 1  •  penciclovir (Denavir) 1 % cream, Apply  topically to the appropriate area as directed 2 (Two) Times a Day., Disp: 1.5 g, Rfl: 2  •  rizatriptan (MAXALT) 10 MG tablet, Take 1 tablet by mouth 1 (One) Time As Needed for Migraine for up to 1 dose. May repeat in 2 hours if needed, Disp: 9 tablet,  Rfl: 2  •  valACYclovir (VALTREX) 500 MG tablet, Take 1 tablet by mouth Daily., Disp: 90 tablet, Rfl: 1     Physical exam       Alert and orient x3  Well appearing, well developed, in no acute distress   Unlabored respirations        Grossly oriented to person, place and time, judgment is intact, normal mood and affect         Objective     Vital Signs:   There were no vitals taken for this visit.             Assessment and Plan    Diagnoses and all orders for this visit:    1. Nephrolithiasis (Primary)    The patient was counseled on the preventative measures of kidney stones today.  This included increasing fluid intake to make at least 1.5 ml daily, decreasing meat intake, decreasing salt intake and taking in a normal amount of calcium (1000 mg daily).  Information handout given on this today.         We also discussed the DASH diet today for stone prevention and handout was given    We did discuss metabolic work-up today, patient at this time is going to hold on this.  She may be interested in the future if she has more stones.

## 2021-12-13 ENCOUNTER — OFFICE VISIT (OUTPATIENT)
Dept: UROLOGY | Facility: CLINIC | Age: 66
End: 2021-12-13

## 2021-12-13 VITALS — WEIGHT: 215 LBS | BODY MASS INDEX: 32.58 KG/M2 | RESPIRATION RATE: 18 BRPM | HEIGHT: 68 IN

## 2021-12-13 DIAGNOSIS — N20.0 NEPHROLITHIASIS: Primary | ICD-10-CM

## 2021-12-13 PROCEDURE — 99212 OFFICE O/P EST SF 10 MIN: CPT | Performed by: UROLOGY

## 2021-12-21 RX ORDER — CHLORTHALIDONE 25 MG/1
25 TABLET ORAL DAILY
Qty: 90 TABLET | Refills: 1 | Status: SHIPPED | OUTPATIENT
Start: 2021-12-21 | End: 2022-02-14 | Stop reason: SDUPTHER

## 2021-12-21 NOTE — TELEPHONE ENCOUNTER
Caller: Casandra Arango    Relationship: Self    Best call back number: [phone]    Requested Prescriptions:   Requested Prescriptions     Pending Prescriptions Disp Refills   • chlorthalidone (HYGROTON) 25 MG tablet 90 tablet 1     Sig: Take 1 tablet by mouth Daily.    SHINGLES SHOT   NAPROXEN, UNKNOWN DOSAGE, TAKE TWICE A DAY    Pharmacy where request should be sent:    [facility]  [address] [place] [address]  [phone]    PLEASE MESSAGE PATIENT WHEN MEDICATION HAS BEEN SENT TO PHARMACY.    Does the patient have less than a 3 day supply:  [] Yes  [x] No    Satya Bansal Rep   12/21/21 11:27 EST

## 2021-12-28 ENCOUNTER — TELEPHONE (OUTPATIENT)
Dept: ORTHOPEDIC SURGERY | Facility: CLINIC | Age: 66
End: 2021-12-28

## 2021-12-28 DIAGNOSIS — M25.561 RIGHT KNEE PAIN, UNSPECIFIED CHRONICITY: ICD-10-CM

## 2021-12-28 DIAGNOSIS — M25.511 RIGHT SHOULDER PAIN, UNSPECIFIED CHRONICITY: Primary | ICD-10-CM

## 2021-12-28 RX ORDER — NAPROXEN 500 MG/1
500 TABLET ORAL 2 TIMES DAILY
Qty: 60 TABLET | Refills: 1 | Status: SHIPPED | OUTPATIENT
Start: 2021-12-28 | End: 2022-02-21

## 2021-12-28 NOTE — TELEPHONE ENCOUNTER
PATIENT CALLED AND STATES THAT [facility] IS NOT HELPING HER.  SHE WOULD LIKE A DIFFERENT ANTI-INFLAMMATORY MED. SHE STATES THAT SHE WILL GO BACK ON NAPROSYN IF NEEDED.  FT [facility].

## 2022-01-03 ENCOUNTER — IMMUNIZATION (OUTPATIENT)
Dept: VACCINE CLINIC | Facility: HOSPITAL | Age: 67
End: 2022-01-03

## 2022-01-03 DIAGNOSIS — Z23 NEED FOR VACCINATION: Primary | ICD-10-CM

## 2022-01-03 PROCEDURE — 0064A HC ADM SARSCOV2 50MCG/0.25ML BOOSTER: CPT | Performed by: INTERNAL MEDICINE

## 2022-01-03 PROCEDURE — 91306 HC SARSCOV2 VAC 50MCG/0.25ML IM: CPT | Performed by: INTERNAL MEDICINE

## 2022-01-05 ENCOUNTER — TELEPHONE (OUTPATIENT)
Dept: ORTHOPEDIC SURGERY | Facility: CLINIC | Age: 67
End: 2022-01-05

## 2022-01-06 RX ORDER — MELOXICAM 15 MG/1
15 TABLET ORAL DAILY
Qty: 30 TABLET | Refills: 0 | Status: SHIPPED | OUTPATIENT
Start: 2022-01-06 | End: 2022-02-07 | Stop reason: SDUPTHER

## 2022-02-07 ENCOUNTER — TELEPHONE (OUTPATIENT)
Dept: ORTHOPEDIC SURGERY | Facility: CLINIC | Age: 67
End: 2022-02-07

## 2022-02-07 DIAGNOSIS — M25.511 RIGHT SHOULDER PAIN, UNSPECIFIED CHRONICITY: Primary | ICD-10-CM

## 2022-02-07 DIAGNOSIS — M25.561 RIGHT KNEE PAIN, UNSPECIFIED CHRONICITY: ICD-10-CM

## 2022-02-07 RX ORDER — MELOXICAM 15 MG/1
15 TABLET ORAL DAILY
Qty: 90 TABLET | Refills: 0 | Status: SHIPPED | OUTPATIENT
Start: 2022-02-07 | End: 2022-02-21 | Stop reason: SDUPTHER

## 2022-02-14 ENCOUNTER — OFFICE VISIT (OUTPATIENT)
Dept: INTERNAL MEDICINE | Facility: CLINIC | Age: 67
End: 2022-02-14

## 2022-02-14 VITALS
DIASTOLIC BLOOD PRESSURE: 80 MMHG | HEART RATE: 102 BPM | HEIGHT: 68 IN | SYSTOLIC BLOOD PRESSURE: 144 MMHG | WEIGHT: 220.2 LBS | RESPIRATION RATE: 19 BRPM | OXYGEN SATURATION: 100 % | TEMPERATURE: 97.6 F | BODY MASS INDEX: 33.37 KG/M2

## 2022-02-14 DIAGNOSIS — F41.9 ANXIETY: ICD-10-CM

## 2022-02-14 DIAGNOSIS — F33.2 MAJOR DEPRESSIVE DISORDER, RECURRENT, SEVERE WITHOUT PSYCHOTIC FEATURES: ICD-10-CM

## 2022-02-14 DIAGNOSIS — I10 ESSENTIAL HYPERTENSION: ICD-10-CM

## 2022-02-14 DIAGNOSIS — H92.02 OTALGIA OF LEFT EAR: ICD-10-CM

## 2022-02-14 DIAGNOSIS — H00.011 HORDEOLUM EXTERNUM OF RIGHT UPPER EYELID: ICD-10-CM

## 2022-02-14 DIAGNOSIS — J06.9 ACUTE URI: Primary | ICD-10-CM

## 2022-02-14 LAB
EXPIRATION DATE: NORMAL
FLUAV AG NPH QL: NEGATIVE
FLUBV AG NPH QL: NEGATIVE
INTERNAL CONTROL: NORMAL
Lab: NORMAL
S PYO AG THROAT QL: NEGATIVE
SARS-COV-2 AG UPPER RESP QL IA.RAPID: NOT DETECTED

## 2022-02-14 PROCEDURE — 99215 OFFICE O/P EST HI 40 MIN: CPT | Performed by: NURSE PRACTITIONER

## 2022-02-14 PROCEDURE — 87426 SARSCOV CORONAVIRUS AG IA: CPT | Performed by: NURSE PRACTITIONER

## 2022-02-14 PROCEDURE — 87880 STREP A ASSAY W/OPTIC: CPT | Performed by: NURSE PRACTITIONER

## 2022-02-14 PROCEDURE — G0009 ADMIN PNEUMOCOCCAL VACCINE: HCPCS | Performed by: NURSE PRACTITIONER

## 2022-02-14 PROCEDURE — 90732 PPSV23 VACC 2 YRS+ SUBQ/IM: CPT | Performed by: NURSE PRACTITIONER

## 2022-02-14 PROCEDURE — 87804 INFLUENZA ASSAY W/OPTIC: CPT | Performed by: NURSE PRACTITIONER

## 2022-02-14 RX ORDER — OMEPRAZOLE 40 MG/1
40 CAPSULE, DELAYED RELEASE ORAL DAILY
Qty: 90 CAPSULE | Refills: 1 | Status: SHIPPED | OUTPATIENT
Start: 2022-02-14 | End: 2022-05-20 | Stop reason: SDUPTHER

## 2022-02-14 RX ORDER — CETIRIZINE HYDROCHLORIDE 10 MG/1
10 TABLET ORAL DAILY
Qty: 90 TABLET | Refills: 1 | Status: SHIPPED | OUTPATIENT
Start: 2022-02-14 | End: 2022-05-20 | Stop reason: SDUPTHER

## 2022-02-14 RX ORDER — RIZATRIPTAN BENZOATE 10 MG/1
10 TABLET ORAL ONCE AS NEEDED
Qty: 9 TABLET | Refills: 2 | Status: SHIPPED | OUTPATIENT
Start: 2022-02-14

## 2022-02-14 RX ORDER — CHLORTHALIDONE 25 MG/1
25 TABLET ORAL DAILY
Qty: 90 TABLET | Refills: 1 | Status: SHIPPED | OUTPATIENT
Start: 2022-02-14 | End: 2022-05-20 | Stop reason: SDUPTHER

## 2022-02-14 RX ORDER — DESVENLAFAXINE 100 MG/1
100 TABLET, EXTENDED RELEASE ORAL DAILY
Qty: 90 TABLET | Refills: 1 | Status: SHIPPED | OUTPATIENT
Start: 2022-02-14 | End: 2022-02-21 | Stop reason: SDUPTHER

## 2022-02-14 RX ORDER — VALACYCLOVIR HYDROCHLORIDE 500 MG/1
500 TABLET, FILM COATED ORAL DAILY
Qty: 90 TABLET | Refills: 1 | Status: SHIPPED | OUTPATIENT
Start: 2022-02-14
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
## 2022-02-21 ENCOUNTER — OFFICE VISIT (OUTPATIENT)
Dept: INTERNAL MEDICINE | Facility: CLINIC | Age: 67
End: 2022-02-21

## 2022-02-21 VITALS
WEIGHT: 219 LBS | TEMPERATURE: 97.6 F | BODY MASS INDEX: 33.19 KG/M2 | SYSTOLIC BLOOD PRESSURE: 118 MMHG | DIASTOLIC BLOOD PRESSURE: 70 MMHG | HEIGHT: 68 IN | OXYGEN SATURATION: 99 % | RESPIRATION RATE: 18 BRPM | HEART RATE: 85 BPM

## 2022-02-21 DIAGNOSIS — F33.2 MAJOR DEPRESSIVE DISORDER, RECURRENT, SEVERE WITHOUT PSYCHOTIC FEATURES: Primary | ICD-10-CM

## 2022-02-21 DIAGNOSIS — E78.2 MIXED HYPERLIPIDEMIA: ICD-10-CM

## 2022-02-21 DIAGNOSIS — M25.511 RIGHT SHOULDER PAIN, UNSPECIFIED CHRONICITY: ICD-10-CM

## 2022-02-21 DIAGNOSIS — Z11.59 NEED FOR HEPATITIS C SCREENING TEST: ICD-10-CM

## 2022-02-21 DIAGNOSIS — I10 ESSENTIAL HYPERTENSION: ICD-10-CM

## 2022-02-21 DIAGNOSIS — M25.561 RIGHT KNEE PAIN, UNSPECIFIED CHRONICITY: ICD-10-CM

## 2022-02-21 LAB
ALBUMIN SERPL-MCNC: 4.6 G/DL (ref 3.5–5.2)
ALBUMIN/GLOB SERPL: 2 G/DL
ALP SERPL-CCNC: 143 U/L (ref 39–117)
ALT SERPL W P-5'-P-CCNC: 14 U/L (ref 1–33)
ANION GAP SERPL CALCULATED.3IONS-SCNC: 8.5 MMOL/L (ref 5–15)
ANISOCYTOSIS BLD QL: ABNORMAL
AST SERPL-CCNC: 15 U/L (ref 1–32)
BASOPHILS # BLD MANUAL: 0.09 10*3/MM3 (ref 0–0.2)
BASOPHILS NFR BLD MANUAL: 1 % (ref 0–1.5)
BILIRUB SERPL-MCNC: 0.2 MG/DL (ref 0–1.2)
BUN SERPL-MCNC: 27 MG/DL (ref 8–23)
BUN/CREAT SERPL: 27.8 (ref 7–25)
CALCIUM SPEC-SCNC: 10.1 MG/DL (ref 8.6–10.5)
CHLORIDE SERPL-SCNC: 99 MMOL/L (ref 98–107)
CHOLEST SERPL-MCNC: 231 MG/DL (ref 0–200)
CO2 SERPL-SCNC: 31.5 MMOL/L (ref 22–29)
CREAT SERPL-MCNC: 0.97 MG/DL (ref 0.57–1)
DEPRECATED RDW RBC AUTO: 38.6 FL (ref 37–54)
EOSINOPHIL # BLD MANUAL: 0.18 10*3/MM3 (ref 0–0.4)
EOSINOPHIL NFR BLD MANUAL: 2 % (ref 0.3–6.2)
ERYTHROCYTE [DISTWIDTH] IN BLOOD BY AUTOMATED COUNT: 13 % (ref 12.3–15.4)
GFR SERPL CREATININE-BSD FRML MDRD: 58 ML/MIN/1.73
GLOBULIN UR ELPH-MCNC: 2.3 GM/DL
GLUCOSE SERPL-MCNC: 81 MG/DL (ref 65–99)
HCT VFR BLD AUTO: 40.9 % (ref 34–46.6)
HDLC SERPL-MCNC: 58 MG/DL (ref 40–60)
HGB BLD-MCNC: 13.3 G/DL (ref 12–15.9)
LDLC SERPL CALC-MCNC: 157 MG/DL (ref 0–100)
LDLC/HDLC SERPL: 2.66 {RATIO}
LYMPHOCYTES # BLD MANUAL: 3.21 10*3/MM3 (ref 0.7–3.1)
LYMPHOCYTES NFR BLD MANUAL: 8.2 % (ref 5–12)
MCH RBC QN AUTO: 26.9 PG (ref 26.6–33)
MCHC RBC AUTO-ENTMCNC: 32.5 G/DL (ref 31.5–35.7)
MCV RBC AUTO: 82.8 FL (ref 79–97)
MONOCYTES # BLD: 0.72 10*3/MM3 (ref 0.1–0.9)
NEUTROPHILS # BLD AUTO: 4.56 10*3/MM3 (ref 1.7–7)
NEUTROPHILS NFR BLD MANUAL: 52 % (ref 42.7–76)
PLAT MORPH BLD: NORMAL
PLATELET # BLD AUTO: 466 10*3/MM3 (ref 140–450)
PMV BLD AUTO: 9.1 FL (ref 6–12)
POLYCHROMASIA BLD QL SMEAR: ABNORMAL
POTASSIUM SERPL-SCNC: 3.9 MMOL/L (ref 3.5–5.2)
PROT SERPL-MCNC: 6.9 G/DL (ref 6–8.5)
RBC # BLD AUTO: 4.94 10*6/MM3 (ref 3.77–5.28)
SODIUM SERPL-SCNC: 139 MMOL/L (ref 136–145)
TRIGL SERPL-MCNC: 93 MG/DL (ref 0–150)
TSH SERPL DL<=0.05 MIU/L-ACNC: 1.16 UIU/ML (ref 0.27–4.2)
VARIANT LYMPHS NFR BLD MANUAL: 1 % (ref 0–5)
VARIANT LYMPHS NFR BLD MANUAL: 35.7 % (ref 19.6–45.3)
VLDLC SERPL-MCNC: 16 MG/DL (ref 5–40)
WBC MORPH BLD: NORMAL
WBC NRBC COR # BLD: 8.76 10*3/MM3 (ref 3.4–10.8)

## 2022-02-21 PROCEDURE — 99214 OFFICE O/P EST MOD 30 MIN: CPT | Performed by: INTERNAL MEDICINE

## 2022-02-21 PROCEDURE — 85025 COMPLETE CBC W/AUTO DIFF WBC: CPT | Performed by: INTERNAL MEDICINE

## 2022-02-21 PROCEDURE — 80061 LIPID PANEL: CPT | Performed by: INTERNAL MEDICINE

## 2022-02-21 PROCEDURE — 36415 COLL VENOUS BLD VENIPUNCTURE: CPT | Performed by: INTERNAL MEDICINE

## 2022-02-21 PROCEDURE — 80053 COMPREHEN METABOLIC PANEL: CPT | Performed by: INTERNAL MEDICINE

## 2022-02-21 PROCEDURE — 85007 BL SMEAR W/DIFF WBC COUNT: CPT | Performed by: INTERNAL MEDICINE

## 2022-02-21 PROCEDURE — 86803 HEPATITIS C AB TEST: CPT | Performed by: INTERNAL MEDICINE

## 2022-02-21 PROCEDURE — 84443 ASSAY THYROID STIM HORMONE: CPT | Performed by: INTERNAL MEDICINE

## 2022-02-21 RX ORDER — DESVENLAFAXINE SUCCINATE 50 MG/1
50 TABLET, EXTENDED RELEASE ORAL DAILY
Qty: 90 TABLET | Refills: 1 | Status: SHIPPED | OUTPATIENT
Start: 2022-02-21 | End: 2022-05-20 | Stop reason: SDUPTHER

## 2022-02-21 RX ORDER — MELOXICAM 15 MG/1
15 TABLET ORAL DAILY
Qty: 90 TABLET | Refills: 0 | Status: SHIPPED | OUTPATIENT
Start: 2022-02-21 | End: 2022-05-20 | Stop reason: SDUPTHER

## 2022-02-21 NOTE — PROGRESS NOTES
"Chief Complaint  Follow-up (Feeling Sluggish, Sleepy all the time, Since November) and Hypertension    Subjective          Alatnakellen Arango presents to [facility]  History of Present Illness     Her stress level is really high right now  Her sister passed away of covid among other things  Her other sister is going through a significant illness with her breast tumor, Mrs. Arango is having to take her to see her boyfriend regularly  also just lost her dog    States that she feels like she has no motivation with her new medication  Doesn't want to cook, doesn't want to clean  She has been napping a lot more at home recently    No chest pain  No trouble breathing  Arthritis not great, but stable    Objective   Vital Signs:   /70 (BP Location: Right arm, Patient Position: Sitting, Cuff Size: Adult)   Pulse 85   Temp 97.6 °F (36.4 °C) (Temporal)   Resp 18   Ht 172.7 cm (68\")   Wt 99.3 kg (219 lb)   SpO2 99%   BMI 33.30 kg/m²     Physical Exam  Vitals reviewed.   Constitutional:       Appearance: Normal appearance. She is well-developed.   HENT:      Head: Normocephalic and atraumatic.      Right Ear: External ear normal.      Left Ear: External ear normal.   Eyes:      Conjunctiva/sclera: Conjunctivae normal.      Pupils: Pupils are equal, round, and reactive to light.   Cardiovascular:      Rate and Rhythm: Normal rate and regular rhythm.      Heart sounds: No murmur heard.  No friction rub. No gallop.    Pulmonary:      Effort: Pulmonary effort is normal.      Breath sounds: Normal breath sounds. No wheezing or rhonchi.   Skin:     General: Skin is warm and dry.   Neurological:      Mental Status: She is alert and oriented to person, place, and time.      Cranial Nerves: No cranial nerve deficit.   Psychiatric:         Mood and Affect: Affect normal.         Behavior: Behavior normal.         Thought Content: Thought content normal.      Comments: " Tearful in discussion        Result Review :       Common labs    Common Labsle 10/14/21 10/14/21 10/15/21 10/15/21 11/3/21 11/3/21 11/3/21    2010 2010 0457 0457 1417 1417 1417   Glucose  114 (A)  105 (A)  84    BUN  35 (A)  30 (A)  28 (A)    Creatinine  1.10 (A)  0.92  0.91    eGFR Non  Am  50 (A)  61  62    Sodium  140  140  139    Potassium  4.1  3.6  4.3    Chloride  102  106  101    Calcium  10.0  8.8  10.1    Albumin  4.30  3.50  4.30    Total Bilirubin  0.3  0.2  0.2    Alkaline Phosphatase  130 (A)  107  161 (A)    AST (SGOT)  13  11  16    ALT (SGPT)  12  9  7    WBC 13.92 (A)  13.01 (A)  9.01     Hemoglobin 13.0  11.3 (A)  13.5     Hematocrit 40.6  34.5  40.6     Platelets 452 (A)  358  516 (A)     Total Cholesterol       252 (A)   Triglycerides       141   HDL Cholesterol       61 (A)   LDL Cholesterol        166 (A)   (A) Abnormal value                     Procedures        Assessment and Plan    Diagnoses and all orders for this visit:    1. Major depressive disorder, recurrent, severe without psychotic features (HCC) (Primary)  Comments:  will try moving slowly by decreasing pristiq to 50mg; will also place counseling referral; no SI  Orders:  -     Ambulatory Referral to Psychology    2. Right shoulder pain, unspecified chronicity  -     meloxicam (MOBIC) 15 MG tablet; Take 1 tablet by mouth Daily.  Dispense: 90 tablet; Refill: 0    3. Right knee pain, unspecified chronicity  -     meloxicam (MOBIC) 15 MG tablet; Take 1 tablet by mouth Daily.  Dispense: 90 tablet; Refill: 0    4. Need for hepatitis C screening test  -     Hepatitis C Antibody; Future    5. Essential hypertension  Comments:  bp well controlled cont to monitor  Orders:  -     Comprehensive Metabolic Panel  -     CBC & Differential  -     TSH  -     Lipid Panel    6. Mixed hyperlipidemia  Comments:  will check labs and adjust meds as needed  Orders:  -     Comprehensive Metabolic Panel  -     CBC & Differential  -     TSH  -      Lipid Panel    Other orders  -     desvenlafaxine (PRISTIQ) 50 MG 24 hr tablet; Take 1 tablet by mouth Daily.  Dispense: 90 tablet; Refill: 1                Follow Up   Return in about 1 month (around 3/21/2022).  Patient was given instructions and counseling regarding her condition or for health maintenance advice. Please see specific information pulled into the AVS if appropriate.

## 2022-02-22 LAB — HCV AB SER DONR QL: NORMAL

## 2022-03-23 ENCOUNTER — OFFICE VISIT (OUTPATIENT)
Dept: INTERNAL MEDICINE | Facility: CLINIC | Age: 67
End: 2022-03-23

## 2022-03-23 ENCOUNTER — OFFICE VISIT (OUTPATIENT)
Dept: SLEEP MEDICINE | Facility: HOSPITAL | Age: 67
End: 2022-03-23

## 2022-03-23 VITALS
DIASTOLIC BLOOD PRESSURE: 82 MMHG | HEART RATE: 74 BPM | TEMPERATURE: 97.7 F | SYSTOLIC BLOOD PRESSURE: 110 MMHG | WEIGHT: 221.4 LBS | RESPIRATION RATE: 18 BRPM | BODY MASS INDEX: 33.56 KG/M2 | HEIGHT: 68 IN | OXYGEN SATURATION: 97 %

## 2022-03-23 VITALS
OXYGEN SATURATION: 97 % | TEMPERATURE: 98 F | BODY MASS INDEX: 33.34 KG/M2 | DIASTOLIC BLOOD PRESSURE: 59 MMHG | SYSTOLIC BLOOD PRESSURE: 130 MMHG | HEIGHT: 68 IN | WEIGHT: 220 LBS | HEART RATE: 78 BPM

## 2022-03-23 DIAGNOSIS — F33.2 MAJOR DEPRESSIVE DISORDER, RECURRENT, SEVERE WITHOUT PSYCHOTIC FEATURES: Primary | ICD-10-CM

## 2022-03-23 DIAGNOSIS — Z78.0 POSTMENOPAUSE: ICD-10-CM

## 2022-03-23 DIAGNOSIS — Z99.89 OSA ON CPAP: Primary | ICD-10-CM

## 2022-03-23 DIAGNOSIS — I10 ESSENTIAL HYPERTENSION: ICD-10-CM

## 2022-03-23 DIAGNOSIS — Z12.31 ENCOUNTER FOR SCREENING MAMMOGRAM FOR BREAST CANCER: ICD-10-CM

## 2022-03-23 DIAGNOSIS — E78.2 MIXED HYPERLIPIDEMIA: ICD-10-CM

## 2022-03-23 DIAGNOSIS — J30.9 ALLERGIC RHINITIS, UNSPECIFIED SEASONALITY, UNSPECIFIED TRIGGER: ICD-10-CM

## 2022-03-23 DIAGNOSIS — G47.33 OSA ON CPAP: Primary | ICD-10-CM

## 2022-03-23 DIAGNOSIS — E66.9 OBESITY (BMI 30-39.9): ICD-10-CM

## 2022-03-23 PROBLEM — Z86.39 HISTORY OF VITAMIN D DEFICIENCY: Status: RESOLVED | Noted: 2019-06-03 | Resolved: 2022-03-23

## 2022-03-23 PROCEDURE — 99213 OFFICE O/P EST LOW 20 MIN: CPT | Performed by: INTERNAL MEDICINE

## 2022-03-23 PROCEDURE — 99214 OFFICE O/P EST MOD 30 MIN: CPT | Performed by: INTERNAL MEDICINE

## 2022-03-23 PROCEDURE — G0463 HOSPITAL OUTPT CLINIC VISIT: HCPCS | Performed by: INTERNAL MEDICINE

## 2022-03-23 NOTE — PROGRESS NOTES
"Chief Complaint  HTN, HLD, depression    Subjective          Casandra Arango presents to [facility]  History of Present Illness     States that decreasing her pristiq has helped quite a bit   Fatigue and sleepiness is much better    She is wanting to start walking more, but having quite a bit of right foot pain  Reviewed imaging that showed significant arthritis    She has been dealing with hand pain for quite some time  She saw the neurologist and ortho read EMG results and told her that she has carpal tunnel  She has wrist pain and it can go up to her shoulders  They gave her braces to wear on her wrist bilaterally  The pain is about the same, but admits she hasn't been wearing the braces super consistently    She has been using the mobic for the last 3 months  She really likes how it makes her feel    Reviewed labs from labs time that were     Allergic rhinitis-  Having some runny nose and drainage    Objective   Vital Signs:   /82 (BP Location: Right arm, Patient Position: Sitting, Cuff Size: Adult)   Pulse 74   Temp 97.7 °F (36.5 °C) (Temporal)   Resp 18   Ht 172.7 cm (68\")   Wt 100 kg (221 lb 6.4 oz)   SpO2 97%   BMI 33.66 kg/m²     Physical Exam  Vitals reviewed.   Constitutional:       Appearance: Normal appearance. She is well-developed.   HENT:      Head: Normocephalic and atraumatic.      Right Ear: External ear normal.      Left Ear: External ear normal.   Eyes:      Conjunctiva/sclera: Conjunctivae normal.      Pupils: Pupils are equal, round, and reactive to light.   Cardiovascular:      Rate and Rhythm: Normal rate and regular rhythm.      Heart sounds: No murmur heard.    No friction rub. No gallop.   Pulmonary:      Effort: Pulmonary effort is normal.      Breath sounds: Normal breath sounds. No wheezing or rhonchi.   Musculoskeletal:      Comments: Tenderness of the right MTP joint   Skin:     General: Skin is warm and dry. "   Neurological:      Mental Status: She is alert and oriented to person, place, and time.      Cranial Nerves: No cranial nerve deficit.   Psychiatric:         Mood and Affect: Affect normal.         Behavior: Behavior normal.         Thought Content: Thought content normal.        Result Review :       Common labs    Common Labsle 10/15/21 10/15/21 11/3/21 11/3/21 11/3/21 2/21/22 2/21/22 2/21/22    0457 0457 1417 1417 1417 1241 1241 1241   Glucose  105 (A)  84   81    BUN  30 (A)  28 (A)   27 (A)    Creatinine  0.92  0.91   0.97    eGFR Non  Am  61  62   58 (A)    Sodium  140  139   139    Potassium  3.6  4.3   3.9    Chloride  106  101   99    Calcium  8.8  10.1   10.1    Albumin  3.50  4.30   4.60    Total Bilirubin  0.2  0.2   0.2    Alkaline Phosphatase  107  161 (A)   143 (A)    AST (SGOT)  11  16   15    ALT (SGPT)  9  7   14    WBC 13.01 (A)  9.01   8.76     Hemoglobin 11.3 (A)  13.5   13.3     Hematocrit 34.5  40.6   40.9     Platelets 358  516 (A)   466 (A)     Total Cholesterol     252 (A)   231 (A)   Triglycerides     141   93   HDL Cholesterol     61 (A)   58   LDL Cholesterol      166 (A)   157 (A)   (A) Abnormal value                     Procedures        Assessment and Plan    Diagnoses and all orders for this visit:    1. Major depressive disorder, recurrent, severe without psychotic features (HCC) (Primary)  Comments:  doing well on current meds, will stick with current dose for now    2. Essential hypertension  Comments:  well controlled, cotn current meds    3. Mixed hyperlipidemia  Comments:  a little elevated will just try diet and exercise for now    4. Postmenopause  -     DEXA Bone Density Axial; Future    5. Allergic rhinitis, unspecified seasonality, unspecified trigger  Comments:  cont zyrtec and flonase for now    6. Encounter for screening mammogram for breast cancer  -     Mammo Screening Digital Tomosynthesis Bilateral With CAD; Future                  Follow Up   Return in  about 2 months (around 5/23/2022).  Patient was given instructions and counseling regarding her condition or for health maintenance advice. Please see specific information pulled into the AVS if appropriate.

## 2022-03-23 NOTE — PROGRESS NOTES
"  [facility] [address]  Phone: [phone]  Fax: [phone]      SLEEP CLINIC FOLLOW UP PROGRESS NOTE.    Casandra Arango  1955  66 y.o.  female      PCP: Teresa Herman MD      Date of visit: 3/23/2022    Chief Complaint   Patient presents with   • Sleep Apnea   • Obesity       HPI:  This is a 66 y.o. years old patient is here for the management of obstructive sleep apnea.  Sleep apnea is moderate in severity with a AHI of 23/hr. Patient is using positive airway pressure therapy with auto set and the symptoms of snoring, non-restorative sleep and daytime excessive sleepiness have improved significantly on the therapy. Normally goes to bed at 11 PM and wakes up at 8.  The patient wakes up 2 time(s) during the night and has no problem going back to sleep.  Feels refreshed after waking up.  Patient also denies headaches and nasal congestion.     Medications and allergies are reviewed by me and documented in the encounter.     SOCIAL (habits pertaining to sleep medicine)  History tobacco use:No   History of alcohol use: 0 per week  Caffeine use: 1     REVIEW OF SYSTEMS:   [place] Sleepiness Scale :Total score: 7   Nasal congestion:Yes   Dry mouth/nose:Yes   Post nasal drip; No   Acid reflux/Heartburn:No   Abd bloating:No   Morning headache:No   Anxiety:Yes   Depression:Yes    PHYSICAL EXAMINATION:  CONSTITUTIONAL:  Vitals:    03/23/22 1500   BP: 130/59   Pulse: 78   Temp: 98 °F (36.7 °C)   SpO2: 97%   Weight: 99.8 kg (220 lb)   Height: 172.7 cm (68\")    Body mass index is 33.45 kg/m².   NOSE: nasal passages are clear, No deformities noted   RESP SYSTEM: Not in any respiratory distress, no chest deformities noted,   CARDIOVASULAR: No edema noted  NEURO: Oriented x 3, gait normal,  Mood and affect appeared appropriate      Data reviewed:  The Smart card downloaded on 3/23/2022 has been reviewed independently by me for compliance and discussed " the data with the patient.   Compliance; 60%  More than 4 hr use, 50%, she had cold was unable to use for a week  Average use of the device 5 hours and 6 minutes per night  Residual AHI: 4.5 /hr (goal < 5.0 /hr)  Mask type: Nasal pillows  Device: ResMed  DME: [employer]      ASSESSMENT AND PLAN:  · Obstructive sleep apnea ( G 47.33).  The symptoms of sleep apnea have improved with the device and the treatment.  Patient's compliance with the device is excellent for treatment of sleep apnea.  I have independently reviewed the smart card down load and discussed with the patient the download data and encouarged the patient to continue to use the device.The residual AHI is acceptable. The device is benefiting the patient and the device is medically necessary.  Without proper control of sleep apnea and good compliance there is a increased risk for hypertension, diabetes mellitus and nonrestorative sleep with hypersomnia which can increase risk for motor vehicle accidents.  Untreated sleep apnea is also a risk factor for development of atrial fibrillation, pulmonary hypertension and stroke. The patient is also instructed to get the supplies from the [employer] and and change them on a regular basis.  A prescription for supplies has been sent to the [employer].  I have also discussed the good sleep hygiene habits and adequate amount of sleep needed for good health.  · Obesity  1 with BMI is Body mass index is 33.45 kg/m².. I have discuss the relationship between the weight and sleep apnea. The benefit of weight loss in reducing severity of sleep apnea was discussed. Discussed diet and exercise with the patient to achieve ideal BMI.   · Return in about 1 year (around 3/23/2023) for Annual visit with smartcard download. . Patient's questions were answered.      Gavino Gaston MD  Sleep Medicine.  Medical Director, [facility] sleep [facility]  3/23/2022 ,

## 2022-05-20 ENCOUNTER — OFFICE VISIT (OUTPATIENT)
Dept: INTERNAL MEDICINE | Facility: CLINIC | Age: 67
End: 2022-05-20

## 2022-05-20 VITALS
RESPIRATION RATE: 18 BRPM | DIASTOLIC BLOOD PRESSURE: 82 MMHG | HEIGHT: 68 IN | SYSTOLIC BLOOD PRESSURE: 142 MMHG | HEART RATE: 88 BPM | OXYGEN SATURATION: 97 % | TEMPERATURE: 97.1 F | BODY MASS INDEX: 34.1 KG/M2 | WEIGHT: 225 LBS

## 2022-05-20 DIAGNOSIS — E55.9 VITAMIN D DEFICIENCY: ICD-10-CM

## 2022-05-20 DIAGNOSIS — M25.511 RIGHT SHOULDER PAIN, UNSPECIFIED CHRONICITY: ICD-10-CM

## 2022-05-20 DIAGNOSIS — G56.02 CARPAL TUNNEL SYNDROME OF LEFT WRIST: ICD-10-CM

## 2022-05-20 DIAGNOSIS — I10 ESSENTIAL HYPERTENSION: Primary | ICD-10-CM

## 2022-05-20 DIAGNOSIS — M19.90 ARTHRITIS: ICD-10-CM

## 2022-05-20 DIAGNOSIS — F41.9 ANXIETY: ICD-10-CM

## 2022-05-20 DIAGNOSIS — M19.071 ARTHRITIS OF FIRST METATARSOPHALANGEAL (MTP) JOINT OF RIGHT FOOT: ICD-10-CM

## 2022-05-20 DIAGNOSIS — D50.9 IRON DEFICIENCY ANEMIA, UNSPECIFIED IRON DEFICIENCY ANEMIA TYPE: ICD-10-CM

## 2022-05-20 DIAGNOSIS — F33.2 MAJOR DEPRESSIVE DISORDER, RECURRENT, SEVERE WITHOUT PSYCHOTIC FEATURES: ICD-10-CM

## 2022-05-20 DIAGNOSIS — M25.561 RIGHT KNEE PAIN, UNSPECIFIED CHRONICITY: ICD-10-CM

## 2022-05-20 DIAGNOSIS — E78.2 MIXED HYPERLIPIDEMIA: ICD-10-CM

## 2022-05-20 PROCEDURE — 99214 OFFICE O/P EST MOD 30 MIN: CPT | Performed by: INTERNAL MEDICINE

## 2022-05-20 RX ORDER — DESVENLAFAXINE SUCCINATE 50 MG/1
50 TABLET, EXTENDED RELEASE ORAL DAILY
Qty: 90 TABLET | Refills: 1 | Status: SHIPPED | OUTPATIENT
Start: 2022-05-20 | End: 2022-12-02 | Stop reason: SDUPTHER

## 2022-05-20 RX ORDER — OMEPRAZOLE 40 MG/1
40 CAPSULE, DELAYED RELEASE ORAL DAILY
Qty: 90 CAPSULE | Refills: 1 | Status: SHIPPED | OUTPATIENT
Start: 2022-05-20 | End: 2023-01-06 | Stop reason: SDUPTHER

## 2022-05-20 RX ORDER — MELOXICAM 15 MG/1
15 TABLET ORAL DAILY
Qty: 90 TABLET | Refills: 0 | Status: SHIPPED | OUTPATIENT
Start: 2022-05-20 | End: 2022-07-25 | Stop reason: ALTCHOICE

## 2022-05-20 RX ORDER — ONDANSETRON 4 MG/1
4 TABLET, ORALLY DISINTEGRATING ORAL EVERY 8 HOURS PRN
Qty: 30 TABLET | Refills: 1 | Status: SHIPPED | OUTPATIENT
Start: 2022-05-20

## 2022-05-20 RX ORDER — CICLOPIROX 7.7 MG/G
1 GEL TOPICAL ONCE AS NEEDED
COMMUNITY
End: 2022-05-20 | Stop reason: SDUPTHER

## 2022-05-20 RX ORDER — HYDROXYZINE HYDROCHLORIDE 25 MG/1
25 TABLET, FILM COATED ORAL DAILY
Qty: 90 TABLET | Refills: 1 | Status: SHIPPED | OUTPATIENT
Start: 2022-05-20

## 2022-05-20 RX ORDER — CICLOPIROX 7.7 MG/G
1 GEL TOPICAL 2 TIMES DAILY
Qty: 30 G | Refills: 1 | Status: SHIPPED | OUTPATIENT
Start: 2022-05-20

## 2022-05-20 RX ORDER — ACETAMINOPHEN 325 MG/1
650 TABLET ORAL EVERY 6 HOURS PRN
Qty: 90 TABLET | Refills: 1 | Status: SHIPPED | OUTPATIENT
Start: 2022-05-20 | End: 2023-01-06 | Stop reason: SDUPTHER

## 2022-05-20 RX ORDER — CHLORTHALIDONE 25 MG/1
25 TABLET ORAL DAILY
Qty: 90 TABLET | Refills: 1 | Status: SHIPPED | OUTPATIENT
Start: 2022-05-20 | End: 2023-01-06 | Stop reason: SDUPTHER

## 2022-05-20 RX ORDER — CETIRIZINE HYDROCHLORIDE 10 MG/1
10 TABLET ORAL DAILY
Qty: 90 TABLET | Refills: 1 | Status: SHIPPED | OUTPATIENT
Start: 2022-05-20 | End: 2023-02-01 | Stop reason: SDUPTHER

## 2022-05-20 NOTE — PROGRESS NOTES
Chief Complaint  Follow-up (2 Month), Depression, Hypertension, and Hyperlipidemia    Subjective          Casandra Arango presents to [facility]  History of Present Illness     Hypertension  The patient is currently taking hygroton 25 mg to manage the hypertension     Depression   The patient reports that she is currently taking Pristiq 50 mg once a day. She states that she has been doing better since the dose was lowered. She reports that before she began taking the medication, she had no desire to do anything. The patient states that she has started taking multivitamins to help with fatigue. The patient reports that she has been working 4 days a week. She states that initially she was only supposed to work 2 days a week, due to staffing issues with her job she has been working additional days to make things easier on herself.      Weight loss management    The patient has reported that she has not been able to start walking for exercise because she is not home much. She states that she is working 4 days a week now and is eating lunch out daily. The patient states that she does not think weight loss medication will help her. She reports when she eats, she is not hungry, she likes the taste of food. The patient reports that many of her activities she is sitting, playing the piano, reading and sewing. She states that she is not getting up and moving around.      Arthritis    The patient states that her toe is not getting any better. She reports that she has being alternating the splint on her hands. She is alternating hands 12 hours at a time. She states that she had discomfort in the base of her left thumb, if feels like it is going to lock. The patient reports that she has not seen anyone in orthopedics for these issues yet. She states that she did mention it to Dr. Denny while she was at a follow-up on knee pain. The patient notes that she does have carpal  "tunnel disease.      Restless leg syndrome   The patient states that she is getting to the point where she goes to bed and her legs start hurting and she is rubbing them.      Anemia    The patient's recent lab work showed some iron deficiency      Healthcare maintenance   The patient is due for a third COVID-19 booster     Objective   Vital Signs:   /82 (BP Location: Left arm, Patient Position: Sitting, Cuff Size: Adult)   Pulse 88   Temp 97.1 °F (36.2 °C)   Resp 18   Ht 172.7 cm (68\")   Wt 102 kg (225 lb)   SpO2 97%   BMI 34.21 kg/m²     Physical Exam  Vitals reviewed.   Constitutional:       Appearance: Normal appearance. She is well-developed.   HENT:      Head: Normocephalic and atraumatic.      Right Ear: External ear normal.      Left Ear: External ear normal.   Eyes:      Conjunctiva/sclera: Conjunctivae normal.      Pupils: Pupils are equal, round, and reactive to light.   Cardiovascular:      Rate and Rhythm: Normal rate and regular rhythm.      Heart sounds: No murmur heard.    No friction rub. No gallop.   Pulmonary:      Effort: Pulmonary effort is normal.      Breath sounds: Normal breath sounds. No wheezing or rhonchi.   Skin:     General: Skin is warm and dry.   Neurological:      Mental Status: She is alert and oriented to person, place, and time.   Psychiatric:         Mood and Affect: Affect normal.         Behavior: Behavior normal.         Thought Content: Thought content normal.        Result Review :       Common labs    Common Labsle 10/15/21 10/15/21 11/3/21 11/3/21 11/3/21 2/21/22 2/21/22 2/21/22    0457 0457 1417 1417 1417 1241 1241 1241   Glucose  105 (A)  84   81    BUN  30 (A)  28 (A)   27 (A)    Creatinine  0.92  0.91   0.97    eGFR Non  Am  61  62   58 (A)    Sodium  140  139   139    Potassium  3.6  4.3   3.9    Chloride  106  101   99    Calcium  8.8  10.1   10.1    Albumin  3.50  4.30   4.60    Total Bilirubin  0.2  0.2   0.2    Alkaline Phosphatase  107  161 " (A)   143 (A)    AST (SGOT)  11  16   15    ALT (SGPT)  9  7   14    WBC 13.01 (A)  9.01   8.76     Hemoglobin 11.3 (A)  13.5   13.3     Hematocrit 34.5  40.6   40.9     Platelets 358  516 (A)   466 (A)     Total Cholesterol     252 (A)   231 (A)   Triglycerides     141   93   HDL Cholesterol     61 (A)   58   LDL Cholesterol      166 (A)   157 (A)   (A) Abnormal value                     Procedures        Assessment and Plan    Diagnoses and all orders for this visit:    1. Essential hypertension (Primary)  -     Comprehensive Metabolic Panel; Future  -     CBC & Differential; Future  -     TSH; Future  -     Lipid Panel; Future  -     Iron Profile; Future  -     Vitamin D 25 Hydroxy; Future  -     Vitamin B12 & Folate; Future    2. Right shoulder pain, unspecified chronicity  -     meloxicam (MOBIC) 15 MG tablet; Take 1 tablet by mouth Daily.  Dispense: 90 tablet; Refill: 0    3. Right knee pain, unspecified chronicity  -     meloxicam (MOBIC) 15 MG tablet; Take 1 tablet by mouth Daily.  Dispense: 90 tablet; Refill: 0  -     Diclofenac Sodium (VOLTAREN) 1 % gel gel; Apply  topically to the appropriate area as directed 2 (Two) Times a Day.  Dispense: 100 g; Refill: 2    4. Mixed hyperlipidemia  -     Comprehensive Metabolic Panel; Future  -     CBC & Differential; Future  -     TSH; Future  -     Lipid Panel; Future  -     Iron Profile; Future  -     Vitamin D 25 Hydroxy; Future  -     Vitamin B12 & Folate; Future    5. Major depressive disorder, recurrent, severe without psychotic features (HCC)  -     Comprehensive Metabolic Panel; Future  -     CBC & Differential; Future  -     TSH; Future  -     Lipid Panel; Future  -     Iron Profile; Future  -     Vitamin D 25 Hydroxy; Future  -     Vitamin B12 & Folate; Future  - Continue taking Pristiq 50 mg as prescribed         6. Anxiety  -     Comprehensive Metabolic Panel; Future  -     CBC & Differential; Future  -     TSH; Future  -     Lipid Panel; Future  -      Iron Profile; Future  -     Vitamin D 25 Hydroxy; Future  -     Vitamin B12 & Folate; Future    7. Arthritis  -     Ambulatory Referral to Orthopedic Surgery    8. Arthritis of first metatarsophalangeal (MTP) joint of right foot    9. Carpal tunnel syndrome of left wrist  -     Ambulatory Referral to Orthopedic Surgery    10. Iron deficiency anemia, unspecified iron deficiency anemia type  -     Iron Profile; Future  - The patient will start on a prenatal vitamin    11. Vitamin D deficiency  -     Vitamin D 25 Hydroxy; Future    Other orders  -     acetaminophen (Tylenol) 325 MG tablet; Take 2 tablets by mouth Every 6 (Six) Hours As Needed for Mild Pain .  Dispense: 90 tablet; Refill: 1  -     cetirizine (zyrTEC) 10 MG tablet; Take 1 tablet by mouth Daily.  Dispense: 90 tablet; Refill: 1  -     desvenlafaxine (PRISTIQ) 50 MG 24 hr tablet; Take 1 tablet by mouth Daily.  Dispense: 90 tablet; Refill: 1  -     hydrOXYzine (ATARAX) 25 MG tablet; Take 1 tablet by mouth Daily.  Dispense: 90 tablet; Refill: 1  -     omeprazole (priLOSEC) 40 MG capsule; Take 1 capsule by mouth Daily.  Dispense: 90 capsule; Refill: 1  -     ondansetron ODT (Zofran ODT) 4 MG disintegrating tablet; Place 1 tablet on the tongue Every 8 (Eight) Hours As Needed for Nausea or Vomiting.  Dispense: 30 tablet; Refill: 1  -     ciclopirox (LOPROX) 0.77 % gel; Apply 1 application topically to the appropriate area as directed 2 (Two) Times a Day.  Dispense: 30 g; Refill: 1  -     chlorthalidone (HYGROTON) 25 MG tablet; Take 1 tablet by mouth Daily.  Dispense: 90 tablet; Refill: 1    For above issues we will get her in with Ortho to discuss her arthritis further as she may benefit from injections or procedures    Anxiety depression stable continue current meds    Will check blood work and adjust other meds as necessary based on those results              Follow Up   Return in about 6 months (around 11/20/2022).  Patient was given instructions and  counseling regarding her condition or for health maintenance advice. Please see specific information pulled into the AVS if appropriate.     Transcribed from ambient dictation for Teresa Herman MD by Nisha Mari.  05/20/22   19:20 EDT    Patient verbalized consent to the visit recording.

## 2022-05-26 ENCOUNTER — IMMUNIZATION (OUTPATIENT)
Dept: INTERNAL MEDICINE | Facility: CLINIC | Age: 67
End: 2022-05-26

## 2022-05-26 DIAGNOSIS — U07.1 COVID-19: Primary | ICD-10-CM

## 2022-05-26 PROCEDURE — 0054A COVID-19 (PFIZER) 12+ YRS: CPT | Performed by: INTERNAL MEDICINE

## 2022-05-26 PROCEDURE — 91305 COVID-19 (PFIZER) 12+ YRS: CPT | Performed by: INTERNAL MEDICINE

## 2022-06-08 ENCOUNTER — APPOINTMENT (OUTPATIENT)
Dept: MAMMOGRAPHY | Facility: HOSPITAL | Age: 67
End: 2022-06-08

## 2022-06-08 ENCOUNTER — APPOINTMENT (OUTPATIENT)
Dept: BONE DENSITY | Facility: HOSPITAL | Age: 67
End: 2022-06-08

## 2022-06-14 ENCOUNTER — OFFICE VISIT (OUTPATIENT)
Dept: ORTHOPEDIC SURGERY | Facility: CLINIC | Age: 67
End: 2022-06-14

## 2022-06-14 VITALS — HEIGHT: 68 IN | WEIGHT: 219.6 LBS | OXYGEN SATURATION: 97 % | BODY MASS INDEX: 33.28 KG/M2 | HEART RATE: 74 BPM

## 2022-06-14 DIAGNOSIS — M65.4 DE QUERVAIN'S TENOSYNOVITIS, LEFT: ICD-10-CM

## 2022-06-14 DIAGNOSIS — M79.605 LEFT LEG PAIN: Primary | ICD-10-CM

## 2022-06-14 PROCEDURE — 99214 OFFICE O/P EST MOD 30 MIN: CPT | Performed by: ORTHOPAEDIC SURGERY

## 2022-06-14 RX ORDER — DICLOFENAC SODIUM 75 MG/1
75 TABLET, DELAYED RELEASE ORAL 2 TIMES DAILY
Qty: 60 TABLET | Refills: 1 | Status: SHIPPED | OUTPATIENT
Start: 2022-06-14 | End: 2022-08-18 | Stop reason: SDUPTHER

## 2022-06-14 NOTE — PROGRESS NOTES
"Chief Complaint  Initial Evaluation of the Left Hand and Initial Evaluation of the Left Leg     Subjective      Casandra Arango presents to [facility] for an evaluation of left hand and left leg. Her leg wakes her up at night, she states throbbing is constant. She reports pain about the lateral aspect of the ankle. She has a history of a left TKA 1/21/19 by Dr. Denny. She also has a history of a intramedullary jose angel of proximal tibia. She denies any fever and chills today.     She was taking piano lessons and she noticed a development of numbness and tingling in the left hand. She states her pain is about the base of the thumb. She denies any injuries. Brace the wrist aggravates the hand. She had seen Dr. Nunn which revealed carpal tunnel.     Allergies   Allergen Reactions   • Nabumetone Itching   • Celebrex [Celecoxib] Other (See Comments)     Eye problem   • Hydrocodone Bit-Homatrop Mbr Itching   • Lamotrigine Hives   • Propoxyphene Unknown - Low Severity     Darvocet  Other reaction(s): Rash to arms, torso   • Terbinafine Rash        Social History     Socioeconomic History   • Marital status:    Tobacco Use   • Smoking status: Never Smoker   • Smokeless tobacco: Never Used   Vaping Use   • Vaping Use: Never used   Substance and Sexual Activity   • Alcohol use: Not Currently     Comment: CURRENT SOME DAY    • Drug use: Never   • Sexual activity: Not Currently     Partners: Male     Birth control/protection: None        Review of Systems     Objective   Vital Signs:   Pulse 74   Ht 172.7 cm (68\")   Wt 99.6 kg (219 lb 9.6 oz)   SpO2 97%   BMI 33.39 kg/m²       Physical Exam  Constitutional:       Appearance: Normal appearance. Patient is well-developed and normal weight.   HENT:      Head: Normocephalic.      Right Ear: Hearing and external ear normal.      Left Ear: Hearing and external ear normal.      Nose: Nose normal.   Eyes:      Conjunctiva/sclera: " Conjunctivae normal.   Cardiovascular:      Rate and Rhythm: Normal rate.   Pulmonary:      Effort: Pulmonary effort is normal.      Breath sounds: No wheezing or rales.   Abdominal:      Palpations: Abdomen is soft.      Tenderness: There is no abdominal tenderness.   Musculoskeletal:      Cervical back: Normal range of motion.   Skin:     Findings: No rash.   Neurological:      Mental Status: Patient is alert and oriented to person, place, and time.   Psychiatric:         Mood and Affect: Mood and affect normal.         Judgment: Judgment normal.       Ortho Exam      LEFT HAND: Positive finkelstein's test. Negative tinel's. Negative triggering of the digits. Full wrist flexion and extension. No swelling, skin discoloration or atrophy. Sensation grossly intact. Neurovascular intact.      Procedures      Imaging Results (Most Recent)     Procedure Component Value Units Date/Time    XR Tibia Fibula 2 View Left [[id number]] Resulted: 06/14/22 1333     Updated: 06/14/22 1333           Result Review :     X-Ray Report:  Left TIbia Fibula  X-Ray  Indication: Evaluation of left leg pain   AP and Lateral view(s)  Findings: Total knee replacement intact, no evidence of wearing or loosening. Intramedullary jose angel of proximal tibia in place, no hardware complication.   Prior studies available for comparison: no     Assessment and Plan     Diagnoses and all orders for this visit:    1. Left leg pain (Primary)  -     XR Tibia Fibula 2 View Left    2. De Quervain's tenosynovitis, left        At home exercises for de quervain's tenosynovitis given. A brace provided for the wrist given, wear this to instead of carpal tunnel brace. Mobic prescribed, take this as needed. Rub Voltaren gel on the leg.     Call or return if worsening symptoms.    Follow Up     PRN.       Patient was given instructions and counseling regarding her condition or for health maintenance advice. Please see specific information pulled into the AVS if  appropriate.     Scribed for Kar Denny MD by Ora Sloan.  06/14/22   13:36 EDT    I have personally performed the services described in this document as scribed by the above individual and it is both accurate and complete. Kar Denny MD 06/14/22

## 2022-07-06 NOTE — TELEPHONE ENCOUNTER
Caller: Casandra Arango    Relationship: Self    Best call back number: [phone]    Requested Prescriptions:   Requested Prescriptions     Pending Prescriptions Disp Refills   • cetirizine (zyrTEC) 10 MG tablet 90 tablet 1     Sig: Take 1 tablet by mouth Daily.   • chlorthalidone (HYGROTON) 25 MG tablet 90 tablet 1     Sig: Take 1 tablet by mouth Daily.        Pharmacy where request should be sent:  [facility]  [address] [place] [address]  [phone]      Does the patient have less than a 3 day supply:  [] Yes  [x] No    Satya Bansal Rep   07/06/22 14:06 EDT

## 2022-07-08 RX ORDER — CHLORTHALIDONE 25 MG/1
25 TABLET ORAL DAILY
Qty: 90 TABLET | Refills: 1 | OUTPATIENT
Start: 2022-07-08

## 2022-07-08 RX ORDER — CETIRIZINE HYDROCHLORIDE 10 MG/1
10 TABLET ORAL DAILY
Qty: 90 TABLET | Refills: 1 | OUTPATIENT
Start: 2022-07-08

## 2022-07-25 ENCOUNTER — OFFICE VISIT (OUTPATIENT)
Dept: PODIATRY | Facility: CLINIC | Age: 67
End: 2022-07-25

## 2022-07-25 VITALS
SYSTOLIC BLOOD PRESSURE: 134 MMHG | OXYGEN SATURATION: 99 % | WEIGHT: 223 LBS | HEART RATE: 82 BPM | HEIGHT: 68 IN | BODY MASS INDEX: 33.8 KG/M2 | DIASTOLIC BLOOD PRESSURE: 65 MMHG | TEMPERATURE: 97.5 F

## 2022-07-25 DIAGNOSIS — M79.671 FOOT PAIN, RIGHT: ICD-10-CM

## 2022-07-25 DIAGNOSIS — M20.5X1 HALLUX LIMITUS OF RIGHT FOOT: ICD-10-CM

## 2022-07-25 DIAGNOSIS — M79.672 FOOT PAIN, BILATERAL: Primary | ICD-10-CM

## 2022-07-25 DIAGNOSIS — M79.671 FOOT PAIN, BILATERAL: Primary | ICD-10-CM

## 2022-07-25 DIAGNOSIS — B35.1 ONYCHOMYCOSIS: ICD-10-CM

## 2022-07-25 DIAGNOSIS — L60.0 ONYCHOCRYPTOSIS: ICD-10-CM

## 2022-07-25 PROCEDURE — 11720 DEBRIDE NAIL 1-5: CPT | Performed by: PODIATRIST

## 2022-07-25 PROCEDURE — 99203 OFFICE O/P NEW LOW 30 MIN: CPT | Performed by: PODIATRIST

## 2022-07-25 PROCEDURE — 20550 NJX 1 TENDON SHEATH/LIGAMENT: CPT | Performed by: PODIATRIST

## 2022-07-25 RX ORDER — LIDOCAINE HYDROCHLORIDE 10 MG/ML
0.5 INJECTION, SOLUTION INFILTRATION; PERINEURAL ONCE
Status: COMPLETED | OUTPATIENT
Start: 2022-07-25 | End: 2022-07-25

## 2022-07-25 RX ORDER — DEXAMETHASONE SODIUM PHOSPHATE 4 MG/ML
2 INJECTION, SOLUTION INTRA-ARTICULAR; INTRALESIONAL; INTRAMUSCULAR; INTRAVENOUS; SOFT TISSUE ONCE
Status: COMPLETED | OUTPATIENT
Start: 2022-07-25 | End: 2022-07-25

## 2022-07-25 RX ADMIN — LIDOCAINE HYDROCHLORIDE 0.5 ML: 10 INJECTION, SOLUTION INFILTRATION; PERINEURAL at 15:54

## 2022-07-25 RX ADMIN — DEXAMETHASONE SODIUM PHOSPHATE 2 MG: 4 INJECTION, SOLUTION INTRA-ARTICULAR; INTRALESIONAL; INTRAMUSCULAR; INTRAVENOUS; SOFT TISSUE at 15:54

## 2022-07-25 NOTE — PROGRESS NOTES
[facility] - PODIATRY    Today's Date: 07/25/22    Patient Name: Casandra Arango  MRN: [id number]  CSN: [id number]  PCP: Teresa Herman MD, Last PCP Visit:  7/6/2022  Referring Provider: Referring, Self    SUBJECTIVE     Chief Complaint   Patient presents with   • Right Foot - Establish Care, Toe Pain, Nail Problem     R great toe pain xray on chart 11/3/21  Wants nail cut  Discuss toenail fungus   • Left Foot - Establish Care, Nail Problem     Wants nails cut  Discuss toenail fungus      HPI: Casandra Arango, a 66 y.o.female, comes to clinic.    New, Established, New Problem:  New  Location:  Toenails  Duration:   Greater than five years  Onset:  Gradual  Nature:  sore with palpation.  Stable, worsening, improving:   worsening  Aggravating factors:  Pain with shoe gear and ambulation.  Previous Treatment: Unable to trim their own toenails.    New, Established, New Problem: Second new problem  Location: Right first MPJ  Duration: Greater than 5 years  Onset: Insidious  Nature: Sore, achy  Stable, worsening, improving: Worsening  Aggravating factors:  Patient relates pain is aggravated by shoe gear and ambulation.    Previous Treatment: Currently using topical antifungal medication to affected toenails    Patient denies any fevers, chills, nausea, vomiting, shortness of breath, nor any other constitutional signs nor symptoms.       Past Medical History:   Diagnosis Date   • Acid reflux    • Allergic rhinitis    • Anemia, unspecified    • Anxiety    • Arthritis    • Chronic cough    • Closed nondisplaced fracture of distal phalanx of right great toe 01/19/2018    WITH ROUTINE HEALING , SUBSEQUENT ENCOUNTER    • Closed nondisplaced fracture of distal phalanx of right great toe 12/26/2017    INITIAL ENCOUNTER    • Colon polyp 2016?   • Difficulty walking Last year   • Edema of right lower extremity 10/10/2015    DICUSSED CONTINUING ELEVATION OF THE FOOT AND WEARING COMPRESSION STOCKIGS   •  GERD (gastroesophageal reflux disease)    • Globus sensation    • Hemorrhoids    • Hyperlipemia    • Hypertension    • Insomnia, unspecified 10/10/2015    TOLD HER TO TAKE ABLIFY DURING THE MORNING RATHER THAN IN THE EVENING AND SEE IF THAT HELPS WITH THE INSOMNIA HOWEVER IF IT DOES NOT WILL NEED TO CONSIDER FURTHER TREATMENT.    • Kidney stones    • Major depressive disorder, recurrent (HCC) 10/10/2015    SEVERE WITHOUT PSYCHOTIC FEATURES   • Migraine headache    • Night sweats    • Primary osteoarthritis of both knees 10/10/2015   • Primary osteoarthritis of left knee 12/26/2017   • Primary osteoarthritis of left shoulder 12/12/2017   • Seasonal allergies    • SOB (shortness of breath)    • Stress fracture    • Toe pain, right     great toe   • Urinary tract infection    • Vitamin D deficiency 01/07/2015     Past Surgical History:   Procedure Laterality Date   • ADENOIDECTOMY  1970   • BACK SURGERY      LUMBAR   • COLONOSCOPY  2011   • HEMORRHOIDECTOMY     • HYSTERECTOMY  2018?   • JOINT REPLACEMENT  2020   • KIDNEY SURGERY      KIDNEY STONE SURGERY UNSPECIFIED    • KNEE SURGERY      ARTHROSCOPIC KNEE SURGERY   • OTHER SURGICAL HISTORY Right 08/01/2000    ARM ORIF BY DR. BECERRA   • OTHER SURGICAL HISTORY      ARTIFICAL JOINTS/LIMBS   • OTHER SURGICAL HISTORY      JOINT SURGERY   • SPINE SURGERY  ?   • TIBIA FRACTURE SURGERY  09/11/2013    LEFT TIBIAL ORIF BY DR. PÉREZ   • TONSILLECTOMY     • TOTAL HIP ARTHROPLASTY Right 12/16/2013        • URETEROSCOPY LASER LITHOTRIPSY WITH STENT INSERTION Left 10/15/2021    Procedure: Cystoscopy with left ureteroscopy with laser and left ureteral stent placement;  Surgeon: Reuben Yuan MD;  Location: [facility] OR;  Service: Urology;  Laterality: Left;     Family History   Problem Relation Age of Onset   • Skin cancer Mother    • Hypertension Mother    • Cancer Mother    • Kidney cancer Father    • Diabetes Sister    • Vision loss Paternal Grandfather    • Drug  abuse Son    • Mental illness Sister    • Mental illness Sister    • Cancer Sister    • Mental illness Sister    • Cancer Sister    • Mental illness Brother      Social History     Socioeconomic History   • Marital status:    Tobacco Use   • Smoking status: Never Smoker   • Smokeless tobacco: Never Used   Vaping Use   • Vaping Use: Never used   Substance and Sexual Activity   • Alcohol use: Never     Comment: CURRENT SOME DAY    • Drug use: Never   • Sexual activity: Not Currently     Partners: Male     Birth control/protection: None     Allergies   Allergen Reactions   • Nabumetone Itching   • Celebrex [Celecoxib] Other (See Comments)     Eye problem   • Hydrocodone Bit-Homatrop Mbr Itching   • Lamotrigine Hives   • Propoxyphene Unknown - Low Severity     Darvocet  Other reaction(s): Rash to arms, torso   • Terbinafine Rash     Current Outpatient Medications   Medication Sig Dispense Refill   • acetaminophen (Tylenol) 325 MG tablet Take 2 tablets by mouth Every 6 (Six) Hours As Needed for Mild Pain . 90 tablet 1   • cetirizine (zyrTEC) 10 MG tablet Take 1 tablet by mouth Daily. 90 tablet 1   • chlorthalidone (HYGROTON) 25 MG tablet Take 1 tablet by mouth Daily. 90 tablet 1   • ciclopirox (LOPROX) 0.77 % gel Apply 1 application topically to the appropriate area as directed 2 (Two) Times a Day. 30 g 1   • desvenlafaxine (PRISTIQ) 50 MG 24 hr tablet Take 1 tablet by mouth Daily. 90 tablet 1   • diclofenac (VOLTAREN) 75 MG EC tablet Take 1 tablet by mouth 2 (Two) Times a Day. 60 tablet 1   • Diclofenac Sodium (VOLTAREN) 1 % gel gel Apply  topically to the appropriate area as directed 2 (Two) Times a Day. 100 g 2   • fluticasone (FLONASE) 50 MCG/ACT nasal spray 2 sprays into the nostril(s) as directed by provider Daily. 16 g 1   • hydrOXYzine (ATARAX) 25 MG tablet Take 1 tablet by mouth Daily. 90 tablet 1   • omeprazole (priLOSEC) 40 MG capsule Take 1 capsule by mouth Daily. 90 capsule 1   • ondansetron ODT  (Zofran ODT) 4 MG disintegrating tablet Place 1 tablet on the tongue Every 8 (Eight) Hours As Needed for Nausea or Vomiting. 30 tablet 1   • penciclovir (Denavir) 1 % cream Apply  topically to the appropriate area as directed 2 (Two) Times a Day. 1.5 g 2   • rizatriptan (MAXALT) 10 MG tablet Take 1 tablet by mouth 1 (One) Time As Needed for Migraine for up to 1 dose. May repeat in 2 hours if needed 9 tablet 2   • valACYclovir (VALTREX) 500 MG tablet Take 1 tablet by mouth Daily. 90 tablet 1     Current Facility-Administered Medications   Medication Dose Route Frequency Provider Last Rate Last Admin   • dexamethasone sodium phosphate injection 2 mg  2 mg Intramuscular Once Jacob Haskins DPM       • lidocaine (XYLOCAINE) 1 % injection 0.5 mL  0.5 mL Infiltration Once Jacob Haskins DPM         Review of Systems   Constitutional: Negative.    Musculoskeletal:        Painful right first MPJ   Skin:        Painful toenails.   All other systems reviewed and are negative.      OBJECTIVE     Vitals:    07/25/22 1504   BP: 134/65   Pulse: 82   Temp: 97.5 °F (36.4 °C)   SpO2: 99%       Patient seen in no apparent distress.      PHYSICAL EXAM:     Foot/Ankle Exam:       General:   Appearance: obesity    Orientation: AAOx3    Affect: appropriate    Gait: unimpaired    Shoe Gear:  Casual shoes    VASCULAR      Right Foot Vascularity   Normal vascular exam    Dorsalis pedis:  2+  Posterior tibial:  2+  Skin Temperature: warm    Edema Grading:  None  CFT:  < 3 seconds  Pedal Hair Growth:  Absent  Varicosities: mild varicosities       Left Foot Vascularity   Normal vascular exam    Dorsalis pedis:  2+  Posterior tibial:  2+  Skin Temperature: warm    Edema Grading:  None  CFT:  < 3 seconds  Pedal Hair Growth:  Absent  Varicosities: mild varicosities        NEUROLOGIC     Right Foot Neurologic   Normal sensation    Light touch sensation:  Normal  Vibratory sensation:  Normal  Hot/Cold sensation: normal     Protective Sensation using [place] Monofilament:  10     Left Foot Neurologic   Normal sensation    Light touch sensation:  Normal  Vibratory sensation:  Normal  Hot/cold sensation: normal    Protective Sensation using [place] Monofilament:  10     MUSCULOSKELETAL      Right Foot Musculoskeletal   Hallux limitus: Yes       MUSCLE STRENGTH     Right Foot Muscle Strength   Foot dorsiflexion:  4  Foot plantar flexion:  4  Foot inversion:  4  Foot eversion:  4     Left Foot Muscle Strength   Foot dorsiflexion:  4  Foot plantar flexion:  4  Foot inversion:  4  Foot eversion:  4     RANGE OF MOTION      Right Foot Range of Motion   Foot and ankle ROM within normal limits    first MTP active extension pain      first MTP active flexion pain         Left Foot Range of Motion   Foot and ankle ROM within normal limits       DERMATOLOGIC     Right Foot Dermatologic   Skin: skin intact    Nails: onychomycosis, abnormally thick, subungual debris, dystrophic nails and ingrown toenail    Nails comment:  Toenail 1     Left Foot Dermatologic   Skin: skin intact    Nails: onychomycosis, abnormally thick, subungual debris, dystrophic nails and ingrown toenail    Nails comment:  Toenail 1      ASSESSMENT/PLAN     Diagnoses and all orders for this visit:    1. Foot pain, bilateral (Primary)    2. Onychomycosis    3. Onychocryptosis    4. Hallux limitus of right foot  -     dexamethasone sodium phosphate injection 2 mg  -     lidocaine (XYLOCAINE) 1 % injection 0.5 mL    5. Foot pain, right        Comprehensive lower extremity examination and evaluation was performed.    Discussed findings and treatment plan including risks, benefits, and treatment options with patient in detail. Patient agreed with treatment plan.    Toenails 1 on Right and 1 on Left were debrided with nail nippers then filed with a Janet nail john.  Patient tolerated procedure well without complications.    Injection  Date/Time:  07/25/2022  Performed by: Jacob Haskins DPM  Authorized by: Jacob Haskins DPM     Consent: Verbal consent obtained. Written consent obtained.  Risks and benefits: risks, benefits and alternatives were discussed  Consent given by: patient  Patient identity confirmed: verbally with patient  Indications: pain relief    Betadine prep x 3 to the planned injection site.    Injection site: Right first MPJ    Sedation:  Patient sedated: no    Patient position: sitting  Needle size: 27 G  Local anesthetic: 01.0 ml 1% Lidocaine plain and 1.0 ml Decadron 4 mg/mL.   Outcome: pain improved  Patient tolerance: Patient tolerated the procedure well with no immediate complications      An After Visit Summary was printed and given to the patient at discharge, including (if requested) any available informative/educational handouts regarding diagnosis, treatment, or medications. All questions were answered to patient/family satisfaction. Should symptoms fail to improve or worsen they agree to call or return to clinic or to go to the Emergency Department. Discussed the importance of following up with any needed screening tests/labs/specialist appointments and any requested follow-up recommended by me today. Importance of maintaining follow-up discussed and patient accepts that missed appointments can delay diagnosis and potentially lead to worsening of conditions.    Return in about 9 weeks (around 9/26/2022) for Toenail Care., or sooner if acute issues arise.    This document has been electronically signed by Jacob Haskins DPM on July 25, 2022 15:52 EDT

## 2022-08-18 DIAGNOSIS — M65.4 DE QUERVAIN'S TENOSYNOVITIS, LEFT: ICD-10-CM

## 2022-08-18 RX ORDER — DICLOFENAC SODIUM 75 MG/1
75 TABLET, DELAYED RELEASE ORAL 2 TIMES DAILY
Qty: 60 TABLET | Refills: 1 | Status: SHIPPED | OUTPATIENT
Start: 2022-08-18 | End: 2022-10-27 | Stop reason: SDUPTHER

## 2022-08-18 NOTE — TELEPHONE ENCOUNTER
Caller: Casandra Arango    Relationship: Self    Best call back number: [phone]    Requested Prescriptions:   Requested Prescriptions     Pending Prescriptions Disp Refills   • diclofenac (VOLTAREN) 75 MG EC tablet 60 tablet 1     Sig: Take 1 tablet by mouth 2 (Two) Times a Day.        Pharmacy where request should be sent:  [facility]    Does the patient have less than a 3 day supply:  [] Yes  [x] No    Satya Berger Rep   08/18/22 11:06 EDT

## 2022-09-21 ENCOUNTER — HOSPITAL ENCOUNTER (OUTPATIENT)
Dept: BONE DENSITY | Facility: HOSPITAL | Age: 67
Discharge: HOME | End: 2022-09-21

## 2022-09-21 ENCOUNTER — HOSPITAL ENCOUNTER (OUTPATIENT)
Dept: MAMMOGRAPHY | Facility: HOSPITAL | Age: 67
Discharge: HOME | End: 2022-09-21

## 2022-09-21 DIAGNOSIS — Z78.0 POSTMENOPAUSE: ICD-10-CM

## 2022-09-21 DIAGNOSIS — Z12.31 ENCOUNTER FOR SCREENING MAMMOGRAM FOR BREAST CANCER: ICD-10-CM

## 2022-09-21 PROCEDURE — 77067 SCR MAMMO BI INCL CAD: CPT

## 2022-09-21 PROCEDURE — 77063 BREAST TOMOSYNTHESIS BI: CPT

## 2022-09-21 PROCEDURE — 77080 DXA BONE DENSITY AXIAL: CPT

## 2022-09-23 DIAGNOSIS — R92.8 ABNORMAL MAMMOGRAM: Primary | ICD-10-CM

## 2022-10-13 ENCOUNTER — HOSPITAL ENCOUNTER (OUTPATIENT)
Dept: MAMMOGRAPHY | Facility: HOSPITAL | Age: 67
Discharge: HOME | End: 2022-10-13

## 2022-10-13 ENCOUNTER — HOSPITAL ENCOUNTER (OUTPATIENT)
Dept: ULTRASOUND IMAGING | Facility: HOSPITAL | Age: 67
Discharge: HOME | End: 2022-10-13

## 2022-10-13 DIAGNOSIS — R92.8 ABNORMAL MAMMOGRAM: ICD-10-CM

## 2022-10-13 PROCEDURE — 76642 ULTRASOUND BREAST LIMITED: CPT

## 2022-10-13 PROCEDURE — 77065 DX MAMMO INCL CAD UNI: CPT

## 2022-10-13 PROCEDURE — G0279 TOMOSYNTHESIS, MAMMO: HCPCS

## 2022-10-15 DIAGNOSIS — N64.9 BREAST LESION: Primary | ICD-10-CM

## 2022-10-15 DIAGNOSIS — R92.1 MAMMOGRAPHIC CALCIFICATION FOUND ON DIAGNOSTIC IMAGING OF BREAST: ICD-10-CM

## 2022-10-15 NOTE — PROGRESS NOTES
Please ensure this gets scheduled; I know she has a diagnostic mammo that says repeat in 6 months, but with her family hx of breast cancer, we decided to move forward with the biopsy

## 2022-10-17 ENCOUNTER — OFFICE VISIT (OUTPATIENT)
Dept: PODIATRY | Facility: CLINIC | Age: 67
End: 2022-10-17

## 2022-10-17 VITALS
SYSTOLIC BLOOD PRESSURE: 144 MMHG | WEIGHT: 220 LBS | HEART RATE: 57 BPM | BODY MASS INDEX: 33.45 KG/M2 | TEMPERATURE: 95.3 F | DIASTOLIC BLOOD PRESSURE: 68 MMHG | OXYGEN SATURATION: 98 %

## 2022-10-17 DIAGNOSIS — B35.1 ONYCHOMYCOSIS: ICD-10-CM

## 2022-10-17 DIAGNOSIS — L60.0 ONYCHOCRYPTOSIS: ICD-10-CM

## 2022-10-17 DIAGNOSIS — M79.672 FOOT PAIN, BILATERAL: Primary | ICD-10-CM

## 2022-10-17 DIAGNOSIS — M79.671 FOOT PAIN, BILATERAL: Primary | ICD-10-CM

## 2022-10-17 PROCEDURE — 11720 DEBRIDE NAIL 1-5: CPT | Performed by: PODIATRIST

## 2022-10-17 NOTE — PROGRESS NOTES
[facility] - PODIATRY    Today's Date: 10/17/22    Patient Name: Casandra Arango  MRN: [id number]  [facility]: [id number]  PCP: Teresa Herman MD, Last PCP Visit:  7/6/2022  Referring Provider: No ref. provider found    SUBJECTIVE     Chief Complaint   Patient presents with   • Left Foot - Nail Problem, Follow-up   • Right Foot - Follow-up, Nail Problem, Pain     Right foot big toe arthritis pain, with a score of 9-10 at night. Keeps her up at night.      HPI: Casandra Arango, a 66 y.o.female, comes to clinic.    New, Established, New Problem:  est  Location:  Toenails  Duration:   Greater than five years  Onset:  Gradual  Nature:  sore with palpation.  Stable, worsening, improving:   improving  Aggravating factors:  Pain with shoe gear and ambulation.  Previous Treatment: debridement    No recent medical changes.    Patient denies any fevers, chills, nausea, vomiting, shortness of breath, nor any other constitutional signs nor symptoms.       Past Medical History:   Diagnosis Date   • Acid reflux    • Allergic rhinitis    • Anemia, unspecified    • Anxiety    • Arthritis    • Chronic cough    • Closed nondisplaced fracture of distal phalanx of right great toe 01/19/2018    WITH ROUTINE HEALING , SUBSEQUENT ENCOUNTER    • Closed nondisplaced fracture of distal phalanx of right great toe 12/26/2017    INITIAL ENCOUNTER    • Colon polyp 2016?   • Difficulty walking Last year   • Edema of right lower extremity 10/10/2015    DICUSSED CONTINUING ELEVATION OF THE FOOT AND WEARING COMPRESSION STOCKIGS   • GERD (gastroesophageal reflux disease)    • Globus sensation    • Hemorrhoids    • Hyperlipemia    • Hypertension    • Insomnia, unspecified 10/10/2015    TOLD HER TO TAKE ABLIFY DURING THE MORNING RATHER THAN IN THE EVENING AND SEE IF THAT HELPS WITH THE INSOMNIA HOWEVER IF IT DOES NOT WILL NEED TO CONSIDER FURTHER TREATMENT.    • Kidney stones    • Major depressive disorder, recurrent (HCC)  10/10/2015    SEVERE WITHOUT PSYCHOTIC FEATURES   • Migraine headache    • Night sweats    • Primary osteoarthritis of both knees 10/10/2015   • Primary osteoarthritis of left knee 12/26/2017   • Primary osteoarthritis of left shoulder 12/12/2017   • Seasonal allergies    • SOB (shortness of breath)    • Stress fracture    • Toe pain, right     great toe   • Urinary tract infection    • Vitamin D deficiency 01/07/2015     Past Surgical History:   Procedure Laterality Date   • ADENOIDECTOMY  1970   • BACK SURGERY      LUMBAR   • COLONOSCOPY  2011   • HEMORRHOIDECTOMY     • HYSTERECTOMY  2018?   • JOINT REPLACEMENT  2020   • KIDNEY SURGERY      KIDNEY STONE SURGERY UNSPECIFIED    • KNEE SURGERY      ARTHROSCOPIC KNEE SURGERY   • OTHER SURGICAL HISTORY Right 08/01/2000    ARM ORIF BY DR. BECERRA   • OTHER SURGICAL HISTORY      ARTIFICAL JOINTS/LIMBS   • OTHER SURGICAL HISTORY      JOINT SURGERY   • SPINE SURGERY  ?   • TIBIA FRACTURE SURGERY  09/11/2013    LEFT TIBIAL ORIF BY DR. PÉREZ   • TONSILLECTOMY     • TOTAL HIP ARTHROPLASTY Right 12/16/2013        • URETEROSCOPY LASER LITHOTRIPSY WITH STENT INSERTION Left 10/15/2021    Procedure: Cystoscopy with left ureteroscopy with laser and left ureteral stent placement;  Surgeon: Reuben Yuan MD;  Location: [facility], [place];  Service: Urology;  Laterality: Left;     Family History   Problem Relation Age of Onset   • Skin cancer Mother    • Hypertension Mother    • Cancer Mother    • Kidney cancer Father    • Diabetes Sister    • Vision loss Paternal Grandfather    • Drug abuse Son    • Mental illness Sister    • Mental illness Sister    • Cancer Sister    • Mental illness Sister    • Cancer Sister    • Mental illness Brother      Social History     Socioeconomic History   • Marital status:    Tobacco Use   • Smoking status: Never   • Smokeless tobacco: Never   Vaping Use   • Vaping Use: Never used   Substance and Sexual Activity   • Alcohol use: Never      Comment: CURRENT SOME DAY    • Drug use: Never   • Sexual activity: Not Currently     Partners: Male     Birth control/protection: None     Allergies   Allergen Reactions   • Nabumetone Itching   • Celebrex [Celecoxib] Other (See Comments)     Eye problem   • Hydrocodone Bit-Homatrop Mbr Itching   • Lamotrigine Hives   • Propoxyphene Unknown - Low Severity     Darvocet  Other reaction(s): Rash to arms, torso   • Terbinafine Rash     Current Outpatient Medications   Medication Sig Dispense Refill   • acetaminophen (Tylenol) 325 MG tablet Take 2 tablets by mouth Every 6 (Six) Hours As Needed for Mild Pain . 90 tablet 1   • cetirizine (zyrTEC) 10 MG tablet Take 1 tablet by mouth Daily. 90 tablet 1   • chlorthalidone (HYGROTON) 25 MG tablet Take 1 tablet by mouth Daily. 90 tablet 1   • ciclopirox (LOPROX) 0.77 % gel Apply 1 application topically to the appropriate area as directed 2 (Two) Times a Day. 30 g 1   • desvenlafaxine (PRISTIQ) 50 MG 24 hr tablet Take 1 tablet by mouth Daily. 90 tablet 1   • diclofenac (VOLTAREN) 75 MG EC tablet Take 1 tablet by mouth 2 (Two) Times a Day. 60 tablet 1   • Diclofenac Sodium (VOLTAREN) 1 % gel gel Apply  topically to the appropriate area as directed 2 (Two) Times a Day. 100 g 2   • fluticasone (FLONASE) 50 MCG/ACT nasal spray 2 sprays into the nostril(s) as directed by provider Daily. 16 g 1   • hydrOXYzine (ATARAX) 25 MG tablet Take 1 tablet by mouth Daily. 90 tablet 1   • omeprazole (priLOSEC) 40 MG capsule Take 1 capsule by mouth Daily. 90 capsule 1   • ondansetron ODT (Zofran ODT) 4 MG disintegrating tablet Place 1 tablet on the tongue Every 8 (Eight) Hours As Needed for Nausea or Vomiting. 30 tablet 1   • penciclovir (Denavir) 1 % cream Apply  topically to the appropriate area as directed 2 (Two) Times a Day. 1.5 g 2   • rizatriptan (MAXALT) 10 MG tablet Take 1 tablet by mouth 1 (One) Time As Needed for Migraine for up to 1 dose. May repeat in 2 hours if needed 9 tablet 2    • valACYclovir (VALTREX) 500 MG tablet Take 1 tablet by mouth Daily. 90 tablet 1     No current facility-administered medications for this visit.     Review of Systems   Constitutional: Negative.    Skin:        Painful toenails.   All other systems reviewed and are negative.      OBJECTIVE     Vitals:    10/17/22 1445   BP: 144/68   Pulse: 57   Temp: 95.3 °F (35.2 °C)   SpO2: 98%       Patient seen in no apparent distress.      PHYSICAL EXAM:     Foot/Ankle Exam:       General:   Appearance: obesity    Orientation: AAOx3    Affect: appropriate    Gait: unimpaired    Shoe Gear:  Casual shoes    VASCULAR      Right Foot Vascularity   Normal vascular exam    Dorsalis pedis:  2+  Posterior tibial:  2+  Skin Temperature: warm    Edema Grading:  None  CFT:  < 3 seconds  Pedal Hair Growth:  Absent  Varicosities: mild varicosities       Left Foot Vascularity   Normal vascular exam    Dorsalis pedis:  2+  Posterior tibial:  2+  Skin Temperature: warm    Edema Grading:  None  CFT:  < 3 seconds  Pedal Hair Growth:  Absent  Varicosities: mild varicosities        NEUROLOGIC     Right Foot Neurologic   Normal sensation    Light touch sensation:  Normal  Vibratory sensation:  Normal  Hot/Cold sensation: normal    Protective Sensation using [place] Monofilament:  10     Left Foot Neurologic   Normal sensation    Light touch sensation:  Normal  Vibratory sensation:  Normal  Hot/cold sensation: normal    Protective Sensation using [place] Monofilament:  10     MUSCULOSKELETAL      Right Foot Musculoskeletal   Hallux limitus: Yes       MUSCLE STRENGTH     Right Foot Muscle Strength   Foot dorsiflexion:  4  Foot plantar flexion:  4  Foot inversion:  4  Foot eversion:  4     Left Foot Muscle Strength   Foot dorsiflexion:  4  Foot plantar flexion:  4  Foot inversion:  4  Foot eversion:  4     RANGE OF MOTION      Right Foot Range of Motion   Foot and ankle ROM within normal limits       Left Foot Range of Motion    Foot and ankle ROM within normal limits       DERMATOLOGIC     Right Foot Dermatologic   Skin: skin intact    Nails: onychomycosis, abnormally thick, subungual debris, dystrophic nails and ingrown toenail    Nails comment:  Toenail 1     Left Foot Dermatologic   Skin: skin intact    Nails: onychomycosis, abnormally thick, subungual debris, dystrophic nails and ingrown toenail    Nails comment:  Toenail 1      ASSESSMENT/PLAN     Diagnoses and all orders for this visit:    1. Foot pain, bilateral (Primary)    2. Onychomycosis    3. Onychocryptosis        Comprehensive lower extremity examination and evaluation was performed.    Discussed findings and treatment plan including risks, benefits, and treatment options with patient in detail. Patient agreed with treatment plan.    Toenails 1 on Right and 1 on Left were debrided with nail nippers then filed with a Dremel nail jhon.  Patient tolerated procedure well without complications.    An After Visit Summary was printed and given to the patient at discharge, including (if requested) any available informative/educational handouts regarding diagnosis, treatment, or medications. All questions were answered to patient/family satisfaction. Should symptoms fail to improve or worsen they agree to call or return to clinic or to go to the Emergency Department. Discussed the importance of following up with any needed screening tests/labs/specialist appointments and any requested follow-up recommended by me today. Importance of maintaining follow-up discussed and patient accepts that missed appointments can delay diagnosis and potentially lead to worsening of conditions.    Return in about 9 weeks (around 12/19/2022) for Toenail Care., or sooner if acute issues arise.    This document has been electronically signed by Jacob Haskins DPM on October 17, 2022 15:13 EDT

## 2022-10-25 DIAGNOSIS — R92.8 ABNORMAL MAMMOGRAM: Primary | ICD-10-CM

## 2022-10-27 ENCOUNTER — PATIENT MESSAGE (OUTPATIENT)
Dept: INTERNAL MEDICINE | Facility: CLINIC | Age: 67
End: 2022-10-27

## 2022-10-27 DIAGNOSIS — M65.4 DE QUERVAIN'S TENOSYNOVITIS, LEFT: ICD-10-CM

## 2022-10-27 DIAGNOSIS — M47.817 LUMBOSACRAL SPONDYLOSIS WITHOUT MYELOPATHY: Primary | ICD-10-CM

## 2022-10-27 DIAGNOSIS — M54.10 RADICULOPATHY, UNSPECIFIED SPINAL REGION: ICD-10-CM

## 2022-10-27 RX ORDER — DICLOFENAC SODIUM 75 MG/1
75 TABLET, DELAYED RELEASE ORAL 2 TIMES DAILY
Qty: 180 TABLET | Refills: 0 | Status: SHIPPED | OUTPATIENT
Start: 2022-10-27 | End: 2023-01-06 | Stop reason: SDUPTHER

## 2022-11-05 NOTE — TELEPHONE ENCOUNTER
From: Casandra Arango  To: Teresa Herman MD  Sent: 10/27/2022 9:41 AM EDT  Subject: Referral for back pain    I saw Dr Solorio for lower back pain, sciatica pain maybe five + years ago and he recommended surgery but suggested I have knee replacements done first. He told me to wait until I absolutely had to do the back surgery and referred me to Dr Meraz at [facility] for injections in my back. I was doing those, with good results. Sciatica pain gone but still have lower back pain. I stopped when I had my last knee replacement. Should I get another X-ray and referral to make sure the injections will help?

## 2022-11-07 ENCOUNTER — OFFICE VISIT (OUTPATIENT)
Dept: PODIATRY | Facility: CLINIC | Age: 67
End: 2022-11-07

## 2022-11-07 VITALS
OXYGEN SATURATION: 100 % | WEIGHT: 217 LBS | SYSTOLIC BLOOD PRESSURE: 122 MMHG | BODY MASS INDEX: 32.99 KG/M2 | HEART RATE: 57 BPM | TEMPERATURE: 97.5 F | DIASTOLIC BLOOD PRESSURE: 68 MMHG

## 2022-11-07 DIAGNOSIS — M79.671 FOOT PAIN, RIGHT: ICD-10-CM

## 2022-11-07 DIAGNOSIS — M20.5X1 HALLUX LIMITUS OF RIGHT FOOT: Primary | ICD-10-CM

## 2022-11-07 PROCEDURE — 99213 OFFICE O/P EST LOW 20 MIN: CPT | Performed by: PODIATRIST

## 2022-11-07 PROCEDURE — 20605 DRAIN/INJ JOINT/BURSA W/O US: CPT | Performed by: PODIATRIST

## 2022-11-07 RX ORDER — DEXAMETHASONE SODIUM PHOSPHATE 4 MG/ML
2 INJECTION, SOLUTION INTRA-ARTICULAR; INTRALESIONAL; INTRAMUSCULAR; INTRAVENOUS; SOFT TISSUE ONCE
Status: COMPLETED | OUTPATIENT
Start: 2022-11-07 | End: 2022-11-07

## 2022-11-07 RX ORDER — LIDOCAINE HYDROCHLORIDE 10 MG/ML
0.5 INJECTION, SOLUTION INFILTRATION; PERINEURAL ONCE
Status: DISCONTINUED | OUTPATIENT
Start: 2022-11-07 | End: 2022-11-07

## 2022-11-07 RX ORDER — ROPIVACAINE HYDROCHLORIDE 5 MG/ML
0.5 INJECTION, SOLUTION EPIDURAL; INFILTRATION; PERINEURAL ONCE
Status: COMPLETED | OUTPATIENT
Start: 2022-11-07 | End: 2022-11-07

## 2022-11-07 RX ADMIN — ROPIVACAINE HYDROCHLORIDE 0.5 ML: 5 INJECTION, SOLUTION EPIDURAL; INFILTRATION; PERINEURAL at 13:08

## 2022-11-07 RX ADMIN — DEXAMETHASONE SODIUM PHOSPHATE 2 MG: 4 INJECTION, SOLUTION INTRA-ARTICULAR; INTRALESIONAL; INTRAMUSCULAR; INTRAVENOUS; SOFT TISSUE at 12:42

## 2022-11-07 NOTE — PROGRESS NOTES
[facility] - PODIATRY    Today's Date: 11/07/22    Patient Name: Casandra Arango  MRN: [id number]  CSN: [id number]  PCP: Teresa Herman MD  Referring Provider: No ref. provider found    SUBJECTIVE     Chief Complaint   Patient presents with   • Right Foot - Pain     R-1 Cortisone Injection      HPI: Casandra Arango, a 67 y.o.female, presents to clinic.    New, Established, New Problem:  New problem    Location:  Right 1st MPJ    Onset:  incidious    Nature:  Sore, achy    Stable, worsening, improving:  worsening    Aggravating factors:  Patient relates pain is aggravated by shoe gear and ambulation.      Previous Treatment:  Cortisone injection    Patient denies any fevers, chills, nausea, vomiting, shortness of breath, nor any other constitutional signs nor symptoms.    No other pedal complaints at this time.    Recent medical changes:  none    Past Medical History:   Diagnosis Date   • Acid reflux    • Allergic rhinitis    • Anemia, unspecified    • Anxiety    • Arthritis    • Chronic cough    • Closed nondisplaced fracture of distal phalanx of right great toe 01/19/2018    WITH ROUTINE HEALING , SUBSEQUENT ENCOUNTER    • Closed nondisplaced fracture of distal phalanx of right great toe 12/26/2017    INITIAL ENCOUNTER    • Colon polyp 2016?   • Difficulty walking Last year   • Edema of right lower extremity 10/10/2015    DICUSSED CONTINUING ELEVATION OF THE FOOT AND WEARING COMPRESSION STOCKIGS   • GERD (gastroesophageal reflux disease)    • Globus sensation    • Hemorrhoids    • Hyperlipemia    • Hypertension    • Insomnia, unspecified 10/10/2015    TOLD HER TO TAKE ABLIFY DURING THE MORNING RATHER THAN IN THE EVENING AND SEE IF THAT HELPS WITH THE INSOMNIA HOWEVER IF IT DOES NOT WILL NEED TO CONSIDER FURTHER TREATMENT.    • Kidney stones    • Major depressive disorder, recurrent (HCC) 10/10/2015    SEVERE WITHOUT PSYCHOTIC FEATURES   • Migraine headache    • Night sweats    •  Primary osteoarthritis of both knees 10/10/2015   • Primary osteoarthritis of left knee 12/26/2017   • Primary osteoarthritis of left shoulder 12/12/2017   • Seasonal allergies    • SOB (shortness of breath)    • Stress fracture    • Toe pain, right     great toe   • Urinary tract infection    • Vitamin D deficiency 01/07/2015     Past Surgical History:   Procedure Laterality Date   • ADENOIDECTOMY  1970   • BACK SURGERY      LUMBAR   • COLONOSCOPY  2011   • HEMORRHOIDECTOMY     • HYSTERECTOMY  2018?   • JOINT REPLACEMENT  2020   • KIDNEY SURGERY      KIDNEY STONE SURGERY UNSPECIFIED    • KNEE SURGERY      ARTHROSCOPIC KNEE SURGERY   • OTHER SURGICAL HISTORY Right 08/01/2000    ARM ORIF BY DR. BECERRA   • OTHER SURGICAL HISTORY      ARTIFICAL JOINTS/LIMBS   • OTHER SURGICAL HISTORY      JOINT SURGERY   • SPINE SURGERY  ?   • TIBIA FRACTURE SURGERY  09/11/2013    LEFT TIBIAL ORIF BY DR. PÉREZ   • TONSILLECTOMY     • TOTAL HIP ARTHROPLASTY Right 12/16/2013        • URETEROSCOPY LASER LITHOTRIPSY WITH STENT INSERTION Left 10/15/2021    Procedure: Cystoscopy with left ureteroscopy with laser and left ureteral stent placement;  Surgeon: Reuben Yuan MD;  Location: [facility];  Service: Urology;  Laterality: Left;     Family History   Problem Relation Age of Onset   • Skin cancer Mother    • Hypertension Mother    • Cancer Mother    • Kidney cancer Father    • Diabetes Sister    • Vision loss Paternal Grandfather    • Drug abuse Son    • Mental illness Sister    • Mental illness Sister    • Cancer Sister    • Mental illness Sister    • Cancer Sister    • Mental illness Brother      Social History     Socioeconomic History   • Marital status:    Tobacco Use   • Smoking status: Never   • Smokeless tobacco: Never   Vaping Use   • Vaping Use: Never used   Substance and Sexual Activity   • Alcohol use: Never     Comment: CURRENT SOME DAY    • Drug use: Never   • Sexual activity: Not Currently      Partners: Male     Birth control/protection: None     Allergies   Allergen Reactions   • Nabumetone Itching   • Celebrex [Celecoxib] Other (See Comments)     Eye problem   • Hydrocodone Bit-Homatrop Mbr Itching   • Lamotrigine Hives   • Propoxyphene Unknown - Low Severity     Darvocet  Other reaction(s): Rash to arms, torso   • Terbinafine Rash     Current Outpatient Medications   Medication Sig Dispense Refill   • acetaminophen (Tylenol) 325 MG tablet Take 2 tablets by mouth Every 6 (Six) Hours As Needed for Mild Pain . 90 tablet 1   • cetirizine (zyrTEC) 10 MG tablet Take 1 tablet by mouth Daily. 90 tablet 1   • chlorthalidone (HYGROTON) 25 MG tablet Take 1 tablet by mouth Daily. 90 tablet 1   • ciclopirox (LOPROX) 0.77 % gel Apply 1 application topically to the appropriate area as directed 2 (Two) Times a Day. 30 g 1   • desvenlafaxine (PRISTIQ) 50 MG 24 hr tablet Take 1 tablet by mouth Daily. 90 tablet 1   • diclofenac (VOLTAREN) 75 MG EC tablet Take 1 tablet by mouth 2 (Two) Times a Day for 90 days. 180 tablet 0   • Diclofenac Sodium (VOLTAREN) 1 % gel gel Apply  topically to the appropriate area as directed 2 (Two) Times a Day. 100 g 2   • fluticasone (FLONASE) 50 MCG/ACT nasal spray 2 sprays into the nostril(s) as directed by provider Daily. 16 g 1   • hydrOXYzine (ATARAX) 25 MG tablet Take 1 tablet by mouth Daily. 90 tablet 1   • omeprazole (priLOSEC) 40 MG capsule Take 1 capsule by mouth Daily. 90 capsule 1   • ondansetron ODT (Zofran ODT) 4 MG disintegrating tablet Place 1 tablet on the tongue Every 8 (Eight) Hours As Needed for Nausea or Vomiting. 30 tablet 1   • penciclovir (Denavir) 1 % cream Apply  topically to the appropriate area as directed 2 (Two) Times a Day. 1.5 g 2   • rizatriptan (MAXALT) 10 MG tablet Take 1 tablet by mouth 1 (One) Time As Needed for Migraine for up to 1 dose. May repeat in 2 hours if needed 9 tablet 2   • valACYclovir (VALTREX) 500 MG tablet Take 1 tablet by mouth Daily. 90  tablet 1     Current Facility-Administered Medications   Medication Dose Route Frequency Provider Last Rate Last Admin   • lidocaine (XYLOCAINE) 1 % injection 0.5 mL  0.5 mL Infiltration Once Jacob Haskins DPM       • ropivacaine (NAROPIN) 0.5 % injection 0.5 mL  0.5 mL Injection Once Jacob Haskins DPM         Review of Systems   Constitutional: Negative.    Musculoskeletal:        Right 1st MPJ   All other systems reviewed and are negative.      OBJECTIVE     Vitals:    11/07/22 1112   BP: 122/68   Pulse: 57   Temp: 97.5 °F (36.4 °C)   SpO2: 100%       WBC   Date Value Ref Range Status   02/21/2022 8.76 3.40 - 10.80 10*3/mm3 Final   03/07/2018 13.19 (H) 4.5 - 11.0 10*3/uL Final     RBC   Date Value Ref Range Status   02/21/2022 4.94 3.77 - 5.28 10*6/mm3 Final   03/07/2018 4.20 4.0 - 5.2 10*6/uL Final     Hemoglobin   Date Value Ref Range Status   02/21/2022 13.3 12.0 - 15.9 g/dL Final   03/07/2018 12.0 12.0 - 16.0 g/dL Final     Hematocrit   Date Value Ref Range Status   02/21/2022 40.9 34.0 - 46.6 % Final   03/07/2018 37.8 36.0 - 46.0 % Final     MCV   Date Value Ref Range Status   02/21/2022 82.8 79.0 - 97.0 fL Final   03/07/2018 90.0 80.0 - 100.0 fL Final     MCH   Date Value Ref Range Status   02/21/2022 26.9 26.6 - 33.0 pg Final   03/07/2018 28.6 26.0 - 34.0 pg Final     MCHC   Date Value Ref Range Status   02/21/2022 32.5 31.5 - 35.7 g/dL Final   03/07/2018 31.7 31.0 - 37.0 g/dL Final     RDW   Date Value Ref Range Status   02/21/2022 13.0 12.3 - 15.4 % Final   03/07/2018 13.8 12.0 - 16.8 % Final     RDW-SD   Date Value Ref Range Status   02/21/2022 38.6 37.0 - 54.0 fl Final     MPV   Date Value Ref Range Status   02/21/2022 9.1 6.0 - 12.0 fL Final   03/07/2018 8.9 6.7 - 10.8 fL Final     Platelets   Date Value Ref Range Status   02/21/2022 466 (H) 140 - 450 10*3/mm3 Final   03/07/2018 404 140 - 440 10*3/uL Final     Neutrophil Rel %   Date Value Ref Range Status   03/07/2018 79.7 45 - 80  % Final     Neutrophil %   Date Value Ref Range Status   11/03/2021 68.1 42.7 - 76.0 % Final     Lymphocyte Rel %   Date Value Ref Range Status   03/07/2018 12.3 (L) 15 - 50 % Final     Lymphocyte %   Date Value Ref Range Status   11/03/2021 18.8 (L) 19.6 - 45.3 % Final     Monocyte Rel %   Date Value Ref Range Status   03/07/2018 7.4 0 - 15 % Final     Monocyte %   Date Value Ref Range Status   11/03/2021 6.4 5.0 - 12.0 % Final     Eosinophil %   Date Value Ref Range Status   11/03/2021 5.2 0.3 - 6.2 % Final   03/07/2018 0.2 0 - 7 % Final     Basophil Rel %   Date Value Ref Range Status   03/07/2018 0.2 0 - 2 % Final     Basophil %   Date Value Ref Range Status   11/03/2021 1.1 0.0 - 1.5 % Final     Immature Grans %   Date Value Ref Range Status   11/03/2021 0.4 0.0 - 0.5 % Final   03/07/2018 0.2 (H) 0 % Final     Neutrophils Absolute   Date Value Ref Range Status   02/21/2022 4.56 1.70 - 7.00 10*3/mm3 Final   03/07/2018 10.51 (H) 2.0 - 8.8 10*3/uL Final     Neutrophils, Absolute   Date Value Ref Range Status   11/03/2021 6.13 1.70 - 7.00 10*3/mm3 Final     Lymphocytes Absolute   Date Value Ref Range Status   03/07/2018 1.62 0.7 - 5.5 10*3/uL Final     Lymphocytes, Absolute   Date Value Ref Range Status   11/03/2021 1.69 0.70 - 3.10 10*3/mm3 Final     Monocytes Absolute   Date Value Ref Range Status   03/07/2018 0.98 0.0 - 1.7 10*3/uL Final     Monocytes, Absolute   Date Value Ref Range Status   11/03/2021 0.58 0.10 - 0.90 10*3/mm3 Final     Eosinophils Absolute   Date Value Ref Range Status   02/21/2022 0.18 0.00 - 0.40 10*3/mm3 Final   03/07/2018 0.03 0.0 - 0.8 10*3/uL Final     Eosinophils, Absolute   Date Value Ref Range Status   11/03/2021 0.47 (H) 0.00 - 0.40 10*3/mm3 Final     Basophils Absolute   Date Value Ref Range Status   02/21/2022 0.09 0.00 - 0.20 10*3/mm3 Final   03/07/2018 0.03 0.0 - 0.2 10*3/uL Final     Basophils, Absolute   Date Value Ref Range Status   11/03/2021 0.10 0.00 - 0.20 10*3/mm3 Final      Immature Grans, Absolute   Date Value Ref Range Status   11/03/2021 0.04 0.00 - 0.05 10*3/mm3 Final   03/07/2018 0.02 <1 10*3/uL Final     nRBC   Date Value Ref Range Status   11/03/2021 0.0 0.0 - 0.2 /100 WBC Final         Lab Results   Component Value Date    GLUCOSE 81 02/21/2022    BUN 27 (H) 02/21/2022    CREATININE 0.97 02/21/2022    EGFRIFNONA 58 (L) 02/21/2022    BCR 27.8 (H) 02/21/2022    K 3.9 02/21/2022    CO2 31.5 (H) 02/21/2022    CALCIUM 10.1 02/21/2022    ALBUMIN 4.60 02/21/2022    LABIL2 1.5 05/04/2021    AST 15 02/21/2022    ALT 14 02/21/2022       Patient seen in no apparent distress.      PHYSICAL EXAM:     Foot/Ankle Exam:       General:   Appearance: appears stated age and healthy    Orientation: AAOx3    Affect: appropriate    Gait: unimpaired    Shoe Gear:  Casual shoes    VASCULAR      Right Foot Vascularity   Normal vascular exam    Dorsalis pedis:  2+  Posterior tibial:  2+  Skin Temperature: warm    Edema Grading:  None  CFT:  < 3 seconds  Pedal Hair Growth:  Present  Varicosities: none       Left Foot Vascularity   Normal vascular exam    Dorsalis pedis:  2+  Posterior tibial:  2+  Skin Temperature: warm    Edema Grading:  None  CFT:  < 3 seconds  Pedal Hair Growth:  Present  Varicosities: none        NEUROLOGIC     Right Foot Neurologic   Normal sensation    Light touch sensation:  Normal  Vibratory sensation:  Normal  Hot/Cold sensation: normal    Protective Sensation using [place] Monofilament:  10     Left Foot Neurologic   Normal sensation    Light touch sensation:  Normal  Vibratory sensation:  Normal  Hot/cold sensation: normal    Protective Sensation using [place] Monofilament:  10     MUSCULOSKELETAL      Right Foot Musculoskeletal   Ecchymosis:  None  Tenderness: great toe metatarsophalangeal joint       MUSCLE STRENGTH     Right Foot Muscle Strength   Foot dorsiflexion:  4  Foot plantar flexion:  4  Foot inversion:  4  Foot eversion:  4     Left Foot  Muscle Strength   Foot dorsiflexion:  4  Foot plantar flexion:  4  Foot inversion:  4  Foot eversion:  4     RANGE OF MOTION      Right Foot Range of Motion   Foot and ankle ROM within normal limits    first MTP active extension pain      first MTP active flexion pain         Left Foot Range of Motion   Foot and ankle ROM within normal limits       DERMATOLOGIC     Right Foot Dermatologic   Skin: skin intact    Nails: normal       Left Foot Dermatologic   Skin: skin intact    Nails: normal        ASSESSMENT/PLAN     Diagnoses and all orders for this visit:    1. Hallux limitus of right foot (Primary)  -     dexamethasone sodium phosphate injection 2 mg  -     ropivacaine (NAROPIN) 0.5 % injection 0.5 mL    2. Foot pain, right        Comprehensive lower extremity examination and evaluation was performed.    Discussed findings and treatment plan including risks, benefits, and treatment options with patient in detail. Patient agreed with treatment plan.    Medications and allergies reviewed.  Reviewed available lab values along with other pertinent labs.  These were discussed with the patient.    Injection  Date/Time: 11/7/2022  Performed by: Dr. Haskins  Authorized by: Dr. Haskins    Consent: Verbal consent obtained. Written consent obtained.  Risks and benefits: risks, benefits and alternatives were discussed  Consent given by: patient  Patient identity confirmed: verbally with patient  Indications: pain relief    Betadine prep x 3 to the planned injection site.    Injection site:  Right 1st MPJ    Sedation:  None  Patient sedated: no    Patient position: sitting  Needle size: 27 G  Local anesthetic: ropivacaine (NAROPIN) 0.5 % injection 0.5 mL plain and 1.0 ml Decadron 4 mg/mL.   Outcome: pain improved  Patient tolerance: Patient tolerated the procedure well with no immediate complications    An After Visit Summary was printed and given to the patient at discharge, including (if requested) any available  informative/educational handouts regarding diagnosis, treatment, or medications. All questions were answered to patient/family satisfaction. Should symptoms fail to improve or worsen they agree to call or return to clinic or to go to the Emergency Department. Discussed the importance of following up with any needed screening tests/labs/specialist appointments and any requested follow-up recommended by me today. Importance of maintaining follow-up discussed and patient accepts that missed appointments can delay diagnosis and potentially lead to worsening of conditions.    Return if symptoms worsen or fail to improve., or sooner if acute issues arise.    This document has been electronically signed by Jacob Haskins DPM on November 7, 2022 12:51 EST

## 2022-11-14 ENCOUNTER — HOSPITAL ENCOUNTER (OUTPATIENT)
Dept: MAMMOGRAPHY | Facility: HOSPITAL | Age: 67
Discharge: HOME | End: 2022-11-14

## 2022-11-14 DIAGNOSIS — N64.9 BREAST LESION: ICD-10-CM

## 2022-11-14 DIAGNOSIS — R92.8 ABNORMAL MAMMOGRAM: ICD-10-CM

## 2022-11-14 PROCEDURE — A4648 IMPLANTABLE TISSUE MARKER: HCPCS

## 2022-11-14 PROCEDURE — 88305 TISSUE EXAM BY PATHOLOGIST: CPT | Performed by: INTERNAL MEDICINE

## 2022-11-14 PROCEDURE — 0 LIDOCAINE 1 % SOLUTION: Performed by: INTERNAL MEDICINE

## 2022-11-14 RX ORDER — LIDOCAINE HYDROCHLORIDE 10 MG/ML
10 INJECTION, SOLUTION INFILTRATION; PERINEURAL ONCE
Status: COMPLETED | OUTPATIENT
Start: 2022-11-14 | End: 2022-11-14

## 2022-11-14 RX ORDER — LIDOCAINE HYDROCHLORIDE AND EPINEPHRINE 10; 10 MG/ML; UG/ML
10 INJECTION, SOLUTION INFILTRATION; PERINEURAL ONCE
Status: COMPLETED | OUTPATIENT
Start: 2022-11-14 | End: 2022-11-14

## 2022-11-14 RX ADMIN — LIDOCAINE HYDROCHLORIDE 10 ML: 10 INJECTION, SOLUTION INFILTRATION; PERINEURAL at 13:55

## 2022-11-14 RX ADMIN — LIDOCAINE HYDROCHLORIDE,EPINEPHRINE BITARTRATE 10 ML: 10; .01 INJECTION, SOLUTION INFILTRATION; PERINEURAL at 13:55

## 2022-11-15 LAB
CYTO UR: NORMAL
LAB AP CASE REPORT: NORMAL
LAB AP CLINICAL INFORMATION: NORMAL
PATH REPORT.FINAL DX SPEC: NORMAL
PATH REPORT.GROSS SPEC: NORMAL

## 2022-11-18 DIAGNOSIS — R91.1 LUNG NODULE: Primary | ICD-10-CM

## 2022-11-30 ENCOUNTER — HOSPITAL ENCOUNTER (OUTPATIENT)
Dept: CT IMAGING | Facility: HOSPITAL | Age: 67
Discharge: HOME | End: 2022-11-30
Admitting: INTERNAL MEDICINE

## 2022-11-30 DIAGNOSIS — R91.1 LUNG NODULE: ICD-10-CM

## 2022-11-30 PROCEDURE — 71250 CT THORAX DX C-: CPT

## 2022-12-02 ENCOUNTER — OFFICE VISIT (OUTPATIENT)
Dept: INTERNAL MEDICINE | Facility: CLINIC | Age: 67
End: 2022-12-02

## 2022-12-02 VITALS
HEART RATE: 84 BPM | TEMPERATURE: 97.9 F | HEIGHT: 68 IN | WEIGHT: 210.6 LBS | DIASTOLIC BLOOD PRESSURE: 74 MMHG | OXYGEN SATURATION: 100 % | SYSTOLIC BLOOD PRESSURE: 130 MMHG | BODY MASS INDEX: 31.92 KG/M2

## 2022-12-02 DIAGNOSIS — F33.2 MAJOR DEPRESSIVE DISORDER, RECURRENT, SEVERE WITHOUT PSYCHOTIC FEATURES: ICD-10-CM

## 2022-12-02 DIAGNOSIS — F41.9 ANXIETY: ICD-10-CM

## 2022-12-02 DIAGNOSIS — I10 ESSENTIAL HYPERTENSION: Primary | ICD-10-CM

## 2022-12-02 DIAGNOSIS — E78.2 MIXED HYPERLIPIDEMIA: ICD-10-CM

## 2022-12-02 PROCEDURE — 1170F FXNL STATUS ASSESSED: CPT | Performed by: INTERNAL MEDICINE

## 2022-12-02 PROCEDURE — 99213 OFFICE O/P EST LOW 20 MIN: CPT | Performed by: INTERNAL MEDICINE

## 2022-12-02 PROCEDURE — 1159F MED LIST DOCD IN RCRD: CPT | Performed by: INTERNAL MEDICINE

## 2022-12-02 PROCEDURE — G0439 PPPS, SUBSEQ VISIT: HCPCS | Performed by: INTERNAL MEDICINE

## 2022-12-02 RX ORDER — DESVENLAFAXINE 100 MG/1
TABLET, EXTENDED RELEASE ORAL
Qty: 30 TABLET | Refills: 1 | Status: SHIPPED | OUTPATIENT
Start: 2022-12-02 | End: 2023-01-06 | Stop reason: SDUPTHER

## 2022-12-02 NOTE — PROGRESS NOTES
Chief Complaint  Follow-up (6 month follow up ), Knee Pain (Follow up ), Shoulder Pain (Follow up ), Hyperlipidemia (Follow up ), Hypertension (Follow up ), Anxiety (Follow up), Depression (Follow up ), Vitamin D Deficiency (Follow up ), Fatigue, and Arthritis (Follow up )    Subjective          Casandra Arango presents to [facility]  History of Present Illness   The patient reports she is tired, worn out and her immune system is shot. She states she thinks she had the type A flu the week of her grandson's wedding. She states she called to see if she could get in, however she was informed there were no appointments with me or my nurse practitioner. She notes she went to place in [place]. She reports she had an EKG and a lung x-ray. She states she tested negative for COVID-19 and influenza, however she is in tune with her body and just knew she was getting sick. She reports that she started running a fever at 4:00 AM after she had already taken the antibiotic and Tessalon Perles. She adds the coughing was horrible. She states she was going to get her influenza vaccine when she came in 12/2022 because it was not available when she came in 09/2022.     She states she has lost weight since her last visit. She states she has not been able to eat anything. She reports she has lost 13 pounds since her last visit.     She reports her daughter wants her to get spots on her back checked.     The patient reports she has an appointment in 1 to 2 weeks for her lower back to decide if the injections will help or if she needs to proceed with doing something else. She states it said she had spurs on the thoracic section. She communicates she was unsure if she should go back to pain management due to the fact she does not have sciatica anymore or if the injections would help with the pain she is experiencing. She states she does not want to have surgery. She reports she is  "getting injections in her big toe. She states it has helped greatly and she is able bend her toe now.    The patient reports she is unsure of what the nodule was and how they confused it with a hiatal hernia. She states she takes omeprazole. She inquires if she needs to be concerned about the hiatal hernia and if she is going to \"explode\" one day.    She reports on her mammogram, they missed a spot. She inquires if she should wait with it being so small. She states they want to do an MRI of the breast.    She states she feels she is anemic again and inquires if she should get bloodwork today. She notes she is taking prenatal vitamins everyday. She reports she has been drinking Gatorade.     The patient reports she has had the first shingles vaccine. She states she is supposed to get the booster. She inquires if she should get pneumonia vaccine.    She states her health is about the same as last year. She adds she does not want to get out of bed in regards to her mental health. She states she was not admitted to the hospital in the last year, however she was in to have a kidney stone taken out in 10/2021. She states she has a living will. She reports she has brain fog and not necessarily memory loss. She denies having issues preparing food or going to the bathroom. She states she is driving. She reports she has difficulty concentrating. She states she is still taking Pristiq 50mg. She adds she noticed a big difference when she first started taking it, however during the transition from Wellbutrin to Pristiq, it was too much. She inquires about a different medication that she could take with the Pristiq. She states she is in agreement with increasing the Pristiq. She states she would like it fill as 100mg for 30 days to see how she does.     Objective   Vital Signs:   /74 (BP Location: Right arm, Patient Position: Sitting, Cuff Size: Adult)   Pulse 84   Temp 97.9 °F (36.6 °C)   Ht 172.7 cm (68\")   Wt 95.5 kg " (210 lb 9.6 oz)   SpO2 100%   BMI 32.02 kg/m²     Physical Exam  Vitals reviewed.   Constitutional:       Appearance: Normal appearance. She is well-developed.   HENT:      Head: Normocephalic and atraumatic.      Right Ear: External ear normal.      Left Ear: External ear normal.   Eyes:      Conjunctiva/sclera: Conjunctivae normal.      Pupils: Pupils are equal, round, and reactive to light.   Cardiovascular:      Rate and Rhythm: Normal rate and regular rhythm.      Heart sounds: No murmur heard.    No friction rub. No gallop.   Pulmonary:      Effort: Pulmonary effort is normal.      Breath sounds: Normal breath sounds. No wheezing or rhonchi.   Skin:     General: Skin is warm and dry.   Neurological:      Mental Status: She is alert and oriented to person, place, and time.   Psychiatric:         Mood and Affect: Affect normal.         Behavior: Behavior normal.         Thought Content: Thought content normal.      Comments: tearful        Result Review :       Common labs    Common Labs 2/21/22 2/21/22 2/21/22    1241 1241 1241   Glucose  81    BUN  27 (A)    Creatinine  0.97    eGFR Non African Am  58 (A)    Sodium  139    Potassium  3.9    Chloride  99    Calcium  10.1    Albumin  4.60    Total Bilirubin  0.2    Alkaline Phosphatase  143 (A)    AST (SGOT)  15    ALT (SGPT)  14    WBC 8.76     Hemoglobin 13.3     Hematocrit 40.9     Platelets 466 (A)     Total Cholesterol   231 (A)   Triglycerides   93   HDL Cholesterol   58   LDL Cholesterol    157 (A)   (A) Abnormal value              Results for orders placed or performed during the hospital encounter of 11/14/22   Tissue Pathology Exam    Specimen: Breast, Right; Tissue   Result Value Ref Range    Case Report       Surgical Pathology Report                         Case: [id number]                                  Authorizing Provider:  Ming Castro DO       Collected:           11/14/2022 01:45 PM          Ordering Location:     [facility]  "[facility]      Received:            11/14/2022 02:14 PM                                 MAMMOGRAPHY                                                                  Pathologist:           Ana Mckeon MD                                                     Specimen:    Breast, Right, US RT BREAST [id number]                                                   Clinical Information       Right breast mass      Final Diagnosis       Right breast,  8 o'clock position, 5 cm from nipple, ultrasound-guided core biopsy:   - Usual ductal hyperplasia (UDH)  - Fibrosis  - Negative for atypia and malignancy          Gross Description       1. Breast, Right.  Received in formalin and labeled \" ultrasound right breast biopsy/8:00 at 5 cm from nipple\" is a 1.0 cm aggregate of fibrofatty breast cores.  The specimen is entirely submitted in 2 cassettes. Cold ischemic time-5 minutes; total formalin fixation time-6 hours and 10 minutes. RACHEL          Microscopic Description              Procedures        Assessment and Plan    Diagnoses and all orders for this visit:    1. Essential hypertension (Primary)    2. Mixed hyperlipidemia    3. Major depressive disorder, recurrent, severe without psychotic features (HCC)    4. Anxiety    Other orders  -     desvenlafaxine (PRISTIQ) 100 MG 24 hr tablet; Take one tab po daily  Dispense: 30 tablet; Refill: 1        1. Anxiety and depression  - We are going to increase the Pristiq to 100mg for 30 days.    2. Hypertension  - Blood pressure looks okay today.    3. Influenza and illness  - We will give it some time to rest.    4. Back pain  - We will refer her to pain management.    5. Health maintenance  - We will order blood work.  - She will get her flu shot in 01/2023.  - I recommend getting MRI of the breast. I advised her to let me know if she has not heard anything in 2 weeks and I will schedule it.    6. Spots on back  - Discussed they are age-related spots.  - Advised to get them " checked within 6 months to see if there is any change.      }          Follow Up   Return in about 2 months (around 2/2/2023).  Patient was given instructions and counseling regarding her condition or for health maintenance advice. Please see specific information pulled into the AVS if appropriate.

## 2022-12-02 NOTE — PROGRESS NOTES
The ABCs of the Annual Wellness Visit  Subsequent Medicare Wellness Visit    Chief Complaint   Patient presents with   • Follow-up     6 month follow up    • Knee Pain     Follow up    • Shoulder Pain     Follow up    • Hyperlipidemia     Follow up    • Hypertension     Follow up    • Anxiety     Follow up   • Depression     Follow up    • Vitamin D Deficiency     Follow up    • Fatigue   • Arthritis     Follow up       Subjective    History of Present Illness:  Casandra Arango is a 67 y.o. female who presents for a Subsequent Medicare Wellness Visit.    The patient presents today for a follow-up visit.          The following portions of the patient's history were reviewed and   updated as appropriate: allergies, current medications, past family history, past medical history, past social history, past surgical history and problem list.    Compared to one year ago, the patient feels her physical   health is the same.    Compared to one year ago, the patient feels her mental   health is worse.    Recent Hospitalizations:  She was not admitted to the hospital during the last year.       Current Medical Providers:  Patient Care Team:  Teresa Herman MD as PCP - General (Internal Medicine)    Outpatient Medications Prior to Visit   Medication Sig Dispense Refill   • acetaminophen (Tylenol) 325 MG tablet Take 2 tablets by mouth Every 6 (Six) Hours As Needed for Mild Pain . 90 tablet 1   • cetirizine (zyrTEC) 10 MG tablet Take 1 tablet by mouth Daily. 90 tablet 1   • chlorthalidone (HYGROTON) 25 MG tablet Take 1 tablet by mouth Daily. 90 tablet 1   • ciclopirox (LOPROX) 0.77 % gel Apply 1 application topically to the appropriate area as directed 2 (Two) Times a Day. 30 g 1   • diclofenac (VOLTAREN) 75 MG EC tablet Take 1 tablet by mouth 2 (Two) Times a Day for 90 days. 180 tablet 0   • Diclofenac Sodium (VOLTAREN) 1 % gel gel Apply  topically to the appropriate area as directed 2 (Two) Times a Day. 100 g 2   •  fluticasone (FLONASE) 50 MCG/ACT nasal spray 2 sprays into the nostril(s) as directed by provider Daily. 16 g 1   • hydrOXYzine (ATARAX) 25 MG tablet Take 1 tablet by mouth Daily. 90 tablet 1   • omeprazole (priLOSEC) 40 MG capsule Take 1 capsule by mouth Daily. 90 capsule 1   • ondansetron ODT (Zofran ODT) 4 MG disintegrating tablet Place 1 tablet on the tongue Every 8 (Eight) Hours As Needed for Nausea or Vomiting. 30 tablet 1   • penciclovir (Denavir) 1 % cream Apply  topically to the appropriate area as directed 2 (Two) Times a Day. 1.5 g 2   • rizatriptan (MAXALT) 10 MG tablet Take 1 tablet by mouth 1 (One) Time As Needed for Migraine for up to 1 dose. May repeat in 2 hours if needed 9 tablet 2   • valACYclovir (VALTREX) 500 MG tablet Take 1 tablet by mouth Daily. 90 tablet 1   • desvenlafaxine (PRISTIQ) 50 MG 24 hr tablet Take 1 tablet by mouth Daily. 90 tablet 1     No facility-administered medications prior to visit.       No opioid medication identified on active medication list. I have reviewed chart for other potential  high risk medication/s and harmful drug interactions in the elderly.          Aspirin is not on active medication list.  Aspirin use is not indicated based on review of current medical condition/s. Risk of harm outweighs potential benefits.  .    Patient Active Problem List   Diagnosis   • Right shoulder pain   • Scabies   • Obesity (BMI 30-39.9)   • GERD without esophagitis   • Essential hypertension   • Obstructive uropathy   • Nephrolithiasis   • Seasonal allergic rhinitis   • Anxiety   • Arthritis   • Arthritis of first metatarsophalangeal (MTP) joint of right foot   • Arthritis of left knee   • Degeneration of lumbar intervertebral disc   • Hemorrhoids   • Hyperlipemia   • Insomnia   • Lumbosacral spondylosis without myelopathy   • Major depressive disorder, recurrent, severe without psychotic features (HCC)   • Vitamin D deficiency   • KARINA on CPAP     Advance Care Planning  Advance  "Directive is not on file.  ACP discussion was held with the patient during this visit. Patient has an advance directive (not in EMR), copy requested.          Objective    Vitals:    12/02/22 1213   BP: 130/74   BP Location: Right arm   Patient Position: Sitting   Cuff Size: Adult   Pulse: 84   Temp: 97.9 °F (36.6 °C)   SpO2: 100%   Weight: 95.5 kg (210 lb 9.6 oz)   Height: 172.7 cm (68\")     Estimated body mass index is 32.02 kg/m² as calculated from the following:    Height as of this encounter: 172.7 cm (68\").    Weight as of this encounter: 95.5 kg (210 lb 9.6 oz).    BMI is >= 30 and <35. (Class 1 Obesity). The following options were offered after discussion;: exercise counseling/recommendations and nutrition counseling/recommendations      Does the patient have evidence of cognitive impairment? Mild    Physical Exam            HEALTH RISK ASSESSMENT    Smoking Status:  Social History     Tobacco Use   Smoking Status Never   Smokeless Tobacco Never     Alcohol Consumption:  Social History     Substance and Sexual Activity   Alcohol Use Not Currently    Comment: CURRENT SOME DAY      Fall Risk Screen:    STEADI Fall Risk Assessment was completed, and patient is at LOW risk for falls.Assessment completed on:5/20/2022    Depression Screening:  PHQ-2/PHQ-9 Depression Screening 5/20/2022   Retired PHQ-9 Total Score -   Retired Total Score -   Little Interest or Pleasure in Doing Things 1-->several days   Feeling Down, Depressed or Hopeless 1-->several days   Trouble Falling or Staying Asleep, or Sleeping Too Much 1-->several days   Feeling Tired or Having Little Energy 3-->nearly every day   Poor Appetite or Overeating 1-->several days   Feeling Bad about Yourself - or that You are a Failure or Have Let Yourself or Your Family Down 0-->not at all   Trouble Concentrating on Things, Such as Reading the Newspaper or Watching Television 0-->not at all   Moving or Speaking So Slowly that Other People Could Have Noticed? " Or the Opposite - Being So Fidgety 0-->not at all   Thoughts that You Would be Better Off Dead or of Hurting Yourself in Some Way 0-->not at all   PHQ-9: Brief Depression Severity Measure Score 7   If You Checked Off Any Problems, How Difficult Have These Problems Made It For You to Do Your Work, Take Care of Things at Home, or Get Along with Other People? somewhat difficult       Health Habits and Functional and Cognitive Screening:  Functional & Cognitive Status 12/2/2022   Do you have difficulty preparing food and eating? No   Do you have difficulty bathing yourself, getting dressed or grooming yourself? No   Do you have difficulty using the toilet? No   Do you have difficulty moving around from place to place? No   Do you have trouble with steps or getting out of a bed or a chair? No   Current Diet -   Dental Exam -   Eye Exam -   Exercise (times per week) -   Current Exercises Include -   Do you need help using the phone?  No   Are you deaf or do you have serious difficulty hearing?  No   Do you need help with transportation? No   Do you need help shopping? No   Do you need help preparing meals?  No   Do you need help with housework?  No   Do you need help with laundry? No   Do you need help taking your medications? No   Do you need help managing money? No   Do you ever drive or ride in a car without wearing a seat belt? No   Have you felt unusual stress, anger or loneliness in the last month? Yes   Who do you live with? -   If you need help, do you have trouble finding someone available to you? -   Have you been bothered in the last four weeks by sexual problems? -   Do you have difficulty concentrating, remembering or making decisions? Yes       Age-appropriate Screening Schedule:  Refer to the list below for future screening recommendations based on patient's age, sex and/or medical conditions. Orders for these recommended tests are listed in the plan section. The patient has been provided with a written  plan.    Health Maintenance   Topic Date Due   • ZOSTER VACCINE (2 of 2) 04/11/2022   • INFLUENZA VACCINE  08/01/2022   • LIPID PANEL  02/21/2023   • DXA SCAN  09/21/2024   • MAMMOGRAM  10/13/2024   • TDAP/TD VACCINES (2 - Tdap) 10/13/2031              Assessment & Plan      Will see back in 2 months or sooner.    CMS Preventative Services Quick Reference  Risk Factors Identified During Encounter  Immunizations Discussed/Encouraged (specific Immunizations; Influenza  The above risks/problems have been discussed with the patient.  Follow up actions/plans if indicated are seen below in the Assessment/Plan Section.  Pertinent information has been shared with the patient in the After Visit Summary.    Diagnoses and all orders for this visit:    1. Essential hypertension (Primary)    2. Mixed hyperlipidemia    3. Major depressive disorder, recurrent, severe without psychotic features (HCC)    4. Anxiety    Other orders  -     desvenlafaxine (PRISTIQ) 100 MG 24 hr tablet; Take one tab po daily  Dispense: 30 tablet; Refill: 1        Follow Up:   Return in about 2 months (around 2/2/2023).     An After Visit Summary and PPPS were made available to the patient.                   Transcribed from ambient dictation for Teresa Herman MD by Unique Lugo.  12/02/22   18:39 EST    Patient or patient representative verbalized consent to the visit recording.  I have personally performed the services described in this document as transcribed by the above individual, and it is both accurate and complete.

## 2022-12-12 ENCOUNTER — HOSPITAL ENCOUNTER (OUTPATIENT)
Dept: MRI IMAGING | Facility: HOSPITAL | Age: 67
Discharge: HOME | End: 2022-12-12
Admitting: INTERNAL MEDICINE

## 2022-12-12 DIAGNOSIS — M54.10 RADICULOPATHY, UNSPECIFIED SPINAL REGION: ICD-10-CM

## 2022-12-12 DIAGNOSIS — M47.817 LUMBOSACRAL SPONDYLOSIS WITHOUT MYELOPATHY: ICD-10-CM

## 2022-12-12 PROCEDURE — 72148 MRI LUMBAR SPINE W/O DYE: CPT

## 2022-12-23 ENCOUNTER — PATIENT MESSAGE (OUTPATIENT)
Dept: INTERNAL MEDICINE | Facility: CLINIC | Age: 67
End: 2022-12-23
Payer: MEDICARE

## 2022-12-23 DIAGNOSIS — N64.9 BREAST LESION: ICD-10-CM

## 2022-12-23 DIAGNOSIS — M54.40 CHRONIC BILATERAL LOW BACK PAIN WITH SCIATICA, SCIATICA LATERALITY UNSPECIFIED: Primary | ICD-10-CM

## 2022-12-23 DIAGNOSIS — R92.8 ABNORMAL MAMMOGRAM: ICD-10-CM

## 2022-12-23 DIAGNOSIS — Z80.3 FAMILY HISTORY OF BREAST CANCER: ICD-10-CM

## 2022-12-23 DIAGNOSIS — G89.29 CHRONIC BILATERAL LOW BACK PAIN WITH SCIATICA, SCIATICA LATERALITY UNSPECIFIED: Primary | ICD-10-CM

## 2022-12-29 NOTE — TELEPHONE ENCOUNTER
Please call radiology and ask if they want me to order the breast MRI for this patient or if they were going to do it and if they want it with contrast if I order it.    I thought they were doing this, but I don't mind to do it if needed.  I just need to know specifically what to order and when they recommend it as this isn't my area of expertise.

## 2022-12-29 NOTE — TELEPHONE ENCOUNTER
From: Casandra Arango  To: Teresa Herman  Sent: 12/23/2022 8:49 AM EST  Subject: Referral    I read your message and I would like a referral to [facility] for consultation. On another topic, I haven't received a call about the breast mri yet. You wanted me to message you if I had not received a call in two weeks. Jody Hines

## 2023-01-06 DIAGNOSIS — M65.4 DE QUERVAIN'S TENOSYNOVITIS, LEFT: ICD-10-CM

## 2023-01-06 RX ORDER — DICLOFENAC SODIUM 75 MG/1
75 TABLET, DELAYED RELEASE ORAL 2 TIMES DAILY
Qty: 180 TABLET | Refills: 0 | Status: SHIPPED | OUTPATIENT
Start: 2023-01-06 | End: 2023-04-06

## 2023-01-06 RX ORDER — OMEPRAZOLE 40 MG/1
40 CAPSULE, DELAYED RELEASE ORAL DAILY
Qty: 90 CAPSULE | Refills: 1 | Status: SHIPPED | OUTPATIENT
Start: 2023-01-06

## 2023-01-06 RX ORDER — CHLORTHALIDONE 25 MG/1
25 TABLET ORAL DAILY
Qty: 90 TABLET | Refills: 1 | Status: SHIPPED | OUTPATIENT
Start: 2023-01-06 | End: 2023-02-03

## 2023-01-06 RX ORDER — ACETAMINOPHEN 325 MG/1
650 TABLET ORAL EVERY 6 HOURS PRN
Qty: 90 TABLET | Refills: 1 | Status: SHIPPED | OUTPATIENT
Start: 2023-01-06

## 2023-01-06 RX ORDER — DESVENLAFAXINE 100 MG/1
TABLET, EXTENDED RELEASE ORAL
Qty: 30 TABLET | Refills: 1 | Status: SHIPPED | OUTPATIENT
Start: 2023-01-06 | End: 2023-02-02 | Stop reason: SDUPTHER

## 2023-01-10 ENCOUNTER — CLINICAL SUPPORT (OUTPATIENT)
Dept: INTERNAL MEDICINE | Facility: CLINIC | Age: 68
End: 2023-01-10
Payer: MEDICARE

## 2023-01-10 DIAGNOSIS — F41.9 ANXIETY: ICD-10-CM

## 2023-01-10 DIAGNOSIS — I10 ESSENTIAL HYPERTENSION: ICD-10-CM

## 2023-01-10 DIAGNOSIS — Z23 NEED FOR INFLUENZA VACCINATION: Primary | ICD-10-CM

## 2023-01-10 DIAGNOSIS — F33.2 MAJOR DEPRESSIVE DISORDER, RECURRENT, SEVERE WITHOUT PSYCHOTIC FEATURES: ICD-10-CM

## 2023-01-10 DIAGNOSIS — E55.9 VITAMIN D DEFICIENCY: ICD-10-CM

## 2023-01-10 DIAGNOSIS — R92.8 ABNORMAL MAMMOGRAM: Primary | ICD-10-CM

## 2023-01-10 DIAGNOSIS — E78.2 MIXED HYPERLIPIDEMIA: ICD-10-CM

## 2023-01-10 DIAGNOSIS — D50.9 IRON DEFICIENCY ANEMIA, UNSPECIFIED IRON DEFICIENCY ANEMIA TYPE: ICD-10-CM

## 2023-01-10 LAB
25(OH)D3 SERPL-MCNC: 46.9 NG/ML (ref 30–100)
ALBUMIN SERPL-MCNC: 3.8 G/DL (ref 3.5–5.2)
ALBUMIN/GLOB SERPL: 1.2 G/DL
ALP SERPL-CCNC: 137 U/L (ref 39–117)
ALT SERPL W P-5'-P-CCNC: 24 U/L (ref 1–33)
ANION GAP SERPL CALCULATED.3IONS-SCNC: 12.4 MMOL/L (ref 5–15)
AST SERPL-CCNC: 8 U/L (ref 1–32)
BASOPHILS # BLD AUTO: 0.08 10*3/MM3 (ref 0–0.2)
BASOPHILS NFR BLD AUTO: 0.8 % (ref 0–1.5)
BILIRUB SERPL-MCNC: 0.2 MG/DL (ref 0–1.2)
BUN SERPL-MCNC: 30 MG/DL (ref 8–23)
BUN/CREAT SERPL: 30.3 (ref 7–25)
CALCIUM SPEC-SCNC: 9.4 MG/DL (ref 8.6–10.5)
CHLORIDE SERPL-SCNC: 101 MMOL/L (ref 98–107)
CHOLEST SERPL-MCNC: 246 MG/DL (ref 0–200)
CO2 SERPL-SCNC: 26.6 MMOL/L (ref 22–29)
CREAT SERPL-MCNC: 0.99 MG/DL (ref 0.57–1)
DEPRECATED RDW RBC AUTO: 41.8 FL (ref 37–54)
EGFRCR SERPLBLD CKD-EPI 2021: 63 ML/MIN/1.73
EOSINOPHIL # BLD AUTO: 0.32 10*3/MM3 (ref 0–0.4)
EOSINOPHIL NFR BLD AUTO: 3.1 % (ref 0.3–6.2)
ERYTHROCYTE [DISTWIDTH] IN BLOOD BY AUTOMATED COUNT: 12.7 % (ref 12.3–15.4)
FOLATE SERPL-MCNC: >20 NG/ML (ref 4.78–24.2)
GLOBULIN UR ELPH-MCNC: 3.1 GM/DL
GLUCOSE SERPL-MCNC: 111 MG/DL (ref 65–99)
HCT VFR BLD AUTO: 41.6 % (ref 34–46.6)
HDLC SERPL-MCNC: 49 MG/DL (ref 40–60)
HGB BLD-MCNC: 13.5 G/DL (ref 12–15.9)
IMM GRANULOCYTES # BLD AUTO: 0.03 10*3/MM3 (ref 0–0.05)
IMM GRANULOCYTES NFR BLD AUTO: 0.3 % (ref 0–0.5)
IRON 24H UR-MRATE: 66 MCG/DL (ref 37–145)
IRON SATN MFR SERPL: 20 % (ref 20–50)
LDLC SERPL CALC-MCNC: 162 MG/DL (ref 0–100)
LDLC/HDLC SERPL: 3.24 {RATIO}
LYMPHOCYTES # BLD AUTO: 2.34 10*3/MM3 (ref 0.7–3.1)
LYMPHOCYTES NFR BLD AUTO: 22.7 % (ref 19.6–45.3)
MCH RBC QN AUTO: 29.1 PG (ref 26.6–33)
MCHC RBC AUTO-ENTMCNC: 32.5 G/DL (ref 31.5–35.7)
MCV RBC AUTO: 89.7 FL (ref 79–97)
MONOCYTES # BLD AUTO: 0.64 10*3/MM3 (ref 0.1–0.9)
MONOCYTES NFR BLD AUTO: 6.2 % (ref 5–12)
NEUTROPHILS NFR BLD AUTO: 6.92 10*3/MM3 (ref 1.7–7)
NEUTROPHILS NFR BLD AUTO: 66.9 % (ref 42.7–76)
NRBC BLD AUTO-RTO: 0 /100 WBC (ref 0–0.2)
PLATELET # BLD AUTO: 404 10*3/MM3 (ref 140–450)
PMV BLD AUTO: 9.6 FL (ref 6–12)
POTASSIUM SERPL-SCNC: 3.2 MMOL/L (ref 3.5–5.2)
PROT SERPL-MCNC: 6.9 G/DL (ref 6–8.5)
RBC # BLD AUTO: 4.64 10*6/MM3 (ref 3.77–5.28)
SODIUM SERPL-SCNC: 140 MMOL/L (ref 136–145)
TIBC SERPL-MCNC: 323 MCG/DL (ref 298–536)
TRANSFERRIN SERPL-MCNC: 217 MG/DL (ref 200–360)
TRIGL SERPL-MCNC: 191 MG/DL (ref 0–150)
VIT B12 BLD-MCNC: 525 PG/ML (ref 211–946)
VLDLC SERPL-MCNC: 35 MG/DL (ref 5–40)
WBC NRBC COR # BLD: 10.33 10*3/MM3 (ref 3.4–10.8)

## 2023-01-10 PROCEDURE — 83540 ASSAY OF IRON: CPT | Performed by: INTERNAL MEDICINE

## 2023-01-10 PROCEDURE — 84466 ASSAY OF TRANSFERRIN: CPT | Performed by: INTERNAL MEDICINE

## 2023-01-10 PROCEDURE — 82746 ASSAY OF FOLIC ACID SERUM: CPT | Performed by: INTERNAL MEDICINE

## 2023-01-10 PROCEDURE — 84443 ASSAY THYROID STIM HORMONE: CPT | Performed by: INTERNAL MEDICINE

## 2023-01-10 PROCEDURE — 90662 IIV NO PRSV INCREASED AG IM: CPT | Performed by: INTERNAL MEDICINE

## 2023-01-10 PROCEDURE — 85025 COMPLETE CBC W/AUTO DIFF WBC: CPT | Performed by: INTERNAL MEDICINE

## 2023-01-10 PROCEDURE — 82306 VITAMIN D 25 HYDROXY: CPT | Performed by: INTERNAL MEDICINE

## 2023-01-10 PROCEDURE — 82607 VITAMIN B-12: CPT | Performed by: INTERNAL MEDICINE

## 2023-01-10 PROCEDURE — G0008 ADMIN INFLUENZA VIRUS VAC: HCPCS | Performed by: INTERNAL MEDICINE

## 2023-01-10 PROCEDURE — 80061 LIPID PANEL: CPT | Performed by: INTERNAL MEDICINE

## 2023-01-10 PROCEDURE — 80053 COMPREHEN METABOLIC PANEL: CPT | Performed by: INTERNAL MEDICINE

## 2023-01-11 LAB — TSH SERPL DL<=0.05 MIU/L-ACNC: 1.59 UIU/ML (ref 0.27–4.2)

## 2023-01-12 ENCOUNTER — OFFICE VISIT (OUTPATIENT)
Dept: PODIATRY | Facility: CLINIC | Age: 68
End: 2023-01-12
Payer: MEDICARE

## 2023-01-12 VITALS
HEART RATE: 92 BPM | BODY MASS INDEX: 32.43 KG/M2 | TEMPERATURE: 96.9 F | DIASTOLIC BLOOD PRESSURE: 61 MMHG | WEIGHT: 214 LBS | OXYGEN SATURATION: 99 % | HEIGHT: 68 IN | SYSTOLIC BLOOD PRESSURE: 136 MMHG

## 2023-01-12 DIAGNOSIS — B35.1 ONYCHOMYCOSIS: Primary | ICD-10-CM

## 2023-01-12 DIAGNOSIS — M79.671 FOOT PAIN, BILATERAL: ICD-10-CM

## 2023-01-12 DIAGNOSIS — L60.0 ONYCHOCRYPTOSIS: ICD-10-CM

## 2023-01-12 DIAGNOSIS — M79.672 FOOT PAIN, BILATERAL: ICD-10-CM

## 2023-01-12 PROCEDURE — 11720 DEBRIDE NAIL 1-5: CPT | Performed by: PODIATRIST

## 2023-01-12 NOTE — PROGRESS NOTES
[facility] - PODIATRY    Today's Date: 01/12/23    Patient Name: Casandra Arango  MRN: [id number]  CSN: [id number]  PCP: Teresa Herman MD, Last PCP Visit:  12/2/2022  Referring Provider: No ref. provider found    SUBJECTIVE     Chief Complaint   Patient presents with   • Left Foot - Follow-up, Nail Problem   • Right Foot - Follow-up, Nail Problem     HPI: Casandra Arango, a 67 y.o.female, comes to clinic.    New, Established, New Problem:  est  Location:  Toenails  Duration:   Greater than five years  Onset:  Gradual  Nature:  sore with palpation.  Stable, worsening, improving:   recurring  Aggravating factors:  Pain with shoe gear and ambulation.  Previous Treatment: debridement    Medical changes:  Head laceration repaired    Patient denies any fevers, chills, nausea, vomiting, shortness of breath, nor any other constitutional signs nor symptoms.       Past Medical History:   Diagnosis Date   • Acid reflux    • Allergic rhinitis    • Anemia, unspecified    • Anxiety    • Arthritis    • Chronic cough    • Closed nondisplaced fracture of distal phalanx of right great toe 01/19/2018    WITH ROUTINE HEALING , SUBSEQUENT ENCOUNTER    • Closed nondisplaced fracture of distal phalanx of right great toe 12/26/2017    INITIAL ENCOUNTER    • Colon polyp 2016?   • Difficulty walking Last year   • Edema of right lower extremity 10/10/2015    DICUSSED CONTINUING ELEVATION OF THE FOOT AND WEARING COMPRESSION STOCKIGS   • GERD (gastroesophageal reflux disease)    • Globus sensation    • Hemorrhoids    • Hyperlipemia    • Hypertension    • Insomnia, unspecified 10/10/2015    TOLD HER TO TAKE ABLIFY DURING THE MORNING RATHER THAN IN THE EVENING AND SEE IF THAT HELPS WITH THE INSOMNIA HOWEVER IF IT DOES NOT WILL NEED TO CONSIDER FURTHER TREATMENT.    • Kidney stones    • Major depressive disorder, recurrent (HCC) 10/10/2015    SEVERE WITHOUT PSYCHOTIC FEATURES   • Migraine headache    • Night sweats     • Primary osteoarthritis of both knees 10/10/2015   • Primary osteoarthritis of left knee 12/26/2017   • Primary osteoarthritis of left shoulder 12/12/2017   • Seasonal allergies    • SOB (shortness of breath)    • Stress fracture    • Toe pain, right     great toe   • Urinary tract infection    • Vitamin D deficiency 01/07/2015     Past Surgical History:   Procedure Laterality Date   • ADENOIDECTOMY  1970   • BACK SURGERY      LUMBAR   • COLONOSCOPY  2011   • HEMORRHOIDECTOMY     • HYSTERECTOMY  2018?   • JOINT REPLACEMENT  2020   • KIDNEY SURGERY      KIDNEY STONE SURGERY UNSPECIFIED    • KNEE SURGERY      ARTHROSCOPIC KNEE SURGERY   • OTHER SURGICAL HISTORY Right 08/01/2000    ARM ORIF BY DR. BECERRA   • OTHER SURGICAL HISTORY      ARTIFICAL JOINTS/LIMBS   • OTHER SURGICAL HISTORY      JOINT SURGERY   • SPINE SURGERY  ?   • TIBIA FRACTURE SURGERY  09/11/2013    LEFT TIBIAL ORIF BY DR. PÉREZ   • TONSILLECTOMY     • TOTAL HIP ARTHROPLASTY Right 12/16/2013        • URETEROSCOPY LASER LITHOTRIPSY WITH STENT INSERTION Left 10/15/2021    Procedure: Cystoscopy with left ureteroscopy with laser and left ureteral stent placement;  Surgeon: Reuben Yuan MD;  Location: [facility];  Service: Urology;  Laterality: Left;     Family History   Problem Relation Age of Onset   • Skin cancer Mother    • Hypertension Mother    • Cancer Mother    • Kidney cancer Father    • Diabetes Sister    • Vision loss Paternal Grandfather    • Drug abuse Son    • Mental illness Sister    • Mental illness Sister    • Cancer Sister    • Mental illness Sister    • Cancer Sister    • Mental illness Brother      Social History     Socioeconomic History   • Marital status:    Tobacco Use   • Smoking status: Never   • Smokeless tobacco: Never   Vaping Use   • Vaping Use: Never used   Substance and Sexual Activity   • Alcohol use: Not Currently     Comment: CURRENT SOME DAY    • Drug use: Never   • Sexual activity: Not  Currently     Partners: Male     Birth control/protection: None     Allergies   Allergen Reactions   • Nabumetone Itching   • Celebrex [Celecoxib] Other (See Comments)     Eye problem   • Hydrocodone Bit-Homatrop Mbr Itching   • Lamotrigine Hives   • Propoxyphene Unknown - Low Severity     Darvocet  Other reaction(s): Rash to arms, torso   • Terbinafine Rash     Current Outpatient Medications   Medication Sig Dispense Refill   • acetaminophen (Tylenol) 325 MG tablet Take 2 tablets by mouth Every 6 (Six) Hours As Needed for Mild Pain. 90 tablet 1   • cetirizine (zyrTEC) 10 MG tablet Take 1 tablet by mouth Daily. 90 tablet 1   • chlorthalidone (HYGROTON) 25 MG tablet Take 1 tablet by mouth Daily. 90 tablet 1   • ciclopirox (LOPROX) 0.77 % gel Apply 1 application topically to the appropriate area as directed 2 (Two) Times a Day. 30 g 1   • desvenlafaxine (PRISTIQ) 100 MG 24 hr tablet Take one tab po daily 30 tablet 1   • diclofenac (VOLTAREN) 75 MG EC tablet Take 1 tablet by mouth 2 (Two) Times a Day for 90 days. 180 tablet 0   • Diclofenac Sodium (VOLTAREN) 1 % gel gel Apply  topically to the appropriate area as directed 2 (Two) Times a Day. 100 g 2   • fluticasone (FLONASE) 50 MCG/ACT nasal spray 2 sprays into the nostril(s) as directed by provider Daily. 16 g 1   • hydrOXYzine (ATARAX) 25 MG tablet Take 1 tablet by mouth Daily. 90 tablet 1   • omeprazole (priLOSEC) 40 MG capsule Take 1 capsule by mouth Daily. 90 capsule 1   • ondansetron ODT (Zofran ODT) 4 MG disintegrating tablet Place 1 tablet on the tongue Every 8 (Eight) Hours As Needed for Nausea or Vomiting. 30 tablet 1   • penciclovir (Denavir) 1 % cream Apply  topically to the appropriate area as directed 2 (Two) Times a Day. 1.5 g 2   • rizatriptan (MAXALT) 10 MG tablet Take 1 tablet by mouth 1 (One) Time As Needed for Migraine for up to 1 dose. May repeat in 2 hours if needed 9 tablet 2   • valACYclovir (VALTREX) 500 MG tablet Take 1 tablet by mouth Daily.  90 tablet 1     No current facility-administered medications for this visit.     Review of Systems   Constitutional: Negative.    Skin:        Painful toenails.   All other systems reviewed and are negative.      OBJECTIVE     Vitals:    01/12/23 1337   BP: 136/61   Pulse: 92   Temp: 96.9 °F (36.1 °C)   SpO2: 99%       Patient seen in no apparent distress.      PHYSICAL EXAM:     Foot/Ankle Exam:       General:   Appearance: obesity    Orientation: AAOx3    Affect: appropriate    Gait: unimpaired    Shoe Gear:  Casual shoes    VASCULAR      Right Foot Vascularity   Normal vascular exam    Dorsalis pedis:  2+  Posterior tibial:  2+  Skin Temperature: warm    Edema Grading:  None  CFT:  < 3 seconds  Pedal Hair Growth:  Absent  Varicosities: mild varicosities       Left Foot Vascularity   Normal vascular exam    Dorsalis pedis:  2+  Posterior tibial:  2+  Skin Temperature: warm    Edema Grading:  None  CFT:  < 3 seconds  Pedal Hair Growth:  Absent  Varicosities: mild varicosities        NEUROLOGIC     Right Foot Neurologic   Normal sensation    Light touch sensation:  Normal  Vibratory sensation:  Normal  Hot/Cold sensation: normal    Protective Sensation using [place] Monofilament:  10     Left Foot Neurologic   Normal sensation    Light touch sensation:  Normal  Vibratory sensation:  Normal  Hot/cold sensation: normal    Protective Sensation using [place] Monofilament:  10     MUSCULOSKELETAL      Right Foot Musculoskeletal   Hallux limitus: Yes       MUSCLE STRENGTH     Right Foot Muscle Strength   Foot dorsiflexion:  4  Foot plantar flexion:  4  Foot inversion:  4  Foot eversion:  4     Left Foot Muscle Strength   Foot dorsiflexion:  4  Foot plantar flexion:  4  Foot inversion:  4  Foot eversion:  4     RANGE OF MOTION      Right Foot Range of Motion   Foot and ankle ROM within normal limits       Left Foot Range of Motion   Foot and ankle ROM within normal limits       DERMATOLOGIC     Right Foot  Dermatologic   Skin: skin intact    Nails: onychomycosis, abnormally thick, subungual debris, dystrophic nails and ingrown toenail    Nails comment:  Toenail 1     Left Foot Dermatologic   Skin: skin intact    Nails: onychomycosis, abnormally thick, subungual debris, dystrophic nails and ingrown toenail    Nails comment:  Toenail 1      ASSESSMENT/PLAN     Diagnoses and all orders for this visit:    1. Onychomycosis (Primary)    2. Onychocryptosis    3. Foot pain, bilateral        Comprehensive lower extremity examination and evaluation was performed.    Discussed findings and treatment plan including risks, benefits, and treatment options with patient in detail. Patient agreed with treatment plan.    Toenails 1 on Right and 1 on Left were debrided with nail nippers then filed with a Dremel nail john.  Patient tolerated procedure well without complications.    An After Visit Summary was printed and given to the patient at discharge, including (if requested) any available informative/educational handouts regarding diagnosis, treatment, or medications. All questions were answered to patient/family satisfaction. Should symptoms fail to improve or worsen they agree to call or return to clinic or to go to the Emergency Department. Discussed the importance of following up with any needed screening tests/labs/specialist appointments and any requested follow-up recommended by me today. Importance of maintaining follow-up discussed and patient accepts that missed appointments can delay diagnosis and potentially lead to worsening of conditions.    Return in about 9 weeks (around 3/16/2023) for Toenail Care., or sooner if acute issues arise.    This document has been electronically signed by Jacob Haskins DPM on January 12, 2023 14:04 EST

## 2023-01-20 ENCOUNTER — HOSPITAL ENCOUNTER (OUTPATIENT)
Dept: MRI IMAGING | Facility: HOSPITAL | Age: 68
Discharge: HOME | End: 2023-01-20
Admitting: INTERNAL MEDICINE
Payer: MEDICARE

## 2023-01-20 DIAGNOSIS — R92.8 ABNORMAL MAMMOGRAM: ICD-10-CM

## 2023-01-20 PROCEDURE — C8908 MRI W/O FOL W/CONT, BREAST,: HCPCS

## 2023-01-20 PROCEDURE — 0 GADOBENATE DIMEGLUMINE 529 MG/ML SOLUTION: Performed by: INTERNAL MEDICINE

## 2023-01-20 PROCEDURE — A9577 INJ MULTIHANCE: HCPCS | Performed by: INTERNAL MEDICINE

## 2023-01-20 PROCEDURE — C8937 CAD BREAST MRI: HCPCS

## 2023-01-20 RX ADMIN — GADOBENATE DIMEGLUMINE 18 ML: 529 INJECTION, SOLUTION INTRAVENOUS at 13:12

## 2023-01-23 DIAGNOSIS — I10 ESSENTIAL HYPERTENSION: Primary | ICD-10-CM

## 2023-01-31 ENCOUNTER — TELEPHONE (OUTPATIENT)
Dept: INTERNAL MEDICINE | Facility: CLINIC | Age: 68
End: 2023-01-31
Payer: MEDICARE

## 2023-01-31 NOTE — TELEPHONE ENCOUNTER
Patients bp average 140/96 137/95 Patient not an any bp med currently. Waking up with headache that usually go away but not going away today. do you want labs before apt. She has apt on Friday. She is going to take 1 migraine pill and monitor BP

## 2023-01-31 NOTE — TELEPHONE ENCOUNTER
As long as in the 140's monitor til visit; if going to 150 or 160's let us know and we can call in some meds.

## 2023-02-01 ENCOUNTER — TELEPHONE (OUTPATIENT)
Dept: INTERNAL MEDICINE | Facility: CLINIC | Age: 68
End: 2023-02-01
Payer: MEDICARE

## 2023-02-01 DIAGNOSIS — I10 ESSENTIAL HYPERTENSION: Primary | ICD-10-CM

## 2023-02-01 NOTE — TELEPHONE ENCOUNTER
Caller: Casandra Arango    Relationship: Self    Best call back number: [phone]     Requested Prescriptions:   Requested Prescriptions     Pending Prescriptions Disp Refills   • cetirizine (zyrTEC) 10 MG tablet 90 tablet 1     Sig: Take 1 tablet by mouth Daily.        Pharmacy where request should be sent: [facility] [place] [facility] [place] - [address] [phone] [facility] [phone] [place]     Does the patient have less than a 3 day supply:  [x] Yes  [] No    Would you like a call back once the refill request has been completed: [x] Yes [] No    If the office needs to give you a call back, can they leave a voicemail: [x] Yes [] No    Satya Arroyo Rep   02/01/23 12:24 EST       PATIENT IS ALSO REQUESTING THAT A NEW PRESCRIPTION BE SENT IN FOR desvenlafaxine (PRISTIQ) 100 MG 24 hr tablet, PATIENT WOULD LIKE THE DOSAGE TO BE DROPPED BACK DOWN TO 50MG.    PATIENT ALSO STATES SHE HAS HAD ONGOING HEADACHES SINCE STOPPING THE BLOOD PRESSURE MEDICATION AND WOULD LIKE TO KNOW IF LABS SHOULD BE DRAWN TO CHECK POTASSIUM LEVELS AND SEE IF SHE COULD BE PLACED Back on that medication.

## 2023-02-02 RX ORDER — DESVENLAFAXINE SUCCINATE 50 MG/1
TABLET, EXTENDED RELEASE ORAL
Qty: 90 TABLET | Refills: 1 | Status: SHIPPED | OUTPATIENT
Start: 2023-02-02

## 2023-02-02 RX ORDER — CETIRIZINE HYDROCHLORIDE 10 MG/1
10 TABLET ORAL DAILY
Qty: 90 TABLET | Refills: 1 | Status: SHIPPED | OUTPATIENT
Start: 2023-02-02

## 2023-02-02 NOTE — TELEPHONE ENCOUNTER
Staying 143/ /96. Patient took one of her old BP medications on 2/1/23. She states she has had a headache off and on. She will see us tomorrow

## 2023-02-02 NOTE — TELEPHONE ENCOUNTER
Yes please tell her I changed pristiq to 50mg daily.    BMP ordered and mag for her to get prior to appt  Can call in lisinopril 20mg daily if she hasn't had any issues with it in the past, or just wait for appt tomorrow.

## 2023-02-02 NOTE — TELEPHONE ENCOUNTER
Pt aware, lisinopril did make pt cough when she was on it before and did not like it. Pt will collect labs tomorrow at appt and also discuss bp meds further

## 2023-02-03 ENCOUNTER — OFFICE VISIT (OUTPATIENT)
Dept: INTERNAL MEDICINE | Facility: CLINIC | Age: 68
End: 2023-02-03
Payer: MEDICARE

## 2023-02-03 VITALS
WEIGHT: 220.4 LBS | SYSTOLIC BLOOD PRESSURE: 154 MMHG | TEMPERATURE: 97.9 F | OXYGEN SATURATION: 96 % | DIASTOLIC BLOOD PRESSURE: 70 MMHG | BODY MASS INDEX: 33.51 KG/M2 | HEART RATE: 71 BPM

## 2023-02-03 DIAGNOSIS — M51.36 DEGENERATION OF LUMBAR INTERVERTEBRAL DISC: ICD-10-CM

## 2023-02-03 DIAGNOSIS — E55.9 VITAMIN D DEFICIENCY: ICD-10-CM

## 2023-02-03 DIAGNOSIS — Z78.9 ACE INHIBITOR INTOLERANCE: ICD-10-CM

## 2023-02-03 DIAGNOSIS — I10 ESSENTIAL HYPERTENSION: Primary | ICD-10-CM

## 2023-02-03 DIAGNOSIS — E78.2 MIXED HYPERLIPIDEMIA: ICD-10-CM

## 2023-02-03 DIAGNOSIS — F33.2 MAJOR DEPRESSIVE DISORDER, RECURRENT, SEVERE WITHOUT PSYCHOTIC FEATURES: ICD-10-CM

## 2023-02-03 PROBLEM — B86 SCABIES: Status: RESOLVED | Noted: 2021-09-01 | Resolved: 2023-02-03

## 2023-02-03 LAB
ANION GAP SERPL CALCULATED.3IONS-SCNC: 7 MMOL/L (ref 5–15)
BUN SERPL-MCNC: 21 MG/DL (ref 8–23)
BUN/CREAT SERPL: 25.9 (ref 7–25)
CALCIUM SPEC-SCNC: 9.7 MG/DL (ref 8.6–10.5)
CHLORIDE SERPL-SCNC: 102 MMOL/L (ref 98–107)
CO2 SERPL-SCNC: 30 MMOL/L (ref 22–29)
CREAT SERPL-MCNC: 0.81 MG/DL (ref 0.57–1)
EGFRCR SERPLBLD CKD-EPI 2021: 80.2 ML/MIN/1.73
GLUCOSE SERPL-MCNC: 88 MG/DL (ref 65–99)
MAGNESIUM SERPL-MCNC: 3.8 MG/DL (ref 1.6–2.4)
POTASSIUM SERPL-SCNC: 4 MMOL/L (ref 3.5–5.2)
SODIUM SERPL-SCNC: 139 MMOL/L (ref 136–145)

## 2023-02-03 PROCEDURE — 99214 OFFICE O/P EST MOD 30 MIN: CPT | Performed by: INTERNAL MEDICINE

## 2023-02-03 PROCEDURE — 80048 BASIC METABOLIC PNL TOTAL CA: CPT | Performed by: INTERNAL MEDICINE

## 2023-02-03 PROCEDURE — 83735 ASSAY OF MAGNESIUM: CPT | Performed by: INTERNAL MEDICINE

## 2023-02-03 RX ORDER — LOSARTAN POTASSIUM AND HYDROCHLOROTHIAZIDE 12.5; 5 MG/1; MG/1
1 TABLET ORAL DAILY
Qty: 90 TABLET | Refills: 1 | Status: SHIPPED | OUTPATIENT
Start: 2023-02-03 | End: 2023-03-09 | Stop reason: SINTOL

## 2023-02-03 NOTE — PROGRESS NOTES
Chief Complaint  Hypertension (Follow up /)    Subjective          Casandra Arango presents to [facility]  History of Present Illness     Since being off the blood pressure medication she has noticed headaches  She has felt tired and like she can't sleep well    Her daughter was recently in a car accident  And life has been busy for the last month  Her mother in law was in the hospital  Her son went to [facility]    She has been playing piano recently and has started playing at [employer] some    Has decreased her pristiq to 50mg daily and feels good at this dose      Objective   Vital Signs:   /70   Pulse 71   Temp 97.9 °F (36.6 °C) (Temporal)   Wt 100 kg (220 lb 6.4 oz)   SpO2 96%   BMI 33.51 kg/m²     Physical Exam  Vitals reviewed.   Constitutional:       Appearance: Normal appearance. She is well-developed. She is obese.   HENT:      Head: Normocephalic and atraumatic.      Right Ear: External ear normal.      Left Ear: External ear normal.   Eyes:      Conjunctiva/sclera: Conjunctivae normal.      Pupils: Pupils are equal, round, and reactive to light.   Cardiovascular:      Rate and Rhythm: Normal rate and regular rhythm.      Heart sounds: No murmur heard.    No friction rub. No gallop.   Pulmonary:      Effort: Pulmonary effort is normal.      Breath sounds: Normal breath sounds. No wheezing or rhonchi.   Skin:     General: Skin is warm and dry.   Neurological:      Mental Status: She is alert and oriented to person, place, and time.   Psychiatric:         Mood and Affect: Affect normal.         Behavior: Behavior normal.         Thought Content: Thought content normal.        Result Review :       Common labs    Common Labs 2/21/22 2/21/22 2/21/22 1/10/23 1/10/23 1/10/23    1241 1241 1241 1601 1601 1601   Glucose  81   111 (A)    BUN  27 (A)   30 (A)    Creatinine  0.97   0.99    eGFR Non African Am  58 (A)       Sodium  139   140    Potassium  3.9    3.2 (A)    Chloride  99   101    Calcium  10.1   9.4    Albumin  4.60   3.8    Total Bilirubin  0.2   0.2    Alkaline Phosphatase  143 (A)   137 (A)    AST (SGOT)  15   8    ALT (SGPT)  14   24    WBC 8.76   10.33     Hemoglobin 13.3   13.5     Hematocrit 40.9   41.6     Platelets 466 (A)   404     Total Cholesterol   231 (A)   246 (A)   Triglycerides   93   191 (A)   HDL Cholesterol   58   49   LDL Cholesterol    157 (A)   162 (A)   (A) Abnormal value              Results for orders placed or performed in visit on 01/10/23   Comprehensive Metabolic Panel    Specimen: Blood   Result Value Ref Range    Glucose 111 (H) 65 - 99 mg/dL    BUN 30 (H) 8 - 23 mg/dL    Creatinine 0.99 0.57 - 1.00 mg/dL    Sodium 140 136 - 145 mmol/L    Potassium 3.2 (L) 3.5 - 5.2 mmol/L    Chloride 101 98 - 107 mmol/L    CO2 26.6 22.0 - 29.0 mmol/L    Calcium 9.4 8.6 - 10.5 mg/dL    Total Protein 6.9 6.0 - 8.5 g/dL    Albumin 3.8 3.5 - 5.2 g/dL    ALT (SGPT) 24 1 - 33 U/L    AST (SGOT) 8 1 - 32 U/L    Alkaline Phosphatase 137 (H) 39 - 117 U/L    Total Bilirubin 0.2 0.0 - 1.2 mg/dL    Globulin 3.1 gm/dL    A/G Ratio 1.2 g/dL    BUN/Creatinine Ratio 30.3 (H) 7.0 - 25.0    Anion Gap 12.4 5.0 - 15.0 mmol/L    eGFR 63.0 >60.0 mL/min/1.73   TSH    Specimen: Blood   Result Value Ref Range    TSH 1.590 0.270 - 4.200 uIU/mL   Lipid Panel    Specimen: Blood   Result Value Ref Range    Total Cholesterol 246 (H) 0 - 200 mg/dL    Triglycerides 191 (H) 0 - 150 mg/dL    HDL Cholesterol 49 40 - 60 mg/dL    LDL Cholesterol  162 (H) 0 - 100 mg/dL    VLDL Cholesterol 35 5 - 40 mg/dL    LDL/HDL Ratio 3.24    Iron Profile    Specimen: Blood   Result Value Ref Range    Iron 66 37 - 145 mcg/dL    Iron Saturation 20 20 - 50 %    Transferrin 217 200 - 360 mg/dL    TIBC 323 298 - 536 mcg/dL   Vitamin D 25 Hydroxy    Specimen: Blood   Result Value Ref Range    25 Hydroxy, Vitamin D 46.9 30.0 - 100.0 ng/ml   Vitamin B12 & Folate    Specimen: Blood   Result Value Ref  Range    Folate >20.00 4.78 - 24.20 ng/mL    Vitamin B-12 525 211 - 946 pg/mL   CBC Auto Differential    Specimen: Blood   Result Value Ref Range    WBC 10.33 3.40 - 10.80 10*3/mm3    RBC 4.64 3.77 - 5.28 10*6/mm3    Hemoglobin 13.5 12.0 - 15.9 g/dL    Hematocrit 41.6 34.0 - 46.6 %    MCV 89.7 79.0 - 97.0 fL    MCH 29.1 26.6 - 33.0 pg    MCHC 32.5 31.5 - 35.7 g/dL    RDW 12.7 12.3 - 15.4 %    RDW-SD 41.8 37.0 - 54.0 fl    MPV 9.6 6.0 - 12.0 fL    Platelets 404 140 - 450 10*3/mm3    Neutrophil % 66.9 42.7 - 76.0 %    Lymphocyte % 22.7 19.6 - 45.3 %    Monocyte % 6.2 5.0 - 12.0 %    Eosinophil % 3.1 0.3 - 6.2 %    Basophil % 0.8 0.0 - 1.5 %    Immature Grans % 0.3 0.0 - 0.5 %    Neutrophils, Absolute 6.92 1.70 - 7.00 10*3/mm3    Lymphocytes, Absolute 2.34 0.70 - 3.10 10*3/mm3    Monocytes, Absolute 0.64 0.10 - 0.90 10*3/mm3    Eosinophils, Absolute 0.32 0.00 - 0.40 10*3/mm3    Basophils, Absolute 0.08 0.00 - 0.20 10*3/mm3    Immature Grans, Absolute 0.03 0.00 - 0.05 10*3/mm3    nRBC 0.0 0.0 - 0.2 /100 WBC            Procedures        Assessment and Plan    Diagnoses and all orders for this visit:    1. Essential hypertension (Primary)  Comments:  will start meds and monitor for coughing    2. Mixed hyperlipidemia  Comments:  cont to montior    3. ACE inhibitor intolerance  Comments:  will try losartan but montior closely    4. Vitamin D deficiency    5. Major depressive disorder, recurrent, severe without psychotic features (HCC)  Comments:  stay on pristiq at currently    6. Degeneration of lumbar intervertebral disc  Comments:  cont to follow [facility] pain management    Other orders  -     losartan-hydrochlorothiazide (Hyzaar) 50-12.5 MG per tablet; Take 1 tablet by mouth Daily.  Dispense: 90 tablet; Refill: 1        BMI is >= 30 and <35. (Class 1 Obesity). The following options were offered after discussion;: exercise counseling/recommendations and nutrition counseling/recommendations            Follow Up   Return in  about 3 months (around 5/3/2023).  Patient was given instructions and counseling regarding her condition or for health maintenance advice. Please see specific information pulled into the AVS if appropriate.

## 2023-02-07 DIAGNOSIS — E83.41 HYPERMAGNESEMIA: Primary | ICD-10-CM

## 2023-02-09 ENCOUNTER — TELEPHONE (OUTPATIENT)
Dept: INTERNAL MEDICINE | Facility: CLINIC | Age: 68
End: 2023-02-09

## 2023-02-09 DIAGNOSIS — I10 HYPERTENSION, UNSPECIFIED TYPE: Primary | ICD-10-CM

## 2023-02-09 RX ORDER — AMLODIPINE BESYLATE 5 MG/1
5 TABLET ORAL DAILY
Qty: 30 TABLET | Refills: 2 | Status: SHIPPED | OUTPATIENT
Start: 2023-02-09 | End: 2023-05-03 | Stop reason: SDUPTHER

## 2023-02-09 NOTE — TELEPHONE ENCOUNTER
Yes please ensure she has stopped lisionpril and start amlodipine 5mg daily and adjust med list for this.

## 2023-02-09 NOTE — TELEPHONE ENCOUNTER
Called and patient is aware, sent in. It was not lisinopril but it was the losartan and she did stop taking it.

## 2023-02-09 NOTE — TELEPHONE ENCOUNTER
Caller: Casandra Arango    Relationship to patient: Self    Best call back number: [phone]     Patient is needing: PATIENT CALLED IN AND SAID THAT SHE THINKS SHE IS ALLERGIC TO LISINOPRIL. SHE STATED THAT SHE HAD TAKEN IT TWICE AND STARTED ITCHING SEVERELY AFTER TAKING MEDICATION. SHE SPOKE WITH ON CALL PHYSICIAN LAST NIGHT AND HAS TAKEN BENADRYL AND IS NO LONGER ITCHING. ON CALL PHYSICIAN ADVISED HER TO CALL AND ALERT PCP. SHE WOULD LIKE ANOTHER BLOOD PRESSURE MEDICATION IF POSSIBLE    [facility] [place] [facility] - [place] - [address] - [phone]  - [phone]   [phone]

## 2023-03-09 ENCOUNTER — OFFICE VISIT (OUTPATIENT)
Dept: PODIATRY | Facility: CLINIC | Age: 68
End: 2023-03-09
Payer: MEDICARE

## 2023-03-09 VITALS
OXYGEN SATURATION: 100 % | DIASTOLIC BLOOD PRESSURE: 67 MMHG | SYSTOLIC BLOOD PRESSURE: 139 MMHG | HEIGHT: 68 IN | HEART RATE: 67 BPM | WEIGHT: 220 LBS | TEMPERATURE: 98.2 F | BODY MASS INDEX: 33.34 KG/M2

## 2023-03-09 DIAGNOSIS — M20.5X1 HALLUX LIMITUS OF RIGHT FOOT: Primary | ICD-10-CM

## 2023-03-09 DIAGNOSIS — M79.671 FOOT PAIN, RIGHT: ICD-10-CM

## 2023-03-09 DIAGNOSIS — S93.602A FOOT SPRAIN, LEFT, INITIAL ENCOUNTER: ICD-10-CM

## 2023-03-09 PROCEDURE — 99213 OFFICE O/P EST LOW 20 MIN: CPT | Performed by: PODIATRIST

## 2023-03-09 PROCEDURE — 20605 DRAIN/INJ JOINT/BURSA W/O US: CPT | Performed by: PODIATRIST

## 2023-03-09 RX ORDER — BUPIVACAINE HYDROCHLORIDE 2.5 MG/ML
0.5 INJECTION, SOLUTION INFILTRATION; PERINEURAL ONCE
Status: COMPLETED | OUTPATIENT
Start: 2023-03-09 | End: 2023-03-09

## 2023-03-09 RX ORDER — DEXAMETHASONE SODIUM PHOSPHATE 4 MG/ML
2 INJECTION, SOLUTION INTRA-ARTICULAR; INTRALESIONAL; INTRAMUSCULAR; INTRAVENOUS; SOFT TISSUE ONCE
Status: COMPLETED | OUTPATIENT
Start: 2023-03-09 | End: 2023-03-09

## 2023-03-09 RX ADMIN — DEXAMETHASONE SODIUM PHOSPHATE 2 MG: 4 INJECTION, SOLUTION INTRA-ARTICULAR; INTRALESIONAL; INTRAMUSCULAR; INTRAVENOUS; SOFT TISSUE at 15:55

## 2023-03-09 RX ADMIN — BUPIVACAINE HYDROCHLORIDE 0.5 ML: 2.5 INJECTION, SOLUTION INFILTRATION; PERINEURAL at 15:54

## 2023-03-09 NOTE — PROGRESS NOTES
[facility] - PODIATRY    Today's Date: 03/09/23    Patient Name: Casandra Arango  MRN: [id number]  CSN: [id number]  PCP: Teresa Herman MD  Referring Provider: No ref. provider found    SUBJECTIVE     Chief Complaint   Patient presents with   • Right Foot - Follow-up, Pain     Wants injection    • Left Foot - Pain     Turned foot while walking on the sidewalk x 1 week ago     HPI: Casandra Arango, a 67 y.o.female, presents to clinic.    New, Established, New Problem: Established    Location:  Right 1st MPJ    Onset:  incidious    Nature:  Sore, achy    Stable, worsening, improving:  worsening    Aggravating factors:  Patient relates pain is aggravated by shoe gear and ambulation.      Previous Treatment:  Cortisone injection    Patient denies any fevers, chills, nausea, vomiting, shortness of breath, nor any other constitutional signs nor symptoms.    New, Established, New Problem: New problem  Location: Left dorsal midfoot  Duration: Last week  Onset: Acute, turned her foot walking on the sidewalk  Nature: Sore  Stable, worsening, improving: Improving  Aggravating factors:  Patient relates pain is aggravated by shoe gear and ambulation.    Previous Treatment: RICE therapy    Patient denies any fevers, chills, nausea, vomiting, shortness of breathe, nor any other constitutional signs nor symptoms.      Past Medical History:   Diagnosis Date   • Acid reflux    • Allergic rhinitis    • Anemia, unspecified    • Anxiety    • Arthritis    • Chronic cough    • Closed nondisplaced fracture of distal phalanx of right great toe 01/19/2018    WITH ROUTINE HEALING , SUBSEQUENT ENCOUNTER    • Closed nondisplaced fracture of distal phalanx of right great toe 12/26/2017    INITIAL ENCOUNTER    • Colon polyp 2016?   • Difficulty walking Last year   • Edema of right lower extremity 10/10/2015    DICUSSED CONTINUING ELEVATION OF THE FOOT AND WEARING COMPRESSION STOCKIGS   • Foot pain, left    • GERD  (gastroesophageal reflux disease)    • Globus sensation    • Hemorrhoids    • Hyperlipemia    • Hypertension    • Insomnia, unspecified 10/10/2015    TOLD HER TO TAKE ABLIFY DURING THE MORNING RATHER THAN IN THE EVENING AND SEE IF THAT HELPS WITH THE INSOMNIA HOWEVER IF IT DOES NOT WILL NEED TO CONSIDER FURTHER TREATMENT.    • Kidney stones    • Major depressive disorder, recurrent (HCC) 10/10/2015    SEVERE WITHOUT PSYCHOTIC FEATURES   • Migraine headache    • Night sweats    • Primary osteoarthritis of both knees 10/10/2015   • Primary osteoarthritis of left knee 12/26/2017   • Primary osteoarthritis of left shoulder 12/12/2017   • Seasonal allergies    • SOB (shortness of breath)    • Stress fracture    • Toe pain, right     great toe   • Urinary tract infection    • Vitamin D deficiency 01/07/2015     Past Surgical History:   Procedure Laterality Date   • ADENOIDECTOMY  1970   • BACK SURGERY      LUMBAR   • COLONOSCOPY  2011   • HEMORRHOIDECTOMY     • HYSTERECTOMY  2018?   • JOINT REPLACEMENT  2020   • KIDNEY SURGERY      KIDNEY STONE SURGERY UNSPECIFIED    • KNEE SURGERY      ARTHROSCOPIC KNEE SURGERY   • OTHER SURGICAL HISTORY Right 08/01/2000    ARM ORIF BY DR. BECERRA   • OTHER SURGICAL HISTORY      ARTIFICAL JOINTS/LIMBS   • OTHER SURGICAL HISTORY      JOINT SURGERY   • SPINE SURGERY  ?   • TIBIA FRACTURE SURGERY  09/11/2013    LEFT TIBIAL ORIF BY DR. PÉREZ   • TONSILLECTOMY     • TOTAL HIP ARTHROPLASTY Right 12/16/2013        • URETEROSCOPY LASER LITHOTRIPSY WITH STENT INSERTION Left 10/15/2021    Procedure: Cystoscopy with left ureteroscopy with laser and left ureteral stent placement;  Surgeon: Reuben Yuan MD;  Location: [facility] OR;  Service: Urology;  Laterality: Left;     Family History   Problem Relation Age of Onset   • Skin cancer Mother    • Hypertension Mother    • Cancer Mother    • Kidney cancer Father    • Diabetes Sister    • Vision loss Paternal Grandfather    • Drug abuse  Son    • Mental illness Sister    • Mental illness Sister    • Cancer Sister    • Mental illness Sister    • Cancer Sister    • Mental illness Brother      Social History     Socioeconomic History   • Marital status:    Tobacco Use   • Smoking status: Never   • Smokeless tobacco: Never   Vaping Use   • Vaping Use: Never used   Substance and Sexual Activity   • Alcohol use: Not Currently     Comment: CURRENT SOME DAY    • Drug use: Never   • Sexual activity: Not Currently     Partners: Male     Birth control/protection: None     Allergies   Allergen Reactions   • Nabumetone Itching   • Celebrex [Celecoxib] Other (See Comments)     Eye problem   • Hydrocodone Bit-Homatrop Mbr Itching   • Lamotrigine Hives   • Propoxyphene Unknown - Low Severity     Darvocet  Other reaction(s): Rash to arms, torso   • Losartan Rash   • Terbinafine Rash     Current Outpatient Medications   Medication Sig Dispense Refill   • acetaminophen (Tylenol) 325 MG tablet Take 2 tablets by mouth Every 6 (Six) Hours As Needed for Mild Pain. 90 tablet 1   • amLODIPine (NORVASC) 5 MG tablet Take 1 tablet by mouth Daily. 30 tablet 2   • cetirizine (zyrTEC) 10 MG tablet Take 1 tablet by mouth Daily. 90 tablet 1   • ciclopirox (LOPROX) 0.77 % gel Apply 1 application topically to the appropriate area as directed 2 (Two) Times a Day. 30 g 1   • desvenlafaxine (PRISTIQ) 50 MG 24 hr tablet Take one tab po daily 90 tablet 1   • diclofenac (VOLTAREN) 75 MG EC tablet Take 1 tablet by mouth 2 (Two) Times a Day for 90 days. 180 tablet 0   • Diclofenac Sodium (VOLTAREN) 1 % gel gel Apply  topically to the appropriate area as directed 2 (Two) Times a Day. 100 g 2   • fluticasone (FLONASE) 50 MCG/ACT nasal spray 2 sprays into the nostril(s) as directed by provider Daily. 16 g 1   • hydrOXYzine (ATARAX) 25 MG tablet Take 1 tablet by mouth Daily. 90 tablet 1   • omeprazole (priLOSEC) 40 MG capsule Take 1 capsule by mouth Daily. 90 capsule 1   • ondansetron ODT  (Zofran ODT) 4 MG disintegrating tablet Place 1 tablet on the tongue Every 8 (Eight) Hours As Needed for Nausea or Vomiting. 30 tablet 1   • penciclovir (Denavir) 1 % cream Apply  topically to the appropriate area as directed 2 (Two) Times a Day. 1.5 g 2   • rizatriptan (MAXALT) 10 MG tablet Take 1 tablet by mouth 1 (One) Time As Needed for Migraine for up to 1 dose. May repeat in 2 hours if needed 9 tablet 2   • valACYclovir (VALTREX) 500 MG tablet Take 1 tablet by mouth Daily. 90 tablet 1     Current Facility-Administered Medications   Medication Dose Route Frequency Provider Last Rate Last Admin   • bupivacaine (MARCAINE) 0.25 % injection 0.5 mL  0.5 mL Injection Once Jacob Haskins DPM       • dexamethasone sodium phosphate injection 2 mg  2 mg Intramuscular Once Jacob Haskins DPM         Review of Systems   Constitutional: Negative.    Musculoskeletal:        Right 1st MPJ; tripped and sprained her left dorsal midfoot   All other systems reviewed and are negative.      OBJECTIVE     Vitals:    03/09/23 1425   BP: 139/67   Pulse: 67   Temp: 98.2 °F (36.8 °C)   SpO2: 100%       WBC   Date Value Ref Range Status   01/10/2023 10.33 3.40 - 10.80 10*3/mm3 Final   03/07/2018 13.19 (H) 4.5 - 11.0 10*3/uL Final     RBC   Date Value Ref Range Status   01/10/2023 4.64 3.77 - 5.28 10*6/mm3 Final   03/07/2018 4.20 4.0 - 5.2 10*6/uL Final     Hemoglobin   Date Value Ref Range Status   01/10/2023 13.5 12.0 - 15.9 g/dL Final   03/07/2018 12.0 12.0 - 16.0 g/dL Final     Hematocrit   Date Value Ref Range Status   01/10/2023 41.6 34.0 - 46.6 % Final   03/07/2018 37.8 36.0 - 46.0 % Final     MCV   Date Value Ref Range Status   01/10/2023 89.7 79.0 - 97.0 fL Final   03/07/2018 90.0 80.0 - 100.0 fL Final     MCH   Date Value Ref Range Status   01/10/2023 29.1 26.6 - 33.0 pg Final   03/07/2018 28.6 26.0 - 34.0 pg Final     [facility]   Date Value Ref Range Status   01/10/2023 32.5 31.5 - 35.7 g/dL Final   03/07/2018 31.7  31.0 - 37.0 g/dL Final     RDW   Date Value Ref Range Status   01/10/2023 12.7 12.3 - 15.4 % Final   03/07/2018 13.8 12.0 - 16.8 % Final     RDW-SD   Date Value Ref Range Status   01/10/2023 41.8 37.0 - 54.0 fl Final     MPV   Date Value Ref Range Status   01/10/2023 9.6 6.0 - 12.0 fL Final   03/07/2018 8.9 6.7 - 10.8 fL Final     Platelets   Date Value Ref Range Status   01/10/2023 404 140 - 450 10*3/mm3 Final   03/07/2018 404 140 - 440 10*3/uL Final     Neutrophil Rel %   Date Value Ref Range Status   03/07/2018 79.7 45 - 80 % Final     Neutrophil %   Date Value Ref Range Status   01/10/2023 66.9 42.7 - 76.0 % Final     Lymphocyte Rel %   Date Value Ref Range Status   03/07/2018 12.3 (L) 15 - 50 % Final     Lymphocyte %   Date Value Ref Range Status   01/10/2023 22.7 19.6 - 45.3 % Final     Monocyte Rel %   Date Value Ref Range Status   03/07/2018 7.4 0 - 15 % Final     Monocyte %   Date Value Ref Range Status   01/10/2023 6.2 5.0 - 12.0 % Final     Eosinophil %   Date Value Ref Range Status   01/10/2023 3.1 0.3 - 6.2 % Final   03/07/2018 0.2 0 - 7 % Final     Basophil Rel %   Date Value Ref Range Status   03/07/2018 0.2 0 - 2 % Final     Basophil %   Date Value Ref Range Status   01/10/2023 0.8 0.0 - 1.5 % Final     Immature Grans %   Date Value Ref Range Status   01/10/2023 0.3 0.0 - 0.5 % Final   03/07/2018 0.2 (H) 0 % Final     Neutrophils Absolute   Date Value Ref Range Status   03/07/2018 10.51 (H) 2.0 - 8.8 10*3/uL Final     Neutrophils, Absolute   Date Value Ref Range Status   01/10/2023 6.92 1.70 - 7.00 10*3/mm3 Final     Lymphocytes Absolute   Date Value Ref Range Status   03/07/2018 1.62 0.7 - 5.5 10*3/uL Final     Lymphocytes, Absolute   Date Value Ref Range Status   01/10/2023 2.34 0.70 - 3.10 10*3/mm3 Final     Monocytes Absolute   Date Value Ref Range Status   03/07/2018 0.98 0.0 - 1.7 10*3/uL Final     Monocytes, Absolute   Date Value Ref Range Status   01/10/2023 0.64 0.10 - 0.90 10*3/mm3 Final      Eosinophils Absolute   Date Value Ref Range Status   03/07/2018 0.03 0.0 - 0.8 10*3/uL Final     Eosinophils, Absolute   Date Value Ref Range Status   01/10/2023 0.32 0.00 - 0.40 10*3/mm3 Final     Basophils Absolute   Date Value Ref Range Status   03/07/2018 0.03 0.0 - 0.2 10*3/uL Final     Basophils, Absolute   Date Value Ref Range Status   01/10/2023 0.08 0.00 - 0.20 10*3/mm3 Final     Immature Grans, Absolute   Date Value Ref Range Status   01/10/2023 0.03 0.00 - 0.05 10*3/mm3 Final   03/07/2018 0.02 <1 10*3/uL Final     nRBC   Date Value Ref Range Status   01/10/2023 0.0 0.0 - 0.2 /100 WBC Final         Lab Results   Component Value Date    GLUCOSE 88 02/03/2023    BUN 21 02/03/2023    CREATININE 0.81 02/03/2023    EGFRIFNONA 58 (L) 02/21/2022    BCR 25.9 (H) 02/03/2023    K 4.0 02/03/2023    CO2 30.0 (H) 02/03/2023    CALCIUM 9.7 02/03/2023    ALBUMIN 3.8 01/10/2023    LABIL2 1.5 05/04/2021    AST 8 01/10/2023    ALT 24 01/10/2023       Patient seen in no apparent distress.      PHYSICAL EXAM:     Foot/Ankle Exam:       General:   Appearance: appears stated age and healthy    Orientation: AAOx3    Affect: appropriate    Gait: unimpaired    Shoe Gear:  Casual shoes    VASCULAR      Right Foot Vascularity   Normal vascular exam    Dorsalis pedis:  2+  Posterior tibial:  2+  Skin Temperature: warm    Edema Grading:  None  CFT:  < 3 seconds  Pedal Hair Growth:  Present  Varicosities: none       Left Foot Vascularity   Normal vascular exam    Dorsalis pedis:  2+  Posterior tibial:  2+  Skin Temperature: warm    Edema Grading:  None  CFT:  < 3 seconds  Pedal Hair Growth:  Present  Varicosities: none        NEUROLOGIC     Right Foot Neurologic   Normal sensation    Light touch sensation:  Normal  Vibratory sensation:  Normal  Hot/Cold sensation: normal    Protective Sensation using [place] Monofilament:  10     Left Foot Neurologic   Normal sensation    Light touch sensation:  Normal  Vibratory  sensation:  Normal  Hot/cold sensation: normal    Protective Sensation using [place] Monofilament:  10     MUSCULOSKELETAL      Right Foot Musculoskeletal   Ecchymosis:  None  Tenderness: great toe metatarsophalangeal joint       Left Foot Musculoskeletal   Ecchymosis:  None  Tenderness comment:  Dorsal midfoot.  No signs of edema, erythema, lymphangitis, nor signs of infection.       MUSCLE STRENGTH     Right Foot Muscle Strength   Foot dorsiflexion:  4  Foot plantar flexion:  4  Foot inversion:  4  Foot eversion:  4     Left Foot Muscle Strength   Foot dorsiflexion:  4  Foot plantar flexion:  4  Foot inversion:  4  Foot eversion:  4     RANGE OF MOTION      Right Foot Range of Motion   Foot and ankle ROM within normal limits    first MTP active extension pain      first MTP active flexion pain         Left Foot Range of Motion   Foot and ankle ROM within normal limits       DERMATOLOGIC     Right Foot Dermatologic   Skin: skin intact    Nails: normal       Left Foot Dermatologic   Skin: skin intact    Nails: normal        ASSESSMENT/PLAN     Diagnoses and all orders for this visit:    1. Hallux limitus of right foot (Primary)    2. Foot pain, right        Comprehensive lower extremity examination and evaluation was performed.    Discussed findings and treatment plan including risks, benefits, and treatment options with patient in detail. Patient agreed with treatment plan.    Medications and allergies reviewed.  Reviewed available lab values along with other pertinent labs.  These were discussed with the patient.    Injection  Date/Time: 3/9/2023  Performed by: Dr. Haskins  Authorized by: Dr. Haskins    Consent: Verbal consent obtained. Written consent obtained.  Risks and benefits: risks, benefits and alternatives were discussed  Consent given by: patient  Patient identity confirmed: verbally with patient  Indications: pain relief    Betadine prep x 3 to the planned injection site.    Injection site:   Right 1st MPJ    Sedation:  None  Patient sedated: no    Patient position: sitting  Needle size: 27 G  Local anesthetic: 0.25% Marcaine plain injection 1.0 mL plain and 1.0 ml Decadron 4 mg/mL.   Outcome: pain improved  Patient tolerance: Patient tolerated the procedure well with no immediate complications    Patient is to monitor for recurrence and any new symptoms and to contact Dr. Haskins's office for a follow-up appointment.      The patient states understanding and agreement with this plan.    An After Visit Summary was printed and given to the patient at discharge, including (if requested) any available informative/educational handouts regarding diagnosis, treatment, or medications. All questions were answered to patient/family satisfaction. Should symptoms fail to improve or worsen they agree to call or return to clinic or to go to the Emergency Department. Discussed the importance of following up with any needed screening tests/labs/specialist appointments and any requested follow-up recommended by me today. Importance of maintaining follow-up discussed and patient accepts that missed appointments can delay diagnosis and potentially lead to worsening of conditions.    Return if symptoms worsen or fail to improve., or sooner if acute issues arise.    This document has been electronically signed by Jacob Haskins DPM on March 9, 2023 15:53 EST

## 2023-03-13 ENCOUNTER — TELEPHONE (OUTPATIENT)
Dept: INTERNAL MEDICINE | Facility: CLINIC | Age: 68
End: 2023-03-13
Payer: MEDICARE

## 2023-03-13 NOTE — TELEPHONE ENCOUNTER
Caller: Casandra Arango    Relationship: Self    Best call back number:[phone]    What form or medical record are you requesting: COVID VACCINE RECORD    Who is requesting this form or medical record from you: SELF    How would you like to receive the form or medical records (pick-up, mail, fax):       Timeframe paperwork needed: AS SOON AS POSSIBLE NEEDS BY 3.14.2023

## 2023-03-14 ENCOUNTER — CLINICAL SUPPORT (OUTPATIENT)
Dept: INTERNAL MEDICINE | Facility: CLINIC | Age: 68
End: 2023-03-14
Payer: MEDICARE

## 2023-03-14 DIAGNOSIS — E83.41 HYPERMAGNESEMIA: ICD-10-CM

## 2023-03-14 LAB
ALBUMIN SERPL-MCNC: 4.3 G/DL (ref 3.5–5.2)
ALBUMIN/GLOB SERPL: 1.7 G/DL
ALP SERPL-CCNC: 134 U/L (ref 39–117)
ALT SERPL W P-5'-P-CCNC: 21 U/L (ref 1–33)
ANION GAP SERPL CALCULATED.3IONS-SCNC: 9.7 MMOL/L (ref 5–15)
AST SERPL-CCNC: 19 U/L (ref 1–32)
BILIRUB SERPL-MCNC: 0.2 MG/DL (ref 0–1.2)
BUN SERPL-MCNC: 32 MG/DL (ref 8–23)
BUN/CREAT SERPL: 49.2 (ref 7–25)
CALCIUM SPEC-SCNC: 9.7 MG/DL (ref 8.6–10.5)
CHLORIDE SERPL-SCNC: 105 MMOL/L (ref 98–107)
CO2 SERPL-SCNC: 28.3 MMOL/L (ref 22–29)
CREAT SERPL-MCNC: 0.65 MG/DL (ref 0.57–1)
EGFRCR SERPLBLD CKD-EPI 2021: 97.2 ML/MIN/1.73
GLOBULIN UR ELPH-MCNC: 2.5 GM/DL
GLUCOSE SERPL-MCNC: 79 MG/DL (ref 65–99)
MAGNESIUM SERPL-MCNC: 2.2 MG/DL (ref 1.6–2.4)
POTASSIUM SERPL-SCNC: 4.4 MMOL/L (ref 3.5–5.2)
PROT SERPL-MCNC: 6.8 G/DL (ref 6–8.5)
SODIUM SERPL-SCNC: 143 MMOL/L (ref 136–145)

## 2023-03-14 PROCEDURE — 80053 COMPREHEN METABOLIC PANEL: CPT | Performed by: INTERNAL MEDICINE

## 2023-03-14 PROCEDURE — 36415 COLL VENOUS BLD VENIPUNCTURE: CPT | Performed by: INTERNAL MEDICINE

## 2023-03-14 PROCEDURE — 83735 ASSAY OF MAGNESIUM: CPT | Performed by: INTERNAL MEDICINE

## 2023-03-30 ENCOUNTER — OFFICE VISIT (OUTPATIENT)
Dept: INTERNAL MEDICINE | Facility: CLINIC | Age: 68
End: 2023-03-30
Payer: MEDICARE

## 2023-03-30 VITALS
SYSTOLIC BLOOD PRESSURE: 185 MMHG | DIASTOLIC BLOOD PRESSURE: 96 MMHG | WEIGHT: 221 LBS | OXYGEN SATURATION: 97 % | TEMPERATURE: 98.2 F | HEIGHT: 68 IN | BODY MASS INDEX: 33.49 KG/M2 | HEART RATE: 80 BPM

## 2023-03-30 DIAGNOSIS — M25.471 PAIN AND SWELLING OF RIGHT ANKLE: Primary | ICD-10-CM

## 2023-03-30 DIAGNOSIS — I10 ESSENTIAL HYPERTENSION: ICD-10-CM

## 2023-03-30 DIAGNOSIS — M25.571 PAIN AND SWELLING OF RIGHT ANKLE: Primary | ICD-10-CM

## 2023-03-30 PROBLEM — M96.1 LUMBAR POST-LAMINECTOMY SYNDROME: Status: ACTIVE | Noted: 2023-03-30

## 2023-03-30 PROCEDURE — 3077F SYST BP >= 140 MM HG: CPT

## 2023-03-30 PROCEDURE — 99214 OFFICE O/P EST MOD 30 MIN: CPT

## 2023-03-30 PROCEDURE — 3080F DIAST BP >= 90 MM HG: CPT

## 2023-03-30 RX ORDER — CHLORTHALIDONE 25 MG/1
TABLET ORAL
COMMUNITY
Start: 2023-02-06

## 2023-03-30 RX ORDER — LOSARTAN POTASSIUM AND HYDROCHLOROTHIAZIDE 12.5; 5 MG/1; MG/1
TABLET ORAL
COMMUNITY
End: 2023-03-30

## 2023-03-30 RX ORDER — DESVENLAFAXINE 100 MG/1
TABLET, EXTENDED RELEASE ORAL
COMMUNITY

## 2023-03-30 NOTE — ASSESSMENT & PLAN NOTE
Patient is currently taking 5 mg of Norvasc daily.  Blood pressure significantly elevated in office.  Patient denies symptoms.  Patient to continue to monitor blood pressure once to twice daily x1 week and report back to the office if staying elevated.  Patient was on 25 mg chlorthalidone prior to having episode of hypokalemia.  Most recent blood work was within normal limits.  Could consider adding on chlorthalidone again or increasing Norvasc to 10 mg.  Patient states she will do a low-sodium diet and monitor her blood pressure.  Patient has a previously existing appointment in May with PCP.  Discussed I want to see her sooner than that due to significantly elevated blood pressure.  Will discuss blood pressure medication with PCP and advise patient further.  Could consider Lo-Trel (amlodipine/benazepril) as alternative prescription.

## 2023-03-30 NOTE — PROGRESS NOTES
Answers for HPI/ROS submitted by the patient on 3/30/2023  What is the primary reason for your visit?: Lower Extremity Injury  Incident occurred: more than 1 week ago  Incident location: in the street  Injury mechanism: a fall  Pain location: right foot  Pain quality: aching  Pain - numeric: 3/10  Pain course: fluctuating  tingling: No  inability to bear weight: No  loss of motion: No  loss of sensation: No  muscle weakness: No  Foreign body present: no foreign bodies  Aggravated by: weight bearing    Chief Complaint  Foot Injury (Pt fell 2 weeks ago and is having right foot pain.)    Subjective      Casandra Arango presents to [facility]  History of Present Illness    Casandra is here for right foot pain. She reports that two weeks ago she fell in the street and injured her foot. She fell face forward, rolled her ankle.  She is still having pain. It is described as an aching pain. She rates it on a scale of 3/10. She said walking on it really bothers it. She still has some swelling.  She states she has an elevated pain tolerance and has had a stress fracture in that foot before.      HTN -   Initially on chlorthalidone - had low potassium so was discontinued from that  Had an allergic reaction to losartan (rash)  Started norvasc - was doing okay, didn't have a rash from that   She came in on 3/15 for labs after being on the norvasc which she was told everything was fine.  Blood pressure significantly elevated today in office.  She denies any worsening chest pain/difficulty breathing.  She reports 1 episode of a noise coming from her upper airway while she was [place] and it took about 45 minutes to resolve but it has not returned.  She denies any increased shortness of breath with walking.  She denies any frequent headaches.  She denies any bilateral lower extremity swelling.      Current Outpatient Medications   Medication Instructions   • acetaminophen  "(TYLENOL) 650 mg, Oral, Every 6 Hours PRN   • amLODIPine (NORVASC) 5 mg, Oral, Daily   • cetirizine (ZYRTEC) 10 mg, Oral, Daily   • chlorthalidone (HYGROTON) 25 MG tablet chlorthalidone 25 mg tablet   • ciclopirox (LOPROX) 0.77 % gel 1 application, Topical, 2 Times Daily   • desvenlafaxine (PRISTIQ) 100 MG 24 hr tablet desvenlafaxine succinate  mg tablet,extended release 24 hr   • desvenlafaxine (PRISTIQ) 50 MG 24 hr tablet Take one tab po daily   • diclofenac (VOLTAREN) 75 mg, Oral, 2 Times Daily   • Diclofenac Sodium (VOLTAREN) 1 % gel gel Topical, 2 Times Daily   • fluticasone (FLONASE) 50 MCG/ACT nasal spray 2 sprays, Nasal, Daily   • hydrOXYzine (ATARAX) 25 mg, Oral, Daily   • omeprazole (PRILOSEC) 40 mg, Oral, Daily   • ondansetron ODT (ZOFRAN ODT) 4 mg, Translingual, Every 8 Hours PRN   • penciclovir (Denavir) 1 % cream Topical, 2 Times Daily   • Prenatal MV-Min-Fe Fum-FA-DHA (PRENATAL 1 PO) Prenatal   • rizatriptan (MAXALT) 10 mg, Oral, Once As Needed, May repeat in 2 hours if needed   • valACYclovir (VALTREX) 500 mg, Oral, Daily       The following portions of the patient's history were reviewed and updated as appropriate: allergies, current medications, past family history, past medical history, past social history, past surgical history, and problem list.    Objective   Vital Signs:   BP (!) 185/96   Pulse 80   Temp 98.2 °F (36.8 °C)   Ht 172.7 cm (68\")   Wt 100 kg (221 lb)   SpO2 97%   BMI 33.60 kg/m²     Wt Readings from Last 3 Encounters:   03/30/23 100 kg (221 lb)   03/09/23 99.8 kg (220 lb)   02/03/23 100 kg (220 lb 6.4 oz)     BP Readings from Last 3 Encounters:   03/30/23 (!) 185/96   03/09/23 139/67   02/03/23 154/70     Physical Exam  Vitals reviewed.   Constitutional:       Appearance: Normal appearance. She is well-developed.   HENT:      Head: Normocephalic and atraumatic.      Mouth/Throat:      Pharynx: No oropharyngeal exudate.   Eyes:      Conjunctiva/sclera: Conjunctivae " normal.      Pupils: Pupils are equal, round, and reactive to light.   Cardiovascular:      Rate and Rhythm: Normal rate and regular rhythm.      Heart sounds: No murmur heard.    No friction rub. No gallop.   Pulmonary:      Effort: Pulmonary effort is normal.      Breath sounds: Normal breath sounds. No wheezing or rhonchi.   Musculoskeletal:      Right lower leg: No edema.      Left lower leg: No edema.      Right ankle: Swelling (mild) present. Normal range of motion.   Skin:     General: Skin is warm and dry.   Neurological:      Mental Status: She is alert and oriented to person, place, and time.   Psychiatric:         Mood and Affect: Affect normal.          Result Review :  The following data was reviewed by: SHEY Pickett on 03/30/2023:      Common labs    Common Labs 1/10/23 1/10/23 1/10/23 2/3/23 3/14/23    1601 1601 1601     Glucose  111 (A)  88 79   BUN  30 (A)  21 32 (A)   Creatinine  0.99  0.81 0.65   Sodium  140  139 143   Potassium  3.2 (A)  4.0 4.4   Chloride  101  102 105   Calcium  9.4  9.7 9.7   Albumin  3.8   4.3   Total Bilirubin  0.2   0.2   Alkaline Phosphatase  137 (A)   134 (A)   AST (SGOT)  8   19   ALT (SGPT)  24   21   WBC 10.33       Hemoglobin 13.5       Hematocrit 41.6       Platelets 404       Total Cholesterol   246 (A)     Triglycerides   191 (A)     HDL Cholesterol   49     LDL Cholesterol    162 (A)     (A) Abnormal value              Lab Results (last 72 hours)     ** No results found for the last 72 hours. **           MRI Lumbar Spine Without Contrast    Result Date: 12/12/2022    1. Changes from prior left L5-S1 hemilaminotomy. 2. Moderate multilevel degenerative changes of lumbar spine as described above, which overall have progressed from prior MRI.      LIBRA WELDON MD       Electronically Signed and Approved By: LIBRA WELDON MD on 12/12/2022 at 12:44             MRI Breast Bilateral With & Without Contrast    Result Date: 1/23/2023  No MR findings to  indicate malignancy in either breast.  Annual screening mammography is recommended.  If she has elevated risk factors based on risk assessment, then annual screening breast MRI should also be considered.  BIRADS: DIAGNOSTIC CATEGORY 2--BENIGN FINDING   RECOMMENDATION(S): ROUTINE MAMMOGRAM AND CLINICAL EVALUATION IN 12 MONTHS.    PLEASE NOTE:  A NORMAL MRI DOES NOT EXCLUDE THE POSSIBILITY OF BREAST CANCER.  A CLINICALLY SUSPICIOUS PALPABLE LUMP SHOULD BE BIOPSIED.      ALTAGRACIA GUSTAFSON DO       Electronically Signed and Approved By: ALTAGRACIA GUSTAFSON DO on 1/23/2023 at 11:19               Lab Results   Component Value Date    SARSANTIGEN Not Detected 02/14/2022    COVID19 NOT DETECTED 11/16/2020    RAPFLUA Negative 02/14/2022    RAPFLUB Negative 02/14/2022    RAPSCRN Negative 02/14/2022    INR 0.91 (L) 11/10/2020    BILIRUBINUR Negative 10/14/2021    POCGLU 105 03/06/2018       Procedures        Assessment and Plan   Diagnoses and all orders for this visit:    1. Pain and swelling of right ankle (Primary)  Comments:  Due to history of stress fracture in that foot and continued swelling will obtain x-ray.  Discussed boot however will wait for results of x-ray.  Orders:  -     XR Ankle 3+ View Right    2. Essential hypertension  Assessment & Plan:  Patient is currently taking 5 mg of Norvasc daily.  Blood pressure significantly elevated in office.  Patient denies symptoms.  Patient to continue to monitor blood pressure once to twice daily x1 week and report back to the office if staying elevated.  Patient was on 25 mg chlorthalidone prior to having episode of hypokalemia.  Most recent blood work was within normal limits.  Could consider adding on chlorthalidone again or increasing Norvasc to 10 mg.  Patient states she will do a low-sodium diet and monitor her blood pressure.  Patient has a previously existing appointment in May with PCP.  Discussed I want to see her sooner than that due to significantly elevated blood  pressure.  Will discuss blood pressure medication with PCP and advise patient further.  Could consider Lo-Trel (amlodipine/benazepril) as alternative prescription.          Medications Discontinued During This Encounter   Medication Reason   • losartan-hydrochlorothiazide (HYZAAR) 50-12.5 MG per tablet *Therapy completed          Follow Up   No follow-ups on file.  Patient was given instructions and counseling regarding her condition or for health maintenance advice. Please see specific information pulled into the AVS if appropriate.       SHEY Pickett  03/30/23  13:43 EDT

## 2023-05-03 ENCOUNTER — OFFICE VISIT (OUTPATIENT)
Dept: INTERNAL MEDICINE | Facility: CLINIC | Age: 68
End: 2023-05-03
Payer: MEDICARE

## 2023-05-03 VITALS
SYSTOLIC BLOOD PRESSURE: 130 MMHG | OXYGEN SATURATION: 98 % | HEIGHT: 68 IN | WEIGHT: 220 LBS | HEART RATE: 67 BPM | RESPIRATION RATE: 14 BRPM | TEMPERATURE: 98.4 F | DIASTOLIC BLOOD PRESSURE: 72 MMHG | BODY MASS INDEX: 33.34 KG/M2

## 2023-05-03 DIAGNOSIS — Z78.9 ACE INHIBITOR INTOLERANCE: ICD-10-CM
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
## 2023-05-03 DIAGNOSIS — E78.2 MIXED HYPERLIPIDEMIA: ICD-10-CM

## 2023-05-03 DIAGNOSIS — I10 HYPERTENSION, UNSPECIFIED TYPE: ICD-10-CM

## 2023-05-03 DIAGNOSIS — I10 ESSENTIAL HYPERTENSION: Primary | ICD-10-CM

## 2023-05-03 DIAGNOSIS — M25.561 RIGHT KNEE PAIN, UNSPECIFIED CHRONICITY: ICD-10-CM

## 2023-05-03 RX ORDER — DOXYCYCLINE HYCLATE 100 MG/1
100 CAPSULE ORAL 2 TIMES DAILY
Qty: 10 CAPSULE | Refills: 0 | Status: SHIPPED | OUTPATIENT
Start: 2023-05-03 | End: 2023-05-08

## 2023-05-03 RX ORDER — DESVENLAFAXINE SUCCINATE 50 MG/1
TABLET, EXTENDED RELEASE ORAL
Qty: 90 TABLET | Refills: 1 | Status: SHIPPED | OUTPATIENT
Start: 2023-05-03

## 2023-05-03 RX ORDER — FLUTICASONE PROPIONATE 50 MCG
2 SPRAY, SUSPENSION (ML) NASAL DAILY
Qty: 16 G | Refills: 1 | Status: SHIPPED | OUTPATIENT
Start: 2023-05-03

## 2023-05-03 RX ORDER — CETIRIZINE HYDROCHLORIDE 10 MG/1
10 TABLET ORAL DAILY
Qty: 90 TABLET | Refills: 1 | Status: SHIPPED | OUTPATIENT
Start: 2023-05-03

## 2023-05-03 RX ORDER — AMLODIPINE BESYLATE 5 MG/1
5 TABLET ORAL DAILY
Qty: 30 TABLET | Refills: 2 | Status: SHIPPED | OUTPATIENT
Start: 2023-05-03

## 2023-05-03 RX ORDER — BUPROPION HYDROCHLORIDE 150 MG/1
150 TABLET, EXTENDED RELEASE ORAL DAILY
Qty: 90 TABLET | Refills: 1 | Status: SHIPPED | OUTPATIENT
Start: 2023-05-03

## 2023-05-03 RX ORDER — HYDROXYZINE HYDROCHLORIDE 25 MG/1
25 TABLET, FILM COATED ORAL DAILY
Qty: 90 TABLET | Refills: 1 | Status: SHIPPED | OUTPATIENT
Start: 2023-05-03

## 2023-05-03 RX ORDER — OMEPRAZOLE 40 MG/1
40 CAPSULE, DELAYED RELEASE ORAL DAILY
Qty: 90 CAPSULE | Refills: 1 | Status: SHIPPED | OUTPATIENT
Start: 2023-05-03

## 2023-05-03 RX ORDER — RIZATRIPTAN BENZOATE 10 MG/1
10 TABLET ORAL ONCE AS NEEDED
Qty: 9 TABLET | Refills: 2 | Status: SHIPPED | OUTPATIENT
Start: 2023-05-03

## 2023-05-03 RX ORDER — BUPROPION HYDROCHLORIDE 150 MG/1
150 TABLET, EXTENDED RELEASE ORAL 2 TIMES DAILY
Qty: 90 TABLET | Refills: 1 | Status: SHIPPED | OUTPATIENT
Start: 2023-05-03 | End: 2023-05-03 | Stop reason: SDUPTHER

## 2023-05-03 RX ORDER — CICLOPIROX 7.7 MG/G
1 GEL TOPICAL 2 TIMES DAILY
Qty: 30 G | Refills: 1 | Status: SHIPPED | OUTPATIENT
Start: 2023-05-03 | End: 2023-05-05 | Stop reason: RX

## 2023-05-03 RX ORDER — ONDANSETRON 4 MG/1
4 TABLET, ORALLY DISINTEGRATING ORAL EVERY 8 HOURS PRN
Qty: 30 TABLET | Refills: 1 | Status: SHIPPED | OUTPATIENT
Start: 2023-05-03

## 2023-05-03 NOTE — ASSESSMENT & PLAN NOTE
The patient will start taking Wellbutrin 1 tablet in the morning and Pristiq 1 tablet in the evening before bed. She will wait 1 day before she starts the Wellbutrin.

## 2023-05-03 NOTE — PROGRESS NOTES
"Chief Complaint  Hypertension (Follow up for BP)    Subjective          Casandra Arango presents to [facility]  History of Present Illness     Casandra Mcintyre is a 67-year-old female who presents today for a follow-up visit.    Erythema of left posterior neck  The patient reports a knot on her left posterior neck since Sunday, 04/30/2023 after being bitten by a tick. It is not decreasing in size. A knot was also present on her posterior left leg, but it has been resolved.    Hypertension   When the patient was in [place] her right ankle became swollen. She was worried because she was put on Norvasc before she went to [facility] and never took it for 2 weeks, so she thought it was a side effect of the Norvasc. When she returned from [facility] she was seen in the office and when the young lady that took her blood pressure it was 185 systolic. They waited a while and then took it again and the systolic was still in the 180s. The patient reports she did think it was  error and was advised to record her blood pressure readings at home. The patient did as advised and her readings ranged in the 140s. Her blood pressure today was 130/72 mmHg. The patient is taking amlodipine 5 mg consistently. She was not sent to the emergency room. The patient reports she was experiencing angina and trouble breathing. She is not taking chlorthalidone. The patient tried losartan and broke out into a rash.     Depression   The patient reports she may need medication for depression because she does not have \"any motivation whatsoever\". She states she wants to sleep all the time and lay in bed and has no energy to get up and do anything. The patient reports her favorite energy drink is . She tried drinking half a can of  one day and it was able to get her energized. The patient reports once she was going, she was not hungry and had the energy to clean and do other tasks. The " patient reports she just had no energy to get up and do anything. She states it was a wonderful time when she went to [place]. The patient reports when she came home her son-in-law picked her up and, on the way home her son-in-law informed her Mina was out of detention. Mina has been to Rehab for drinking, and he stays with the patient. Mina tried to commit suicide because nobody was home when he got there, and he was adjusted to being around a group of individuals in rehab. He told the patient he was not trying to commit suicide and he was just drinking to resolve his abdominal pain. Eventually, the patient became sick, went to be seen at the urgent care, and was prescribed Tessalon Perles and antibiotics. She states she is not able to get out of bed because she was always coughing, so it would be difficult to deal with him. The patient states it is difficult helping him, but she does not want him to be homeless. She has never attended a support group. The patient is currently taking Pristiq 50mg. She has taken Wellbutrin before and used to do the Pristiq and Wellbutrin combination, but it was changed because her symptoms improved. Her last dose of Pristiq was taken this morning.     Family history   The patient reports mental illness runs in her family. One of her first cousins who committed suicide was a 28-year-old male. Her sister Ros was paranoid schizophrenic, and her other sister had multiple personalities.     Migraine   The patient has not experienced a migraine recently. She would like a refill on the Maxalt and Zofran.     Social history   The patient does not smoke and states she is allergic to smoke.    Fall   The patient does a lot of walking. She fell outside the other day while it was raining.    Nails  The patient continues to use the nolberto for her nails.  Objective   Vital Signs:   /72 (BP Location: Left arm, Patient Position: Sitting, Cuff Size: Large Adult)   Pulse 67   Temp 98.4 °F  "(36.9 °C) (Temporal)   Resp 14   Ht 172.7 cm (67.99\")   Wt 99.8 kg (220 lb)   SpO2 98%   BMI 33.46 kg/m²     Physical Exam  Vitals reviewed.   Constitutional:       Appearance: Normal appearance. She is well-developed.   HENT:      Head: Normocephalic and atraumatic.      Right Ear: External ear normal.      Left Ear: External ear normal.   Eyes:      Conjunctiva/sclera: Conjunctivae normal.      Pupils: Pupils are equal, round, and reactive to light.   Neck:      Comments: Quarter size erythematous area on the left posterior neck with a pinpoint opening in the center.  Cardiovascular:      Rate and Rhythm: Normal rate and regular rhythm.      Heart sounds: No murmur heard.    No friction rub. No gallop.   Pulmonary:      Effort: Pulmonary effort is normal.      Breath sounds: Normal breath sounds. No wheezing or rhonchi.   Skin:     General: Skin is warm and dry.   Neurological:      Mental Status: She is alert and oriented to person, place, and time.   Psychiatric:         Mood and Affect: Affect normal.         Behavior: Behavior normal.         Thought Content: Thought content normal.        Result Review :       Common labs        1/10/2023    16:01 2/3/2023    13:11 3/14/2023    13:28   Common Labs   Glucose 111   88   79     BUN 30   21   32     Creatinine 0.99   0.81   0.65     Sodium 140   139   143     Potassium 3.2   4.0   4.4     Chloride 101   102   105     Calcium 9.4   9.7   9.7     Albumin 3.8    4.3     Total Bilirubin 0.2    0.2     Alkaline Phosphatase 137    134     AST (SGOT) 8    19     ALT (SGPT) 24    21     WBC 10.33       Hemoglobin 13.5       Hematocrit 41.6       Platelets 404       Total Cholesterol 246       Triglycerides 191       HDL Cholesterol 49       LDL Cholesterol  162           Results for orders placed or performed in visit on 03/14/23   Magnesium    Specimen: Arm, Left; Blood   Result Value Ref Range    Magnesium 2.2 1.6 - 2.4 mg/dL            Procedures      "   Assessment and Plan    Diagnoses and all orders for this visit:    1. Essential hypertension (Primary)  Comments:  well controlled, cont current meds  Assessment & Plan:  - The patient will continue taking amlodipine 5 mg      2. Mixed hyperlipidemia  Comments:  stable cont to montior    3. Tick bite, unspecified site, initial encounter  Comments:  treat with abx  Assessment & Plan:  The patient will take doxycycline for 5 days.      4. ACE inhibitor intolerance    5. Hypertension, unspecified type  -     amLODIPine (NORVASC) 5 MG tablet; Take 1 tablet by mouth Daily.  Dispense: 30 tablet; Refill: 2    6. Right knee pain, unspecified chronicity  -     Diclofenac Sodium (VOLTAREN) 1 % gel gel; Apply  topically to the appropriate area as directed 2 (Two) Times a Day.  Dispense: 100 g; Refill: 2    Other orders  -     doxycycline (VIBRAMYCIN) 100 MG capsule; Take 1 capsule by mouth 2 (Two) Times a Day for 5 days.  Dispense: 10 capsule; Refill: 0  -     Discontinue: buPROPion SR (Wellbutrin SR) 150 MG 12 hr tablet; Take 1 tablet by mouth 2 (Two) Times a Day.  Dispense: 90 tablet; Refill: 1  -     buPROPion SR (Wellbutrin SR) 150 MG 12 hr tablet; Take 1 tablet by mouth Daily.  Dispense: 90 tablet; Refill: 1  -     cetirizine (zyrTEC) 10 MG tablet; Take 1 tablet by mouth Daily.  Dispense: 90 tablet; Refill: 1  -     desvenlafaxine (PRISTIQ) 50 MG 24 hr tablet; Take one tab po daily  Dispense: 90 tablet; Refill: 1  -     hydrOXYzine (ATARAX) 25 MG tablet; Take 1 tablet by mouth Daily.  Dispense: 90 tablet; Refill: 1  -     omeprazole (priLOSEC) 40 MG capsule; Take 1 capsule by mouth Daily.  Dispense: 90 capsule; Refill: 1  -     ciclopirox (LOPROX) 0.77 % gel; Apply 1 application topically to the appropriate area as directed 2 (Two) Times a Day.  Dispense: 30 g; Refill: 1  -     fluticasone (FLONASE) 50 MCG/ACT nasal spray; 2 sprays into the nostril(s) as directed by provider Daily.  Dispense: 16 g; Refill: 1  -      ondansetron ODT (Zofran ODT) 4 MG disintegrating tablet; Place 1 tablet on the tongue Every 8 (Eight) Hours As Needed for Nausea or Vomiting.  Dispense: 30 tablet; Refill: 1  -     rizatriptan (MAXALT) 10 MG tablet; Take 1 tablet by mouth 1 (One) Time As Needed for Migraine for up to 1 dose. May repeat in 2 hours if needed  Dispense: 9 tablet; Refill: 2        }          Follow Up   Return in about 2 months (around 7/3/2023).  Patient was given instructions and counseling regarding her condition or for health maintenance advice. Please see specific information pulled into the AVS if appropriate.     Transcribed from ambient dictation for Teresa Herman MD by Lori Bal.  05/03/23   17:47 EDT    Patient or patient representative verbalized consent to the visit recording.  I have personally performed the services described in this document as transcribed by the above individual, and it is both accurate and complete.

## 2023-05-25 ENCOUNTER — OFFICE VISIT (OUTPATIENT)
Dept: PODIATRY | Facility: CLINIC | Age: 68
End: 2023-05-25
Payer: MEDICARE

## 2023-05-25 VITALS
SYSTOLIC BLOOD PRESSURE: 136 MMHG | OXYGEN SATURATION: 96 % | BODY MASS INDEX: 33.34 KG/M2 | HEART RATE: 80 BPM | WEIGHT: 220 LBS | HEIGHT: 68 IN | TEMPERATURE: 97.8 F | DIASTOLIC BLOOD PRESSURE: 77 MMHG

## 2023-05-25 DIAGNOSIS — M79.672 FOOT PAIN, BILATERAL: ICD-10-CM

## 2023-05-25 DIAGNOSIS — M79.671 FOOT PAIN, BILATERAL: ICD-10-CM

## 2023-05-25 DIAGNOSIS — B35.1 ONYCHOMYCOSIS: ICD-10-CM

## 2023-05-25 DIAGNOSIS — L60.0 ONYCHOCRYPTOSIS: Primary | ICD-10-CM
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
## 2023-06-07 DIAGNOSIS — I10 HYPERTENSION, UNSPECIFIED TYPE: ICD-10-CM

## 2023-06-07 RX ORDER — AMLODIPINE BESYLATE 5 MG/1
5 TABLET ORAL DAILY
Qty: 30 TABLET | Refills: 2 | Status: SHIPPED | OUTPATIENT
Start: 2023-06-07

## 2023-06-07 NOTE — TELEPHONE ENCOUNTER
Caller: Nba Lodgekellen Mercedes    Relationship: Self    Best call back number: [phone]     Requested Prescriptions:   Requested Prescriptions     Pending Prescriptions Disp Refills    amLODIPine (NORVASC) 5 MG tablet 30 tablet 2     Sig: Take 1 tablet by mouth Daily.        Pharmacy where request should be sent: [facility] - [address] - [phone]  - [phone] FX     Last office visit with prescribing clinician: 5/3/2023   Last telemedicine visit with prescribing clinician: Visit date not found   Next office visit with prescribing clinician: 7/7/2023     Additional details provided by patient: PATIENT IS NEEDING A REFILL.     Does the patient have less than a 3 day supply:  [x] Yes  [] No    Would you like a call back once the refill request has been completed: [x] Yes [] No    If the office needs to give you a call back, can they leave a voicemail: [x] Yes [] No    Satya Nicholson Rep   06/07/23 14:54 EDT

## 2023-06-16 DIAGNOSIS — M25.561 RIGHT KNEE PAIN, UNSPECIFIED CHRONICITY: ICD-10-CM

## 2023-06-16 RX ORDER — DICLOFENAC SODIUM 75 MG/1
75 TABLET, DELAYED RELEASE ORAL 2 TIMES DAILY
Qty: 90 TABLET | Refills: 1 | Status: SHIPPED | OUTPATIENT
Start: 2023-06-16

## 2023-06-16 NOTE — TELEPHONE ENCOUNTER
Caller: Casandra Arango    Relationship: Self    Best call back number: [phone]     Requested Prescriptions:   Requested Prescriptions     Pending Prescriptions Disp Refills    Diclofenac Sodium (VOLTAREN) 1 % gel gel 100 g 2     Sig: Apply  topically to the appropriate area as directed 2 (Two) Times a Day.        Pharmacy where request should be sent: [facility] - [address] - [phone] [facility] [phone]      Last office visit with prescribing clinician: 5/3/2023   Last telemedicine visit with prescribing clinician: Visit date not found   Next office visit with prescribing clinician: 7/7/2023     Additional details provided by patient: less than three day supply     Does the patient have less than a 3 day supply:  [x] Yes  [] No    Would you like a call back once the refill request has been completed: [x] Yes [] No    If the office needs to give you a call back, can they leave a voicemail: [x] Yes [] No    Satya Tellez Rep   06/16/23 12:22 EDT

## 2023-08-08 ENCOUNTER — OFFICE VISIT (OUTPATIENT)
Dept: PODIATRY | Facility: CLINIC | Age: 68
End: 2023-08-08
Payer: MEDICARE

## 2023-08-08 VITALS
WEIGHT: 215 LBS | HEART RATE: 80 BPM | SYSTOLIC BLOOD PRESSURE: 124 MMHG | OXYGEN SATURATION: 95 % | DIASTOLIC BLOOD PRESSURE: 79 MMHG | TEMPERATURE: 97.3 F | BODY MASS INDEX: 32.7 KG/M2

## 2023-08-08 DIAGNOSIS — M79.671 FOOT PAIN, RIGHT: ICD-10-CM

## 2023-08-08 DIAGNOSIS — M20.5X1 HALLUX LIMITUS OF RIGHT FOOT: ICD-10-CM

## 2023-08-08 DIAGNOSIS — M79.671 FOOT PAIN, BILATERAL: ICD-10-CM

## 2023-08-08 DIAGNOSIS — B35.1 ONYCHOMYCOSIS: ICD-10-CM

## 2023-08-08 DIAGNOSIS — L60.0 ONYCHOCRYPTOSIS: Primary | ICD-10-CM

## 2023-08-08 DIAGNOSIS — M79.672 FOOT PAIN, BILATERAL: ICD-10-CM

## 2023-08-08 RX ORDER — BUPIVACAINE HYDROCHLORIDE 5 MG/ML
0.5 INJECTION, SOLUTION PERINEURAL ONCE
Status: COMPLETED | OUTPATIENT
Start: 2023-08-08 | End: 2023-08-08

## 2023-08-08 RX ORDER — AMOXICILLIN 500 MG/1
500 CAPSULE ORAL
COMMUNITY
Start: 2023-08-04

## 2023-08-08 RX ORDER — TESTOSTERONE CYPIONATE 200 MG/ML
4 INJECTION INTRAMUSCULAR ONCE
Status: COMPLETED | OUTPATIENT
Start: 2023-08-08 | End: 2023-08-08

## 2023-08-08 RX ADMIN — BUPIVACAINE HYDROCHLORIDE 0.5 ML: 5 INJECTION, SOLUTION PERINEURAL at 13:33

## 2023-08-08 RX ADMIN — TESTOSTERONE CYPIONATE 4 MG: 200 INJECTION INTRAMUSCULAR at 13:33
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
## 2023-08-15 ENCOUNTER — OFFICE VISIT (OUTPATIENT)
Dept: INTERNAL MEDICINE | Facility: CLINIC | Age: 68
End: 2023-08-15
Payer: MEDICARE

## 2023-08-15 VITALS
HEART RATE: 82 BPM | HEIGHT: 68 IN | OXYGEN SATURATION: 98 % | DIASTOLIC BLOOD PRESSURE: 60 MMHG | SYSTOLIC BLOOD PRESSURE: 106 MMHG | BODY MASS INDEX: 33.07 KG/M2 | TEMPERATURE: 97.9 F | WEIGHT: 218.2 LBS

## 2023-08-15 DIAGNOSIS — S99.921A INJURY OF RIGHT FOOT, INITIAL ENCOUNTER: Primary | ICD-10-CM

## 2023-08-15 DIAGNOSIS — G47.00 INSOMNIA, UNSPECIFIED TYPE: ICD-10-CM

## 2023-08-15 RX ORDER — MULTIVIT AND MINERALS-FERROUS GLUCONATE 9 MG IRON/15 ML ORAL LIQUID 9 MG/15 ML
LIQUID (ML) ORAL DAILY
COMMUNITY

## 2023-08-15 RX ORDER — TRAZODONE HYDROCHLORIDE 50 MG/1
50 TABLET ORAL NIGHTLY
Qty: 30 TABLET | Refills: 2 | Status: SHIPPED | OUTPATIENT
Start: 2023-08-15 | End: 2023-09-14

## 2023-08-15 NOTE — ASSESSMENT & PLAN NOTE
Will discuss with patient's PCP.  Patient may benefit from a change in anxiety medications to help with insomnia.  Persist or worsen patient needs to return to the clinic sooner.

## 2023-08-15 NOTE — PROGRESS NOTES
"Chief Complaint  Foot Injury (Top, bottom of foot and ankle on the right side. She hurt it last Thursday when she was going to the restroom and the dog wanted to be let out. Patient takes diclofenac for arthritis so she has been taking that. )    Subjective          Casandra Arango presents to [facility]    Foot injury- patient twisted her ankle the other day while walking and since then she has had persistent foot pain. She has been using diclofenac and elevating her foot without improvement.     Difficulty sleeping- currently on wellbutrin in the morning and pristiq at nighttime.  Several times a week she has noticed a wave of anxiety when laying in bed and she is not able to sleep for many hours at a time.  She has tried melatonin and hydroxyzine without help.  Patient has had some increased stress with her sister's health recently and her daughter having surgery but otherwise no significant changes in her mental health over the past few months.     Objective   Vital Signs:   /60 (BP Location: Left arm, Patient Position: Sitting, Cuff Size: Large Adult)   Pulse 82   Temp 97.9 øF (36.6 øC) (Temporal)   Ht 172.7 cm (67.99\")   Wt 99 kg (218 lb 3.2 oz)   SpO2 98%   BMI 33.19 kg/mý     Physical Exam  Constitutional:       Appearance: Normal appearance.   HENT:      Head: Normocephalic and atraumatic.   Eyes:      Extraocular Movements: Extraocular movements intact.      Conjunctiva/sclera: Conjunctivae normal.      Pupils: Pupils are equal, round, and reactive to light.   Cardiovascular:      Rate and Rhythm: Normal rate and regular rhythm.      Pulses: Normal pulses.      Heart sounds: Normal heart sounds.   Pulmonary:      Effort: Pulmonary effort is normal. No respiratory distress.      Breath sounds: Normal breath sounds.   Musculoskeletal:         General: Tenderness (anterior aspect of forefoot without discoloration or deformity) present. No " swelling. Normal range of motion.      Cervical back: Normal range of motion and neck supple. No rigidity.   Skin:     General: Skin is warm and dry.      Coloration: Skin is not pale.   Neurological:      General: No focal deficit present.      Mental Status: She is alert and oriented to person, place, and time. Mental status is at baseline.      Gait: Gait normal.   Psychiatric:         Mood and Affect: Mood normal.         Behavior: Behavior normal.      Result Review :          Procedures      Assessment and Plan    Diagnoses and all orders for this visit:    1. Injury of right foot, initial encounter (Primary)  Assessment & Plan:  Will get x-ray of the foot in office today.  Continue conservative treatment at this time.    Orders:  -     XR Foot 3+ View Right (In Office)    2. Insomnia, unspecified type  Assessment & Plan:  Will discuss with patient's PCP.  Patient may benefit from a change in anxiety medications to help with insomnia.  Persist or worsen patient needs to return to the clinic sooner.                Follow Up   No follow-ups on file.  Patient was given instructions and counseling regarding her condition or for health maintenance advice. Please see specific information pulled into the AVS if appropriate.

## 2023-09-13 ENCOUNTER — CLINICAL SUPPORT (OUTPATIENT)
Dept: INTERNAL MEDICINE | Facility: CLINIC | Age: 68
End: 2023-09-13
Payer: MEDICARE

## 2023-09-13 DIAGNOSIS — Z23 ENCOUNTER FOR IMMUNIZATION: Primary | ICD-10-CM

## 2023-09-13 NOTE — PROGRESS NOTES
Patient came into office for administration of FLU vaccine; tolerated well; left immediately following; no 15 min OBS. See immunization records for details.

## 2023-09-21 ENCOUNTER — PATIENT MESSAGE (OUTPATIENT)
Dept: INTERNAL MEDICINE | Facility: CLINIC | Age: 68
End: 2023-09-21

## 2023-09-25 RX ORDER — DICLOFENAC SODIUM 75 MG/1
75 TABLET, DELAYED RELEASE ORAL 2 TIMES DAILY
Qty: 90 TABLET | Refills: 1 | Status: SHIPPED | OUTPATIENT
Start: 2023-09-25

## 2023-09-25 NOTE — TELEPHONE ENCOUNTER
From: Casandra Arango  To: Teresa Fernando Chism  Sent: 9/21/2023 11:17 PM EDT  Subject: Norvasc    When I last saw you, I mentioned the itchiness on my back. You prescribed lotion for me to try. I think I have narrowed down the source of the itching. It’s an allergic reaction to the Norvasc. It’s not as bad as the terrible reaction from the Losartan but the itching has a similar pattern. It’s all over my body. I’m constantly scratching my head, back, torso, legs and even feet. Is there another medication for my blood pressure that doesn’t have the itchy side effects?

## 2023-10-30 ENCOUNTER — TELEPHONE (OUTPATIENT)
Dept: INTERNAL MEDICINE | Facility: CLINIC | Age: 68
End: 2023-10-30
Payer: MEDICARE

## 2023-10-30 NOTE — TELEPHONE ENCOUNTER
Caller: Casandra Arango    Relationship: Self    Best call back number: [phone]    Requested Prescriptions:   Requested Prescriptions     Pending Prescriptions Disp Refills    buPROPion SR (Wellbutrin SR) 150 MG 12 hr tablet 90 tablet 1     Sig: Take 1 tablet by mouth Daily.        Pharmacy where request should be sent: [place] [phone] [facility] [phone]      Last office visit with prescribing clinician: 7/7/2023   Last telemedicine visit with prescribing clinician: Visit date not found   Next office visit with prescribing clinician: 11/8/2023       Does the patient have less than a 3 day supply:  [x] Yes  [] No        Satya Anthony Rep   10/30/23 11:11 EDT

## 2023-10-31 ENCOUNTER — TRANSCRIBE ORDERS (OUTPATIENT)
Dept: ADMINISTRATIVE | Facility: HOSPITAL | Age: 68
End: 2023-10-31
Payer: MEDICARE

## 2023-10-31 DIAGNOSIS — Z12.31 VISIT FOR SCREENING MAMMOGRAM: Primary | ICD-10-CM

## 2023-10-31 RX ORDER — BUPROPION HYDROCHLORIDE 150 MG/1
150 TABLET, EXTENDED RELEASE ORAL DAILY
Qty: 90 TABLET | Refills: 1 | Status: SHIPPED | OUTPATIENT
Start: 2023-10-31

## 2023-11-08 ENCOUNTER — TELEPHONE (OUTPATIENT)
Dept: GASTROENTEROLOGY | Facility: CLINIC | Age: 68
End: 2023-11-08
Payer: MEDICARE

## 2023-11-08 ENCOUNTER — OFFICE VISIT (OUTPATIENT)
Dept: INTERNAL MEDICINE | Facility: CLINIC | Age: 68
End: 2023-11-08
Payer: MEDICARE

## 2023-11-08 VITALS
BODY MASS INDEX: 33.49 KG/M2 | SYSTOLIC BLOOD PRESSURE: 126 MMHG | DIASTOLIC BLOOD PRESSURE: 74 MMHG | TEMPERATURE: 97.5 F | OXYGEN SATURATION: 97 % | HEART RATE: 78 BPM | WEIGHT: 221 LBS | HEIGHT: 68 IN

## 2023-11-08 DIAGNOSIS — Z12.11 SCREEN FOR COLON CANCER: ICD-10-CM

## 2023-11-08 DIAGNOSIS — F33.2 MAJOR DEPRESSIVE DISORDER, RECURRENT, SEVERE WITHOUT PSYCHOTIC FEATURES: Primary | ICD-10-CM

## 2023-11-08 DIAGNOSIS — E66.9 OBESITY (BMI 30-39.9): ICD-10-CM

## 2023-11-08 DIAGNOSIS — R53.83 OTHER FATIGUE: ICD-10-CM

## 2023-11-08 DIAGNOSIS — M25.561 RIGHT KNEE PAIN, UNSPECIFIED CHRONICITY: ICD-10-CM

## 2023-11-08 DIAGNOSIS — Z23 NEED FOR SHINGLES VACCINE: ICD-10-CM

## 2023-11-08 DIAGNOSIS — E78.2 MIXED HYPERLIPIDEMIA: ICD-10-CM

## 2023-11-08 DIAGNOSIS — R79.9 ABNORMAL FINDING OF BLOOD CHEMISTRY, UNSPECIFIED: ICD-10-CM

## 2023-11-08 DIAGNOSIS — I10 ESSENTIAL HYPERTENSION: ICD-10-CM

## 2023-11-08 DIAGNOSIS — E55.9 VITAMIN D DEFICIENCY: ICD-10-CM

## 2023-11-08 LAB
25(OH)D3 SERPL-MCNC: 37.1 NG/ML (ref 30–100)
ALBUMIN SERPL-MCNC: 4.1 G/DL (ref 3.5–5.2)
ALBUMIN/GLOB SERPL: 1.4 G/DL
ALP SERPL-CCNC: 155 U/L (ref 39–117)
ALT SERPL W P-5'-P-CCNC: 18 U/L (ref 1–33)
ANION GAP SERPL CALCULATED.3IONS-SCNC: 9 MMOL/L (ref 5–15)
AST SERPL-CCNC: 21 U/L (ref 1–32)
BASOPHILS # BLD AUTO: 0.11 10*3/MM3 (ref 0–0.2)
BASOPHILS NFR BLD AUTO: 1.3 % (ref 0–1.5)
BILIRUB SERPL-MCNC: 0.2 MG/DL (ref 0–1.2)
BUN SERPL-MCNC: 28 MG/DL (ref 8–23)
BUN/CREAT SERPL: 27.2 (ref 7–25)
CALCIUM SPEC-SCNC: 9.8 MG/DL (ref 8.6–10.5)
CHLORIDE SERPL-SCNC: 106 MMOL/L (ref 98–107)
CHOLEST SERPL-MCNC: 252 MG/DL (ref 0–200)
CO2 SERPL-SCNC: 24 MMOL/L (ref 22–29)
CREAT SERPL-MCNC: 1.03 MG/DL (ref 0.57–1)
DEPRECATED RDW RBC AUTO: 40 FL (ref 37–54)
EGFRCR SERPLBLD CKD-EPI 2021: 59.7 ML/MIN/1.73
EOSINOPHIL # BLD AUTO: 0.33 10*3/MM3 (ref 0–0.4)
EOSINOPHIL NFR BLD AUTO: 3.8 % (ref 0.3–6.2)
ERYTHROCYTE [DISTWIDTH] IN BLOOD BY AUTOMATED COUNT: 12.4 % (ref 12.3–15.4)
FOLATE SERPL-MCNC: >20 NG/ML (ref 4.78–24.2)
GLOBULIN UR ELPH-MCNC: 3 GM/DL
GLUCOSE SERPL-MCNC: 76 MG/DL (ref 65–99)
HBA1C MFR BLD: 5.5 % (ref 4.8–5.6)
HCT VFR BLD AUTO: 43.1 % (ref 34–46.6)
HDLC SERPL-MCNC: 51 MG/DL (ref 40–60)
HGB BLD-MCNC: 14.5 G/DL (ref 12–15.9)
IMM GRANULOCYTES # BLD AUTO: 0.03 10*3/MM3 (ref 0–0.05)
IMM GRANULOCYTES NFR BLD AUTO: 0.3 % (ref 0–0.5)
IRON 24H UR-MRATE: 56 MCG/DL (ref 37–145)
IRON SATN MFR SERPL: 16 % (ref 20–50)
LDLC SERPL CALC-MCNC: 187 MG/DL (ref 0–100)
LDLC/HDLC SERPL: 3.61 {RATIO}
LYMPHOCYTES # BLD AUTO: 2.42 10*3/MM3 (ref 0.7–3.1)
LYMPHOCYTES NFR BLD AUTO: 27.8 % (ref 19.6–45.3)
MAGNESIUM SERPL-MCNC: 2.2 MG/DL (ref 1.6–2.4)
MCH RBC QN AUTO: 29.5 PG (ref 26.6–33)
MCHC RBC AUTO-ENTMCNC: 33.6 G/DL (ref 31.5–35.7)
MCV RBC AUTO: 87.6 FL (ref 79–97)
MONOCYTES # BLD AUTO: 0.54 10*3/MM3 (ref 0.1–0.9)
MONOCYTES NFR BLD AUTO: 6.2 % (ref 5–12)
NEUTROPHILS NFR BLD AUTO: 5.29 10*3/MM3 (ref 1.7–7)
NEUTROPHILS NFR BLD AUTO: 60.6 % (ref 42.7–76)
NRBC BLD AUTO-RTO: 0 /100 WBC (ref 0–0.2)
PLATELET # BLD AUTO: 415 10*3/MM3 (ref 140–450)
PMV BLD AUTO: 9.4 FL (ref 6–12)
POTASSIUM SERPL-SCNC: 4.5 MMOL/L (ref 3.5–5.2)
PROT SERPL-MCNC: 7.1 G/DL (ref 6–8.5)
RBC # BLD AUTO: 4.92 10*6/MM3 (ref 3.77–5.28)
SODIUM SERPL-SCNC: 139 MMOL/L (ref 136–145)
TIBC SERPL-MCNC: 356 MCG/DL (ref 298–536)
TRANSFERRIN SERPL-MCNC: 239 MG/DL (ref 200–360)
TRIGL SERPL-MCNC: 84 MG/DL (ref 0–150)
TSH SERPL DL<=0.05 MIU/L-ACNC: 1.63 UIU/ML (ref 0.27–4.2)
VIT B12 BLD-MCNC: 515 PG/ML (ref 211–946)
VLDLC SERPL-MCNC: 14 MG/DL (ref 5–40)
WBC NRBC COR # BLD: 8.72 10*3/MM3 (ref 3.4–10.8)

## 2023-11-08 PROCEDURE — 83036 HEMOGLOBIN GLYCOSYLATED A1C: CPT | Performed by: INTERNAL MEDICINE

## 2023-11-08 PROCEDURE — 85025 COMPLETE CBC W/AUTO DIFF WBC: CPT | Performed by: INTERNAL MEDICINE

## 2023-11-08 PROCEDURE — 82306 VITAMIN D 25 HYDROXY: CPT | Performed by: INTERNAL MEDICINE

## 2023-11-08 PROCEDURE — 83735 ASSAY OF MAGNESIUM: CPT | Performed by: INTERNAL MEDICINE

## 2023-11-08 PROCEDURE — 80061 LIPID PANEL: CPT | Performed by: INTERNAL MEDICINE

## 2023-11-08 PROCEDURE — 84443 ASSAY THYROID STIM HORMONE: CPT | Performed by: INTERNAL MEDICINE

## 2023-11-08 PROCEDURE — 83540 ASSAY OF IRON: CPT | Performed by: INTERNAL MEDICINE

## 2023-11-08 PROCEDURE — 84466 ASSAY OF TRANSFERRIN: CPT | Performed by: INTERNAL MEDICINE

## 2023-11-08 PROCEDURE — 80053 COMPREHEN METABOLIC PANEL: CPT | Performed by: INTERNAL MEDICINE

## 2023-11-08 PROCEDURE — 82746 ASSAY OF FOLIC ACID SERUM: CPT | Performed by: INTERNAL MEDICINE

## 2023-11-08 PROCEDURE — 82607 VITAMIN B-12: CPT | Performed by: INTERNAL MEDICINE

## 2023-11-08 RX ORDER — FLUTICASONE PROPIONATE 50 MCG
2 SPRAY, SUSPENSION (ML) NASAL DAILY
Qty: 16 G | Refills: 1 | Status: SHIPPED | OUTPATIENT
Start: 2023-11-08

## 2023-11-08 RX ORDER — RIZATRIPTAN BENZOATE 10 MG/1
10 TABLET ORAL ONCE AS NEEDED
Qty: 9 TABLET | Refills: 2 | Status: SHIPPED | OUTPATIENT
Start: 2023-11-08

## 2023-11-08 RX ORDER — NALOXONE HYDROCHLORIDE 4 MG/.1ML
SPRAY NASAL
COMMUNITY
Start: 2023-11-06

## 2023-11-08 RX ORDER — HYDROCODONE BITARTRATE AND ACETAMINOPHEN 5; 325 MG/1; MG/1
TABLET ORAL
COMMUNITY
Start: 2023-11-06

## 2023-11-08 RX ORDER — ACETAMINOPHEN 325 MG/1
650 TABLET ORAL EVERY 6 HOURS PRN
Qty: 90 TABLET | Refills: 1 | Status: SHIPPED | OUTPATIENT
Start: 2023-11-08

## 2023-11-08 RX ORDER — DESVENLAFAXINE SUCCINATE 50 MG/1
TABLET, EXTENDED RELEASE ORAL
Qty: 90 TABLET | Refills: 1 | Status: SHIPPED | OUTPATIENT
Start: 2023-11-08

## 2023-11-08 RX ORDER — CETIRIZINE HYDROCHLORIDE 10 MG/1
10 TABLET ORAL DAILY
Qty: 90 TABLET | Refills: 1 | Status: SHIPPED | OUTPATIENT
Start: 2023-11-08

## 2023-11-08 RX ORDER — TRAZODONE HYDROCHLORIDE 50 MG/1
50 TABLET ORAL NIGHTLY
Qty: 30 TABLET | Refills: 2 | Status: SHIPPED | OUTPATIENT
Start: 2023-11-08

## 2023-11-08 RX ORDER — OMEPRAZOLE 40 MG/1
40 CAPSULE, DELAYED RELEASE ORAL DAILY
Qty: 90 CAPSULE | Refills: 1 | Status: SHIPPED | OUTPATIENT
Start: 2023-11-08

## 2023-11-08 RX ORDER — HYDROXYZINE HYDROCHLORIDE 25 MG/1
25 TABLET, FILM COATED ORAL DAILY
Qty: 90 TABLET | Refills: 1 | Status: SHIPPED | OUTPATIENT
Start: 2023-11-08

## 2023-11-08 RX ORDER — ONDANSETRON 4 MG/1
4 TABLET, ORALLY DISINTEGRATING ORAL EVERY 8 HOURS PRN
Qty: 30 TABLET | Refills: 1 | Status: SHIPPED | OUTPATIENT
Start: 2023-11-08

## 2023-11-08 RX ORDER — VALACYCLOVIR HYDROCHLORIDE 500 MG/1
500 TABLET, FILM COATED ORAL DAILY
Qty: 90 TABLET | Refills: 1 | Status: SHIPPED | OUTPATIENT
Start: 2023-11-08

## 2023-11-08 NOTE — TELEPHONE ENCOUNTER
LEFT VOICE MESSAGE FOR PATIENT TO RETURN CALL IN REGARDS TO A REFERRAL FOR A COLON SCREENING REFERRED BY DARIAN STEARNS. THIS IS A 5YEAR COLON RECALL, PREV COLON 11/13/18. PATIENT NEEDS A PHONE SCREENING APPOINTMENT.      LEFT VOICE MESSAGE FOR PATIENT TO RETURN CALL IN REGARDS TO A REFERRAL FOR A COLON SCREENING REFERRED BY DARIAN STEARNS. THIS IS A 5YEAR COLON RECALL, PREV COLON 11/13/18. THIS IS THE SECOND TIME IN TRYING TO CONTACT PATIENT.

## 2023-11-08 NOTE — PROGRESS NOTES
Chief Complaint  Essential hypertension (4 month f/u- Pt states that she hasn't taking since starting new ), Allergic Rhinitis, Migraine, Insomnia, and Med Refill    Subjective      Allergic Rhinitis    Migraine  Insomnia      Casandra Arango is a 68 y.o. female who presents to [facility] with a past medical history of  Past Medical History:   Diagnosis Date    Acid reflux     Allergic rhinitis     Anemia, unspecified     Anxiety     Arthritis     Cataract 02/28/2023    Chronic cough     Closed nondisplaced fracture of distal phalanx of right great toe 01/19/2018    WITH ROUTINE HEALING , SUBSEQUENT ENCOUNTER     Closed nondisplaced fracture of distal phalanx of right great toe 12/26/2017    INITIAL ENCOUNTER     Colon polyp 2016?    Difficulty walking Last year    Edema of right lower extremity 10/10/2015    DICUSSED CONTINUING ELEVATION OF THE FOOT AND WEARING COMPRESSION STOCKIGS    Foot pain, left     GERD (gastroesophageal reflux disease)     Globus sensation     Hemorrhoids     Hyperlipemia     Hypertension     Insomnia, unspecified 10/10/2015    TOLD HER TO TAKE ABLIFY DURING THE MORNING RATHER THAN IN THE EVENING AND SEE IF THAT HELPS WITH THE INSOMNIA HOWEVER IF IT DOES NOT WILL NEED TO CONSIDER FURTHER TREATMENT.     Kidney stones     Low back pain 11/01/2003    Major depressive disorder, recurrent 10/10/2015    SEVERE WITHOUT PSYCHOTIC FEATURES    Migraine headache     Night sweats     Obesity 01/01/2018    Primary osteoarthritis of both knees 10/10/2015    Primary osteoarthritis of left knee 12/26/2017    Primary osteoarthritis of left shoulder 12/12/2017    Seasonal allergies     SOB (shortness of breath)     Stress fracture     Toe pain, right     great toe    Urinary tract infection     Vitamin D deficiency 01/07/2015     HTN-  States that she hasn't been checking bp at home  Liking the spironolactone  Her daughter does have  "hyperaldosteronism    Mental health is doing better  Recently has been caring for her daughter and that was stressful, but not having to do that now    Recently became a great grandmother    No chest pain  No trouble breathing        Objective   Vital Signs:   Vitals:    11/08/23 1048   BP: 126/74   Pulse: 78   Temp: 97.5 °F (36.4 °C)   TempSrc: Temporal   SpO2: 97%   Weight: 100 kg (221 lb)   Height: 172.7 cm (67.99\")   PainSc: 2  Comment: chronic pain       Wt Readings from Last 3 Encounters:   11/08/23 100 kg (221 lb)   08/15/23 99 kg (218 lb 3.2 oz)   08/08/23 97.5 kg (215 lb)     BP Readings from Last 3 Encounters:   11/08/23 126/74   08/15/23 106/60   08/08/23 124/79       Health Maintenance   Topic Date Due    COLORECTAL CANCER SCREENING  11/01/2023    COVID-19 Vaccine (5 - 2023-24 season) 02/08/2024 (Originally 9/1/2023)    ZOSTER VACCINE (2 of 2) 11/08/2024 (Originally 4/11/2022)    ANNUAL WELLNESS VISIT  12/02/2023    BMI FOLLOWUP  02/03/2024    LIPID PANEL  07/07/2024    DXA SCAN  09/21/2024    MAMMOGRAM  10/13/2024    TDAP/TD VACCINES (2 - Tdap) 10/13/2031    HEPATITIS C SCREENING  Completed    INFLUENZA VACCINE  Completed    Pneumococcal Vaccine 65+  Completed       Physical Exam  Vitals reviewed.   Constitutional:       Appearance: Normal appearance. She is well-developed.   HENT:      Head: Normocephalic and atraumatic.      Right Ear: External ear normal.      Left Ear: External ear normal.   Eyes:      Conjunctiva/sclera: Conjunctivae normal.      Pupils: Pupils are equal, round, and reactive to light.   Cardiovascular:      Rate and Rhythm: Normal rate and regular rhythm.      Heart sounds: No murmur heard.     No friction rub. No gallop.   Pulmonary:      Effort: Pulmonary effort is normal.      Breath sounds: Normal breath sounds. No wheezing or rhonchi.   Skin:     General: Skin is warm and dry.   Neurological:      Mental Status: She is alert and oriented to person, place, and time. "   Psychiatric:         Mood and Affect: Affect normal.         Behavior: Behavior normal.         Thought Content: Thought content normal.            Result Review :  The following data was reviewed by: Teresa Herman MD on 11/08/2023:      Procedures        Assessment and Plan   Diagnoses and all orders for this visit:    1. Major depressive disorder, recurrent, severe without psychotic features (Primary)  Comments:  doing much better, cont current meds  Orders:  -     CBC & Differential  -     Comprehensive Metabolic Panel  -     Hemoglobin A1c  -     Lipid Panel  -     TSH    2. Right knee pain, unspecified chronicity  Comments:  stable, cont current meds  Orders:  -     Diclofenac Sodium (VOLTAREN) 1 % gel gel; Apply  topically to the appropriate area as directed 2 (Two) Times a Day.  Dispense: 100 g; Refill: 2    3. Need for shingles vaccine  -     Shingrix Vaccine; Future    4. Screen for colon cancer  -     Ambulatory Referral For Screening Colonoscopy    5. Essential hypertension  Comments:  well controlled, cont current meds  Orders:  -     CBC & Differential  -     Comprehensive Metabolic Panel  -     Hemoglobin A1c  -     Lipid Panel  -     TSH    6. Mixed hyperlipidemia  -     CBC & Differential  -     Comprehensive Metabolic Panel  -     Hemoglobin A1c  -     Lipid Panel  -     TSH    7. Obesity (BMI 30-39.9)  Comments:  cont to work on diet and exercise discussed things to try    8. Vitamin D deficiency  -     Vitamin D,25-Hydroxy    9. Abnormal finding of blood chemistry, unspecified  -     Hemoglobin A1c  -     Iron Profile    10. Other fatigue  Comments:  will check labs and adjust as needed  Orders:  -     Vitamin D,25-Hydroxy  -     Vitamin B12 & Folate  -     Iron Profile  -     Magnesium    Other orders  -     acetaminophen (Tylenol) 325 MG tablet; Take 2 tablets by mouth Every 6 (Six) Hours As Needed for Mild Pain.  Dispense: 90 tablet; Refill: 1  -     cetirizine (zyrTEC) 10 MG tablet;  Take 1 tablet by mouth Daily.  Dispense: 90 tablet; Refill: 1  -     desvenlafaxine (PRISTIQ) 50 MG 24 hr tablet; Take one tab po daily  Dispense: 90 tablet; Refill: 1  -     fluticasone (FLONASE) 50 MCG/ACT nasal spray; 2 sprays into the nostril(s) as directed by provider Daily.  Dispense: 16 g; Refill: 1  -     hydrOXYzine (ATARAX) 25 MG tablet; Take 1 tablet by mouth Daily.  Dispense: 90 tablet; Refill: 1  -     omeprazole (priLOSEC) 40 MG capsule; Take 1 capsule by mouth Daily.  Dispense: 90 capsule; Refill: 1  -     ondansetron ODT (Zofran ODT) 4 MG disintegrating tablet; Place 1 tablet on the tongue Every 8 (Eight) Hours As Needed for Nausea or Vomiting.  Dispense: 30 tablet; Refill: 1  -     rizatriptan (MAXALT) 10 MG tablet; Take 1 tablet by mouth 1 (One) Time As Needed for Migraine. May repeat in 2 hours if needed  Dispense: 9 tablet; Refill: 2  -     traZODone (DESYREL) 50 MG tablet; Take 1 tablet by mouth Every Night.  Dispense: 30 tablet; Refill: 2  -     valACYclovir (VALTREX) 500 MG tablet; Take 1 tablet by mouth Daily.  Dispense: 90 tablet; Refill: 1                  FOLLOW UP  Return in about 4 months (around 3/8/2024).  Patient was given instructions and counseling regarding her condition or for health maintenance advice. Please see specific information pulled into the AVS if appropriate.       Teresa Herman MD  11/08/23  11:32 EST    CURRENT & DISCONTINUED MEDICATIONS  Current Outpatient Medications   Medication Instructions    acetaminophen (TYLENOL) 650 mg, Oral, Every 6 Hours PRN    buPROPion SR (WELLBUTRIN SR) 150 mg, Oral, Daily    cetirizine (ZYRTEC) 10 mg, Oral, Daily    Ciclopirox 8 % kit Apply nightly as directed    desvenlafaxine (PRISTIQ) 50 MG 24 hr tablet Take one tab po daily    diclofenac (VOLTAREN) 75 mg, Oral, 2 Times Daily    Diclofenac Sodium (VOLTAREN) 1 % gel gel Topical, 2 Times Daily    fluticasone (FLONASE) 50 MCG/ACT nasal spray 2 sprays, Nasal, Daily     HYDROcodone-acetaminophen (NORCO) 5-325 MG per tablet     hydrOXYzine (ATARAX) 25 mg, Oral, Daily    Multiple Vitamins-Minerals (multivitamin and minerals) liquid liquid Oral, Daily    naloxone (NARCAN) 4 MG/0.1ML nasal spray     omeprazole (PRILOSEC) 40 mg, Oral, Daily    ondansetron ODT (ZOFRAN ODT) 4 mg, Translingual, Every 8 Hours PRN    rizatriptan (MAXALT) 10 mg, Oral, Once As Needed, May repeat in 2 hours if needed    spironolactone (ALDACTONE) 25 mg, Oral, Daily    traZODone (DESYREL) 50 mg, Oral, Nightly    valACYclovir (VALTREX) 500 mg, Oral, Daily       Medications Discontinued During This Encounter   Medication Reason    valACYclovir (VALTREX) 500 MG tablet Reorder    acetaminophen (Tylenol) 325 MG tablet Reorder    cetirizine (zyrTEC) 10 MG tablet Reorder    desvenlafaxine (PRISTIQ) 50 MG 24 hr tablet Reorder    hydrOXYzine (ATARAX) 25 MG tablet Reorder    omeprazole (priLOSEC) 40 MG capsule Reorder    fluticasone (FLONASE) 50 MCG/ACT nasal spray Reorder    ondansetron ODT (Zofran ODT) 4 MG disintegrating tablet Reorder    rizatriptan (MAXALT) 10 MG tablet Reorder    Diclofenac Sodium (VOLTAREN) 1 % gel gel Reorder    traZODone (DESYREL) 50 MG tablet Reorder

## 2023-12-04 ENCOUNTER — PATIENT MESSAGE (OUTPATIENT)
Dept: INTERNAL MEDICINE | Facility: CLINIC | Age: 68
End: 2023-12-04
Payer: MEDICARE

## 2023-12-04 ENCOUNTER — OFFICE VISIT (OUTPATIENT)
Dept: PODIATRY | Facility: CLINIC | Age: 68
End: 2023-12-04
Payer: MEDICARE

## 2023-12-04 VITALS
WEIGHT: 223 LBS | SYSTOLIC BLOOD PRESSURE: 143 MMHG | OXYGEN SATURATION: 96 % | TEMPERATURE: 98.2 F | BODY MASS INDEX: 33.92 KG/M2 | HEART RATE: 70 BPM | DIASTOLIC BLOOD PRESSURE: 84 MMHG

## 2023-12-04 DIAGNOSIS — L60.0 ONYCHOCRYPTOSIS: Primary | ICD-10-CM

## 2023-12-04 DIAGNOSIS — M79.672 FOOT PAIN, BILATERAL: ICD-10-CM

## 2023-12-04 DIAGNOSIS — B35.1 ONYCHOMYCOSIS: ICD-10-CM

## 2023-12-04 DIAGNOSIS — M79.671 FOOT PAIN, BILATERAL: ICD-10-CM

## 2023-12-04 PROCEDURE — 1160F RVW MEDS BY RX/DR IN RCRD: CPT | Performed by: PODIATRIST

## 2023-12-04 PROCEDURE — 3079F DIAST BP 80-89 MM HG: CPT | Performed by: PODIATRIST

## 2023-12-04 PROCEDURE — 1159F MED LIST DOCD IN RCRD: CPT | Performed by: PODIATRIST

## 2023-12-04 PROCEDURE — 11720 DEBRIDE NAIL 1-5: CPT | Performed by: PODIATRIST

## 2023-12-04 PROCEDURE — 3077F SYST BP >= 140 MM HG: CPT | Performed by: PODIATRIST
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
## 2023-12-06 NOTE — TELEPHONE ENCOUNTER
From: Casandra Arango  To: Teresa Herman  Sent: 12/4/2023 8:48 AM EST  Subject: Cholesterol med    I have taken Crestor before but it made my legs hurt. I’m taking a multi vitamin for women over 50 for iron but I noticed the lab report said I wasn’t processing that very well. Should I try a prescription iron? My leg hurts a lot from all the hardware in it, I don’t want to compound that pain.

## 2023-12-07 RX ORDER — PRAVASTATIN SODIUM 20 MG
20 TABLET ORAL DAILY
Qty: 90 TABLET | Refills: 1 | Status: SHIPPED | OUTPATIENT
Start: 2023-12-07

## 2023-12-11 ENCOUNTER — OFFICE VISIT (OUTPATIENT)
Dept: PODIATRY | Facility: CLINIC | Age: 68
End: 2023-12-11
Payer: MEDICARE

## 2023-12-11 VITALS
OXYGEN SATURATION: 96 % | HEIGHT: 68 IN | DIASTOLIC BLOOD PRESSURE: 79 MMHG | BODY MASS INDEX: 33.8 KG/M2 | SYSTOLIC BLOOD PRESSURE: 122 MMHG | WEIGHT: 223 LBS | HEART RATE: 70 BPM | TEMPERATURE: 98.2 F

## 2023-12-11 DIAGNOSIS — M79.671 FOOT PAIN, RIGHT: ICD-10-CM

## 2023-12-11 DIAGNOSIS — M20.5X1 HALLUX LIMITUS OF RIGHT FOOT: Primary | ICD-10-CM

## 2023-12-11 PROCEDURE — 1160F RVW MEDS BY RX/DR IN RCRD: CPT | Performed by: PODIATRIST

## 2023-12-11 PROCEDURE — 1159F MED LIST DOCD IN RCRD: CPT | Performed by: PODIATRIST

## 2023-12-11 PROCEDURE — 3074F SYST BP LT 130 MM HG: CPT | Performed by: PODIATRIST

## 2023-12-11 PROCEDURE — 3078F DIAST BP <80 MM HG: CPT | Performed by: PODIATRIST

## 2023-12-11 PROCEDURE — 20605 DRAIN/INJ JOINT/BURSA W/O US: CPT | Performed by: PODIATRIST

## 2023-12-11 RX ORDER — BUPIVACAINE HYDROCHLORIDE 5 MG/ML
0.5 INJECTION, SOLUTION PERINEURAL ONCE
Status: COMPLETED | OUTPATIENT
Start: 2023-12-11 | End: 2023-12-11

## 2023-12-11 RX ORDER — TESTOSTERONE CYPIONATE 200 MG/ML
4 INJECTION INTRAMUSCULAR ONCE
Status: COMPLETED | OUTPATIENT
Start: 2023-12-11 | End: 2023-12-11

## 2023-12-11 RX ADMIN — TESTOSTERONE CYPIONATE 4 MG: 200 INJECTION INTRAMUSCULAR at 14:54

## 2023-12-11 RX ADMIN — BUPIVACAINE HYDROCHLORIDE 0.5 ML: 5 INJECTION, SOLUTION PERINEURAL at 14:53
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
## 2023-12-13 ENCOUNTER — HOSPITAL ENCOUNTER (OUTPATIENT)
Dept: MAMMOGRAPHY | Facility: HOSPITAL | Age: 68
Discharge: HOME | End: 2023-12-13
Admitting: INTERNAL MEDICINE
Payer: MEDICARE

## 2023-12-13 DIAGNOSIS — Z12.31 VISIT FOR SCREENING MAMMOGRAM: ICD-10-CM

## 2023-12-13 PROCEDURE — 77063 BREAST TOMOSYNTHESIS BI: CPT

## 2023-12-13 PROCEDURE — 77067 SCR MAMMO BI INCL CAD: CPT

## 2023-12-14 ENCOUNTER — OFFICE VISIT (OUTPATIENT)
Dept: ORTHOPEDIC SURGERY | Facility: CLINIC | Age: 68
End: 2023-12-14
Payer: MEDICARE

## 2023-12-14 VITALS
HEIGHT: 68 IN | SYSTOLIC BLOOD PRESSURE: 130 MMHG | WEIGHT: 223 LBS | DIASTOLIC BLOOD PRESSURE: 86 MMHG | BODY MASS INDEX: 33.8 KG/M2 | OXYGEN SATURATION: 97 % | HEART RATE: 73 BPM

## 2023-12-14 DIAGNOSIS — M79.605 LEFT LEG PAIN: Primary | ICD-10-CM

## 2023-12-14 NOTE — PROGRESS NOTES
"Chief Complaint  Follow-up of the Left Lower Leg     Subjective      Casandra Arango presents to [facility] for follow up of the left lower leg. She has a history of a left TKA 1/21/19.  Patient has a history of a long gamma nail for a tibia fracture   She has pain from mid calf to the ankle.  She has pain at night and pain with driving and certain movements. She uses topical cream and takes Diclofenac.     Allergies   Allergen Reactions    Acetaminophen Anaphylaxis    Hydrocodone Anaphylaxis    Nabumetone Itching    Amlodipine Itching    Celebrex [Celecoxib] Other (See Comments)     Eye problem    Hydrocodone Bit-Homatrop Mbr Itching    Lamotrigine Hives    Propoxyphene Unknown - Low Severity     Darvocet  Other reaction(s): Rash to arms, torso    Losartan Rash    Terbinafine Rash        Social History     Socioeconomic History    Marital status:    Tobacco Use    Smoking status: Never    Smokeless tobacco: Never    Tobacco comments:     Casualty of second hand smoke   Vaping Use    Vaping Use: Never used   Substance and Sexual Activity    Alcohol use: Not Currently     Comment: CURRENT SOME DAY     Drug use: Never    Sexual activity: Not Currently     Partners: Male     Birth control/protection: None        I reviewed the patient's chief complaint, history of present illness, review of systems, past medical history, surgical history, family history, social history, medications, and allergy list.     Review of Systems     Constitutional: Denies fevers, chills, weight loss  Cardiovascular: Denies chest pain, shortness of breath  Skin: Denies rashes, acute skin changes  Neurologic: Denies headache, loss of consciousness        Vital Signs:   /86   Pulse 73   Ht 172.7 cm (68\")   Wt 101 kg (223 lb)   SpO2 97%   BMI 33.91 kg/m²          Physical Exam  General: Alert. No acute distress    Ortho Exam        LEFT LOWER LEG Flexion 120. Extension 0. Stable to varus/valgus " stress. Stable to anterior/posterior drawer. Neurovascularly intact.  Positive EHL, FHL, HS and TA. Sensation intact to light touch all 5 nerves of the foot. Ambulates with Non-antalgic gait. Patella is well tracking. Calf supple, non-tender. Good strength to hamstrings, quadriceps, dorsiflexors, and plantar flexors.  Knee Extensor Mechanism intact. She has pain in the lower mid leg to the ankle.  She notes decrease movement in the ankle and pain at times.         Procedures      Imaging Results (Most Recent)       Procedure Component Value Units Date/Time    XR Tibia Fibula 2 View Left [[id number]] Resulted: 12/14/23 1417     Updated: 12/14/23 1429             Result Review :     X-Ray Report:  Left Tib/Fib  X-Ray  Indication: Evaluation of the left tib/fib  AP/Lateral view(s)  Findings: Intact left total knee arthroplasty.   Intact intra medullary jose angel.   No signs of loosening, subsidence or periprosthetic fracture.   Prior studies available for comparison: yes             Assessment and Plan     Diagnoses and all orders for this visit:    1. Left leg pain (Primary)  -     XR Tibia Fibula 2 View Left        Discussed the treatment plan with the patient. I reviewed the X-rays that were obtained today with the patient.     HEP exercises.  Use topical cream.      Call or return if worsening symptoms.    Follow Up     PRN      Patient was given instructions and counseling regarding her condition or for health maintenance advice. Please see specific information pulled into the AVS if appropriate.     Scribed for Kar Denny MD by Lynne Dietz MA.  12/14/23   14:25 EST    I have personally performed the services described in this document as scribed by the above individual and it is both accurate and complete. Kar Denny MD 12/14/23

## 2023-12-22 ENCOUNTER — TELEPHONE (OUTPATIENT)
Dept: INTERNAL MEDICINE | Facility: CLINIC | Age: 68
End: 2023-12-22
Payer: MEDICARE

## 2023-12-22 RX ORDER — DICLOFENAC SODIUM 75 MG/1
75 TABLET, DELAYED RELEASE ORAL 2 TIMES DAILY
Qty: 90 TABLET | Refills: 1 | Status: SHIPPED | OUTPATIENT
Start: 2023-12-22

## 2023-12-22 NOTE — TELEPHONE ENCOUNTER
renea    Caller: Casandra Arango    Relationship: Self    Best call back number: [phone]    Requested Prescriptions:   Requested Prescriptions     Pending Prescriptions Disp Refills    diclofenac (VOLTAREN) 75 MG EC tablet 90 tablet 1     Sig: Take 1 tablet by mouth 2 (Two) Times a Day.        Pharmacy where request should be sent: [place] [phone] [place] [phone]      Last office visit with prescribing clinician: 11/8/2023   Last telemedicine visit with prescribing clinician: Visit date not found   Next office visit with prescribing clinician: 3/8/2024     Does the patient have less than a 3 day supply:  [x] Yes  [] No      Satya Anthony Rep   12/22/23 10:44 EST

## 2024-01-18 ENCOUNTER — TELEPHONE (OUTPATIENT)
Dept: INTERNAL MEDICINE | Facility: CLINIC | Age: 69
End: 2024-01-18
Payer: MEDICARE

## 2024-01-18 NOTE — TELEPHONE ENCOUNTER
Spoke with patient advise her to go to urgent care, patient declined she stated she tested at home for covid and came back positive, patient is having cough, nasal congestion. Please advise

## 2024-01-18 NOTE — TELEPHONE ENCOUNTER
Caller: Casandra Arango    Relationship: Self    Best call back number: [phone]     What medication are you requesting: ANTIBIOTIC, TESSALON PEARLS    What are your current symptoms: COUGH,     How long have you been experiencing symptoms: A FEW DAYS    Have you had these symptoms before:    [] Yes  [x] No    Have you been treated for these symptoms before:   [] Yes  [x] No    If a prescription is needed, what is your preferred pharmacy and phone number: [id number] [place] [facility] [address] AT [facility] - [phone]  - [phone]      Additional notes: PATIENT TESTED POSITIVE FOR COVID 1/17/24

## 2024-01-19 NOTE — TELEPHONE ENCOUNTER
Please call and let her know that I am sending paxlovid for her.  IF she isn't too bad I wouldn't take it but if she doesn't feel well it is worth a try.  Sent to [facility].  If she chooses to take it she should hold her omeprazole and pravastatin during the time she is taking the medicine.

## 2024-02-26 ENCOUNTER — OFFICE VISIT (OUTPATIENT)
Dept: PODIATRY | Facility: CLINIC | Age: 69
End: 2024-02-26
Payer: MEDICARE

## 2024-02-26 VITALS
TEMPERATURE: 98 F | SYSTOLIC BLOOD PRESSURE: 130 MMHG | BODY MASS INDEX: 34.4 KG/M2 | HEIGHT: 68 IN | HEART RATE: 75 BPM | DIASTOLIC BLOOD PRESSURE: 77 MMHG | WEIGHT: 227 LBS

## 2024-02-26 DIAGNOSIS — L60.0 ONYCHOCRYPTOSIS: Primary | ICD-10-CM

## 2024-02-26 DIAGNOSIS — M79.672 FOOT PAIN, BILATERAL: ICD-10-CM

## 2024-02-26 DIAGNOSIS — B35.1 ONYCHOMYCOSIS: ICD-10-CM

## 2024-02-26 DIAGNOSIS — M79.671 FOOT PAIN, BILATERAL: ICD-10-CM

## 2024-02-26 PROCEDURE — 11720 DEBRIDE NAIL 1-5: CPT | Performed by: PODIATRIST

## 2024-02-26 PROCEDURE — 1160F RVW MEDS BY RX/DR IN RCRD: CPT | Performed by: PODIATRIST

## 2024-02-26 PROCEDURE — 3075F SYST BP GE 130 - 139MM HG: CPT | Performed by: PODIATRIST

## 2024-02-26 PROCEDURE — 3078F DIAST BP <80 MM HG: CPT | Performed by: PODIATRIST

## 2024-02-26 PROCEDURE — 1159F MED LIST DOCD IN RCRD: CPT | Performed by: PODIATRIST
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
## 2024-03-04 ENCOUNTER — TELEPHONE (OUTPATIENT)
Dept: GASTROENTEROLOGY | Facility: CLINIC | Age: 69
End: 2024-03-04
Payer: MEDICARE

## 2024-03-04 ENCOUNTER — CLINICAL SUPPORT (OUTPATIENT)
Dept: GASTROENTEROLOGY | Facility: CLINIC | Age: 69
End: 2024-03-04
Payer: MEDICARE

## 2024-03-04 ENCOUNTER — PREP FOR SURGERY (OUTPATIENT)
Dept: OTHER | Facility: HOSPITAL | Age: 69
End: 2024-03-04
Payer: MEDICARE

## 2024-03-04 DIAGNOSIS — K63.5 POLYP OF COLON, UNSPECIFIED PART OF COLON, UNSPECIFIED TYPE: Primary | ICD-10-CM

## 2024-03-04 RX ORDER — PEG-3350, SODIUM SULFATE, SODIUM CHLORIDE, POTASSIUM CHLORIDE, SODIUM ASCORBATE AND ASCORBIC ACID 7.5-2.691G
1000 KIT ORAL EVERY 12 HOURS
Qty: 1000 ML | Refills: 0 | Status: SHIPPED | OUTPATIENT
Start: 2024-03-04

## 2024-03-04 NOTE — TELEPHONE ENCOUNTER
Hub staff attempted to follow warm transfer process and was unsuccessful     Caller: Casandra Arango    Relationship to patient: Self    Best call back number: [phone]; [facility] TO [facility]     Patient is needing: PATIENT RETURNED MISSED CALL FROM [facility]. PLEASE RETURN HER CALL.
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
## 2024-03-08 ENCOUNTER — OFFICE VISIT (OUTPATIENT)
Dept: INTERNAL MEDICINE | Facility: CLINIC | Age: 69
End: 2024-03-08
Payer: MEDICARE

## 2024-03-08 VITALS
HEART RATE: 79 BPM | HEIGHT: 68 IN | SYSTOLIC BLOOD PRESSURE: 118 MMHG | BODY MASS INDEX: 34.56 KG/M2 | DIASTOLIC BLOOD PRESSURE: 72 MMHG | WEIGHT: 228 LBS | TEMPERATURE: 97.5 F | OXYGEN SATURATION: 99 % | RESPIRATION RATE: 18 BRPM

## 2024-03-08 DIAGNOSIS — G89.29 CHRONIC BILATERAL LOW BACK PAIN, UNSPECIFIED WHETHER SCIATICA PRESENT: Primary | ICD-10-CM

## 2024-03-08 DIAGNOSIS — E78.2 MIXED HYPERLIPIDEMIA: ICD-10-CM

## 2024-03-08 DIAGNOSIS — G58.9 PINCHED NERVE: Primary | ICD-10-CM

## 2024-03-08 DIAGNOSIS — R79.9 ABNORMAL FINDING OF BLOOD CHEMISTRY, UNSPECIFIED: ICD-10-CM

## 2024-03-08 DIAGNOSIS — M54.50 CHRONIC BILATERAL LOW BACK PAIN, UNSPECIFIED WHETHER SCIATICA PRESENT: Primary | ICD-10-CM

## 2024-03-08 DIAGNOSIS — Z78.9 ACE INHIBITOR INTOLERANCE: ICD-10-CM

## 2024-03-08 DIAGNOSIS — I10 ESSENTIAL HYPERTENSION: ICD-10-CM

## 2024-03-08 PROCEDURE — 84443 ASSAY THYROID STIM HORMONE: CPT | Performed by: INTERNAL MEDICINE

## 2024-03-08 PROCEDURE — 83036 HEMOGLOBIN GLYCOSYLATED A1C: CPT | Performed by: INTERNAL MEDICINE

## 2024-03-08 PROCEDURE — 80061 LIPID PANEL: CPT | Performed by: INTERNAL MEDICINE

## 2024-03-08 PROCEDURE — 80053 COMPREHEN METABOLIC PANEL: CPT | Performed by: INTERNAL MEDICINE

## 2024-03-08 PROCEDURE — 85025 COMPLETE CBC W/AUTO DIFF WBC: CPT | Performed by: INTERNAL MEDICINE

## 2024-03-08 RX ORDER — BUPROPION HYDROCHLORIDE 300 MG/1
300 TABLET ORAL DAILY
Qty: 90 TABLET | Refills: 1 | Status: SHIPPED | OUTPATIENT
Start: 2024-03-08

## 2024-03-08 RX ORDER — CYCLOBENZAPRINE HCL 5 MG
5 TABLET ORAL 3 TIMES DAILY PRN
Qty: 30 TABLET | Refills: 3 | Status: SHIPPED | OUTPATIENT
Start: 2024-03-08

## 2024-03-08 RX ORDER — GABAPENTIN 300 MG/1
300 CAPSULE ORAL 3 TIMES DAILY
Qty: 90 CAPSULE | Refills: 0 | Status: SHIPPED | OUTPATIENT
Start: 2024-03-08

## 2024-03-08 NOTE — PROGRESS NOTES
"Chief Complaint  Hyperlipidemia (4 month follow up ), Hypertension, Depression, and Medicare Wellness-subsequent    Subjective      History of Present Illness  The patient presents for evaluation of multiple medical concerns.    Her blood pressure is good. She checks it at home several times and it is always around 120 or less.    She is tired and can sleep all the time. She wonders if it is due to the medication. She feels like her metabolism is not good. She is sad in 01/2024. She cleaned her house only once in 01/2024. In 02/2024, she was not much better. She had no motivation. She is not sure if it is the blood pressure medicine or if she has gotten herself involved in too many things. Her mother-in-law was in the hospital with COVID-19. She has been busy with her mother-in-law. Her sister's cancer is getting worse and she is starting to have pain and problems breathing. She found out about her sister yesterday. She noticed the changes after starting the blood pressure medication in 12/2023. She cries occasionally, but it takes a lot to make her cry. She wants to sleep all the time. She will lay back down to let the medication work. She will fall asleep and not wake up until 10:00. She wears a CPAP. She had the settings adjusted. She lives in a \"depressing situation\". She talked about counseling once last year, but they could not take her. She is overwhelmed because she wants to clean her house. She has not done Al-Anon meetings.    She hurts all the time. She can stand at the kitchen sink for 20 minutes washing dishes, and she has to lay down. She has a back brace now, but it makes her back hurt more sometimes. It makes her stand up straight. She has done injections recently, but they were not successful this time. She went for the series of 8 test injections. She did get some relief after the test injections. She went ahead with another ablation, but the ablation did not work within 3 days. The first time she " "did it, it was awesome because she did not feel like she needed to go back for 2 years. She asked for pain medication on her worst days because it gets so bad that she is on edge. She has taken 2 or 3 of them since they gave them to her. She took gabapentin when she had shingles, but she has not taken that for her back. She has a bad lower back. She had a discectomy. They did an MRI 2 years ago, which showed arthritis. She saw Dr. Pedroza 5 years ago, who recommended back surgery, but he told her to wait until she absolutely has to have it done. She has muscle spasms. She can not  line for very long unless she is holding onto something. The spasms are normal when she is awake during the day. She sometimes lays on a heating pad, but she did not yesterday. She denies numbness in her legs. She has numbness in her arms. She denies sciatic pain.     Wellbutrin and Pristiq seem to work okay for her. She tried increasing Pristiq, but it made her not want to function.   She had her eyes checked after her cataract surgery. She had xanthomas removed.         Objective   Vital Signs:   Vitals:    03/08/24 1307   BP: 118/72   BP Location: Left arm   Patient Position: Sitting   Cuff Size: Large Adult   Pulse: 79   Resp: 18   Temp: 97.5 °F (36.4 °C)   SpO2: 99%   Weight: 103 kg (228 lb)   Height: 172.7 cm (68\")     Body mass index is 34.67 kg/m².    Wt Readings from Last 3 Encounters:   03/08/24 103 kg (228 lb)   02/26/24 103 kg (227 lb)   12/14/23 101 kg (223 lb)     BP Readings from Last 3 Encounters:   03/08/24 118/72   02/26/24 130/77   12/14/23 130/86       Health Maintenance   Topic Date Due    RSV Vaccine - Adults (1 - 1-dose 60+ series) Never done    COVID-19 Vaccine (5 - 2023-24 season) 09/01/2023    COLORECTAL CANCER SCREENING  11/01/2023    ZOSTER VACCINE (2 of 2) 11/08/2024 (Originally 4/11/2022)    DXA SCAN  09/21/2024    LIPID PANEL  11/08/2024    ANNUAL WELLNESS VISIT  03/08/2025    BMI FOLLOWUP  " 03/08/2025    MAMMOGRAM  12/13/2025    TDAP/TD VACCINES (2 - Tdap) 10/13/2031    HEPATITIS C SCREENING  Completed    INFLUENZA VACCINE  Completed    Pneumococcal Vaccine 65+  Completed       Physical Exam  Vitals reviewed.   Constitutional:       Appearance: Normal appearance. She is well-developed.   HENT:      Head: Normocephalic and atraumatic.      Right Ear: External ear normal.      Left Ear: External ear normal.   Eyes:      Conjunctiva/sclera: Conjunctivae normal.      Pupils: Pupils are equal, round, and reactive to light.   Cardiovascular:      Rate and Rhythm: Normal rate and regular rhythm.      Heart sounds: No murmur heard.     No friction rub. No gallop.   Pulmonary:      Effort: Pulmonary effort is normal.      Breath sounds: Normal breath sounds. No wheezing or rhonchi.   Skin:     General: Skin is warm and dry.   Neurological:      Mental Status: She is alert and oriented to person, place, and time.   Psychiatric:         Mood and Affect: Affect normal.         Behavior: Behavior normal.         Thought Content: Thought content normal.        Physical Exam        Result Review :  The following data was reviewed by: Teresa Herman MD on 03/08/2024:         Results        Procedures          Assessment & Plan  1. Depression.  I think she would benefit from counseling. I will increase her Wellbutrin to 300 mg. We discussed the side effects of Wellbutrin.    2. Back pain.  It sounds like neurogenic claudication. I will send in a prescription for a muscle relaxer. She will take it 3 times a day. She will take it at night or when she has spasms. She will talk to pain management about gabapentin. She will try physical therapy along with pain management. If her symptoms do not improve, we will consider seeing a neurosurgeon or repeating an MRI.    3. Sleep apnea.  She will check her CPAP settings.    Other chronic issues below are well controlled, will monitor closely and adjust meds as  needed    Follow-up  The patient will follow up in 3 months.    Assessment & Plan  Chronic bilateral low back pain, unspecified whether sciatica present    ACE inhibitor intolerance    Essential hypertension    Mixed hyperlipidemia     Abnormal finding of blood chemistry, unspecified      Orders Placed This Encounter   Procedures    Comprehensive Metabolic Panel    Lipid Panel    TSH    Hemoglobin A1c    Ambulatory Referral to Physical Therapy Evaluate and treat    CBC & Differential     New Medications Ordered This Visit   Medications    cyclobenzaprine (FLEXERIL) 5 MG tablet     Sig: Take 1 tablet by mouth 3 (Three) Times a Day As Needed for Muscle Spasms.     Dispense:  30 tablet     Refill:  3    buPROPion XL (Wellbutrin XL) 300 MG 24 hr tablet     Sig: Take 1 tablet by mouth Daily.     Dispense:  90 tablet     Refill:  1            BMI is >= 30 and <35. (Class 1 Obesity). The following options were offered after discussion;: exercise counseling/recommendations and nutrition counseling/recommendations         FOLLOW UP  Return in about 3 months (around 6/8/2024).  Patient was given instructions and counseling regarding her condition or for health maintenance advice. Please see specific information pulled into the AVS if appropriate.       Teresa Herman MD  03/08/24  14:48 EST    CURRENT & DISCONTINUED MEDICATIONS  Current Outpatient Medications   Medication Instructions    acetaminophen (TYLENOL) 650 mg, Oral, Every 6 Hours PRN    buPROPion XL (WELLBUTRIN XL) 300 mg, Oral, Daily    cetirizine (ZYRTEC) 10 mg, Oral, Daily    cyclobenzaprine (FLEXERIL) 5 mg, Oral, 3 Times Daily PRN    desvenlafaxine (PRISTIQ) 50 MG 24 hr tablet Take one tab po daily    diclofenac (VOLTAREN) 75 mg, Oral, 2 Times Daily    Diclofenac Sodium (VOLTAREN) 1 % gel gel Topical, 2 Times Daily    fluticasone (FLONASE) 50 MCG/ACT nasal spray 2 sprays, Nasal, Daily    HYDROcodone-acetaminophen (NORCO) 5-325 MG per tablet     hydrOXYzine  (ATARAX) 25 mg, Oral, Daily    Multiple Vitamins-Minerals (multivitamin and minerals) liquid liquid Oral, Daily    naloxone (NARCAN) 4 MG/0.1ML nasal spray     omeprazole (PRILOSEC) 40 mg, Oral, Daily    ondansetron ODT (ZOFRAN ODT) 4 mg, Translingual, Every 8 Hours PRN    PEG-KCl-NaCl-NaSulf-Na Asc-C (MoviPrep) 100 g reconstituted solution powder 1,000 mL, Oral, Every 12 Hours    pravastatin (PRAVACHOL) 20 mg, Oral, Daily    rizatriptan (MAXALT) 10 mg, Oral, Once As Needed, May repeat in 2 hours if needed    spironolactone (ALDACTONE) 25 mg, Oral, Daily    traZODone (DESYREL) 50 mg, Oral, Nightly    valACYclovir (VALTREX) 500 mg, Oral, Daily       Medications Discontinued During This Encounter   Medication Reason    Nirmatrelvir & Ritonavir, 300mg/100mg, (PAXLOVID) 20 x 150 MG & 10 x 100MG tablet therapy pack tablet *Therapy completed    buPROPion SR (Wellbutrin SR) 150 MG 12 hr tablet         Patient or patient representative verbalized consent for the use of Ambient Listening during the visit with  Teresa Herman MD for chart documentation. 3/8/2024  14:43 EST

## 2024-03-08 NOTE — PROGRESS NOTES
The ABCs of the Annual Wellness Visit  [facility] to [facility] Visit    Subjective     Casandra Arango is a 68 y.o. female who presents for a  Welcome to Medicare Visit.    The following portions of the patient's history were reviewed and   updated as appropriate: allergies, current medications, past family history, past medical history, past social history, past surgical history, and problem list.     Compared to one year ago, the patient feels her physical   health is the same.    Compared to one year ago, the patient feels her mental   health is the same.    Recent Hospitalizations:  She was not admitted to the hospital during the last year.       Current Medical Providers:  Patient Care Team:  Teresa Heramn MD as PCP - General (Internal Medicine)    Outpatient Medications Prior to Visit   Medication Sig Dispense Refill    acetaminophen (Tylenol) 325 MG tablet Take 2 tablets by mouth Every 6 (Six) Hours As Needed for Mild Pain. 90 tablet 1    buPROPion SR (Wellbutrin SR) 150 MG 12 hr tablet Take 1 tablet by mouth Daily. 90 tablet 1    cetirizine (zyrTEC) 10 MG tablet Take 1 tablet by mouth Daily. 90 tablet 1    desvenlafaxine (PRISTIQ) 50 MG 24 hr tablet Take one tab po daily 90 tablet 1    diclofenac (VOLTAREN) 75 MG EC tablet Take 1 tablet by mouth 2 (Two) Times a Day. 90 tablet 1    Diclofenac Sodium (VOLTAREN) 1 % gel gel Apply  topically to the appropriate area as directed 2 (Two) Times a Day. 100 g 2    fluticasone (FLONASE) 50 MCG/ACT nasal spray 2 sprays into the nostril(s) as directed by provider Daily. 16 g 1    HYDROcodone-acetaminophen (NORCO) 5-325 MG per tablet       hydrOXYzine (ATARAX) 25 MG tablet Take 1 tablet by mouth Daily. 90 tablet 1    Multiple Vitamins-Minerals (multivitamin and minerals) liquid liquid Take  by mouth Daily.      naloxone (NARCAN) 4 MG/0.1ML nasal spray       omeprazole (priLOSEC) 40 MG capsule Take 1 capsule by mouth Daily. 90 capsule 1    ondansetron ODT (Zofran  ODT) 4 MG disintegrating tablet Place 1 tablet on the tongue Every 8 (Eight) Hours As Needed for Nausea or Vomiting. 30 tablet 1    PEG-KCl-NaCl-NaSulf-Na Asc-C (MoviPrep) 100 g reconstituted solution powder Take 1,000 mL by mouth Every 12 (Twelve) Hours. 1000 mL 0    pravastatin (Pravachol) 20 MG tablet Take 1 tablet by mouth Daily. 90 tablet 1    rizatriptan (MAXALT) 10 MG tablet Take 1 tablet by mouth 1 (One) Time As Needed for Migraine. May repeat in 2 hours if needed 9 tablet 2    spironolactone (Aldactone) 25 MG tablet Take 1 tablet by mouth Daily. 90 tablet 1    traZODone (DESYREL) 50 MG tablet Take 1 tablet by mouth Every Night. 30 tablet 2    valACYclovir (VALTREX) 500 MG tablet Take 1 tablet by mouth Daily. 90 tablet 1    Nirmatrelvir & Ritonavir, 300mg/100mg, (PAXLOVID) 20 x 150 MG & 10 x 100MG tablet therapy pack tablet Take 3 tablets by mouth 2 (Two) Times a Day. 1 each 0     No facility-administered medications prior to visit.       Opioid medication/s are on active medication list.  and I have evaluated her active treatment plan and pain score trends (see table).  There were no vitals filed for this visit.  I have reviewed the chart for potential of high risk medication and harmful drug interactions in the elderly.          Aspirin is not on active medication list.  Aspirin use is not indicated based on review of current medical condition/s. Risk of harm outweighs potential benefits.  .    Patient Active Problem List   Diagnosis    Right shoulder pain    Obesity (BMI 30-39.9)    GERD without esophagitis    Essential hypertension    Obstructive uropathy    Nephrolithiasis    Seasonal allergic rhinitis    Anxiety    Arthritis    Arthritis of first metatarsophalangeal (MTP) joint of right foot    Arthritis of left knee    Degeneration of lumbar intervertebral disc    Hemorrhoids    Hyperlipemia    Insomnia    Lumbosacral spondylosis without myelopathy    Major depressive disorder, recurrent, severe  "without psychotic features    Vitamin D deficiency    KARINA on CPAP    Lumbar post-laminectomy syndrome    ACE inhibitor intolerance    Tick bite    Injury of right foot    Polyp of colon     Advance Care Planning   Advance Care Planning     Advance Directive is not on file.  ACP discussion was held with the patient during this visit. Patient has an advance directive (not in EMR), copy requested.       Objective   Vitals:    03/08/24 1307   BP: 118/72   BP Location: Left arm   Patient Position: Sitting   Cuff Size: Large Adult   Pulse: 79   Resp: 18   Temp: 97.5 °F (36.4 °C)   SpO2: 99%   Weight: 103 kg (228 lb)   Height: 172.7 cm (68\")     Estimated body mass index is 34.67 kg/m² as calculated from the following:    Height as of this encounter: 172.7 cm (68\").    Weight as of this encounter: 103 kg (228 lb).    BMI is >= 30 and <35. (Class 1 Obesity). The following options were offered after discussion;: exercise counseling/recommendations and nutrition counseling/recommendations      Does the patient have evidence of cognitive impairment?   No         Procedures       HEALTH RISK ASSESSMENT    Smoking Status:  Social History     Tobacco Use   Smoking Status Never   Smokeless Tobacco Never   Tobacco Comments    Casualty of second hand smoke     Alcohol Consumption:  Social History     Substance and Sexual Activity   Alcohol Use Not Currently    Comment: CURRENT SOME DAY        Fall Risk Screen:    RAFFAELE Fall Risk Assessment was completed, and patient is at LOW risk for falls.Assessment completed on:3/8/2024    Depression Screen:       3/8/2024     1:13 PM   PHQ-2/PHQ-9 Depression Screening   Little Interest or Pleasure in Doing Things 1-->several days   Feeling Down, Depressed or Hopeless 3-->nearly every day   Trouble Falling or Staying Asleep, or Sleeping Too Much 3-->nearly every day   Feeling Tired or Having Little Energy 3-->nearly every day   Poor Appetite or Overeating 2-->more than half the days   Feeling " Bad about Yourself - or that You are a Failure or Have Let Yourself or Your Family Down 0-->not at all   Trouble Concentrating on Things, Such as Reading the Newspaper or Watching Television 0-->not at all   Moving or Speaking So Slowly that Other People Could Have Noticed? Or the Opposite - Being So Fidgety 0-->not at all   Thoughts that You Would be Better Off Dead or of Hurting Yourself in Some Way 0-->not at all   PHQ-9: Brief Depression Severity Measure Score 12   If You Checked Off Any Problems, How Difficult Have These Problems Made It For You to Do Your Work, Take Care of Things at Home, or Get Along with Other People? somewhat difficult       Health Habits and Functional and Cognitive Screening:      3/8/2024     1:00 PM   Functional & Cognitive Status   Do you have difficulty preparing food and eating? No   Do you have difficulty bathing yourself, getting dressed or grooming yourself? No   Do you have difficulty using the toilet? No   Do you have difficulty moving around from place to place? No   Do you have trouble with steps or getting out of a bed or a chair? No   Current Diet Well Balanced Diet   Dental Exam Not up to date   Eye Exam Up to date   Exercise (times per week) 0 times per week   Current Exercises Include No Regular Exercise   Do you need help using the phone?  No   Are you deaf or do you have serious difficulty hearing?  No   Do you need help to go to places out of walking distance? No   Do you need help shopping? No   Do you need help preparing meals?  No   Do you need help with housework?  No   Do you need help with laundry? No   Do you need help taking your medications? No   Do you need help managing money? No   Do you ever drive or ride in a car without wearing a seat belt? No   Have you felt unusual stress, anger or loneliness in the last month? Yes   Who do you live with? Child   If you need help, do you have trouble finding someone available to you? No   Have you been bothered in the  last four weeks by sexual problems? No   Do you have difficulty concentrating, remembering or making decisions? No       Visual Acuity:    No results found.    Age-appropriate Screening Schedule:  Refer to the list below for future screening recommendations based on patient's age, sex and/or medical conditions. Orders for these recommended tests are listed in the plan section. The patient has been provided with a written plan.    Health Maintenance   Topic Date Due    RSV Vaccine - Adults (1 - 1-dose 60+ series) Never done    COVID-19 Vaccine (5 - 2023-24 season) 09/01/2023    COLORECTAL CANCER SCREENING  11/01/2023    ZOSTER VACCINE (2 of 2) 11/08/2024 (Originally 4/11/2022)    DXA SCAN  09/21/2024    LIPID PANEL  11/08/2024    ANNUAL WELLNESS VISIT  03/08/2025    BMI FOLLOWUP  03/08/2025    MAMMOGRAM  12/13/2025    TDAP/TD VACCINES (2 - Tdap) 10/13/2031    HEPATITIS C SCREENING  Completed    INFLUENZA VACCINE  Completed    Pneumococcal Vaccine 65+  Completed        CMS Preventative Services Quick Reference  Risk Factors Identified During Encounter    Chronic Pain:  cont to work with pain management  The above risks/problems have been discussed with the patient.  Pertinent information has been shared with the patient in the After Visit Summary.  Follow up plans and orders are seen below in the Assessment/Plan Section.    Diagnoses and all orders for this visit:    1. Chronic bilateral low back pain, unspecified whether sciatica present (Primary)  -     Ambulatory Referral to Physical Therapy Evaluate and treat    2. ACE inhibitor intolerance    3. Essential hypertension  Assessment & Plan:        4. Mixed hyperlipidemia  Assessment & Plan:         Other orders  -     cyclobenzaprine (FLEXERIL) 5 MG tablet; Take 1 tablet by mouth 3 (Three) Times a Day As Needed for Muscle Spasms.  Dispense: 30 tablet; Refill: 3        Follow Up:   Initial Medicare Visit in one year    An After Visit Summary and PPPS were made  available to the patient.

## 2024-03-09 LAB
ALBUMIN SERPL-MCNC: 4.1 G/DL (ref 3.5–5.2)
ALBUMIN/GLOB SERPL: 1.5 G/DL
ALP SERPL-CCNC: 148 U/L (ref 39–117)
ALT SERPL W P-5'-P-CCNC: 18 U/L (ref 1–33)
ANION GAP SERPL CALCULATED.3IONS-SCNC: 12.6 MMOL/L (ref 5–15)
AST SERPL-CCNC: 17 U/L (ref 1–32)
BASOPHILS # BLD AUTO: 0.09 10*3/MM3 (ref 0–0.2)
BASOPHILS NFR BLD AUTO: 1 % (ref 0–1.5)
BILIRUB SERPL-MCNC: <0.2 MG/DL (ref 0–1.2)
BUN SERPL-MCNC: 29 MG/DL (ref 8–23)
BUN/CREAT SERPL: 29.3 (ref 7–25)
CALCIUM SPEC-SCNC: 9.5 MG/DL (ref 8.6–10.5)
CHLORIDE SERPL-SCNC: 104 MMOL/L (ref 98–107)
CHOLEST SERPL-MCNC: 186 MG/DL (ref 0–200)
CO2 SERPL-SCNC: 24.4 MMOL/L (ref 22–29)
CREAT SERPL-MCNC: 0.99 MG/DL (ref 0.57–1)
DEPRECATED RDW RBC AUTO: 42.4 FL (ref 37–54)
EGFRCR SERPLBLD CKD-EPI 2021: 62.6 ML/MIN/1.73
EOSINOPHIL # BLD AUTO: 0.32 10*3/MM3 (ref 0–0.4)
EOSINOPHIL NFR BLD AUTO: 3.6 % (ref 0.3–6.2)
ERYTHROCYTE [DISTWIDTH] IN BLOOD BY AUTOMATED COUNT: 13.1 % (ref 12.3–15.4)
GLOBULIN UR ELPH-MCNC: 2.8 GM/DL
GLUCOSE SERPL-MCNC: 77 MG/DL (ref 65–99)
HBA1C MFR BLD: 5.7 % (ref 4.8–5.6)
HCT VFR BLD AUTO: 43.1 % (ref 34–46.6)
HDLC SERPL-MCNC: 47 MG/DL (ref 40–60)
HGB BLD-MCNC: 14 G/DL (ref 12–15.9)
IMM GRANULOCYTES # BLD AUTO: 0.04 10*3/MM3 (ref 0–0.05)
IMM GRANULOCYTES NFR BLD AUTO: 0.4 % (ref 0–0.5)
LDLC SERPL CALC-MCNC: 119 MG/DL (ref 0–100)
LDLC/HDLC SERPL: 2.49 {RATIO}
LYMPHOCYTES # BLD AUTO: 2.34 10*3/MM3 (ref 0.7–3.1)
LYMPHOCYTES NFR BLD AUTO: 26.3 % (ref 19.6–45.3)
MCH RBC QN AUTO: 29 PG (ref 26.6–33)
MCHC RBC AUTO-ENTMCNC: 32.5 G/DL (ref 31.5–35.7)
MCV RBC AUTO: 89.4 FL (ref 79–97)
MONOCYTES # BLD AUTO: 0.64 10*3/MM3 (ref 0.1–0.9)
MONOCYTES NFR BLD AUTO: 7.2 % (ref 5–12)
NEUTROPHILS NFR BLD AUTO: 5.47 10*3/MM3 (ref 1.7–7)
NEUTROPHILS NFR BLD AUTO: 61.5 % (ref 42.7–76)
NRBC BLD AUTO-RTO: 0 /100 WBC (ref 0–0.2)
PLATELET # BLD AUTO: 416 10*3/MM3 (ref 140–450)
PMV BLD AUTO: 9.4 FL (ref 6–12)
POTASSIUM SERPL-SCNC: 4.5 MMOL/L (ref 3.5–5.2)
PROT SERPL-MCNC: 6.9 G/DL (ref 6–8.5)
RBC # BLD AUTO: 4.82 10*6/MM3 (ref 3.77–5.28)
SODIUM SERPL-SCNC: 141 MMOL/L (ref 136–145)
TRIGL SERPL-MCNC: 111 MG/DL (ref 0–150)
TSH SERPL DL<=0.05 MIU/L-ACNC: 2.34 UIU/ML (ref 0.27–4.2)
VLDLC SERPL-MCNC: 20 MG/DL (ref 5–40)
WBC NRBC COR # BLD AUTO: 8.9 10*3/MM3 (ref 3.4–10.8)

## 2024-03-14 ENCOUNTER — TELEPHONE (OUTPATIENT)
Dept: INTERNAL MEDICINE | Facility: CLINIC | Age: 69
End: 2024-03-14
Payer: MEDICARE

## 2024-03-14 RX ORDER — SPIRONOLACTONE 25 MG/1
25 TABLET ORAL DAILY
Qty: 90 TABLET | Refills: 1 | Status: SHIPPED | OUTPATIENT
Start: 2024-03-14

## 2024-03-14 NOTE — TELEPHONE ENCOUNTER
Caller: ArangoCasandra    Relationship: Self    Best call back number: [phone]    Requested Prescriptions:   Requested Prescriptions     Pending Prescriptions Disp Refills    spironolactone (Aldactone) 25 MG tablet 90 tablet 1     Sig: Take 1 tablet by mouth Daily.        Pharmacy where request should be sent: [facility] [phone] [place] [phone]      Last office visit with prescribing clinician: 3/8/2024   Last telemedicine visit with prescribing clinician: Visit date not found   Next office visit with prescribing clinician: 6/26/2024     Does the patient have less than a 3 day supply:  [x] Yes  [] No    Would you like a call back once the refill request has been completed: [] Yes [] No    If the office needs to give you a call back, can they leave a voicemail: [] Yes [] No    Satya Mccullough Rep   03/14/24 13:30 EDT

## 2024-03-22 NOTE — PRE-PROCEDURE INSTRUCTIONS
"Instructed on date and arrival time of 0830. Come to entrance \"C\". Must have  over age 18 to drive home.  May have two visitors; however, children under 12 must stay in waiting room.  Discussed clear liquid diet (no red or purple), bowel prep, and NPO.  May take medications as usual except for blood thinners, diabetic medications, and weight loss medications.  Verbalized understanding of instructions given.  Instructed to call for questions or concerns.  "

## 2024-03-26 ENCOUNTER — TELEPHONE (OUTPATIENT)
Dept: GASTROENTEROLOGY | Facility: CLINIC | Age: 69
End: 2024-03-26
Payer: MEDICARE

## 2024-03-26 NOTE — TELEPHONE ENCOUNTER
Casandra Mercedes Nba  1955    Patient requested to Reschedule their Colonoscopy. I have offered to reschedule this patient and patient has agreed. Patient has been rescheduled to 04/23/2024 at 10:00 am.    Reason for cancelling/rescheduling: transportation    This procedure was ordered by Elina Harrington MD for an important reason. We want to inform you that there are risks associated with not proceeding with the procedure at this time such as a delay in diagnosis, risk of incurable disease, or cancer.    Patient plans to call us back to reschedule: No    Updated clearance needed?: No    If yes, clearance request has been submitted to: N/A Provider Name    Is the patient currently on any injectable medications for weight loss or diabetes? No    If yes, are they aware that they will need to discontinue this 7 days prior to their procedure? No    Patient verbalized understanding for all of the above information.

## 2024-04-03 ENCOUNTER — TELEPHONE (OUTPATIENT)
Dept: INTERNAL MEDICINE | Facility: CLINIC | Age: 69
End: 2024-04-03
Payer: MEDICARE

## 2024-04-03 RX ORDER — ACETAMINOPHEN 325 MG/1
650 TABLET ORAL EVERY 6 HOURS PRN
Qty: 90 TABLET | Refills: 1 | Status: SHIPPED | OUTPATIENT
Start: 2024-04-03

## 2024-04-03 RX ORDER — DICLOFENAC SODIUM 75 MG/1
75 TABLET, DELAYED RELEASE ORAL 2 TIMES DAILY
Qty: 90 TABLET | Refills: 1 | Status: SHIPPED | OUTPATIENT
Start: 2024-04-03

## 2024-04-03 RX ORDER — OMEPRAZOLE 40 MG/1
40 CAPSULE, DELAYED RELEASE ORAL DAILY
Qty: 90 CAPSULE | Refills: 1 | Status: SHIPPED | OUTPATIENT
Start: 2024-04-03

## 2024-04-03 RX ORDER — SPIRONOLACTONE 25 MG/1
25 TABLET ORAL DAILY
Qty: 90 TABLET | Refills: 1 | Status: SHIPPED | OUTPATIENT
Start: 2024-04-03

## 2024-04-03 NOTE — TELEPHONE ENCOUNTER
Caller: ArangoCasandra    Relationship: Self    Best call back number: [phone]    Requested Prescriptions:   Requested Prescriptions     Pending Prescriptions Disp Refills    diclofenac (VOLTAREN) 75 MG EC tablet 90 tablet 1     Sig: Take 1 tablet by mouth 2 (Two) Times a Day.    omeprazole (priLOSEC) 40 MG capsule 90 capsule 1     Sig: Take 1 capsule by mouth Daily.    spironolactone (Aldactone) 25 MG tablet 90 tablet 1     Sig: Take 1 tablet by mouth Daily.    acetaminophen (Tylenol) 325 MG tablet 90 tablet 1     Sig: Take 2 tablets by mouth Every 6 (Six) Hours As Needed for Mild Pain.        Pharmacy where request should be sent: [facility] [phone] [place], [place] [phone]      Last office visit with prescribing clinician: 3/8/2024   Last telemedicine visit with prescribing clinician: Visit date not found   Next office visit with prescribing clinician: 6/26/2024     Additional details provided by patient:     Does the patient have less than a 3 day supply:  [x] Yes  [] No      Satya Anthony Rep   04/03/24 10:17 EDT

## 2024-04-11 ENCOUNTER — OFFICE VISIT (OUTPATIENT)
Dept: PODIATRY | Facility: CLINIC | Age: 69
End: 2024-04-11
Payer: MEDICARE

## 2024-04-11 VITALS
SYSTOLIC BLOOD PRESSURE: 118 MMHG | HEIGHT: 68 IN | OXYGEN SATURATION: 96 % | DIASTOLIC BLOOD PRESSURE: 65 MMHG | WEIGHT: 226 LBS | TEMPERATURE: 98.2 F | HEART RATE: 74 BPM | BODY MASS INDEX: 34.25 KG/M2

## 2024-04-11 DIAGNOSIS — M20.5X1 HALLUX LIMITUS OF RIGHT FOOT: Primary | ICD-10-CM

## 2024-04-11 DIAGNOSIS — M79.671 FOOT PAIN, RIGHT: ICD-10-CM

## 2024-04-11 RX ORDER — BUPIVACAINE HYDROCHLORIDE 5 MG/ML
0.5 INJECTION, SOLUTION PERINEURAL ONCE
Status: COMPLETED | OUTPATIENT
Start: 2024-04-11 | End: 2024-04-11

## 2024-04-11 RX ORDER — TESTOSTERONE CYPIONATE 200 MG/ML
4 INJECTION INTRAMUSCULAR ONCE
Status: COMPLETED | OUTPATIENT
Start: 2024-04-11 | End: 2024-04-11

## 2024-04-11 RX ADMIN — TESTOSTERONE CYPIONATE 4 MG: 200 INJECTION INTRAMUSCULAR at 12:01

## 2024-04-11 RX ADMIN — BUPIVACAINE HYDROCHLORIDE 0.5 ML: 5 INJECTION, SOLUTION PERINEURAL at 12:00
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
## 2024-04-16 NOTE — PRE-PROCEDURE INSTRUCTIONS
Reminded of arrival time at 1000, Entrance C of the [facility] hospital. Instructed to bring or have a  over the age of 18 set up to drive you home the day of procedure.    Instructed on clear liquid diet the day before, nothing red or purple. Call with any questions about the prep or if in need of the prep.  Reminded them not to eat or drink anything am of procedure unless its a sip of water with medications.  Hold diclofenac, aldactone am of procedure.

## 2024-04-22 ENCOUNTER — ANESTHESIA EVENT (OUTPATIENT)
Dept: GASTROENTEROLOGY | Facility: HOSPITAL | Age: 69
End: 2024-04-22
Payer: MEDICARE

## 2024-04-22 NOTE — ANESTHESIA PREPROCEDURE EVALUATION
Anesthesia Evaluation     Patient summary reviewed and Nursing notes reviewed   NPO Solid Status: > 8 hours  NPO Liquid Status: > 2 hours           Airway   Mallampati: III  TM distance: >3 FB  Neck ROM: full  No difficulty expected  Dental          Pulmonary - normal exam    breath sounds clear to auscultation  (+) ,sleep apnea on CPAP  Cardiovascular - normal exam  Exercise tolerance: good (4-7 METS)    ECG reviewed  Rhythm: regular  Rate: normal    (+) hypertension well controlled, hyperlipidemia      Neuro/Psych  (+) headaches, psychiatric history Anxiety and Depression  GI/Hepatic/Renal/Endo    (+) obesity, morbid obesity, GERD well controlled, renal disease- stones    Musculoskeletal     Abdominal    Substance History - negative use     OB/GYN negative ob/gyn ROS         Other   arthritis, blood dyscrasia anemia,     ROS/Med Hx Other: EKG on 11-:  NSR, borderline T abnormalities, inferior leads                          Anesthesia Plan    ASA 3     general   total IV anesthesia  (Patient understands anesthesia not responsible for dental damage.)  intravenous induction     Anesthetic plan, risks, benefits, and alternatives have been provided, discussed and informed consent has been obtained with: patient.  Pre-procedure education provided  Use of blood products discussed with patient .    Plan discussed with CRNA.

## 2024-04-23 ENCOUNTER — HOSPITAL ENCOUNTER (OUTPATIENT)
Facility: HOSPITAL | Age: 69
Setting detail: HOSPITAL OUTPATIENT SURGERY
Discharge: HOME | End: 2024-04-23
Attending: INTERNAL MEDICINE | Admitting: INTERNAL MEDICINE
Payer: MEDICARE

## 2024-04-23 ENCOUNTER — TELEPHONE (OUTPATIENT)
Dept: GASTROENTEROLOGY | Facility: CLINIC | Age: 69
End: 2024-04-23
Payer: MEDICARE

## 2024-04-23 ENCOUNTER — ANESTHESIA (OUTPATIENT)
Dept: GASTROENTEROLOGY | Facility: HOSPITAL | Age: 69
End: 2024-04-23
Payer: MEDICARE

## 2024-04-23 VITALS
HEART RATE: 76 BPM | WEIGHT: 224.65 LBS | BODY MASS INDEX: 34.16 KG/M2 | TEMPERATURE: 97.3 F | RESPIRATION RATE: 20 BRPM | OXYGEN SATURATION: 100 % | DIASTOLIC BLOOD PRESSURE: 86 MMHG | SYSTOLIC BLOOD PRESSURE: 101 MMHG

## 2024-04-23 PROCEDURE — 25010000002 PROPOFOL 10 MG/ML EMULSION: Performed by: NURSE ANESTHETIST, CERTIFIED REGISTERED

## 2024-04-23 PROCEDURE — G0105 COLORECTAL SCRN; HI RISK IND: HCPCS | Performed by: INTERNAL MEDICINE

## 2024-04-23 PROCEDURE — 25810000003 LACTATED RINGERS PER 1000 ML

## 2024-04-23 RX ORDER — LIDOCAINE HYDROCHLORIDE 20 MG/ML
INJECTION, SOLUTION EPIDURAL; INFILTRATION; INTRACAUDAL; PERINEURAL AS NEEDED
Status: DISCONTINUED | OUTPATIENT
Start: 2024-04-23 | End: 2024-04-23 | Stop reason: SURG

## 2024-04-23 RX ORDER — PROPOFOL 10 MG/ML
VIAL (ML) INTRAVENOUS AS NEEDED
Status: DISCONTINUED | OUTPATIENT
Start: 2024-04-23 | End: 2024-04-23 | Stop reason: SURG

## 2024-04-23 RX ORDER — SODIUM CHLORIDE, SODIUM LACTATE, POTASSIUM CHLORIDE, CALCIUM CHLORIDE 600; 310; 30; 20 MG/100ML; MG/100ML; MG/100ML; MG/100ML
30 INJECTION, SOLUTION INTRAVENOUS CONTINUOUS
Status: DISCONTINUED | OUTPATIENT
Start: 2024-04-23 | End: 2024-04-23 | Stop reason: HOSPADM

## 2024-04-23 RX ADMIN — PROPOFOL 100 MG: 10 INJECTION, EMULSION INTRAVENOUS at 11:12

## 2024-04-23 RX ADMIN — LIDOCAINE HYDROCHLORIDE 60 MG: 20 INJECTION, SOLUTION INTRAVENOUS at 11:12

## 2024-04-23 RX ADMIN — SODIUM CHLORIDE, POTASSIUM CHLORIDE, SODIUM LACTATE AND CALCIUM CHLORIDE 30 ML/HR: 600; 310; 30; 20 INJECTION, SOLUTION INTRAVENOUS at 10:46

## 2024-04-23 RX ADMIN — PROPOFOL 200 MCG/KG/MIN: 10 INJECTION, EMULSION INTRAVENOUS at 11:12

## 2024-04-23 NOTE — TELEPHONE ENCOUNTER
----- Message from SHEY Jacobson sent at 4/23/2024  1:57 PM EDT -----  Repeat colonoscopy in 5 years  ----- Message -----  From: Swetha Villanueva MA  Sent: 4/23/2024   1:22 PM EDT  To: [facility]

## 2024-04-23 NOTE — ANESTHESIA POSTPROCEDURE EVALUATION
Patient: Casandra Arango    Procedure Summary       Date: 04/23/24 Room / Location: [facility] ENDOSCOPY 1 / [facility] ENDOSCOPY    Anesthesia Start: 1110 Anesthesia Stop: 1148    Procedure: COLONOSCOPY FOR SCREENING Diagnosis:       Polyp of colon, unspecified part of colon, unspecified type      (Polyp of colon, unspecified part of colon, unspecified type [K63.5])    Surgeons: Elina Harrington MD Provider: Estefania Farfan CRNA    Anesthesia Type: general ASA Status: 3            Anesthesia Type: general    Vitals  Vitals Value Taken Time   /86 04/23/24 1208   Temp 36.3 °C (97.3 °F) 04/23/24 1148   Pulse 70 04/23/24 1213   Resp 20 04/23/24 1208   SpO2 100 % 04/23/24 1213   Vitals shown include unfiled device data.        Post Anesthesia Care and Evaluation    Post-procedure mental status: acceptable.  Pain management: satisfactory to patient    Airway patency: patent  Anesthetic complications: No anesthetic complications    Cardiovascular status: acceptable  Respiratory status: acceptable    Comments: Per chart review
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
## 2024-04-26 ENCOUNTER — TREATMENT (OUTPATIENT)
Dept: PHYSICAL THERAPY | Facility: CLINIC | Age: 69
End: 2024-04-26
Payer: MEDICARE

## 2024-04-26 DIAGNOSIS — M54.50 CHRONIC MIDLINE LOW BACK PAIN WITHOUT SCIATICA: Primary | ICD-10-CM

## 2024-04-26 DIAGNOSIS — R29.898 WEAKNESS OF BOTH HIPS: ICD-10-CM

## 2024-04-26 DIAGNOSIS — R26.2 DIFFICULTY WALKING: ICD-10-CM

## 2024-04-26 DIAGNOSIS — G89.29 CHRONIC MIDLINE LOW BACK PAIN WITHOUT SCIATICA: Primary | ICD-10-CM

## 2024-04-26 PROCEDURE — 97110 THERAPEUTIC EXERCISES: CPT | Performed by: PHYSICAL THERAPIST

## 2024-04-26 PROCEDURE — 97161 PT EVAL LOW COMPLEX 20 MIN: CPT | Performed by: PHYSICAL THERAPIST

## 2024-04-26 PROCEDURE — 97530 THERAPEUTIC ACTIVITIES: CPT | Performed by: PHYSICAL THERAPIST

## 2024-04-26 NOTE — PROGRESS NOTES
Physical Therapy Initial Evaluation and Plan of Care  [address], [address] [place] [address]        Patient: Casandra Arango   : 1955  Diagnosis/ICD-10 Code:  Chronic midline low back pain without sciatica [M54.50, G89.29]  Referring practitioner: Teresa Herman MD  Date of Initial Visit: 2024  Today's Date: 2024  Patient seen for 1 sessions           Subjective Questionnaire: Oswestry:       Subjective Evaluation    History of Present Illness  Mechanism of injury: Pt reports that he had a laminectomy 18 years ago.  Pt reports that she has been going [facility] [employer] for the past 4 years.  Pt reports that she has this was effective initially but pain has gradually worsened.  Pt reports that she intermittently takes hydrocodone.  Pt reports that she has difficulty with prolonged standing, vacuuming, walking, lifting due to pain.  Pt denies B LE radicular symptoms.  Pt reports cramping/muscle spasms in lower back with prolonged standing.  Pt is retired.        Pt reports hx of B knee TKR, multiple L knee sxs (fx) and R THR.      Pain  Current pain ratin  At best pain ratin  At worst pain ratin  Quality: dull ache, burning, discomfort, tight and pulling  Relieving factors: change in position and medications  Aggravating factors: ambulation, lifting, prolonged positioning, repetitive movement, standing and movement  Progression: worsening    Social Support  Lives with: adult children    Patient Goals  Patient goals for therapy: decreased pain, increased motion, increased strength, independence with ADLs/IADLs and return to sport/leisure activities             Objective          Static Posture     Head  Forward.    Shoulders  Rounded.    Lumbar Spine   Decreased lordosis.     Knee   Knee (Left): Flexed.     Comments  L iliac crest higher in standing than R    Palpation   Left   Tenderness of the erector spinae and lumbar paraspinals.     Right Tenderness of the  erector spinae and lumbar paraspinals.     Strength/Myotome Testing     Left Hip   Planes of Motion   Flexion: 4  Extension: 4-  Abduction: 4-    Right Hip   Planes of Motion   Flexion: 4-  Extension: 4-  Abduction: 4-    Left Knee   Flexion: 5  Extension: 5    Right Knee   Flexion: 5  Extension: 5    Left Ankle/Foot   Dorsiflexion: 5    Right Ankle/Foot   Dorsiflexion: 5    Tests     Lumbar     Left   Negative passive SLR and quadrant.     Right   Negative passive SLR and quadrant.     Left Hip   Positive scour.     Ambulation     Observational Gait   Decreased walking speed.     Additional Observational Gait Details  Pt demonstrates antalgic gait on L LE     General Comments     Lumbar Comments  L iliac crest higher in standing    L LE longer in supine  Bilateral hamstring tightness  Light touch sensation normal in bilateral lower extremities  Fair PPT/TA activation             Assessment & Plan       Assessment  Impairments: abnormal or restricted ROM, activity intolerance, impaired physical strength, lacks appropriate home exercise program and pain with function   Functional limitations: lifting, sleeping, walking, uncomfortable because of pain, sitting, standing and unable to perform repetitive tasks   Assessment details: Patient presents with signs/symptoms consistent with lower back pain without radiculopathy including decreased lumbar ROM, bilateral hip and core weakness, decreased PPT/TA activation, gait deficits and reports of pain limiting function during ADLs as shown on the DANIEL.  Patient would benefit from skilled PT services in order to address deficits limiting function at this time.  HEP was given to patient this session and education on HEP/diagnosis provided to patient.        Goals  Plan Goals: 1. The patient complains of low back pain.  LTG 1: 12 weeks:  The patient will report a pain rating of 2/10 or better in order to improve  tolerance to activities of daily living and improve sleep  quality.  STATUS:  New  STG 1a: 6 weeks:  The patient will report a pain rating of 3/10 or better.  STATUS:  New    2. The patient demonstrates weakness of the bilateral hips.  LTG 2: 12 weeks:  The patient will demonstrate 4+ /5 strength for bilateral hip flexion, abduction,  and extension in order to improve hip stability.  STATUS:  New  STG 2a: 6 weeks:  The patient will demonstrate 4 /5 strength for bilateral hip flexion, abduction,  and extension.  STATUS:  New    3. The patient has gait dysfunction.  LTG 3: 12 weeks:  The patient will ambulate without assistive device, independently, for   community distances with minimal limp in order to improve mobility and allow   patient to perform activities such as grocery shopping with greater ease.  STATUS:  New    4. Mobility: Walking/Moving Around Functional Limitation    LTG 1: 12 weeks:  The patient will demonstrate 11 % limitation by achieving a score of 5/45 on the DANIEL.  STATUS:  New  STG 1: 6 weeks:  The patient will demonstrate 22 % limitation by achieving a score of 10/45 on the DANIEL.  STATUS:  New    5. The patient demonstrates weakness and decreased activation of core stabilizers.  LTG 5: 12 weeks:  The patient will demonstrate good PPT activation in standing and supine without cues in order to improve overall lumbar stability during ADLs and to avoid further injury during lifting and other tasks.  STATUS:  New      Plan  Therapy options: will be seen for skilled therapy services  Planned modality interventions: TENS, thermotherapy (hydrocollator packs) and cryotherapy  Planned therapy interventions: manual therapy, abdominal trunk stabilization, ADL retraining, neuromuscular re-education, body mechanics training, postural training, soft tissue mobilization, flexibility, spinal/joint mobilization, functional ROM exercises, strengthening, stretching, gait training, home exercise program, therapeutic activities and joint mobilization  Frequency: 2x  week  Duration in weeks: 12  Treatment plan discussed with: patient        Timed:         Manual Therapy:    0     mins  73701;     Therapeutic Exercise:    15     mins  20950;     Neuromuscular Melody:    0    mins  10467;    Therapeutic Activity:     8     mins  39592;     Gait Trainin     mins  24105;     Ultrasound:     0     mins  22636;    Ionto                               0    mins   38993  Self-care  __0__ mins 13260    Un-Timed:  Electrical Stimulation:    0     mins  85295 (MC );  Traction     0     mins 54864  Low Eval     25     Mins  10848  Mod Eval     0     Mins  68287  High Eval                       0     Mins  06775  Hot pack     0     Mins    Cold pack                       0     Mins      Timed Treatment:   23   mins   Total Treatment:     48   mins    PT SIGNATURE: Inez Ramos PT      Electronically signed 2024  [place] License: [id number]    Initial Certification    Certification Period: 2024 thru 2024  NPI: [id number]  I certify that the therapy services are furnished while this patient is under my care.  The services outlined above are required by this patient, and will be reviewed every 90 days.     PHYSICIAN: Teresa Herman MD      DATE:     Please sign and return via fax to [phone].. Thank you, [facility].

## 2024-05-10 ENCOUNTER — TREATMENT (OUTPATIENT)
Dept: PHYSICAL THERAPY | Facility: CLINIC | Age: 69
End: 2024-05-10
Payer: MEDICARE

## 2024-05-10 DIAGNOSIS — M54.50 CHRONIC MIDLINE LOW BACK PAIN WITHOUT SCIATICA: Primary | ICD-10-CM

## 2024-05-10 DIAGNOSIS — R29.898 WEAKNESS OF BOTH HIPS: ICD-10-CM

## 2024-05-10 DIAGNOSIS — G89.29 CHRONIC MIDLINE LOW BACK PAIN WITHOUT SCIATICA: Primary | ICD-10-CM

## 2024-05-10 DIAGNOSIS — R26.2 DIFFICULTY WALKING: ICD-10-CM

## 2024-05-10 PROCEDURE — 97530 THERAPEUTIC ACTIVITIES: CPT | Performed by: PHYSICAL THERAPIST

## 2024-05-10 PROCEDURE — 97110 THERAPEUTIC EXERCISES: CPT | Performed by: PHYSICAL THERAPIST

## 2024-05-13 ENCOUNTER — TREATMENT (OUTPATIENT)
Dept: PHYSICAL THERAPY | Facility: CLINIC | Age: 69
End: 2024-05-13
Payer: MEDICARE

## 2024-05-13 DIAGNOSIS — G89.29 CHRONIC MIDLINE LOW BACK PAIN WITHOUT SCIATICA: Primary | ICD-10-CM

## 2024-05-13 DIAGNOSIS — R26.2 DIFFICULTY WALKING: ICD-10-CM

## 2024-05-13 DIAGNOSIS — M54.50 CHRONIC MIDLINE LOW BACK PAIN WITHOUT SCIATICA: Primary | ICD-10-CM

## 2024-05-13 DIAGNOSIS — R29.898 WEAKNESS OF BOTH HIPS: ICD-10-CM

## 2024-05-13 PROCEDURE — 97530 THERAPEUTIC ACTIVITIES: CPT | Performed by: PHYSICAL THERAPIST

## 2024-05-13 PROCEDURE — 97110 THERAPEUTIC EXERCISES: CPT | Performed by: PHYSICAL THERAPIST

## 2024-05-13 PROCEDURE — 97140 MANUAL THERAPY 1/> REGIONS: CPT | Performed by: PHYSICAL THERAPIST

## 2024-05-13 NOTE — PROGRESS NOTES
Physical Therapy Daily Treatment Note  [address], [address] [place] [address]        Patient: Casandra Arango   : 1955  Diagnosis/ICD-10 Code:  Chronic midline low back pain without sciatica [M54.50, G89.29]  Referring practitioner: Teresa Herman MD  Date of Initial Visit: Type: THERAPY  Noted: 2024  Today's Date: 2024  Patient seen for 3 sessions             Subjective   Casandra Arango reports having sight increased pain her lower back after helping with yard sale on Saturday.  She rates her pain at 2-3/10 upon arrival; but states that her pain was 8/10 Saturday night.    Objective     + Left hip pain -tightness with compression  Decrease in left hip pain with distraction    See Exercise and Manual Logs for complete treatment.       Assessment/Plan    Pt tolerated therapy session well - with progression of therapeutic exercises, CKC-Functional activities, and Manual therapy. She has improved, but continues to have deficits in Her Trunk and Bilateral Hip ROM,  Strength, and Stability; limiting function and ability to perform ADLs at this time.  Pt given 1/4 heel lift to take form for extended trial in her right shoe to help manage leg length discrepancy.     Progress per Plan of Care - as tolerated.               Timed:  Manual Therapy:    8     mins  93186;  Therapeutic Exercise:    22     mins  56372;     Neuromuscular Melody:    0    mins  41412;    Therapeutic Activity:     10     mins  19675;     Gait Trainin     mins  19002;     Ultrasound:     0     mins  38123;    Electrical Stimulation:    0     mins  19994;  Iontophoresis     0     mins  88811    Untimed:  Electrical Stimulation:    0     mins  12827 ( );  Mechanical Traction:    0     mins  18909;   Fluidotherapy     0     mins  55821  Hot pack     0     mins  13839  Cold pack     0     mins  53847    Timed Treatment:   40   mins   Total Treatment:     40   mins        Shana Amaro PTA  Physical Therapist  Assistant

## 2024-05-16 ENCOUNTER — TREATMENT (OUTPATIENT)
Dept: PHYSICAL THERAPY | Facility: CLINIC | Age: 69
End: 2024-05-16
Payer: MEDICARE

## 2024-05-16 DIAGNOSIS — R29.898 WEAKNESS OF BOTH HIPS: ICD-10-CM

## 2024-05-16 DIAGNOSIS — M54.50 CHRONIC MIDLINE LOW BACK PAIN WITHOUT SCIATICA: Primary | ICD-10-CM

## 2024-05-16 DIAGNOSIS — G89.29 CHRONIC MIDLINE LOW BACK PAIN WITHOUT SCIATICA: Primary | ICD-10-CM

## 2024-05-16 DIAGNOSIS — R26.2 DIFFICULTY WALKING: ICD-10-CM

## 2024-05-16 PROCEDURE — 97530 THERAPEUTIC ACTIVITIES: CPT | Performed by: PHYSICAL THERAPIST

## 2024-05-16 PROCEDURE — 97110 THERAPEUTIC EXERCISES: CPT | Performed by: PHYSICAL THERAPIST

## 2024-05-16 PROCEDURE — 97140 MANUAL THERAPY 1/> REGIONS: CPT | Performed by: PHYSICAL THERAPIST

## 2024-05-16 PROCEDURE — G0283 ELEC STIM OTHER THAN WOUND: HCPCS | Performed by: PHYSICAL THERAPIST

## 2024-05-16 NOTE — PROGRESS NOTES
Physical Therapy Daily Treatment Note  [address], [address] [place] [address]        Patient: Casandra Arango   : 1955  Diagnosis/ICD-10 Code:  Chronic midline low back pain without sciatica [M54.50, G89.29]  Referring practitioner: Teresa Herman MD  Date of Initial Visit: Type: THERAPY  Noted: 2024  Today's Date: 2024  Patient seen for 4 sessions             Subjective   Casandra Arango reports having increased pain in her right lower back and hip after last therapy session.  She rates her pain at 2-3/10 upon arrival today.    Objective     + Tightness lower back and bilateral hip musculature    See Exercise, Manual, and Modality Logs for complete treatment.       Assessment/Plan    Pt tolerated therapy session well - with progression of therapeutic exercises, CKC-Functional activities, and Manual therapy. She has improved, but continues to have deficits in Her Trunk and Bilateral Hip ROM, Strength, and Stability; limiting function and ability to perform ADLs at this time. Pt given 1/4 heel lift to take form for extended trial in her right shoe to help manage leg length discrepancy.   Positive response to Trial of IFC/MH today- as pt reported decrease in  pain and tightness post session.    Progress per Plan of Care -            Timed:  Manual Therapy:    10     mins  76358;  Therapeutic Exercise:    22     mins  68275;     Neuromuscular Melody:    0    mins  46194;    Therapeutic Activity:     10     mins  98677;     Gait Trainin     mins  41738;     Ultrasound:     0     mins  86755;    Electrical Stimulation:    0     mins  73246;  Iontophoresis     0     mins  76421    Untimed:  Electrical Stimulation:    15     mins  93387 ( );  Mechanical Traction:    00     mins  36568;   Fluidotherapy     0     mins  12819  Hot pack     0     mins  80778  Cold pack     0     mins  73188    Timed Treatment:   42   mins   Total Treatment:     57   mins        Shana Amaro  PTA  Physical Therapist Assistant

## 2024-05-20 ENCOUNTER — OFFICE VISIT (OUTPATIENT)
Dept: PODIATRY | Facility: CLINIC | Age: 69
End: 2024-05-20
Payer: MEDICARE

## 2024-05-20 VITALS
BODY MASS INDEX: 34.06 KG/M2 | HEART RATE: 76 BPM | SYSTOLIC BLOOD PRESSURE: 131 MMHG | DIASTOLIC BLOOD PRESSURE: 77 MMHG | OXYGEN SATURATION: 96 % | WEIGHT: 224 LBS | TEMPERATURE: 98.5 F

## 2024-05-20 DIAGNOSIS — M79.671 FOOT PAIN, BILATERAL: ICD-10-CM

## 2024-05-20 DIAGNOSIS — B35.1 ONYCHOMYCOSIS: ICD-10-CM

## 2024-05-20 DIAGNOSIS — L60.0 ONYCHOCRYPTOSIS: Primary | ICD-10-CM

## 2024-05-20 DIAGNOSIS — M79.672 FOOT PAIN, BILATERAL: ICD-10-CM

## 2024-05-20 PROCEDURE — 11720 DEBRIDE NAIL 1-5: CPT | Performed by: PODIATRIST

## 2024-05-20 PROCEDURE — 1160F RVW MEDS BY RX/DR IN RCRD: CPT | Performed by: PODIATRIST

## 2024-05-20 PROCEDURE — 3078F DIAST BP <80 MM HG: CPT | Performed by: PODIATRIST

## 2024-05-20 PROCEDURE — 3075F SYST BP GE 130 - 139MM HG: CPT | Performed by: PODIATRIST

## 2024-05-20 PROCEDURE — 1159F MED LIST DOCD IN RCRD: CPT | Performed by: PODIATRIST
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
## 2024-05-23 ENCOUNTER — TREATMENT (OUTPATIENT)
Dept: PHYSICAL THERAPY | Facility: CLINIC | Age: 69
End: 2024-05-23
Payer: MEDICARE

## 2024-05-23 DIAGNOSIS — R29.898 WEAKNESS OF BOTH HIPS: ICD-10-CM

## 2024-05-23 DIAGNOSIS — R26.2 DIFFICULTY WALKING: ICD-10-CM

## 2024-05-23 DIAGNOSIS — M54.50 CHRONIC MIDLINE LOW BACK PAIN WITHOUT SCIATICA: Primary | ICD-10-CM

## 2024-05-23 DIAGNOSIS — G89.29 CHRONIC MIDLINE LOW BACK PAIN WITHOUT SCIATICA: Primary | ICD-10-CM

## 2024-05-23 PROCEDURE — 97110 THERAPEUTIC EXERCISES: CPT | Performed by: PHYSICAL THERAPIST

## 2024-05-23 PROCEDURE — 97140 MANUAL THERAPY 1/> REGIONS: CPT | Performed by: PHYSICAL THERAPIST

## 2024-05-23 PROCEDURE — 97530 THERAPEUTIC ACTIVITIES: CPT | Performed by: PHYSICAL THERAPIST

## 2024-05-23 PROCEDURE — G0283 ELEC STIM OTHER THAN WOUND: HCPCS | Performed by: PHYSICAL THERAPIST

## 2024-05-23 NOTE — PROGRESS NOTES
Physical Therapy Daily Treatment Note  [address], [address] [place] [address]        Patient: Casandra Arango   : 1955  Diagnosis/ICD-10 Code:  Chronic midline low back pain without sciatica [M54.50, G89.29]  Referring practitioner: Teresa Herman MD  Date of Initial Visit: Type: THERAPY  Noted: 2024  Today's Date: 2024  Patient seen for 5 sessions             Subjective     Casandra Arango reports having general increase in aches and pains today.  She rates her lower back pain at 2-3/10 upon arrival.    Objective       See Exercise, Manual, and Modality Logs for complete treatment.       Assessment/Plan    Pt tolerated therapy session well - with progression of therapeutic exercises, CKC-Functional activities, and Manual therapy. She has improved, but continues to have deficits in Her Trunk and Bilateral Hip ROM, Strength, and Stability; limiting function and ability to perform ADLs at this time. Pt given 1/4 heel lift to take form for extended trial in her right shoe to help manage leg length discrepancy.   Positive response to Trial of IFC/MH today- as pt reported decrease in  pain and tightness post session.     Progress per Plan of Care -                Timed:  Manual Therapy:    10     mins  63915;  Therapeutic Exercise:    20     mins  38902;     Neuromuscular Melody:    0    mins  45047;    Therapeutic Activity:     8     mins  99336;     Gait Trainin     mins  53537;     Ultrasound:     0     mins  41131;    Electrical Stimulation:    0     mins  15239;  Iontophoresis     0     mins  19373    Untimed:  Electrical Stimulation:    15     mins  57846 ( );  Mechanical Traction:    00     mins  30628;   Fluidotherapy     0     mins  08801  Hot pack     0     mins  50129  Cold pack     0     mins  51634    Timed Treatment:   38   mins   Total Treatment:     53   mins        Shana Amaro PTA  Physical Therapist Assistant

## 2024-05-28 ENCOUNTER — TREATMENT (OUTPATIENT)
Dept: PHYSICAL THERAPY | Facility: CLINIC | Age: 69
End: 2024-05-28
Payer: MEDICARE

## 2024-05-28 DIAGNOSIS — G89.29 CHRONIC MIDLINE LOW BACK PAIN WITHOUT SCIATICA: Primary | ICD-10-CM

## 2024-05-28 DIAGNOSIS — R29.898 WEAKNESS OF BOTH HIPS: ICD-10-CM

## 2024-05-28 DIAGNOSIS — M54.50 CHRONIC MIDLINE LOW BACK PAIN WITHOUT SCIATICA: Primary | ICD-10-CM

## 2024-05-28 DIAGNOSIS — R26.2 DIFFICULTY WALKING: ICD-10-CM

## 2024-05-28 PROCEDURE — G0283 ELEC STIM OTHER THAN WOUND: HCPCS | Performed by: PHYSICAL THERAPIST

## 2024-05-28 PROCEDURE — 97140 MANUAL THERAPY 1/> REGIONS: CPT | Performed by: PHYSICAL THERAPIST

## 2024-05-28 PROCEDURE — 97530 THERAPEUTIC ACTIVITIES: CPT | Performed by: PHYSICAL THERAPIST

## 2024-05-28 PROCEDURE — 97110 THERAPEUTIC EXERCISES: CPT | Performed by: PHYSICAL THERAPIST

## 2024-05-28 NOTE — PROGRESS NOTES
Progress note  [address], [address] [place] [address]      Patient: Casandra Arango   : 1955  Diagnosis/ICD-10 Code:  Chronic midline low back pain without sciatica [M54.50, G89.29]  Referring practitioner: Teresa Herman MD  Date of Initial Visit: Type: THERAPY  Noted: 2024  Today's Date: 2024  Patient seen for 6 sessions      Subjective:   Casandra Arango reports: 2/10 lower back pain at beginning of session today.  Pt reports no changes in pain/function since starting PT.  Pt reports that she has been wearing a heel lift the past week and is trying to get used to it.  Pt reports that she continues to have the most pain with prolonged walking, standing, vacuuming increases pain the most.    Subjective Questionnaire: Oswestry: 15/45      Subjective   Objective   Fair PPT activation  Strength/Myotome Testing      Left Hip   Planes of Motion   Flexion: 4+  Extension: 4  Abduction: 4     Right Hip   Planes of Motion   Flexion: 4+  Extension: 4  Abduction: 4     Left Knee   Flexion: 5  Extension: 5     Right Knee   Flexion: 5  Extension: 5     Left Ankle/Foot   Dorsiflexion: 5     Right Ankle/Foot   Dorsiflexion: 5     Assessment & Plan       Assessment  Impairments: abnormal or restricted ROM, activity intolerance, impaired physical strength, lacks appropriate home exercise program and pain with function   Functional limitations: lifting, sleeping, walking, uncomfortable because of pain, sitting, standing and unable to perform repetitive tasks   Assessment details: Pt demonstrates improvements in bilateral hip strength compared to initial evaluation.  Pt however continues to demonstrate deficits in bilateral hip/core strength, decreased PPT activation and reports pain limiting function.  Pt advised that she may need to follow up with MD regarding L hip OA.  Patient would continue to benefit from skilled PT services in order to address remaining deficits limiting function at this time.       Goals  Plan Goals: 1. The patient complains of low back pain.  LTG 1: 12 weeks:  The patient will report a pain rating of 2/10 or better in order to improve  tolerance to activities of daily living and improve sleep quality.  STATUS:  ongoing  STG 1a: 6 weeks:  The patient will report a pain rating of 3/10 or better.  STATUS:  Not met, continue    2. The patient demonstrates weakness of the bilateral hips.  LTG 2: 12 weeks:  The patient will demonstrate 4+ /5 strength for bilateral hip flexion, abduction,  and extension in order to improve hip stability.  STATUS:  partially met, continue  STG 2a: 6 weeks:  The patient will demonstrate 4 /5 strength for bilateral hip flexion, abduction,  and extension.  STATUS:  partially met, continue    3. The patient has gait dysfunction.  LTG 3: 12 weeks:  The patient will ambulate without assistive device, independently, for   community distances with minimal limp in order to improve mobility and allow   patient to perform activities such as grocery shopping with greater ease.  STATUS:  ongoing    4. Mobility: Walking/Moving Around Functional Limitation    LTG 1: 12 weeks:  The patient will demonstrate 11 % limitation by achieving a score of 5/45 on the DANIEL.  STATUS:  ongoing  STG 1: 6 weeks:  The patient will demonstrate 22 % limitation by achieving a score of 10/45 on the DANIEL.  STATUS:  ongoing    5. The patient demonstrates weakness and decreased activation of core stabilizers.  LTG 5: 12 weeks:  The patient will demonstrate good PPT activation in standing and supine without cues in order to improve overall lumbar stability during ADLs and to avoid further injury during lifting and other tasks.  STATUS:  ongoing      Plan  Therapy options: will be seen for skilled therapy services  Planned modality interventions: TENS, thermotherapy (hydrocollator packs) and cryotherapy  Planned therapy interventions: manual therapy, abdominal trunk stabilization, ADL retraining,  neuromuscular re-education, body mechanics training, postural training, soft tissue mobilization, flexibility, spinal/joint mobilization, functional ROM exercises, strengthening, stretching, gait training, home exercise program, therapeutic activities and joint mobilization  Frequency: 2x week  Duration in weeks: 8  Treatment plan discussed with: patient      Progress toward previous goals: Partially Met      Recommendations: Continue as planned  Timeframe: 2 months  Prognosis to achieve goals: good    PT SIGNATURE: Inez Ramos, PT     Electronically signed 2024  DATE TREATMENT INITIATED: 2024  [place] License: [id number]      Based upon review of the patient's progress and continued therapy plan, it is my medical opinion that Casandra Arango should continue physical therapy treatment at [facility] PHYSICAL THERAPY  [address] [place] [id number] [phone].      Timed:  Manual Therapy:    8     mins  19294;  Therapeutic Exercise:    22     mins  75861;     Neuromuscular Melody:    0    mins  11391;    Therapeutic Activity:     8     mins  21567;     Gait Trainin     mins  48739;     Ultrasound:     0     mins  31193;    Electrical Stimulation:    0     mins  88177 ( );    Untimed:  Electrical Stimulation:    10     mins  72568 ( );  Mechanical Traction:    0     mins  51898;     Timed Treatment:   38   mins   Total Treatment:     53   mins

## 2024-05-30 ENCOUNTER — TREATMENT (OUTPATIENT)
Dept: PHYSICAL THERAPY | Facility: CLINIC | Age: 69
End: 2024-05-30
Payer: MEDICARE

## 2024-05-30 DIAGNOSIS — M54.50 CHRONIC MIDLINE LOW BACK PAIN WITHOUT SCIATICA: Primary | ICD-10-CM

## 2024-05-30 DIAGNOSIS — G89.29 CHRONIC MIDLINE LOW BACK PAIN WITHOUT SCIATICA: Primary | ICD-10-CM

## 2024-05-30 DIAGNOSIS — R26.2 DIFFICULTY WALKING: ICD-10-CM

## 2024-05-30 DIAGNOSIS — R29.898 WEAKNESS OF BOTH HIPS: ICD-10-CM

## 2024-05-30 PROCEDURE — 97140 MANUAL THERAPY 1/> REGIONS: CPT | Performed by: PHYSICAL THERAPIST

## 2024-05-30 PROCEDURE — G0283 ELEC STIM OTHER THAN WOUND: HCPCS | Performed by: PHYSICAL THERAPIST

## 2024-05-30 PROCEDURE — 97110 THERAPEUTIC EXERCISES: CPT | Performed by: PHYSICAL THERAPIST

## 2024-05-30 NOTE — PROGRESS NOTES
Physical Therapy Daily Treatment Note  [address], [address], [place] [address]      Patient: Casandra Arango   : 1955  Diagnosis/ICD-10 Code:  Chronic midline low back pain without sciatica [M54.50, G89.29]  Referring practitioner: Teresa Herman MD  Date of Initial Visit: Type: THERAPY  Noted: 2024  Today's Date: 2024  Patient seen for 7 sessions           Subjective   Casandra Arango reports: increased lower back and L hip pain today.  Pt would like to discharge today and follow up with MD.    Objective   See Exercise, Manual, and Modality Logs for complete treatment.       Assessment/Plan  Patient tolerated all exercise progressions, manual therapy and modalities well today but continues to demonstrate strength/ROM deficits limiting function. Pt will be discharged today per her wishes and will follow up with MD for other treatment options.     Progress per Plan of Care           Timed:  Manual Therapy:    8     mins  90253;  Therapeutic Exercise:    22     mins  50579;     Neuromuscular Melody:    0    mins  65811;    Therapeutic Activity:     0     mins  88346;     Gait Trainin     mins  92841;     Ultrasound:     0     mins  31226;    Electrical Stimulation:    0     mins  74843;  Iontophoresis     0     mins  91427    Untimed:  Electrical Stimulation:    15     mins  59875 ( );  Timed Treatment:   30   mins   Total Treatment:     45   mins        Inez Ramos PT    Electronically signed [unfilled]  [place] License: [id number]  NPI number: [id number]

## 2024-06-26 ENCOUNTER — OFFICE VISIT (OUTPATIENT)
Dept: INTERNAL MEDICINE | Facility: CLINIC | Age: 69
End: 2024-06-26
Payer: MEDICARE

## 2024-06-26 VITALS
SYSTOLIC BLOOD PRESSURE: 120 MMHG | HEART RATE: 77 BPM | DIASTOLIC BLOOD PRESSURE: 74 MMHG | HEIGHT: 68 IN | RESPIRATION RATE: 18 BRPM | TEMPERATURE: 99 F | WEIGHT: 226 LBS | OXYGEN SATURATION: 97 % | BODY MASS INDEX: 34.25 KG/M2

## 2024-06-26 DIAGNOSIS — E78.2 MIXED HYPERLIPIDEMIA: ICD-10-CM

## 2024-06-26 DIAGNOSIS — E55.9 VITAMIN D DEFICIENCY: ICD-10-CM

## 2024-06-26 DIAGNOSIS — R73.01 IMPAIRED FASTING GLUCOSE: ICD-10-CM

## 2024-06-26 DIAGNOSIS — M25.561 RIGHT KNEE PAIN, UNSPECIFIED CHRONICITY: ICD-10-CM

## 2024-06-26 DIAGNOSIS — F33.2 MAJOR DEPRESSIVE DISORDER, RECURRENT, SEVERE WITHOUT PSYCHOTIC FEATURES: ICD-10-CM

## 2024-06-26 DIAGNOSIS — B35.1 ONYCHOMYCOSIS: Primary | ICD-10-CM

## 2024-06-26 DIAGNOSIS — M25.552 LEFT HIP PAIN: ICD-10-CM

## 2024-06-26 DIAGNOSIS — Z78.9 ACE INHIBITOR INTOLERANCE: ICD-10-CM

## 2024-06-26 DIAGNOSIS — I10 ESSENTIAL HYPERTENSION: ICD-10-CM

## 2024-06-26 DIAGNOSIS — M51.36 DEGENERATION OF LUMBAR INTERVERTEBRAL DISC: ICD-10-CM

## 2024-06-26 LAB
25(OH)D3 SERPL-MCNC: 39 NG/ML (ref 30–100)
ALBUMIN SERPL-MCNC: 4 G/DL (ref 3.5–5.2)
ALBUMIN/GLOB SERPL: 1.4 G/DL
ALP SERPL-CCNC: 160 U/L (ref 39–117)
ALT SERPL W P-5'-P-CCNC: 17 U/L (ref 1–33)
ANION GAP SERPL CALCULATED.3IONS-SCNC: 9 MMOL/L (ref 5–15)
AST SERPL-CCNC: 11 U/L (ref 1–32)
BASOPHILS # BLD AUTO: 0.08 10*3/MM3 (ref 0–0.2)
BASOPHILS NFR BLD AUTO: 0.9 % (ref 0–1.5)
BILIRUB SERPL-MCNC: 0.2 MG/DL (ref 0–1.2)
BUN SERPL-MCNC: 30 MG/DL (ref 8–23)
BUN/CREAT SERPL: 29.7 (ref 7–25)
CALCIUM SPEC-SCNC: 9.5 MG/DL (ref 8.6–10.5)
CHLORIDE SERPL-SCNC: 106 MMOL/L (ref 98–107)
CHOLEST SERPL-MCNC: 168 MG/DL (ref 0–200)
CO2 SERPL-SCNC: 28 MMOL/L (ref 22–29)
CREAT SERPL-MCNC: 1.01 MG/DL (ref 0.57–1)
DEPRECATED RDW RBC AUTO: 40.5 FL (ref 37–54)
EGFRCR SERPLBLD CKD-EPI 2021: 61.1 ML/MIN/1.73
EOSINOPHIL # BLD AUTO: 0.39 10*3/MM3 (ref 0–0.4)
EOSINOPHIL NFR BLD AUTO: 4.5 % (ref 0.3–6.2)
ERYTHROCYTE [DISTWIDTH] IN BLOOD BY AUTOMATED COUNT: 12.5 % (ref 12.3–15.4)
GLOBULIN UR ELPH-MCNC: 2.8 GM/DL
GLUCOSE SERPL-MCNC: 95 MG/DL (ref 65–99)
HBA1C MFR BLD: 6 % (ref 4.8–5.6)
HCT VFR BLD AUTO: 42.8 % (ref 34–46.6)
HDLC SERPL-MCNC: 51 MG/DL (ref 40–60)
HGB BLD-MCNC: 13.8 G/DL (ref 12–15.9)
IMM GRANULOCYTES # BLD AUTO: 0.04 10*3/MM3 (ref 0–0.05)
IMM GRANULOCYTES NFR BLD AUTO: 0.5 % (ref 0–0.5)
LDLC SERPL CALC-MCNC: 99 MG/DL (ref 0–100)
LDLC/HDLC SERPL: 1.91 {RATIO}
LYMPHOCYTES # BLD AUTO: 2.42 10*3/MM3 (ref 0.7–3.1)
LYMPHOCYTES NFR BLD AUTO: 27.9 % (ref 19.6–45.3)
MCH RBC QN AUTO: 28.8 PG (ref 26.6–33)
MCHC RBC AUTO-ENTMCNC: 32.2 G/DL (ref 31.5–35.7)
MCV RBC AUTO: 89.2 FL (ref 79–97)
MONOCYTES # BLD AUTO: 0.58 10*3/MM3 (ref 0.1–0.9)
MONOCYTES NFR BLD AUTO: 6.7 % (ref 5–12)
NEUTROPHILS NFR BLD AUTO: 5.16 10*3/MM3 (ref 1.7–7)
NEUTROPHILS NFR BLD AUTO: 59.5 % (ref 42.7–76)
NRBC BLD AUTO-RTO: 0 /100 WBC (ref 0–0.2)
PLATELET # BLD AUTO: 376 10*3/MM3 (ref 140–450)
PMV BLD AUTO: 9.5 FL (ref 6–12)
POTASSIUM SERPL-SCNC: 3.6 MMOL/L (ref 3.5–5.2)
PROT SERPL-MCNC: 6.8 G/DL (ref 6–8.5)
RBC # BLD AUTO: 4.8 10*6/MM3 (ref 3.77–5.28)
SODIUM SERPL-SCNC: 143 MMOL/L (ref 136–145)
TRIGL SERPL-MCNC: 97 MG/DL (ref 0–150)
TSH SERPL DL<=0.05 MIU/L-ACNC: 1.89 UIU/ML (ref 0.27–4.2)
VLDLC SERPL-MCNC: 18 MG/DL (ref 5–40)
WBC NRBC COR # BLD AUTO: 8.67 10*3/MM3 (ref 3.4–10.8)

## 2024-06-26 PROCEDURE — 80053 COMPREHEN METABOLIC PANEL: CPT | Performed by: INTERNAL MEDICINE

## 2024-06-26 PROCEDURE — 83036 HEMOGLOBIN GLYCOSYLATED A1C: CPT | Performed by: INTERNAL MEDICINE

## 2024-06-26 PROCEDURE — 85025 COMPLETE CBC W/AUTO DIFF WBC: CPT | Performed by: INTERNAL MEDICINE

## 2024-06-26 PROCEDURE — 84443 ASSAY THYROID STIM HORMONE: CPT | Performed by: INTERNAL MEDICINE

## 2024-06-26 PROCEDURE — 82306 VITAMIN D 25 HYDROXY: CPT | Performed by: INTERNAL MEDICINE

## 2024-06-26 PROCEDURE — 80061 LIPID PANEL: CPT | Performed by: INTERNAL MEDICINE

## 2024-06-26 RX ORDER — SPIRONOLACTONE 25 MG/1
25 TABLET ORAL DAILY
Qty: 90 TABLET | Refills: 1 | Status: SHIPPED | OUTPATIENT
Start: 2024-06-26

## 2024-06-26 RX ORDER — CETIRIZINE HYDROCHLORIDE 10 MG/1
10 TABLET ORAL DAILY
Qty: 90 TABLET | Refills: 1 | Status: SHIPPED | OUTPATIENT
Start: 2024-06-26

## 2024-06-26 RX ORDER — BUPROPION HYDROCHLORIDE 300 MG/1
300 TABLET ORAL DAILY
Qty: 90 TABLET | Refills: 1 | Status: SHIPPED | OUTPATIENT
Start: 2024-06-26

## 2024-06-26 RX ORDER — DESVENLAFAXINE SUCCINATE 50 MG/1
TABLET, EXTENDED RELEASE ORAL
Qty: 90 TABLET | Refills: 1 | Status: SHIPPED | OUTPATIENT
Start: 2024-06-26

## 2024-06-26 RX ORDER — CYCLOBENZAPRINE HCL 5 MG
5 TABLET ORAL 3 TIMES DAILY PRN
Qty: 30 TABLET | Refills: 3 | Status: SHIPPED | OUTPATIENT
Start: 2024-06-26

## 2024-06-26 RX ORDER — CICLOPIROX 80 MG/ML
SOLUTION TOPICAL NIGHTLY
Qty: 6 ML | Refills: 2 | Status: SHIPPED | OUTPATIENT
Start: 2024-06-26

## 2024-06-26 RX ORDER — DICLOFENAC SODIUM 75 MG/1
75 TABLET, DELAYED RELEASE ORAL 2 TIMES DAILY
Qty: 180 TABLET | Refills: 1 | Status: SHIPPED | OUTPATIENT
Start: 2024-06-26

## 2024-06-26 RX ORDER — SEMAGLUTIDE 0.25 MG/.5ML
0.25 INJECTION, SOLUTION SUBCUTANEOUS WEEKLY
Qty: 2 ML | Refills: 1 | Status: SHIPPED | OUTPATIENT
Start: 2024-06-26

## 2024-06-26 RX ORDER — RIZATRIPTAN BENZOATE 10 MG/1
10 TABLET ORAL ONCE AS NEEDED
Qty: 9 TABLET | Refills: 2 | Status: SHIPPED | OUTPATIENT
Start: 2024-06-26

## 2024-06-26 RX ORDER — ACETAMINOPHEN 325 MG/1
650 TABLET ORAL EVERY 6 HOURS PRN
Qty: 90 TABLET | Refills: 1 | Status: SHIPPED | OUTPATIENT
Start: 2024-06-26

## 2024-06-26 RX ORDER — PRAVASTATIN SODIUM 20 MG
20 TABLET ORAL DAILY
Qty: 90 TABLET | Refills: 1 | Status: SHIPPED | OUTPATIENT
Start: 2024-06-26

## 2024-06-26 NOTE — PROGRESS NOTES
Chief Complaint  Hypertension, Hyperlipidemia, Depression (3 month follow up ), and Med Refill (Would also like a prescription for melatonin. Would also like a prescription for the Loprox nail polish anti fungal. )      Subjective      History of Present Illness  The patient presents for evaluation of multiple medical concerns.    The patient requires a refill of her arthritis medication.     Additionally, she is seeking a prescription for melatonin, which she takes at a dosage of 5 mg at night. She has discontinued the use of trazodone due to its lack of efficacy. Her current medications include Tylenol, Maxalt, Wellbutrin, cetirizine, Flexeril, Pristiq, diclofenac 180 mg, diclofenac gel, pravastatin, and spironolactone.    The patient has been applying a topical polish to her toenail once a week, which she finds beneficial.    The patient initiated physical therapy, which did not yield any relief. It was determined that one leg is longer than the other. She utilizes a lift in her shoe, which she believes is beneficial. However, she has been experiencing issues with her left hip, which is particularly painful at night, making it difficult for her to find a comfortable position in bed. The right hip is more painful than the left. Her back pain is severe, and she has not been evaluated by pain management. She suspects sciatica. Her mobility is limited due to hip and knee puffiness, preventing her from walking to the mailbox and back. She has a pool at home. She has previously used an exercise bike, but discontinued due to hamstring tightness.    The patient's mental health is satisfactory, and she believes her current medication regimen is effective. She denies experiencing chest pain. She reports difficulty breathing during physical activity, which she attributes to deconditioning. Her oxygen saturation is within normal limits.    The patient admits to a stress-eating habit, but denies constant hunger. She expresses  "a desire to lose weight. She is not currently engaged in counseling due to time constraints.    The patient experiences shortness of breath when outdoors.   She is up-to-date on her COVID-19 booster and shingles vaccines.         Objective   Vital Signs:   Vitals:    06/26/24 1521   BP: 120/74   BP Location: Left arm   Patient Position: Sitting   Cuff Size: Adult   Pulse: 77   Resp: 18   Temp: 99 °F (37.2 °C)   TempSrc: Temporal   SpO2: 97%   Weight: 103 kg (226 lb)   Height: 172.7 cm (67.99\")     Body mass index is 34.37 kg/m².    Wt Readings from Last 3 Encounters:   06/26/24 103 kg (226 lb)   05/20/24 102 kg (224 lb)   04/23/24 102 kg (224 lb 10.4 oz)     BP Readings from Last 3 Encounters:   06/26/24 120/74   05/20/24 131/77   04/23/24 101/86       Health Maintenance   Topic Date Due    DXA SCAN  09/21/2024    RSV Vaccine - Adults (1 - 1-dose 60+ series) 06/27/2024 (Originally 10/20/2016)    COVID-19 Vaccine (6 - 2023-24 season) 07/20/2024    INFLUENZA VACCINE  08/01/2024    ANNUAL WELLNESS VISIT  03/08/2025    LIPID PANEL  03/08/2025    BMI FOLLOWUP  03/08/2025    MAMMOGRAM  12/13/2025    COLORECTAL CANCER SCREENING  04/23/2029    TDAP/TD VACCINES (2 - Tdap) 10/13/2031    HEPATITIS C SCREENING  Completed    Pneumococcal Vaccine 65+  Completed    ZOSTER VACCINE  Completed       Physical Exam  Vitals reviewed.   Constitutional:       Appearance: Normal appearance. She is well-developed. She is obese.   HENT:      Head: Normocephalic and atraumatic.      Right Ear: External ear normal.      Left Ear: External ear normal.   Eyes:      Conjunctiva/sclera: Conjunctivae normal.      Pupils: Pupils are equal, round, and reactive to light.   Cardiovascular:      Rate and Rhythm: Normal rate and regular rhythm.      Heart sounds: No murmur heard.     No friction rub. No gallop.   Pulmonary:      Effort: Pulmonary effort is normal.      Breath sounds: Normal breath sounds. No wheezing or rhonchi.   Skin:     General: Skin " is warm and dry.   Neurological:      Mental Status: She is alert and oriented to person, place, and time.   Psychiatric:         Mood and Affect: Affect normal.         Behavior: Behavior normal.         Thought Content: Thought content normal.        Physical Exam        Result Review :  The following data was reviewed by: Teresa Herman MD on 06/26/2024:         Results              Procedures            Assessment & Plan  Right knee pain, unspecified chronicity    Onychomycosis    ACE inhibitor intolerance    Major depressive disorder, recurrent, severe without psychotic features    Left hip pain    Vitamin D deficiency    Degeneration of lumbar intervertebral disc    Impaired fasting glucose    Essential hypertension    Mixed hyperlipidemia       Orders Placed This Encounter   Procedures    XR Hip With or Without Pelvis 2 - 3 View Left    Comprehensive Metabolic Panel    Hemoglobin A1c    Lipid Panel    TSH    Vitamin D,25-Hydroxy    CBC & Differential     New Medications Ordered This Visit   Medications    acetaminophen (Tylenol) 325 MG tablet     Sig: Take 2 tablets by mouth Every 6 (Six) Hours As Needed for Mild Pain.     Dispense:  90 tablet     Refill:  1    buPROPion XL (Wellbutrin XL) 300 MG 24 hr tablet     Sig: Take 1 tablet by mouth Daily.     Dispense:  90 tablet     Refill:  1    cetirizine (zyrTEC) 10 MG tablet     Sig: Take 1 tablet by mouth Daily.     Dispense:  90 tablet     Refill:  1    cyclobenzaprine (FLEXERIL) 5 MG tablet     Sig: Take 1 tablet by mouth 3 (Three) Times a Day As Needed for Muscle Spasms.     Dispense:  30 tablet     Refill:  3    desvenlafaxine (PRISTIQ) 50 MG 24 hr tablet     Sig: Take one tab po daily     Dispense:  90 tablet     Refill:  1    diclofenac (VOLTAREN) 75 MG EC tablet     Sig: Take 1 tablet by mouth 2 (Two) Times a Day.     Dispense:  180 tablet     Refill:  1    Diclofenac Sodium (VOLTAREN) 1 % gel gel     Sig: Apply  topically to the appropriate area as  directed 2 (Two) Times a Day.     Dispense:  100 g     Refill:  2    pravastatin (Pravachol) 20 MG tablet     Sig: Take 1 tablet by mouth Daily.     Dispense:  90 tablet     Refill:  1    spironolactone (Aldactone) 25 MG tablet     Sig: Take 1 tablet by mouth Daily.     Dispense:  90 tablet     Refill:  1    rizatriptan (MAXALT) 10 MG tablet     Sig: Take 1 tablet by mouth 1 (One) Time As Needed for Migraine. May repeat in 2 hours if needed     Dispense:  9 tablet     Refill:  2    Melatonin 5 MG capsule     Sig: Take 5 mg by mouth every night at bedtime.     Dispense:  90 each     Refill:  1    ciclopirox (PENLAC) 8 % solution     Sig: Apply  topically to the appropriate area as directed Every Night.     Dispense:  6 mL     Refill:  2    Semaglutide-Weight Management (Wegovy) 0.25 MG/0.5ML solution auto-injector     Sig: Inject 0.5 mL under the skin into the appropriate area as directed 1 (One) Time Per Week.     Dispense:  2 mL     Refill:  1          Assessment & Plan  1. Arthritis.  A hip x-ray has been ordered.    2. Toenail fungus.  All medications have been refilled, including the one for the toenail.    3. Weight management.  The patient has been advised to engage in a self-reflection evaluation.    4. Shortness of breath.  Start with improved lifestyle management    5. Health maintenance.  The patient's blood pressure is well-regulated. Blood work has been ordered.    Follow-up  A follow-up appointment is scheduled for 3 months from now.    Patient or patient representative verbalized consent for the use of Ambient Listening during the visit with  Teresa Herman MD for chart documentation. 6/26/2024  15:48 EDT      FOLLOW UP  Return in about 3 months (around 9/26/2024).  Patient was given instructions and counseling regarding her condition or for health maintenance advice. Please see specific information pulled into the AVS if appropriate.     Teresa Herman MD  06/26/24  16:09 EDT    CURRENT &  DISCONTINUED MEDICATIONS  Current Outpatient Medications   Medication Instructions    acetaminophen (TYLENOL) 650 mg, Oral, Every 6 Hours PRN    buPROPion XL (WELLBUTRIN XL) 300 mg, Oral, Daily    cetirizine (ZYRTEC) 10 mg, Oral, Daily    ciclopirox (PENLAC) 8 % solution Topical, Nightly    cyclobenzaprine (FLEXERIL) 5 mg, Oral, 3 Times Daily PRN    desvenlafaxine (PRISTIQ) 50 MG 24 hr tablet Take one tab po daily    diclofenac (VOLTAREN) 75 mg, Oral, 2 Times Daily    Diclofenac Sodium (VOLTAREN) 1 % gel gel Topical, 2 Times Daily    fluticasone (FLONASE) 50 MCG/ACT nasal spray 2 sprays, Nasal, Daily    HYDROcodone-acetaminophen (NORCO) 5-325 MG per tablet     Melatonin 5 mg, Oral, Every Night at Bedtime    Multiple Vitamins-Minerals (multivitamin and minerals) liquid liquid Oral, Daily    naloxone (NARCAN) 4 MG/0.1ML nasal spray     omeprazole (PRILOSEC) 40 mg, Oral, Daily    ondansetron ODT (ZOFRAN ODT) 4 mg, Translingual, Every 8 Hours PRN    pravastatin (PRAVACHOL) 20 mg, Oral, Daily    rizatriptan (MAXALT) 10 mg, Oral, Once As Needed, May repeat in 2 hours if needed    spironolactone (ALDACTONE) 25 mg, Oral, Daily    traZODone (DESYREL) 50 mg, Oral, Nightly    valACYclovir (VALTREX) 500 mg, Oral, Daily    Wegovy 0.25 mg, Subcutaneous, Weekly       Medications Discontinued During This Encounter   Medication Reason    hydrOXYzine (ATARAX) 25 MG tablet     gabapentin (NEURONTIN) 300 MG capsule     cetirizine (zyrTEC) 10 MG tablet Reorder    desvenlafaxine (PRISTIQ) 50 MG 24 hr tablet Reorder    Diclofenac Sodium (VOLTAREN) 1 % gel gel Reorder    rizatriptan (MAXALT) 10 MG tablet Reorder    pravastatin (Pravachol) 20 MG tablet Reorder    cyclobenzaprine (FLEXERIL) 5 MG tablet Reorder    buPROPion XL (Wellbutrin XL) 300 MG 24 hr tablet Reorder    diclofenac (VOLTAREN) 75 MG EC tablet Reorder    spironolactone (Aldactone) 25 MG tablet Reorder    acetaminophen (Tylenol) 325 MG tablet Reorder

## 2024-07-29 ENCOUNTER — OFFICE VISIT (OUTPATIENT)
Dept: SLEEP MEDICINE | Facility: HOSPITAL | Age: 69
End: 2024-07-29
Payer: MEDICARE

## 2024-07-29 VITALS
OXYGEN SATURATION: 97 % | DIASTOLIC BLOOD PRESSURE: 65 MMHG | BODY MASS INDEX: 34.24 KG/M2 | HEART RATE: 67 BPM | SYSTOLIC BLOOD PRESSURE: 124 MMHG | HEIGHT: 68 IN | WEIGHT: 225.9 LBS

## 2024-07-29 DIAGNOSIS — G47.33 OSA ON CPAP: Primary | ICD-10-CM

## 2024-07-29 DIAGNOSIS — E66.9 CLASS 1 OBESITY: ICD-10-CM

## 2024-07-29 PROBLEM — E66.811 CLASS 1 OBESITY: Status: ACTIVE | Noted: 2021-10-15

## 2024-07-29 PROCEDURE — 3074F SYST BP LT 130 MM HG: CPT | Performed by: INTERNAL MEDICINE

## 2024-07-29 PROCEDURE — 3078F DIAST BP <80 MM HG: CPT | Performed by: INTERNAL MEDICINE

## 2024-07-29 PROCEDURE — 1159F MED LIST DOCD IN RCRD: CPT | Performed by: INTERNAL MEDICINE

## 2024-07-29 PROCEDURE — 99213 OFFICE O/P EST LOW 20 MIN: CPT | Performed by: INTERNAL MEDICINE

## 2024-07-29 PROCEDURE — G0463 HOSPITAL OUTPT CLINIC VISIT: HCPCS

## 2024-07-29 PROCEDURE — 1160F RVW MEDS BY RX/DR IN RCRD: CPT | Performed by: INTERNAL MEDICINE

## 2024-07-29 NOTE — PROGRESS NOTES
"  [address] [address]  Phone: [phone]  Fax: [phone]      SLEEP CLINIC FOLLOW UP PROGRESS NOTE.    Casandra Arango  1955  68 y.o.  female      PCP: Teresa Herman MD      Date of visit: 7/29/2024    Chief Complaint   Patient presents with    Sleep Apnea    Obesity       HPI:  This is a 68 y.o. years old patient is here for the management of obstructive sleep apnea.  Sleep apnea is moderate in severity with a AHI of 23/hr. Patient is using positive airway pressure therapy with auto set and the symptoms of snoring, non-restorative sleep and daytime excessive sleepiness have improved significantly on the therapy. Normally goes to bed at 11 PM and wakes up at 8.  The patient wakes up 2 time(s) during the night and has no problem going back to sleep.  Feels refreshed after waking up.  Patient also denies headaches and nasal congestion.  She had a sleep study in December 2019    Medications and allergies are reviewed by me and documented in the encounter.     SOCIAL (habits pertaining to sleep medicine)  History tobacco use:No   History of alcohol use: 0 per week  Caffeine use: 1     REVIEW OF SYSTEMS:   Nesmith Sleepiness Scale :Total score: 8   Nasal congestion:Yes   Dry mouth/nose:Yes   Post nasal drip; No   Acid reflux/Heartburn:No   Abd bloating:No   Morning headache:No   Anxiety:Yes   Depression:Yes    PHYSICAL EXAMINATION:  CONSTITUTIONAL:  Vitals:    07/29/24 1100   BP: 124/65   Pulse: 67   SpO2: 97%   Weight: 102 kg (225 lb 14.4 oz)   Height: 172.7 cm (67.99\")    Body mass index is 34.36 kg/m².   NOSE: nasal passages are clear, No deformities noted   RESP SYSTEM: Not in any respiratory distress, no chest deformities noted,   CARDIOVASULAR: No edema noted  NEURO: Oriented x 3, gait normal,  Mood and affect appeared appropriate      Data reviewed:  The Smart card downloaded on 7/29/2024 has been reviewed independently by me for " compliance and discussed the data with the patient.   Compliance; 83%  More than 4 hr use, 80%,   Average use of the device 7 hours and 27 minutes per night  Residual AHI: 4.5 /hr (goal < 5.0 /hr)  Mask type: Nasal pillows  Device: ResMed  DME: [facility] [facility]      ASSESSMENT AND PLAN:  Obstructive sleep apnea ( G 47.33).  The symptoms of sleep apnea have improved with the device and the treatment.  Patient's compliance with the device is excellent for treatment of sleep apnea.  I have independently reviewed the smart card down load and discussed with the patient the download data and encouarged the patient to continue to use the device.The residual AHI is acceptable. The device is benefiting the patient and the device is medically necessary.  Without proper control of sleep apnea and good compliance there is a increased risk for hypertension, diabetes mellitus and nonrestorative sleep with hypersomnia which can increase risk for motor vehicle accidents.  Untreated sleep apnea is also a risk factor for development of atrial fibrillation, pulmonary hypertension and stroke. The patient is also instructed to get the supplies from the [facility] and and change them on a regular basis.  A prescription for supplies has been sent to the [facility].  I have also discussed the good sleep hygiene habits and adequate amount of sleep needed for good health.  Obesity  1 with BMI is Body mass index is 34.36 kg/m².. I have discuss the relationship between the weight and sleep apnea. The benefit of weight loss in reducing severity of sleep apnea was discussed. Discussed diet and exercise with the patient to achieve ideal BMI.   Return in about 1 year (around 7/29/2025) for with smart card down load. . Patient's questions were answered.      Gavino Gaston MD  Sleep Medicine.  Medical Director, [facility] sleep [facility]  7/29/2024 ,

## 2024-08-02 ENCOUNTER — TELEPHONE (OUTPATIENT)
Dept: INTERNAL MEDICINE | Facility: CLINIC | Age: 69
End: 2024-08-02
Payer: MEDICARE

## 2024-08-02 NOTE — TELEPHONE ENCOUNTER
Caller: Casandra Arango    Relationship to patient: Self    Best call back number: [phone]     Patient is needing:PATIENT IS NEEDING A PRIOR AUTHORIZATION SENT TO THE PHARMACY [facility] FOR HER WEGOVY.

## 2024-08-05 ENCOUNTER — OFFICE VISIT (OUTPATIENT)
Dept: PODIATRY | Facility: CLINIC | Age: 69
End: 2024-08-05
Payer: MEDICARE

## 2024-08-05 VITALS
HEART RATE: 64 BPM | SYSTOLIC BLOOD PRESSURE: 147 MMHG | TEMPERATURE: 97.8 F | HEIGHT: 68 IN | BODY MASS INDEX: 34.1 KG/M2 | DIASTOLIC BLOOD PRESSURE: 84 MMHG | WEIGHT: 225 LBS | OXYGEN SATURATION: 96 %

## 2024-08-05 DIAGNOSIS — M79.672 FOOT PAIN, BILATERAL: ICD-10-CM

## 2024-08-05 DIAGNOSIS — M79.671 FOOT PAIN, BILATERAL: ICD-10-CM

## 2024-08-05 DIAGNOSIS — L60.0 ONYCHOCRYPTOSIS: ICD-10-CM

## 2024-08-05 DIAGNOSIS — B35.1 ONYCHOMYCOSIS: ICD-10-CM

## 2024-08-05 DIAGNOSIS — M20.5X1 HALLUX LIMITUS OF RIGHT FOOT: Primary | ICD-10-CM

## 2024-08-05 PROCEDURE — 3077F SYST BP >= 140 MM HG: CPT | Performed by: PODIATRIST

## 2024-08-05 PROCEDURE — 11720 DEBRIDE NAIL 1-5: CPT | Performed by: PODIATRIST

## 2024-08-05 PROCEDURE — 1160F RVW MEDS BY RX/DR IN RCRD: CPT | Performed by: PODIATRIST

## 2024-08-05 PROCEDURE — 3079F DIAST BP 80-89 MM HG: CPT | Performed by: PODIATRIST

## 2024-08-05 PROCEDURE — 1159F MED LIST DOCD IN RCRD: CPT | Performed by: PODIATRIST
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
## 2024-08-06 ENCOUNTER — PRIOR AUTHORIZATION (OUTPATIENT)
Dept: INTERNAL MEDICINE | Facility: CLINIC | Age: 69
End: 2024-08-06
Payer: MEDICARE

## 2024-08-06 NOTE — TELEPHONE ENCOUNTER
Wegovy 0.25MG/0.5ML auto-injectors    Coverage is provided in situations where the patient is greater than or equal to 18 years of age  and has tried and failed or has a contraindication to phentermine, Qsymia or one of its individual  generic components, and Contrave or one of its individual generic components. Coverage cannot  be authorized at this time. Other coverage conditions may apply.  For non-Active Duty Beneficiaries, please refer to the section entitled “Your Appeal Rights” on the  back of this letter to learn how you can submit a reconsideration request of this denial. For Active  Duty Service Members, please refer to the section entitled “Reconsideration: Active Duty Service  Member.”  You and your doctor may obtain the criteria used to make this decision by reviewing the Formulary  Search Tool at [employer].  We are here to help. If you have questions regarding your benefits, you may call [phone],  and a patient care advocate will help you every step of the way.

## 2024-08-06 NOTE — TELEPHONE ENCOUNTER
Please call patient and let her know her insurance wants us to try Contrave first.  I am okay with that if she would like to try it.

## 2024-08-07 ENCOUNTER — PATIENT MESSAGE (OUTPATIENT)
Dept: INTERNAL MEDICINE | Facility: CLINIC | Age: 69
End: 2024-08-07
Payer: MEDICARE

## 2024-08-12 ENCOUNTER — OFFICE VISIT (OUTPATIENT)
Dept: PODIATRY | Facility: CLINIC | Age: 69
End: 2024-08-12
Payer: MEDICARE

## 2024-08-12 VITALS
DIASTOLIC BLOOD PRESSURE: 77 MMHG | TEMPERATURE: 98 F | HEIGHT: 68 IN | SYSTOLIC BLOOD PRESSURE: 119 MMHG | BODY MASS INDEX: 34.1 KG/M2 | HEART RATE: 76 BPM | OXYGEN SATURATION: 97 % | WEIGHT: 225 LBS

## 2024-08-12 DIAGNOSIS — M20.5X1 HALLUX LIMITUS OF RIGHT FOOT: Primary | ICD-10-CM

## 2024-08-12 DIAGNOSIS — M79.671 FOOT PAIN, RIGHT: ICD-10-CM

## 2024-08-12 RX ORDER — TESTOSTERONE CYPIONATE 200 MG/ML
4 INJECTION INTRAMUSCULAR ONCE
Status: COMPLETED | OUTPATIENT
Start: 2024-08-12 | End: 2024-08-12

## 2024-08-12 RX ORDER — BUPIVACAINE HYDROCHLORIDE 5 MG/ML
0.5 INJECTION, SOLUTION PERINEURAL ONCE
Status: COMPLETED | OUTPATIENT
Start: 2024-08-12 | End: 2024-08-12

## 2024-08-12 RX ADMIN — BUPIVACAINE HYDROCHLORIDE 0.5 ML: 5 INJECTION, SOLUTION PERINEURAL at 13:53

## 2024-08-12 RX ADMIN — TESTOSTERONE CYPIONATE 4 MG: 200 INJECTION INTRAMUSCULAR at 13:53

## 2024-08-12 NOTE — TELEPHONE ENCOUNTER
From: Casandra Arango  To: Teresa Herman  Sent: 8/7/2024 9:21 AM EDT  Subject: [place]    I spoke with your staff regarding the prior authorization for wegovi. She said you recommended contrave to see how it does. I accidentally ended the call but I am okay with trying that. Think I had it before but insurance didn’t cover. Hopefully insurance will cover. I prefer [facility] pharmacy. Thanks

## 2024-08-12 NOTE — TELEPHONE ENCOUNTER
Ok please let her know that I sent in the contrave.  Contrave has some wellbutrin in it so she will need to stop her wellbutrin when she starts the contrave.  Monitor for numb/tingling sensations, jitteriness, headache etc.  Also if she takes a pain medicine it may not work as well
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
## 2024-08-22 ENCOUNTER — PRIOR AUTHORIZATION (OUTPATIENT)
Dept: INTERNAL MEDICINE | Facility: CLINIC | Age: 69
End: 2024-08-22
Payer: MEDICARE

## 2024-08-22 ENCOUNTER — TELEPHONE (OUTPATIENT)
Dept: INTERNAL MEDICINE | Facility: CLINIC | Age: 69
End: 2024-08-22
Payer: MEDICARE

## 2024-08-22 NOTE — TELEPHONE ENCOUNTER
Contrave 8-90MG er tablets    Status:Approved;Review Type:Prior Auth;Coverage Start Date:07/23/2024;Coverage End Date:12/20/2024

## 2024-08-22 NOTE — TELEPHONE ENCOUNTER
Caller: Casandra Arango    Relationship to patient: Self    Best call back number: [phone]     Patient is needing: PATIENT STATES THAT A PRIOR AUTHORIZATION IS NEEDED FOR HER naltrexone-bupropion ER (CONTRAVE) 8-90 MG tablet [[id number]]

## 2024-09-16 RX ORDER — OMEPRAZOLE 40 MG/1
40 CAPSULE, DELAYED RELEASE ORAL DAILY
Qty: 90 CAPSULE | Refills: 1 | Status: SHIPPED | OUTPATIENT
Start: 2024-09-16

## 2024-10-04 ENCOUNTER — OFFICE VISIT (OUTPATIENT)
Dept: INTERNAL MEDICINE | Facility: CLINIC | Age: 69
End: 2024-10-04
Payer: MEDICARE

## 2024-10-04 VITALS
WEIGHT: 221.2 LBS | SYSTOLIC BLOOD PRESSURE: 118 MMHG | BODY MASS INDEX: 33.52 KG/M2 | HEIGHT: 68 IN | TEMPERATURE: 97.9 F | OXYGEN SATURATION: 100 % | HEART RATE: 72 BPM | DIASTOLIC BLOOD PRESSURE: 62 MMHG

## 2024-10-04 DIAGNOSIS — E68 SEQUELAE OF HYPERALIMENTATION: ICD-10-CM

## 2024-10-04 DIAGNOSIS — I10 ESSENTIAL HYPERTENSION: ICD-10-CM

## 2024-10-04 DIAGNOSIS — R73.01 IMPAIRED FASTING GLUCOSE: Primary | ICD-10-CM

## 2024-10-04 DIAGNOSIS — N39.41 URGE INCONTINENCE: ICD-10-CM

## 2024-10-04 DIAGNOSIS — R79.9 ABNORMAL FINDING OF BLOOD CHEMISTRY, UNSPECIFIED: ICD-10-CM

## 2024-10-04 DIAGNOSIS — Z78.0 POSTMENOPAUSE: ICD-10-CM

## 2024-10-04 DIAGNOSIS — M25.552 LEFT HIP PAIN: ICD-10-CM

## 2024-10-04 DIAGNOSIS — E78.2 MIXED HYPERLIPIDEMIA: ICD-10-CM

## 2024-10-04 LAB
25(OH)D3 SERPL-MCNC: 40.8 NG/ML (ref 30–100)
ALBUMIN SERPL-MCNC: 4.3 G/DL (ref 3.5–5.2)
ALBUMIN/GLOB SERPL: 1.6 G/DL
ALP SERPL-CCNC: 177 U/L (ref 39–117)
ALT SERPL W P-5'-P-CCNC: 13 U/L (ref 1–33)
ANION GAP SERPL CALCULATED.3IONS-SCNC: 8 MMOL/L (ref 5–15)
AST SERPL-CCNC: 8 U/L (ref 1–32)
BASOPHILS # BLD AUTO: 0.13 10*3/MM3 (ref 0–0.2)
BASOPHILS NFR BLD AUTO: 1.4 % (ref 0–1.5)
BILIRUB SERPL-MCNC: 0.2 MG/DL (ref 0–1.2)
BUN SERPL-MCNC: 22 MG/DL (ref 8–23)
BUN/CREAT SERPL: 22.2 (ref 7–25)
CALCIUM SPEC-SCNC: 10 MG/DL (ref 8.6–10.5)
CHLORIDE SERPL-SCNC: 104 MMOL/L (ref 98–107)
CO2 SERPL-SCNC: 28 MMOL/L (ref 22–29)
CREAT SERPL-MCNC: 0.99 MG/DL (ref 0.57–1)
DEPRECATED RDW RBC AUTO: 44.1 FL (ref 37–54)
EGFRCR SERPLBLD CKD-EPI 2021: 62.6 ML/MIN/1.73
EOSINOPHIL # BLD AUTO: 0.38 10*3/MM3 (ref 0–0.4)
EOSINOPHIL NFR BLD AUTO: 4 % (ref 0.3–6.2)
ERYTHROCYTE [DISTWIDTH] IN BLOOD BY AUTOMATED COUNT: 13.1 % (ref 12.3–15.4)
FOLATE SERPL-MCNC: 10.8 NG/ML (ref 4.78–24.2)
GLOBULIN UR ELPH-MCNC: 2.7 GM/DL
GLUCOSE SERPL-MCNC: 75 MG/DL (ref 65–99)
HBA1C MFR BLD: 5.6 % (ref 4.8–5.6)
HCT VFR BLD AUTO: 45 % (ref 34–46.6)
HGB BLD-MCNC: 14.7 G/DL (ref 12–15.9)
IMM GRANULOCYTES # BLD AUTO: 0.05 10*3/MM3 (ref 0–0.05)
IMM GRANULOCYTES NFR BLD AUTO: 0.5 % (ref 0–0.5)
IRON 24H UR-MRATE: 50 MCG/DL (ref 37–145)
IRON SATN MFR SERPL: 14 % (ref 20–50)
LYMPHOCYTES # BLD AUTO: 3.03 10*3/MM3 (ref 0.7–3.1)
LYMPHOCYTES NFR BLD AUTO: 31.6 % (ref 19.6–45.3)
MAGNESIUM SERPL-MCNC: 2.6 MG/DL (ref 1.6–2.4)
MCH RBC QN AUTO: 29.9 PG (ref 26.6–33)
MCHC RBC AUTO-ENTMCNC: 32.7 G/DL (ref 31.5–35.7)
MCV RBC AUTO: 91.5 FL (ref 79–97)
MONOCYTES # BLD AUTO: 0.73 10*3/MM3 (ref 0.1–0.9)
MONOCYTES NFR BLD AUTO: 7.6 % (ref 5–12)
NEUTROPHILS NFR BLD AUTO: 5.27 10*3/MM3 (ref 1.7–7)
NEUTROPHILS NFR BLD AUTO: 54.9 % (ref 42.7–76)
NRBC BLD AUTO-RTO: 0 /100 WBC (ref 0–0.2)
PLATELET # BLD AUTO: 384 10*3/MM3 (ref 140–450)
PMV BLD AUTO: 9.3 FL (ref 6–12)
POTASSIUM SERPL-SCNC: 4.5 MMOL/L (ref 3.5–5.2)
PROT SERPL-MCNC: 7 G/DL (ref 6–8.5)
RBC # BLD AUTO: 4.92 10*6/MM3 (ref 3.77–5.28)
SODIUM SERPL-SCNC: 140 MMOL/L (ref 136–145)
TIBC SERPL-MCNC: 370 MCG/DL (ref 298–536)
TRANSFERRIN SERPL-MCNC: 248 MG/DL (ref 200–360)
VIT B12 BLD-MCNC: 466 PG/ML (ref 211–946)
WBC NRBC COR # BLD AUTO: 9.59 10*3/MM3 (ref 3.4–10.8)

## 2024-10-04 PROCEDURE — 83036 HEMOGLOBIN GLYCOSYLATED A1C: CPT | Performed by: INTERNAL MEDICINE

## 2024-10-04 PROCEDURE — 82746 ASSAY OF FOLIC ACID SERUM: CPT | Performed by: INTERNAL MEDICINE

## 2024-10-04 PROCEDURE — 83540 ASSAY OF IRON: CPT | Performed by: INTERNAL MEDICINE

## 2024-10-04 PROCEDURE — 83735 ASSAY OF MAGNESIUM: CPT | Performed by: INTERNAL MEDICINE

## 2024-10-04 PROCEDURE — 82607 VITAMIN B-12: CPT | Performed by: INTERNAL MEDICINE

## 2024-10-04 PROCEDURE — 80053 COMPREHEN METABOLIC PANEL: CPT | Performed by: INTERNAL MEDICINE

## 2024-10-04 PROCEDURE — 82306 VITAMIN D 25 HYDROXY: CPT | Performed by: INTERNAL MEDICINE

## 2024-10-04 PROCEDURE — 84466 ASSAY OF TRANSFERRIN: CPT | Performed by: INTERNAL MEDICINE

## 2024-10-04 PROCEDURE — 85025 COMPLETE CBC W/AUTO DIFF WBC: CPT | Performed by: INTERNAL MEDICINE

## 2024-10-04 RX ORDER — OXYBUTYNIN CHLORIDE 5 MG/1
5 TABLET, EXTENDED RELEASE ORAL DAILY
Qty: 30 TABLET | Refills: 1 | Status: SHIPPED | OUTPATIENT
Start: 2024-10-04
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
## 2024-10-07 ENCOUNTER — PATIENT MESSAGE (OUTPATIENT)
Dept: INTERNAL MEDICINE | Facility: CLINIC | Age: 69
End: 2024-10-07
Payer: MEDICARE

## 2024-10-07 NOTE — TELEPHONE ENCOUNTER
Caller: Casandra Arango    Relationship: Self    Best call back number: [phone]    Requested Prescriptions:   Requested Prescriptions     Pending Prescriptions Disp Refills    desvenlafaxine (PRISTIQ) 50 MG 24 hr tablet 90 tablet 1     Sig: Take one tab po daily        Pharmacy where request should be sent: [id number] [place], [place] - [address] AT [facility] - [phone]  - [phone]     Last office visit with prescribing clinician: 10/4/2024   Last telemedicine visit with prescribing clinician: Visit date not found   Next office visit with prescribing clinician: 1/15/2025     Additional details provided by patient: [facility] DID NOT HAVE THIS MEDICATION IN STOCK     Does the patient have less than a 3 day supply:  [] Yes  [x] No      Satya Anthony Rep   10/07/24 15:28 EDT

## 2024-10-08 RX ORDER — DESVENLAFAXINE 50 MG/1
TABLET, FILM COATED, EXTENDED RELEASE ORAL
Qty: 90 TABLET | Refills: 1 | Status: SHIPPED | OUTPATIENT
Start: 2024-10-08

## 2024-10-09 NOTE — TELEPHONE ENCOUNTER
From: Casandra Arango  To: Teresa Herman  Sent: 10/7/2024 10:12 AM EDT  Subject: Hip versus spine    I noticed the radiologist comments about my lower back. When you look at the films, should the back be addressed first or hip?

## 2024-10-15 ENCOUNTER — PREP FOR SURGERY (OUTPATIENT)
Dept: OTHER | Facility: HOSPITAL | Age: 69
End: 2024-10-15
Payer: MEDICARE

## 2024-10-15 ENCOUNTER — OFFICE VISIT (OUTPATIENT)
Dept: ORTHOPEDIC SURGERY | Facility: CLINIC | Age: 69
End: 2024-10-15
Payer: MEDICARE

## 2024-10-15 VITALS
DIASTOLIC BLOOD PRESSURE: 74 MMHG | WEIGHT: 222.2 LBS | OXYGEN SATURATION: 95 % | SYSTOLIC BLOOD PRESSURE: 139 MMHG | BODY MASS INDEX: 33.68 KG/M2 | HEART RATE: 73 BPM | HEIGHT: 68 IN

## 2024-10-15 DIAGNOSIS — M16.12 OSTEOARTHRITIS OF LEFT HIP, UNSPECIFIED OSTEOARTHRITIS TYPE: ICD-10-CM

## 2024-10-15 DIAGNOSIS — M16.12 OSTEOARTHRITIS OF LEFT HIP, UNSPECIFIED OSTEOARTHRITIS TYPE: Primary | ICD-10-CM

## 2024-10-15 DIAGNOSIS — M19.012 OSTEOARTHRITIS OF LEFT SHOULDER, UNSPECIFIED OSTEOARTHRITIS TYPE: ICD-10-CM

## 2024-10-15 DIAGNOSIS — M25.512 LEFT SHOULDER PAIN, UNSPECIFIED CHRONICITY: Primary | ICD-10-CM

## 2024-10-15 DIAGNOSIS — Z47.1 AFTERCARE FOLLOWING LEFT HIP JOINT REPLACEMENT SURGERY: Primary | ICD-10-CM

## 2024-10-15 DIAGNOSIS — Z96.642 AFTERCARE FOLLOWING LEFT HIP JOINT REPLACEMENT SURGERY: Primary | ICD-10-CM

## 2024-10-15 PROBLEM — M16.9 OA (OSTEOARTHRITIS) OF HIP: Status: ACTIVE | Noted: 2024-10-15

## 2024-10-15 RX ORDER — TRANEXAMIC ACID 10 MG/ML
1000 INJECTION, SOLUTION INTRAVENOUS ONCE
OUTPATIENT
Start: 2024-10-15 | End: 2024-10-15

## 2024-10-15 RX ORDER — TRIAMCINOLONE ACETONIDE 40 MG/ML
40 INJECTION, SUSPENSION INTRA-ARTICULAR; INTRAMUSCULAR
Status: COMPLETED | OUTPATIENT
Start: 2024-10-15 | End: 2024-10-15

## 2024-10-15 RX ORDER — LIDOCAINE HYDROCHLORIDE 10 MG/ML
5 INJECTION, SOLUTION INFILTRATION; PERINEURAL
Status: COMPLETED | OUTPATIENT
Start: 2024-10-15 | End: 2024-10-15

## 2024-10-15 RX ADMIN — TRIAMCINOLONE ACETONIDE 40 MG: 40 INJECTION, SUSPENSION INTRA-ARTICULAR; INTRAMUSCULAR at 15:36

## 2024-10-15 RX ADMIN — LIDOCAINE HYDROCHLORIDE 5 ML: 10 INJECTION, SOLUTION INFILTRATION; PERINEURAL at 15:36

## 2024-10-15 NOTE — PROGRESS NOTES
"Chief Complaint  Initial Evaluation of the Left Shoulder and Initial Evaluation of the Left Hip     Subjective      Casandra Arango presents to [facility] for initial evaluation of the left shoulder and the left hip. She has had pain for awhile and has pain with movement and notes popping and cracking of the hip.  She has had no recent injury or falls. She has pain with rotation and movement of the hip.  She has pain especially the last couple of months.  She has pain with forward and upward ROM. She has had no injury or fall.      Allergies   Allergen Reactions    Hydrocodone Anaphylaxis    Nabumetone Itching    Amlodipine Itching    Celebrex [Celecoxib] Other (See Comments)     Eye problem    Hydrocodone Bit-Homatrop Mbr Itching    Lamotrigine Hives    Propoxyphene Unknown - Low Severity     Darvocet  Other reaction(s): Rash to arms, torso    Losartan Rash    Terbinafine Rash        Social History     Socioeconomic History    Marital status:    Tobacco Use    Smoking status: Never    Smokeless tobacco: Never    Tobacco comments:     Casualty of second hand smoke   Vaping Use    Vaping status: Never Used   Substance and Sexual Activity    Alcohol use: Never     Comment: CURRENT SOME DAY     Drug use: Never    Sexual activity: Not Currently     Partners: Male     Birth control/protection: None, Hysterectomy        I reviewed the patient's chief complaint, history of present illness, review of systems, past medical history, surgical history, family history, social history, medications, and allergy list.     Review of Systems     Constitutional: Denies fevers, chills, weight loss  Cardiovascular: Denies chest pain, shortness of breath  Skin: Denies rashes, acute skin changes  Neurologic: Denies headache, loss of consciousness        Vital Signs:   /74   Pulse 73   Ht 172.7 cm (68\")   Wt 101 kg (222 lb 3.2 oz)   SpO2 95%   BMI 33.79 kg/m²          Physical " Exam  General: Alert. No acute distress    Ortho Exam        LEFT SHOULDER Forward flexion 160. Abduction 90. External rotation 50. Internal rotation SI joint. Positive Cross body adduction. Supraspinatus strength 4+/5. Infraspinatus Strength 5/5. Infrared subscap 5/5. Positive Dias. Positive Neer. Negative Apprehension. Negative Lift off. (Negative Obriens. Sensation intact to light touch, median, radial, ulnar nerve. Positive AIN, PIN, ulnar nerve motor. Positive pulses. Positive Impingement signs. Good strength in triceps, biceps, deltoid, wrist extensors and wrist flexors. Tender to palpation to the anterior aspect of the shoulder and down the arm.      LEFT HIP Decrease ROM of the hip.  No skin discoloration, atrophy or swelling. Positive EHL, FHL, GS, and TA. Sensation intact to all 5 nerves of the foot. Pain with straight leg raise. Leg lengths appear equal. Ambulates with Antalgic gait Negative Huong. Negative Alesha. Good strength to hamstrings, quadriceps, dorsiflexors, and plantar flexors. Knee Extensor Mechanism  intact        Large Joint Arthrocentesis: L subacromial bursa  Date/Time: 10/15/2024 3:36 PM  Consent given by: patient  Site marked: site marked  Timeout: Immediately prior to procedure a time out was called to verify the correct patient, procedure, equipment, support staff and site/side marked as required   Supporting Documentation  Indications: pain   Procedure Details  Location: shoulder - L subacromial bursa  Preparation: Patient was prepped and draped in the usual sterile fashion  Needle gauge: 21 G.  Medications administered: 5 mL lidocaine 1 %; 40 mg triamcinolone acetonide 40 MG/ML  Patient tolerance: patient tolerated the procedure well with no immediate complications    This injection documentation was Scribed for Kar Denny MD by Kecia Burns.  10/15/24   15:37 EDT      Imaging Results (Most Recent)       Procedure Component Value Units Date/Time    XR Scapula Left  [[id number]] Resulted: 10/15/24 1517     Updated: 10/15/24 1522             Result Review :     X-Ray Report:  Left scapula X-Ray  Indication: Evaluation of the left scapula  AP/Lateral view(s)  Findings: Moderate AC joint arthritis and Mild glenohumeral arthritis.    Prior studies available for comparison: no       XR Hip With or Without Pelvis 2 - 3 View Left    Result Date: 10/5/2024  Narrative: XR HIP W OR WO PELVIS 2-3 VIEW LEFT Date of Exam: 10/4/2024 3:45 PM EDT Indication: left hip pain Comparison: 6/26/2024 Findings: Prior total right hip arthroplasty. The hardware is intact. There are extensive advanced degenerative changes in the left hip with a bone-on-bone appearance. No fractures, dislocations or acute osseous abnormalities are identified. There are degenerative  changes in the visualized lower lumbar spine.     Impression: Impression: Advanced degenerative change in the left hip. Electronically Signed: Shaji Milner MD  10/5/2024 4:42 PM EDT  Workstation ID: [id number]            Assessment and Plan     Diagnoses and all orders for this visit:    1. Left shoulder pain, unspecified chronicity (Primary)  -     XR Scapula Left    2. Osteoarthritis of left shoulder, unspecified osteoarthritis type    3. Osteoarthritis of left hip, unspecified osteoarthritis type        Discussed the treatment plan with the patient. I reviewed the X-rays that were obtained today with the patient.     Discussed the treatment options with the patient, operative vs non-operative. The patient expressed understanding and wished to proceed with a left total hip arthroplasty.     Discussed the risks and benefits of conservative measures.  The patient expressed understanding and wished to proceed with a left shoulder steroid injection.  She tolerated the injection well.       Discussed surgery., Risks/benefits discussed with patient including, but not limited to: infection, bleeding, neurovascular damage, re-rupture, aesthetic  deformity, need for further surgery, and death., Discussed with patient the implant type being used during surgery and patient understands., Surgery pamphlet given., Call or return if worsening symptoms., and DME order for a 3 in 1 given today due to patient will be confined to one room/level of the home that does not offer a toilet during postop recovery.     Follow Up     2 weeks postoperatively       Patient was given instructions and counseling regarding her condition or for health maintenance advice. Please see specific information pulled into the AVS if appropriate.     Scribed for Kar Denny MD by Lynne Dietz MA.  10/15/24   15:23 EDT    I have personally performed the services described in this document as scribed by the above individual and it is both accurate and complete. Kar Denny MD 10/15/24

## 2024-10-15 NOTE — H&P (VIEW-ONLY)
"Chief Complaint  Initial Evaluation of the Left Shoulder and Initial Evaluation of the Left Hip     Subjective      Casandra Arango presents to [facility] for initial evaluation of the left shoulder and the left hip. She has had pain for awhile and has pain with movement and notes popping and cracking of the hip.  She has had no recent injury or falls. She has pain with rotation and movement of the hip.  She has pain especially the last couple of months.  She has pain with forward and upward ROM. She has had no injury or fall.      Allergies   Allergen Reactions    Hydrocodone Anaphylaxis    Nabumetone Itching    Amlodipine Itching    Celebrex [Celecoxib] Other (See Comments)     Eye problem    Hydrocodone Bit-Homatrop Mbr Itching    Lamotrigine Hives    Propoxyphene Unknown - Low Severity     Darvocet  Other reaction(s): Rash to arms, torso    Losartan Rash    Terbinafine Rash        Social History     Socioeconomic History    Marital status:    Tobacco Use    Smoking status: Never    Smokeless tobacco: Never    Tobacco comments:     Casualty of second hand smoke   Vaping Use    Vaping status: Never Used   Substance and Sexual Activity    Alcohol use: Never     Comment: CURRENT SOME DAY     Drug use: Never    Sexual activity: Not Currently     Partners: Male     Birth control/protection: None, Hysterectomy        I reviewed the patient's chief complaint, history of present illness, review of systems, past medical history, surgical history, family history, social history, medications, and allergy list.     Review of Systems     Constitutional: Denies fevers, chills, weight loss  Cardiovascular: Denies chest pain, shortness of breath  Skin: Denies rashes, acute skin changes  Neurologic: Denies headache, loss of consciousness        Vital Signs:   /74   Pulse 73   Ht 172.7 cm (68\")   Wt 101 kg (222 lb 3.2 oz)   SpO2 95%   BMI 33.79 kg/m²          Physical " Exam  General: Alert. No acute distress    Ortho Exam        LEFT SHOULDER Forward flexion 160. Abduction 90. External rotation 50. Internal rotation SI joint. Positive Cross body adduction. Supraspinatus strength 4+/5. Infraspinatus Strength 5/5. Infrared subscap 5/5. Positive Dias. Positive Neer. Negative Apprehension. Negative Lift off. (Negative Obriens. Sensation intact to light touch, median, radial, ulnar nerve. Positive AIN, PIN, ulnar nerve motor. Positive pulses. Positive Impingement signs. Good strength in triceps, biceps, deltoid, wrist extensors and wrist flexors. Tender to palpation to the anterior aspect of the shoulder and down the arm.      LEFT HIP Decrease ROM of the hip.  No skin discoloration, atrophy or swelling. Positive EHL, FHL, GS, and TA. Sensation intact to all 5 nerves of the foot. Pain with straight leg raise. Leg lengths appear equal. Ambulates with Antalgic gait Negative Huong. Negative Alesha. Good strength to hamstrings, quadriceps, dorsiflexors, and plantar flexors. Knee Extensor Mechanism  intact        Large Joint Arthrocentesis: L subacromial bursa  Date/Time: 10/15/2024 3:36 PM  Consent given by: patient  Site marked: site marked  Timeout: Immediately prior to procedure a time out was called to verify the correct patient, procedure, equipment, support staff and site/side marked as required   Supporting Documentation  Indications: pain   Procedure Details  Location: shoulder - L subacromial bursa  Preparation: Patient was prepped and draped in the usual sterile fashion  Needle gauge: 21 G.  Medications administered: 5 mL lidocaine 1 %; 40 mg triamcinolone acetonide 40 MG/ML  Patient tolerance: patient tolerated the procedure well with no immediate complications    This injection documentation was Scribed for Kar Denny MD by Kecia Burns.  10/15/24   15:37 EDT      Imaging Results (Most Recent)       Procedure Component Value Units Date/Time    XR Scapula Left  [[id number]] Resulted: 10/15/24 1517     Updated: 10/15/24 1522             Result Review :     X-Ray Report:  Left scapula X-Ray  Indication: Evaluation of the left scapula  AP/Lateral view(s)  Findings: Moderate AC joint arthritis and Mild glenohumeral arthritis.    Prior studies available for comparison: no       XR Hip With or Without Pelvis 2 - 3 View Left    Result Date: 10/5/2024  Narrative: XR HIP W OR WO PELVIS 2-3 VIEW LEFT Date of Exam: 10/4/2024 3:45 PM EDT Indication: left hip pain Comparison: 6/26/2024 Findings: Prior total right hip arthroplasty. The hardware is intact. There are extensive advanced degenerative changes in the left hip with a bone-on-bone appearance. No fractures, dislocations or acute osseous abnormalities are identified. There are degenerative  changes in the visualized lower lumbar spine.     Impression: Impression: Advanced degenerative change in the left hip. Electronically Signed: Shaji Milner MD  10/5/2024 4:42 PM EDT  Workstation ID: [id number]            Assessment and Plan     Diagnoses and all orders for this visit:    1. Left shoulder pain, unspecified chronicity (Primary)  -     XR Scapula Left    2. Osteoarthritis of left shoulder, unspecified osteoarthritis type    3. Osteoarthritis of left hip, unspecified osteoarthritis type        Discussed the treatment plan with the patient. I reviewed the X-rays that were obtained today with the patient.     Discussed the treatment options with the patient, operative vs non-operative. The patient expressed understanding and wished to proceed with a left total hip arthroplasty.     Discussed the risks and benefits of conservative measures.  The patient expressed understanding and wished to proceed with a left shoulder steroid injection.  She tolerated the injection well.       Discussed surgery., Risks/benefits discussed with patient including, but not limited to: infection, bleeding, neurovascular damage, re-rupture, aesthetic  deformity, need for further surgery, and death., Discussed with patient the implant type being used during surgery and patient understands., Surgery pamphlet given., Call or return if worsening symptoms., and DME order for a 3 in 1 given today due to patient will be confined to one room/level of the home that does not offer a toilet during postop recovery.     Follow Up     2 weeks postoperatively       Patient was given instructions and counseling regarding her condition or for health maintenance advice. Please see specific information pulled into the AVS if appropriate.     Scribed for Kar Denny MD by Lynne Dietz MA.  10/15/24   15:23 EDT    I have personally performed the services described in this document as scribed by the above individual and it is both accurate and complete. Kar Denny MD 10/15/24

## 2024-10-18 ENCOUNTER — CLINICAL SUPPORT (OUTPATIENT)
Dept: INTERNAL MEDICINE | Facility: CLINIC | Age: 69
End: 2024-10-18
Payer: MEDICARE

## 2024-10-18 DIAGNOSIS — Z23 ENCOUNTER FOR IMMUNIZATION: Primary | ICD-10-CM

## 2024-10-18 PROCEDURE — 90480 ADMN SARSCOV2 VAC 1/ONLY CMP: CPT | Performed by: INTERNAL MEDICINE

## 2024-10-18 PROCEDURE — 91320 SARSCV2 VAC 30MCG TRS-SUC IM: CPT | Performed by: INTERNAL MEDICINE

## 2024-10-18 NOTE — PROGRESS NOTES
Patient came into office for administration of COVID vaccine; see immunization records for details; medication education provided for patient / caregiver; tolerated well; left immediately following; no 15 min OBS.    Elaina Wu RN BSN  [facility]

## 2024-10-25 ENCOUNTER — HOSPITAL ENCOUNTER (OUTPATIENT)
Dept: BONE DENSITY | Facility: HOSPITAL | Age: 69
Discharge: HOME | End: 2024-10-25
Payer: MEDICARE

## 2024-10-25 DIAGNOSIS — Z78.0 POSTMENOPAUSE: ICD-10-CM

## 2024-10-25 PROCEDURE — 77080 DXA BONE DENSITY AXIAL: CPT

## 2024-10-28 ENCOUNTER — OFFICE VISIT (OUTPATIENT)
Dept: PODIATRY | Facility: CLINIC | Age: 69
End: 2024-10-28
Payer: MEDICARE

## 2024-10-28 VITALS
SYSTOLIC BLOOD PRESSURE: 143 MMHG | DIASTOLIC BLOOD PRESSURE: 60 MMHG | WEIGHT: 216 LBS | HEIGHT: 68 IN | OXYGEN SATURATION: 98 % | BODY MASS INDEX: 32.74 KG/M2 | HEART RATE: 78 BPM

## 2024-10-28 DIAGNOSIS — M79.672 FOOT PAIN, BILATERAL: ICD-10-CM

## 2024-10-28 DIAGNOSIS — M79.671 FOOT PAIN, BILATERAL: ICD-10-CM

## 2024-10-28 DIAGNOSIS — B35.1 ONYCHOMYCOSIS: Primary | ICD-10-CM

## 2024-10-28 DIAGNOSIS — L60.0 ONYCHOCRYPTOSIS: ICD-10-CM
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
## 2024-11-04 DIAGNOSIS — Z01.818 PRE-OP TESTING: Primary | ICD-10-CM

## 2024-11-06 ENCOUNTER — PRE-ADMISSION TESTING (OUTPATIENT)
Dept: PREADMISSION TESTING | Facility: HOSPITAL | Age: 69
End: 2024-11-06
Payer: MEDICARE

## 2024-11-06 ENCOUNTER — TELEPHONE (OUTPATIENT)
Dept: INTERNAL MEDICINE | Facility: CLINIC | Age: 69
End: 2024-11-06
Payer: MEDICARE

## 2024-11-06 VITALS
RESPIRATION RATE: 18 BRPM | DIASTOLIC BLOOD PRESSURE: 82 MMHG | WEIGHT: 216.49 LBS | HEIGHT: 67 IN | BODY MASS INDEX: 33.98 KG/M2 | OXYGEN SATURATION: 96 % | TEMPERATURE: 97.7 F | HEART RATE: 65 BPM | SYSTOLIC BLOOD PRESSURE: 132 MMHG

## 2024-11-06 DIAGNOSIS — M16.12 OSTEOARTHRITIS OF LEFT HIP, UNSPECIFIED OSTEOARTHRITIS TYPE: ICD-10-CM

## 2024-11-06 DIAGNOSIS — Z01.818 PRE-OP TESTING: ICD-10-CM

## 2024-11-06 LAB
ALBUMIN SERPL-MCNC: 3.9 G/DL (ref 3.5–5.2)
ALBUMIN/GLOB SERPL: 1.4 G/DL
ALP SERPL-CCNC: 141 U/L (ref 39–117)
ALT SERPL W P-5'-P-CCNC: 15 U/L (ref 1–33)
ANION GAP SERPL CALCULATED.3IONS-SCNC: 7.2 MMOL/L (ref 5–15)
AST SERPL-CCNC: 11 U/L (ref 1–32)
BASOPHILS # BLD AUTO: 0.08 10*3/MM3 (ref 0–0.2)
BASOPHILS NFR BLD AUTO: 0.8 % (ref 0–1.5)
BILIRUB SERPL-MCNC: 0.3 MG/DL (ref 0–1.2)
BUN SERPL-MCNC: 25 MG/DL (ref 8–23)
BUN/CREAT SERPL: 27.5 (ref 7–25)
CALCIUM SPEC-SCNC: 9.3 MG/DL (ref 8.6–10.5)
CHLORIDE SERPL-SCNC: 107 MMOL/L (ref 98–107)
CO2 SERPL-SCNC: 26.8 MMOL/L (ref 22–29)
CREAT SERPL-MCNC: 0.91 MG/DL (ref 0.57–1)
DEPRECATED RDW RBC AUTO: 47.3 FL (ref 37–54)
EGFRCR SERPLBLD CKD-EPI 2021: 68.9 ML/MIN/1.73
EOSINOPHIL # BLD AUTO: 0.31 10*3/MM3 (ref 0–0.4)
EOSINOPHIL NFR BLD AUTO: 3.3 % (ref 0.3–6.2)
ERYTHROCYTE [DISTWIDTH] IN BLOOD BY AUTOMATED COUNT: 13.8 % (ref 12.3–15.4)
GLOBULIN UR ELPH-MCNC: 2.7 GM/DL
GLUCOSE SERPL-MCNC: 96 MG/DL (ref 65–99)
HBA1C MFR BLD: 5.6 % (ref 4.8–5.6)
HCT VFR BLD AUTO: 44.1 % (ref 34–46.6)
HGB BLD-MCNC: 13.8 G/DL (ref 12–15.9)
IMM GRANULOCYTES # BLD AUTO: 0.03 10*3/MM3 (ref 0–0.05)
IMM GRANULOCYTES NFR BLD AUTO: 0.3 % (ref 0–0.5)
LYMPHOCYTES # BLD AUTO: 1.94 10*3/MM3 (ref 0.7–3.1)
LYMPHOCYTES NFR BLD AUTO: 20.4 % (ref 19.6–45.3)
MCH RBC QN AUTO: 29.1 PG (ref 26.6–33)
MCHC RBC AUTO-ENTMCNC: 31.3 G/DL (ref 31.5–35.7)
MCV RBC AUTO: 92.8 FL (ref 79–97)
MONOCYTES # BLD AUTO: 0.51 10*3/MM3 (ref 0.1–0.9)
MONOCYTES NFR BLD AUTO: 5.4 % (ref 5–12)
NEUTROPHILS NFR BLD AUTO: 6.63 10*3/MM3 (ref 1.7–7)
NEUTROPHILS NFR BLD AUTO: 69.8 % (ref 42.7–76)
NRBC BLD AUTO-RTO: 0 /100 WBC (ref 0–0.2)
PLATELET # BLD AUTO: 323 10*3/MM3 (ref 140–450)
PMV BLD AUTO: 8.7 FL (ref 6–12)
POTASSIUM SERPL-SCNC: 4.1 MMOL/L (ref 3.5–5.2)
PROT SERPL-MCNC: 6.6 G/DL (ref 6–8.5)
QT INTERVAL: 412 MS
QTC INTERVAL: 411 MS
RBC # BLD AUTO: 4.75 10*6/MM3 (ref 3.77–5.28)
SODIUM SERPL-SCNC: 141 MMOL/L (ref 136–145)
WBC NRBC COR # BLD AUTO: 9.5 10*3/MM3 (ref 3.4–10.8)

## 2024-11-06 PROCEDURE — 83036 HEMOGLOBIN GLYCOSYLATED A1C: CPT

## 2024-11-06 PROCEDURE — 80053 COMPREHEN METABOLIC PANEL: CPT

## 2024-11-06 PROCEDURE — 36415 COLL VENOUS BLD VENIPUNCTURE: CPT

## 2024-11-06 PROCEDURE — 93005 ELECTROCARDIOGRAM TRACING: CPT

## 2024-11-06 PROCEDURE — 85025 COMPLETE CBC W/AUTO DIFF WBC: CPT

## 2024-11-06 RX ORDER — MULTIPLE VITAMINS W/ MINERALS TAB 9MG-400MCG
2 TAB ORAL DAILY
Status: ON HOLD | COMMUNITY

## 2024-11-06 NOTE — DISCHARGE INSTRUCTIONS
IMPORTANT INSTRUCTIONS - PRE-ADMISSION TESTING  DO NOT EAT OR CHEW anything after midnight the night before your procedure.    DRINK 20 OZ GATORADE OR POWERADE NO RED NIGHT PRIOR TO PROCEDURE  Take the following medications the morning of your procedure with JUST A SIP OF WATER:  __  VALACYCLOVIR IF NEEDED, RIZATRIPTAN IF NEEDED, TYLENOL IF NEEDED, CETIRIZINE, FLEXERIL IF NEEDED, DESVENLAFAXINE, FLONASE IF NEDED, OMEPRAZOLE, ZOFRAN IF NEEDED, DITROPAN IF NEEDED, CONTRVE_____________________________________________________________________________________________________________________________________________________________________________________    DO NOT BRING your medications to the hospital with you, UNLESS something has changed since your PRE-Admission Testing appointment.  Hold all vitamins, supplements, and NSAIDS INCLUDING DICLOFENAC (Non- steroidal anti-inflammatory meds) for one week prior to surgery (you MAY take Tylenol or Acetaminophen).  If you are diabetic, check your blood sugar the morning of your procedure. If it is less than 70 or if you are feeling symptomatic, call the following number for further instructions: [phone]  Use your inhalers/nebulizers as usual, the morning of your procedure. BRING YOUR INHALERS with you.   Bring your CPAP or BIPAP to hospital, ONLY IF YOU WILL BE SPENDING THE NIGHT.   Make sure you have a ride home and have someone who will stay with you the day of your procedure after you go home.  If you have any questions, please call your Pre-Admission Testing Nurse, ___MARTELL_____________ at [phone]____________.   Per anesthesia request, do not smoke for 24 hours before your procedure or as instructed by your surgeon.    WILL CALL ON  11/8/24       NORMALLY BETWEEN 1 AND 4 PM TO GIVE OFFICIAL ARRIVAL TIME FOR DAY OF PROCEDURE  REFER TO PAGE 9 IN TOTAL JOINT BOOK FOR BATHING INSTRUCTIONS . NO JEWELRY OF ANY TYPE OR NAIL POLISH UPPER OR LOWER EXT  COME TO ENTRANCE  A, ELEVATOR A, 3RD FLOOR DAY OF PROCEDURE.   CASH,  OR CARD FOR MEDS TO BED IF INDICATED AT DISCHARGE

## 2024-11-06 NOTE — TELEPHONE ENCOUNTER
Caller: Casandra Arango    Relationship: Self    Best call back number: [phone]     Which medication are you concerned about: NALTREXONE-BUPROPION ER (CONTRAVE) 8-90 MG TABLET    Who prescribed you this medication: DR. STEARNS    When did you start taking this medication: 6 WEEKS OR SO AGO    What are your concerns: PATIENT STATES THAT SHE IS UP TO THE 2 TABLETS 2 TIMES A DAY DOSE ON HER CONTRAVE MEDICATION. PATIENT IS EXPERIENCING REALLY BAD SIDE EFFECTS TO THE MEDICATION. PATIENT STATES THAT SHE IS NAUSEOUS ALL THE TIME AND HAVING HEADACHES. PATIENT WANTING TO KNOW HOW TO STOP THE MEDICATION AND IF SHE CAN BE PLACED BACK ON WELLBUTRIN. PATIENT WANTING MEDICATION SENT TO [facility].    How long have you had these concerns: 3 TO 4 WEEKS, PATIENT THOUGHT IT WOULD GET BETTER BUT IT'S JUST GETTING WORSE.

## 2024-11-06 NOTE — PAT
MESSAGE SENT TO DAVID AT [facility] IN REGARDS TO PT REPORTS GRANDSON WITH HX OF MH. PT THEMSELVES HAS NOT EVERY HAD ANY ISSUES AND NO TESTING DONE.  THEY FEEL COMES FROM PATERNAL SIDE OF FAMILY.

## 2024-11-07 ENCOUNTER — ANESTHESIA EVENT (OUTPATIENT)
Dept: PERIOP | Facility: HOSPITAL | Age: 69
End: 2024-11-07
Payer: MEDICARE

## 2024-11-07 RX ORDER — BUPROPION HYDROCHLORIDE 150 MG/1
150 TABLET ORAL DAILY
Qty: 90 TABLET | Refills: 1 | Status: ON HOLD | OUTPATIENT
Start: 2024-11-07

## 2024-11-07 NOTE — TELEPHONE ENCOUNTER
As long as she just restarts the Wellbutrin there is no need to taper off the Contrave.  I have just sent it over.

## 2024-11-11 ENCOUNTER — APPOINTMENT (OUTPATIENT)
Dept: GENERAL RADIOLOGY | Facility: HOSPITAL | Age: 69
End: 2024-11-11
Payer: MEDICARE

## 2024-11-11 ENCOUNTER — HOSPITAL ENCOUNTER (OUTPATIENT)
Facility: HOSPITAL | Age: 69
Discharge: HOME | End: 2024-11-12
Attending: ORTHOPAEDIC SURGERY | Admitting: ORTHOPAEDIC SURGERY
Payer: MEDICARE

## 2024-11-11 ENCOUNTER — ANESTHESIA (OUTPATIENT)
Dept: PERIOP | Facility: HOSPITAL | Age: 69
End: 2024-11-11
Payer: MEDICARE

## 2024-11-11 DIAGNOSIS — R26.2 DIFFICULTY IN WALKING: Primary | ICD-10-CM

## 2024-11-11 DIAGNOSIS — M16.12 OSTEOARTHRITIS OF LEFT HIP, UNSPECIFIED OSTEOARTHRITIS TYPE: ICD-10-CM

## 2024-11-11 DIAGNOSIS — Z78.9 DECREASED ACTIVITIES OF DAILY LIVING (ADL): ICD-10-CM

## 2024-11-11 PROCEDURE — 73502 X-RAY EXAM HIP UNI 2-3 VIEWS: CPT

## 2024-11-11 PROCEDURE — 25010000002 LIDOCAINE PF 2% 2 % SOLUTION: Performed by: NURSE ANESTHETIST, CERTIFIED REGISTERED

## 2024-11-11 PROCEDURE — 25010000002 FENTANYL CITRATE (PF) 50 MCG/ML SOLUTION: Performed by: NURSE ANESTHETIST, CERTIFIED REGISTERED

## 2024-11-11 PROCEDURE — 25010000002 KETOROLAC TROMETHAMINE PER 15 MG: Performed by: ORTHOPAEDIC SURGERY

## 2024-11-11 PROCEDURE — 94799 UNLISTED PULMONARY SVC/PX: CPT

## 2024-11-11 PROCEDURE — 25010000002 LABETALOL 5 MG/ML SOLUTION: Performed by: NURSE ANESTHETIST, CERTIFIED REGISTERED

## 2024-11-11 PROCEDURE — 25010000002 SUGAMMADEX 200 MG/2ML SOLUTION: Performed by: NURSE ANESTHETIST, CERTIFIED REGISTERED

## 2024-11-11 PROCEDURE — 25810000003 LACTATED RINGERS PER 1000 ML: Performed by: STUDENT IN AN ORGANIZED HEALTH CARE EDUCATION/TRAINING PROGRAM

## 2024-11-11 PROCEDURE — 25010000002 ONDANSETRON PER 1 MG: Performed by: STUDENT IN AN ORGANIZED HEALTH CARE EDUCATION/TRAINING PROGRAM

## 2024-11-11 PROCEDURE — C1776 JOINT DEVICE (IMPLANTABLE): HCPCS | Performed by: ORTHOPAEDIC SURGERY

## 2024-11-11 PROCEDURE — 25010000002 HYDROMORPHONE PER 4 MG: Performed by: ORTHOPAEDIC SURGERY

## 2024-11-11 PROCEDURE — 25810000003 LACTATED RINGERS PER 1000 ML: Performed by: ORTHOPAEDIC SURGERY

## 2024-11-11 PROCEDURE — 25010000002 CEFAZOLIN PER 500 MG: Performed by: ORTHOPAEDIC SURGERY

## 2024-11-11 PROCEDURE — 25010000002 DEXAMETHASONE PER 1 MG: Performed by: NURSE ANESTHETIST, CERTIFIED REGISTERED

## 2024-11-11 PROCEDURE — 97161 PT EVAL LOW COMPLEX 20 MIN: CPT

## 2024-11-11 PROCEDURE — 76000 FLUOROSCOPY <1 HR PHYS/QHP: CPT

## 2024-11-11 PROCEDURE — 25010000002 EPINEPHRINE 1 MG/ML SOLUTION: Performed by: ORTHOPAEDIC SURGERY

## 2024-11-11 PROCEDURE — 25010000002 HYDROMORPHONE 1 MG/ML SOLUTION: Performed by: NURSE ANESTHETIST, CERTIFIED REGISTERED

## 2024-11-11 PROCEDURE — 25010000002 ONDANSETRON PER 1 MG: Performed by: NURSE ANESTHETIST, CERTIFIED REGISTERED

## 2024-11-11 PROCEDURE — 25010000002 ROPIVACAINE PER 1 MG: Performed by: ORTHOPAEDIC SURGERY

## 2024-11-11 PROCEDURE — 25010000002 PROPOFOL 10 MG/ML EMULSION: Performed by: NURSE ANESTHETIST, CERTIFIED REGISTERED

## 2024-11-11 PROCEDURE — 63710000001 PROMETHAZINE PER 12.5 MG: Performed by: STUDENT IN AN ORGANIZED HEALTH CARE EDUCATION/TRAINING PROGRAM

## 2024-11-11 PROCEDURE — 25010000002 MIDAZOLAM PER 1MG: Performed by: STUDENT IN AN ORGANIZED HEALTH CARE EDUCATION/TRAINING PROGRAM

## 2024-11-11 DEVICE — HD FEM/HIP G7 BIOLOX/DELTA OPTN 36MM: Type: IMPLANTABLE DEVICE | Site: HIP | Status: FUNCTIONAL

## 2024-11-11 DEVICE — SHLL ACET G7 PPS LTD/HL TI SZE 52MM: Type: IMPLANTABLE DEVICE | Site: HIP | Status: FUNCTIONAL

## 2024-11-11 DEVICE — LINER ACET G7 VIVACIT/E HI/WL HXPE SZE 36MM: Type: IMPLANTABLE DEVICE | Site: HIP | Status: FUNCTIONAL

## 2024-11-11 DEVICE — SCRW S/TAP 6.5X25MM: Type: IMPLANTABLE DEVICE | Site: HIP | Status: FUNCTIONAL

## 2024-11-11 DEVICE — STEM FEM HIP TAPERLOC COMPL MICROPLASTY HI/OFFST SZ13: Type: IMPLANTABLE DEVICE | Site: HIP | Status: FUNCTIONAL

## 2024-11-11 DEVICE — TOTAL HIP PRIMARY: Type: IMPLANTABLE DEVICE | Site: HIP | Status: FUNCTIONAL

## 2024-11-11 DEVICE — ADAPT HIP BIOLOX OPTN TYPE1 TPR MIN 3: Type: IMPLANTABLE DEVICE | Site: HIP | Status: FUNCTIONAL

## 2024-11-11 RX ORDER — PROMETHAZINE HYDROCHLORIDE 12.5 MG/1
12.5 TABLET ORAL ONCE AS NEEDED
Status: COMPLETED | OUTPATIENT
Start: 2024-11-11 | End: 2024-11-11

## 2024-11-11 RX ORDER — ONDANSETRON 2 MG/ML
INJECTION INTRAMUSCULAR; INTRAVENOUS AS NEEDED
Status: DISCONTINUED | OUTPATIENT
Start: 2024-11-11 | End: 2024-11-11 | Stop reason: SURG

## 2024-11-11 RX ORDER — LIDOCAINE HYDROCHLORIDE 20 MG/ML
INJECTION, SOLUTION EPIDURAL; INFILTRATION; INTRACAUDAL; PERINEURAL AS NEEDED
Status: DISCONTINUED | OUTPATIENT
Start: 2024-11-11 | End: 2024-11-11 | Stop reason: SURG

## 2024-11-11 RX ORDER — DESVENLAFAXINE 50 MG/1
50 TABLET, FILM COATED, EXTENDED RELEASE ORAL DAILY
Status: DISCONTINUED | OUTPATIENT
Start: 2024-11-11 | End: 2024-11-12 | Stop reason: HOSPADM

## 2024-11-11 RX ORDER — AMOXICILLIN 250 MG
2 CAPSULE ORAL 2 TIMES DAILY
Status: DISCONTINUED | OUTPATIENT
Start: 2024-11-11 | End: 2024-11-12 | Stop reason: HOSPADM

## 2024-11-11 RX ORDER — DEXAMETHASONE SODIUM PHOSPHATE 4 MG/ML
INJECTION, SOLUTION INTRA-ARTICULAR; INTRALESIONAL; INTRAMUSCULAR; INTRAVENOUS; SOFT TISSUE AS NEEDED
Status: DISCONTINUED | OUTPATIENT
Start: 2024-11-11 | End: 2024-11-11 | Stop reason: SURG

## 2024-11-11 RX ORDER — LABETALOL HYDROCHLORIDE 5 MG/ML
INJECTION, SOLUTION INTRAVENOUS AS NEEDED
Status: DISCONTINUED | OUTPATIENT
Start: 2024-11-11 | End: 2024-11-11 | Stop reason: SURG

## 2024-11-11 RX ORDER — BUPROPION HYDROCHLORIDE 150 MG/1
150 TABLET ORAL DAILY
Status: DISCONTINUED | OUTPATIENT
Start: 2024-11-12 | End: 2024-11-12 | Stop reason: HOSPADM

## 2024-11-11 RX ORDER — TRANEXAMIC ACID 10 MG/ML
1000 INJECTION, SOLUTION INTRAVENOUS ONCE
Status: COMPLETED | OUTPATIENT
Start: 2024-11-11 | End: 2024-11-11

## 2024-11-11 RX ORDER — PROMETHAZINE HYDROCHLORIDE 12.5 MG/1
25 TABLET ORAL ONCE AS NEEDED
Status: DISCONTINUED | OUTPATIENT
Start: 2024-11-11 | End: 2024-11-11 | Stop reason: HOSPADM

## 2024-11-11 RX ORDER — FERROUS SULFATE 325(65) MG
325 TABLET ORAL
Status: DISCONTINUED | OUTPATIENT
Start: 2024-11-11 | End: 2024-11-12 | Stop reason: HOSPADM

## 2024-11-11 RX ORDER — EPHEDRINE SULFATE 50 MG/ML
INJECTION INTRAVENOUS
Status: DISPENSED
Start: 2024-11-11 | End: 2024-11-11

## 2024-11-11 RX ORDER — PROPOFOL 10 MG/ML
VIAL (ML) INTRAVENOUS AS NEEDED
Status: DISCONTINUED | OUTPATIENT
Start: 2024-11-11 | End: 2024-11-11 | Stop reason: SURG

## 2024-11-11 RX ORDER — FENTANYL CITRATE 50 UG/ML
INJECTION, SOLUTION INTRAMUSCULAR; INTRAVENOUS AS NEEDED
Status: DISCONTINUED | OUTPATIENT
Start: 2024-11-11 | End: 2024-11-11 | Stop reason: SURG

## 2024-11-11 RX ORDER — BISACODYL 10 MG
10 SUPPOSITORY, RECTAL RECTAL DAILY PRN
Status: DISCONTINUED | OUTPATIENT
Start: 2024-11-11 | End: 2024-11-12 | Stop reason: HOSPADM

## 2024-11-11 RX ORDER — PRAVASTATIN SODIUM 20 MG
20 TABLET ORAL NIGHTLY
Status: DISCONTINUED | OUTPATIENT
Start: 2024-11-11 | End: 2024-11-12 | Stop reason: HOSPADM

## 2024-11-11 RX ORDER — SCOLOPAMINE TRANSDERMAL SYSTEM 1 MG/1
1 PATCH, EXTENDED RELEASE TRANSDERMAL ONCE
Status: DISCONTINUED | OUTPATIENT
Start: 2024-11-11 | End: 2024-11-11

## 2024-11-11 RX ORDER — CETIRIZINE HYDROCHLORIDE 10 MG/1
10 TABLET ORAL DAILY
Status: DISCONTINUED | OUTPATIENT
Start: 2024-11-11 | End: 2024-11-12 | Stop reason: HOSPADM

## 2024-11-11 RX ORDER — PROMETHAZINE HYDROCHLORIDE 25 MG/1
25 SUPPOSITORY RECTAL ONCE AS NEEDED
Status: DISCONTINUED | OUTPATIENT
Start: 2024-11-11 | End: 2024-11-11 | Stop reason: HOSPADM

## 2024-11-11 RX ORDER — MIDAZOLAM HYDROCHLORIDE 2 MG/2ML
2 INJECTION, SOLUTION INTRAMUSCULAR; INTRAVENOUS ONCE
Status: COMPLETED | OUTPATIENT
Start: 2024-11-11 | End: 2024-11-11

## 2024-11-11 RX ORDER — MEPERIDINE HYDROCHLORIDE 25 MG/ML
12.5 INJECTION INTRAMUSCULAR; INTRAVENOUS; SUBCUTANEOUS
Status: DISCONTINUED | OUTPATIENT
Start: 2024-11-11 | End: 2024-11-11 | Stop reason: HOSPADM

## 2024-11-11 RX ORDER — SODIUM CHLORIDE 9 MG/ML
40 INJECTION, SOLUTION INTRAVENOUS AS NEEDED
Status: DISCONTINUED | OUTPATIENT
Start: 2024-11-11 | End: 2024-11-12 | Stop reason: HOSPADM

## 2024-11-11 RX ORDER — ROCURONIUM BROMIDE 10 MG/ML
INJECTION, SOLUTION INTRAVENOUS AS NEEDED
Status: DISCONTINUED | OUTPATIENT
Start: 2024-11-11 | End: 2024-11-11 | Stop reason: SURG

## 2024-11-11 RX ORDER — ACETAMINOPHEN 325 MG/1
325 TABLET ORAL EVERY 4 HOURS PRN
Status: DISCONTINUED | OUTPATIENT
Start: 2024-11-11 | End: 2024-11-12 | Stop reason: HOSPADM

## 2024-11-11 RX ORDER — ACETAMINOPHEN 500 MG
1000 TABLET ORAL ONCE
Status: COMPLETED | OUTPATIENT
Start: 2024-11-11 | End: 2024-11-11

## 2024-11-11 RX ORDER — KETOROLAC TROMETHAMINE 15 MG/ML
15 INJECTION, SOLUTION INTRAMUSCULAR; INTRAVENOUS EVERY 6 HOURS SCHEDULED
Status: COMPLETED | OUTPATIENT
Start: 2024-11-11 | End: 2024-11-12

## 2024-11-11 RX ORDER — FAMOTIDINE 20 MG/1
40 TABLET, FILM COATED ORAL DAILY
Status: DISCONTINUED | OUTPATIENT
Start: 2024-11-11 | End: 2024-11-11

## 2024-11-11 RX ORDER — PROMETHAZINE HYDROCHLORIDE 25 MG/1
12.5 TABLET ORAL EVERY 6 HOURS PRN
Status: DISCONTINUED | OUTPATIENT
Start: 2024-11-11 | End: 2024-11-12 | Stop reason: HOSPADM

## 2024-11-11 RX ORDER — SODIUM CHLORIDE 0.9 % (FLUSH) 0.9 %
10 SYRINGE (ML) INJECTION AS NEEDED
Status: DISCONTINUED | OUTPATIENT
Start: 2024-11-11 | End: 2024-11-12 | Stop reason: HOSPADM

## 2024-11-11 RX ORDER — HYDROCODONE BITARTRATE AND ACETAMINOPHEN 7.5; 325 MG/1; MG/1
1 TABLET ORAL EVERY 4 HOURS PRN
Status: DISCONTINUED | OUTPATIENT
Start: 2024-11-11 | End: 2024-11-12 | Stop reason: HOSPADM

## 2024-11-11 RX ORDER — ONDANSETRON 2 MG/ML
4 INJECTION INTRAMUSCULAR; INTRAVENOUS ONCE AS NEEDED
Status: COMPLETED | OUTPATIENT
Start: 2024-11-11 | End: 2024-11-11

## 2024-11-11 RX ORDER — ONDANSETRON 2 MG/ML
4 INJECTION INTRAMUSCULAR; INTRAVENOUS EVERY 6 HOURS PRN
Status: DISCONTINUED | OUTPATIENT
Start: 2024-11-11 | End: 2024-11-12 | Stop reason: HOSPADM

## 2024-11-11 RX ORDER — DEXMEDETOMIDINE HYDROCHLORIDE 100 UG/ML
INJECTION, SOLUTION INTRAVENOUS AS NEEDED
Status: DISCONTINUED | OUTPATIENT
Start: 2024-11-11 | End: 2024-11-11 | Stop reason: SURG

## 2024-11-11 RX ORDER — SODIUM CHLORIDE 0.9 % (FLUSH) 0.9 %
10 SYRINGE (ML) INJECTION EVERY 12 HOURS SCHEDULED
Status: DISCONTINUED | OUTPATIENT
Start: 2024-11-11 | End: 2024-11-12 | Stop reason: HOSPADM

## 2024-11-11 RX ORDER — NALOXONE HCL 0.4 MG/ML
0.1 VIAL (ML) INJECTION
Status: DISCONTINUED | OUTPATIENT
Start: 2024-11-11 | End: 2024-11-12 | Stop reason: HOSPADM

## 2024-11-11 RX ORDER — ENOXAPARIN SODIUM 100 MG/ML
40 INJECTION SUBCUTANEOUS DAILY
Status: DISCONTINUED | OUTPATIENT
Start: 2024-11-12 | End: 2024-11-12 | Stop reason: HOSPADM

## 2024-11-11 RX ORDER — ONDANSETRON 2 MG/ML
4 INJECTION INTRAMUSCULAR; INTRAVENOUS ONCE AS NEEDED
Status: DISCONTINUED | OUTPATIENT
Start: 2024-11-11 | End: 2024-11-11 | Stop reason: HOSPADM

## 2024-11-11 RX ORDER — PANTOPRAZOLE SODIUM 40 MG/1
40 TABLET, DELAYED RELEASE ORAL
Status: DISCONTINUED | OUTPATIENT
Start: 2024-11-12 | End: 2024-11-12 | Stop reason: HOSPADM

## 2024-11-11 RX ORDER — SODIUM CHLORIDE, SODIUM LACTATE, POTASSIUM CHLORIDE, CALCIUM CHLORIDE 600; 310; 30; 20 MG/100ML; MG/100ML; MG/100ML; MG/100ML
100 INJECTION, SOLUTION INTRAVENOUS CONTINUOUS
Status: ACTIVE | OUTPATIENT
Start: 2024-11-11 | End: 2024-11-11

## 2024-11-11 RX ORDER — SPIRONOLACTONE 25 MG/1
25 TABLET ORAL DAILY
Status: DISCONTINUED | OUTPATIENT
Start: 2024-11-12 | End: 2024-11-12 | Stop reason: HOSPADM

## 2024-11-11 RX ORDER — SODIUM CHLORIDE, SODIUM LACTATE, POTASSIUM CHLORIDE, CALCIUM CHLORIDE 600; 310; 30; 20 MG/100ML; MG/100ML; MG/100ML; MG/100ML
9 INJECTION, SOLUTION INTRAVENOUS CONTINUOUS PRN
Status: DISCONTINUED | OUTPATIENT
Start: 2024-11-11 | End: 2024-11-11 | Stop reason: HOSPADM

## 2024-11-11 RX ORDER — ACETAMINOPHEN 500 MG
1000 TABLET ORAL EVERY 8 HOURS
Status: DISCONTINUED | OUTPATIENT
Start: 2024-11-11 | End: 2024-11-12 | Stop reason: HOSPADM

## 2024-11-11 RX ORDER — PROMETHAZINE HYDROCHLORIDE 12.5 MG/1
12.5 SUPPOSITORY RECTAL EVERY 6 HOURS PRN
Status: DISCONTINUED | OUTPATIENT
Start: 2024-11-11 | End: 2024-11-12 | Stop reason: HOSPADM

## 2024-11-11 RX ORDER — HYDROCODONE BITARTRATE AND ACETAMINOPHEN 7.5; 325 MG/1; MG/1
2 TABLET ORAL EVERY 4 HOURS PRN
Status: DISCONTINUED | OUTPATIENT
Start: 2024-11-11 | End: 2024-11-12 | Stop reason: HOSPADM

## 2024-11-11 RX ORDER — ONDANSETRON 4 MG/1
4 TABLET, ORALLY DISINTEGRATING ORAL EVERY 6 HOURS PRN
Status: DISCONTINUED | OUTPATIENT
Start: 2024-11-11 | End: 2024-11-12 | Stop reason: HOSPADM

## 2024-11-11 RX ADMIN — SCOPALAMINE 1 PATCH: 1 PATCH, EXTENDED RELEASE TRANSDERMAL at 07:07

## 2024-11-11 RX ADMIN — SODIUM CHLORIDE, POTASSIUM CHLORIDE, SODIUM LACTATE AND CALCIUM CHLORIDE 100 ML/HR: 600; 310; 30; 20 INJECTION, SOLUTION INTRAVENOUS at 11:27

## 2024-11-11 RX ADMIN — MIDAZOLAM HYDROCHLORIDE 2 MG: 1 INJECTION, SOLUTION INTRAMUSCULAR; INTRAVENOUS at 07:06

## 2024-11-11 RX ADMIN — FENTANYL CITRATE 50 MCG: 50 INJECTION, SOLUTION INTRAMUSCULAR; INTRAVENOUS at 07:59

## 2024-11-11 RX ADMIN — LIDOCAINE HYDROCHLORIDE 40 MG: 20 INJECTION, SOLUTION INTRAVENOUS at 07:17

## 2024-11-11 RX ADMIN — PROPOFOL 150 MG: 10 INJECTION, EMULSION INTRAVENOUS at 07:17

## 2024-11-11 RX ADMIN — SUGAMMADEX 200 MG: 100 INJECTION, SOLUTION INTRAVENOUS at 09:08

## 2024-11-11 RX ADMIN — DEXMEDETOMIDINE HYDROCHLORIDE 10 MCG: 100 INJECTION, SOLUTION, CONCENTRATE INTRAVENOUS at 08:07

## 2024-11-11 RX ADMIN — HYDROMORPHONE HYDROCHLORIDE 0.25 MG: 1 INJECTION, SOLUTION INTRAMUSCULAR; INTRAVENOUS; SUBCUTANEOUS at 10:16

## 2024-11-11 RX ADMIN — FENTANYL CITRATE 100 MCG: 50 INJECTION, SOLUTION INTRAMUSCULAR; INTRAVENOUS at 07:17

## 2024-11-11 RX ADMIN — DEXMEDETOMIDINE HYDROCHLORIDE 10 MCG: 100 INJECTION, SOLUTION, CONCENTRATE INTRAVENOUS at 08:03

## 2024-11-11 RX ADMIN — KETOROLAC TROMETHAMINE 15 MG: 15 INJECTION, SOLUTION INTRAMUSCULAR; INTRAVENOUS at 17:05

## 2024-11-11 RX ADMIN — DEXMEDETOMIDINE HYDROCHLORIDE 20 MCG: 100 INJECTION, SOLUTION, CONCENTRATE INTRAVENOUS at 07:47

## 2024-11-11 RX ADMIN — ROCURONIUM BROMIDE 20 MG: 10 INJECTION, SOLUTION INTRAVENOUS at 08:29

## 2024-11-11 RX ADMIN — SENNOSIDES AND DOCUSATE SODIUM 2 TABLET: 50; 8.6 TABLET ORAL at 13:29

## 2024-11-11 RX ADMIN — PROMETHAZINE HYDROCHLORIDE 12.5 MG: 12.5 TABLET ORAL at 07:00

## 2024-11-11 RX ADMIN — ACETAMINOPHEN 1000 MG: 500 TABLET ORAL at 07:00

## 2024-11-11 RX ADMIN — DEXMEDETOMIDINE HYDROCHLORIDE 10 MCG: 100 INJECTION, SOLUTION, CONCENTRATE INTRAVENOUS at 07:52

## 2024-11-11 RX ADMIN — PRAVASTATIN SODIUM 20 MG: 20 TABLET ORAL at 21:34

## 2024-11-11 RX ADMIN — Medication 10 ML: at 13:40

## 2024-11-11 RX ADMIN — SODIUM CHLORIDE 2 G: 9 INJECTION, SOLUTION INTRAVENOUS at 07:22

## 2024-11-11 RX ADMIN — TRANEXAMIC ACID 1000 MG: 10 INJECTION, SOLUTION INTRAVENOUS at 08:43

## 2024-11-11 RX ADMIN — DEXAMETHASONE SODIUM PHOSPHATE 4 MG: 4 INJECTION, SOLUTION INTRAMUSCULAR; INTRAVENOUS at 07:36

## 2024-11-11 RX ADMIN — KETOROLAC TROMETHAMINE 15 MG: 15 INJECTION, SOLUTION INTRAMUSCULAR; INTRAVENOUS at 23:22

## 2024-11-11 RX ADMIN — KETOROLAC TROMETHAMINE 15 MG: 15 INJECTION, SOLUTION INTRAMUSCULAR; INTRAVENOUS at 13:29

## 2024-11-11 RX ADMIN — FERROUS SULFATE TAB 325 MG (65 MG ELEMENTAL FE) 325 MG: 325 (65 FE) TAB at 13:29

## 2024-11-11 RX ADMIN — ACETAMINOPHEN 1000 MG: 500 TABLET ORAL at 15:13

## 2024-11-11 RX ADMIN — Medication 10 ML: at 21:36

## 2024-11-11 RX ADMIN — HYDROCODONE BITARTRATE AND ACETAMINOPHEN 1 TABLET: 7.5; 325 TABLET ORAL at 17:04

## 2024-11-11 RX ADMIN — SODIUM CHLORIDE, POTASSIUM CHLORIDE, SODIUM LACTATE AND CALCIUM CHLORIDE 9 ML/HR: 600; 310; 30; 20 INJECTION, SOLUTION INTRAVENOUS at 07:05

## 2024-11-11 RX ADMIN — ONDANSETRON 4 MG: 2 INJECTION INTRAMUSCULAR; INTRAVENOUS at 07:03

## 2024-11-11 RX ADMIN — ONDANSETRON HYDROCHLORIDE 4 MG: 2 SOLUTION INTRAMUSCULAR; INTRAVENOUS at 08:54

## 2024-11-11 RX ADMIN — LABETALOL HYDROCHLORIDE 5 MG: 5 INJECTION, SOLUTION INTRAVENOUS at 08:12

## 2024-11-11 RX ADMIN — SENNOSIDES AND DOCUSATE SODIUM 2 TABLET: 50; 8.6 TABLET ORAL at 21:34

## 2024-11-11 RX ADMIN — SODIUM CHLORIDE 2000 MG: 9 INJECTION, SOLUTION INTRAVENOUS at 15:13

## 2024-11-11 RX ADMIN — TRANEXAMIC ACID 1000 MG: 10 INJECTION, SOLUTION INTRAVENOUS at 07:08

## 2024-11-11 RX ADMIN — ACETAMINOPHEN 1000 MG: 500 TABLET ORAL at 23:21

## 2024-11-11 RX ADMIN — PROPOFOL 175 MCG/KG/MIN: 10 INJECTION, EMULSION INTRAVENOUS at 07:20

## 2024-11-11 RX ADMIN — HYDROMORPHONE HYDROCHLORIDE 0.5 MG: 1 INJECTION, SOLUTION INTRAMUSCULAR; INTRAVENOUS; SUBCUTANEOUS at 10:01

## 2024-11-11 RX ADMIN — CETIRIZINE HYDROCHLORIDE 10 MG: 10 TABLET, FILM COATED ORAL at 13:29

## 2024-11-11 RX ADMIN — FENTANYL CITRATE 50 MCG: 50 INJECTION, SOLUTION INTRAMUSCULAR; INTRAVENOUS at 07:54

## 2024-11-11 RX ADMIN — DEXMEDETOMIDINE HYDROCHLORIDE 10 MCG: 100 INJECTION, SOLUTION, CONCENTRATE INTRAVENOUS at 07:56

## 2024-11-11 RX ADMIN — SODIUM CHLORIDE 2000 MG: 9 INJECTION, SOLUTION INTRAVENOUS at 23:21

## 2024-11-11 RX ADMIN — ROCURONIUM BROMIDE 50 MG: 10 INJECTION, SOLUTION INTRAVENOUS at 07:18

## 2024-11-11 NOTE — OP NOTE
TOTAL HIP ARTHROPLASTY ANTERIOR  Procedure Report    Patient Name:  Casandra Arango  YOB: 1955    Date of Surgery:  11/11/2024     Indications:  Advanced hip arthritis, failed conservative care    Pre-op Diagnosis:   Osteoarthritis of left hip, unspecified osteoarthritis type [M16.12]       Post-Op Diagnosis Codes:     * Osteoarthritis of left hip, unspecified osteoarthritis type [M16.12]    Procedure/CPT® Codes:      Procedure(s):  LEFT TOTAL HIP ARTHROPLASTY ANTERIOR    Staff:  Surgeon(s):  Kar Denny MD    Assistant: Paola Allan CSA; Renee Long    Surgical Approach: Hip Direct Anterior ([place])        Anesthesia: Choice    Estimated Blood Loss: 200ml    Implants:    Implant Name Type Inv. Item Serial No.  Lot No. LRB No. Used Action   SHLL ACET G7 PPS LTD/HL TI ALEX 52MM - [id number] Implant SHLL ACET G7 PPS LTD/HL TI ALEX 52MM  [employer] [id number] Left 1 Implanted   SCRW S/TAP 6.5X25MM - [id number] Implant SCRW S/TAP 6.5X25MM  [employer] [id number] Left 1 Implanted   STEM FEM HIP TAPERLOC COMPL MICROPLASTY HI/OFFST SZ13 - [id number] Implant STEM FEM HIP TAPERLOC COMPL MICROPLASTY HI/OFFST SZ13  [employer] [id number] Left 1 Implanted   LINER ACET G7 VIVACIT/E HI/WL HXPE ALEX 36MM - [id number] Implant LINER ACET G7 VIVACIT/E HI/WL HXPE ALEX 36MM  [employer] [id number] Left 1 Implanted   HD FEM/HIP G7 BIOLOX/DELTA OPTN 36MM - [id number] Implant HD FEM/HIP G7 BIOLOX/DELTA OPTN 36MM  [employer] [id number] Left 1 Implanted   ADAPT HIP BIOLOX OPTN TYPE1 TPR MIN 3 - [id number] Implant ADAPT HIP BIOLOX OPTN TYPE1 TPR MIN 3  [employer] [id number] Left 1 Implanted       Specimen:          None        Findings: Advanced arthritis    Complications:  None    Description of Procedure: The patient went to the operating room and  underwent anesthesia, received a preoperative antibiotic, was placed on the [place] table and was then prepped and draped in standard  fashion. A standard anterior hip incision was made. The interval was found and retractors were placed. The capsule was then opened. The femoral neck was identified. Retractors were placed around the neck. The femoral neck cut was then made and then the head was removed from the acetabulum. Acetabular retractors were then placed. The labrum was removed. C-arm fluoroscopy was used to help guide the reaming for the acetabulum. This was sequentially reamed and then the appropriate size shell was then inserted, followed by a screw and then the  liner. The bed was raised, the leg was dropped, and femoral exposure was obtained. This was then opened using a rat-tail reamer and then sequentially broached  and then a stem was inserted. Leg lengths were measured after reduction and a ceramic head was then chosen. This was then thoroughly irrigated, tranexamic acid and local were injected, and then closed using #1 Vicryl, 2-0 Vicryl and staples. Sterile dressing was applied. The patient was then taken to recovery in stable condition. There were no complications.    Assistant: Paola Allan CSA; Renee Long  was responsible for performing the following activities: Retraction, Suction, Irrigation, Closing, and Placing Dressing and their skilled assistance was necessary for the success of this case.    Kar Denny MD     Date: 11/11/2024  Time: 09:09 EST
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
## 2024-11-11 NOTE — ANESTHESIA POSTPROCEDURE EVALUATION
Patient: Casandra Arango    Procedure Summary       Date: 11/11/24 Room / Location: [facility] OR 01 / [facility] MAIN OR    Anesthesia Start: 0712 Anesthesia Stop: 0936    Procedure: LEFT TOTAL HIP ARTHROPLASTY ANTERIOR (Left: Hip) Diagnosis:       Osteoarthritis of left hip, unspecified osteoarthritis type      (Osteoarthritis of left hip, unspecified osteoarthritis type [M16.12])    Surgeons: Kar Denny MD Provider: Ras Olivas MD    Anesthesia Type: general ASA Status: 3            Anesthesia Type: general    Vitals  Vitals Value Taken Time   /48 11/11/24 1010   Temp 35.8 °C (96.5 °F) 11/11/24 0925   Pulse 60 11/11/24 1010   Resp 16 11/11/24 1005   SpO2 98 % 11/11/24 1010   Vitals shown include unfiled device data.        Post Anesthesia Care and Evaluation    Patient location during evaluation: bedside  Patient participation: complete - patient participated  Level of consciousness: awake and alert  Pain management: adequate    Airway patency: patent  Anesthetic complications: No anesthetic complications  PONV Status: none  Cardiovascular status: acceptable  Respiratory status: acceptable  Hydration status: acceptable    Comments: An Anesthesiologist personally participated in the most demanding procedures (including induction and emergence if applicable) in the anesthesia plan, monitored the course of anesthesia administration at frequent intervals and remained physically present and available for immediate diagnosis and treatment of emergencies.

## 2024-11-11 NOTE — INTERVAL H&P NOTE
H&P reviewed. The patient was examined and there are no changes to the H&P.  Risk benefits of left total hip replacement were explained.  Patient understands wishes to proceed
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
## 2024-11-11 NOTE — ANESTHESIA PREPROCEDURE EVALUATION
Anesthesia Evaluation     Patient summary reviewed and Nursing notes reviewed   history of anesthetic complications (Family member with MH):  PONV malignant hyperthermia  NPO Solid Status: > 8 hours  NPO Liquid Status: > 2 hours           Airway   Mallampati: II  TM distance: >3 FB  Neck ROM: full  No difficulty expected  Dental          Pulmonary - normal exam    breath sounds clear to auscultation  (+) ,sleep apnea on CPAP  Cardiovascular - normal exam  Exercise tolerance: good (4-7 METS)    Rhythm: regular  Rate: normal    (+) hypertension well controlled, hyperlipidemia      Neuro/Psych  (+) headaches, psychiatric history Anxiety and Depression  GI/Hepatic/Renal/Endo    (+) obesity, GERD well controlled, renal disease- stones    Musculoskeletal     Abdominal    Substance History - negative use     OB/GYN negative ob/gyn ROS         Other   arthritis,     ROS/Med Hx Other: hx HTN, KARINA, anemia, anxiety, gerd. mets<4. Grandson and Paternal Aunt with hx of MH- pt reports no problems. ELM               Anesthesia Plan    ASA 3     general     Reason for not using neuraxial anesthesia or peripheral nerve block: Patient Preference  (Patient understands anesthesia not responsible for dental damage. Talked about spinal, but patient prefers General. Will need TIVA with  precautions (clean machine, filters, no volatiles, no succinylcholine).)  intravenous induction     Anesthetic plan, risks, benefits, and alternatives have been provided, discussed and informed consent has been obtained with: patient.    Use of blood products discussed with patient .    Plan discussed with CRNA.    CODE STATUS:

## 2024-11-12 ENCOUNTER — TELEPHONE (OUTPATIENT)
Dept: INTERNAL MEDICINE | Facility: CLINIC | Age: 69
End: 2024-11-12
Payer: MEDICARE

## 2024-11-12 VITALS
OXYGEN SATURATION: 98 % | TEMPERATURE: 98.1 F | DIASTOLIC BLOOD PRESSURE: 73 MMHG | WEIGHT: 217.59 LBS | SYSTOLIC BLOOD PRESSURE: 125 MMHG | HEIGHT: 66 IN | RESPIRATION RATE: 18 BRPM | HEART RATE: 72 BPM | BODY MASS INDEX: 34.97 KG/M2

## 2024-11-12 LAB
ANION GAP SERPL CALCULATED.3IONS-SCNC: 7.8 MMOL/L (ref 5–15)
BUN SERPL-MCNC: 19 MG/DL (ref 8–23)
BUN/CREAT SERPL: 20.2 (ref 7–25)
CALCIUM SPEC-SCNC: 8.6 MG/DL (ref 8.6–10.5)
CHLORIDE SERPL-SCNC: 102 MMOL/L (ref 98–107)
CO2 SERPL-SCNC: 27.2 MMOL/L (ref 22–29)
CREAT SERPL-MCNC: 0.94 MG/DL (ref 0.57–1)
EGFRCR SERPLBLD CKD-EPI 2021: 66.2 ML/MIN/1.73
GLUCOSE SERPL-MCNC: 114 MG/DL (ref 65–99)
HCT VFR BLD AUTO: 34.2 % (ref 34–46.6)
HGB BLD-MCNC: 10.8 G/DL (ref 12–15.9)
POTASSIUM SERPL-SCNC: 4.1 MMOL/L (ref 3.5–5.2)
SODIUM SERPL-SCNC: 137 MMOL/L (ref 136–145)

## 2024-11-12 PROCEDURE — 97150 GROUP THERAPEUTIC PROCEDURES: CPT

## 2024-11-12 PROCEDURE — 85014 HEMATOCRIT: CPT | Performed by: ORTHOPAEDIC SURGERY

## 2024-11-12 PROCEDURE — 97116 GAIT TRAINING THERAPY: CPT

## 2024-11-12 PROCEDURE — 25010000002 ENOXAPARIN PER 10 MG: Performed by: ORTHOPAEDIC SURGERY

## 2024-11-12 PROCEDURE — 25010000002 KETOROLAC TROMETHAMINE PER 15 MG: Performed by: ORTHOPAEDIC SURGERY

## 2024-11-12 PROCEDURE — 97165 OT EVAL LOW COMPLEX 30 MIN: CPT

## 2024-11-12 PROCEDURE — 85018 HEMOGLOBIN: CPT | Performed by: ORTHOPAEDIC SURGERY

## 2024-11-12 PROCEDURE — 80048 BASIC METABOLIC PNL TOTAL CA: CPT | Performed by: ORTHOPAEDIC SURGERY

## 2024-11-12 PROCEDURE — 94799 UNLISTED PULMONARY SVC/PX: CPT

## 2024-11-12 PROCEDURE — 97535 SELF CARE MNGMENT TRAINING: CPT

## 2024-11-12 RX ORDER — HYDROCODONE BITARTRATE AND ACETAMINOPHEN 7.5; 325 MG/1; MG/1
1-2 TABLET ORAL EVERY 4 HOURS PRN
Qty: 40 TABLET | Refills: 0 | Status: SHIPPED | OUTPATIENT
Start: 2024-11-12 | End: 2024-11-18 | Stop reason: SDUPTHER

## 2024-11-12 RX ORDER — ONDANSETRON 4 MG/1
4 TABLET, FILM COATED ORAL EVERY 8 HOURS PRN
Qty: 21 TABLET | Refills: 0 | Status: SHIPPED | OUTPATIENT
Start: 2024-11-12

## 2024-11-12 RX ADMIN — ENOXAPARIN SODIUM 40 MG: 100 INJECTION SUBCUTANEOUS at 08:08

## 2024-11-12 RX ADMIN — SPIRONOLACTONE 25 MG: 25 TABLET ORAL at 08:05

## 2024-11-12 RX ADMIN — FERROUS SULFATE TAB 325 MG (65 MG ELEMENTAL FE) 325 MG: 325 (65 FE) TAB at 08:05

## 2024-11-12 RX ADMIN — HYDROCODONE BITARTRATE AND ACETAMINOPHEN 1 TABLET: 7.5; 325 TABLET ORAL at 08:16

## 2024-11-12 RX ADMIN — Medication 10 ML: at 08:05

## 2024-11-12 RX ADMIN — KETOROLAC TROMETHAMINE 15 MG: 15 INJECTION, SOLUTION INTRAMUSCULAR; INTRAVENOUS at 06:19

## 2024-11-12 RX ADMIN — ACETAMINOPHEN 1000 MG: 500 TABLET ORAL at 07:15

## 2024-11-12 RX ADMIN — BUPROPION HYDROCHLORIDE 150 MG: 150 TABLET, EXTENDED RELEASE ORAL at 08:05

## 2024-11-12 RX ADMIN — SENNOSIDES AND DOCUSATE SODIUM 2 TABLET: 50; 8.6 TABLET ORAL at 08:05

## 2024-11-12 RX ADMIN — PANTOPRAZOLE SODIUM 40 MG: 40 TABLET, DELAYED RELEASE ORAL at 06:19

## 2024-11-12 NOTE — PLAN OF CARE
Goal Outcome Evaluation:   Pt  AAO X 4, on room air, and assist X1 patient resting in bed, able to make wants and needs known, no  c/o pain,  Ambulated in hallway without difficulty- with walker and gait belt, tolerated well.  Safety checks maintained throughout shift, wheels to locked, personal items and call light within reach.VSS.

## 2024-11-12 NOTE — SIGNIFICANT NOTE
11/12/24 0809   Plan   Final Discharge Disposition Code 01 - home or self-care   Final Note TEMO Robbins. Appt: 11/13/24 at 1PM.
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
## 2024-11-12 NOTE — PLAN OF CARE
Goal Outcome Evaluation:  Plan of Care Reviewed With: patient           Outcome Evaluation: Pt is A&O X 4, on room air, and X 1 assist. All scheduled medications given as ordered and pain managed with PRN medication. Safety checks maintained throughout shift with pt resting in chair, wheels to chair locked, and personal items and call light within reach.VSS.

## 2024-11-12 NOTE — NURSING NOTE
"Pharmacy messaged about sending home medication Pristiq 24 hr 50 mg to pharmacy to be labeled and sent back for patient use. Asked family about medication. Pt/family stated they only had the pt M-F pill box and the Pristiq is an orange pill that is unwrapped. They did not have an unopened package or bottle to send down. Messaged pharmacist, Taqueria Dolan, at 1753 with the following message, \" I asked family about the home medication. They only have it in the patient M-F pill box...it's an orange colored pill that's unwrapped. They dont have any wrapped or in a bottle to send down.\"   No reply back. Pt/family stated they would be fine if they didn't take the dose and waited until they were discharged home.   "

## 2024-11-12 NOTE — TELEPHONE ENCOUNTER
Caller: [facility]    Relationship to patient:     Best call back number: N/A    Chief complaint: HOSPITAL FOLLOW UP DISCHARGED [facility] ON 11.12.24    Type of visit: HFU     Requested date: WITHIN 7 DAYS PER NURSE     Additional notes: PLEASE CALL PATIENT BACK TO SCHEDULE. HUB UNABLE TO ACCOMMODATE OR WARM TRANSFER.
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
## 2024-11-12 NOTE — PLAN OF CARE
Goal Outcome Evaluation:  Plan of Care Reviewed With: patient           Outcome Evaluation: Pt is A&O X 4, on room air, and X 1 assist with gait belt and walker. All scheduled medications given as ordered and pain managed with PRN medications. Safety checks maintained throughout shift with pt resting in chair, wheels to chair locked, and personal items and call light within reach. VSS. Pt voiding without difficulty. Educated pt on discharge instructions to include follow up appointments, pain management, signs/symptoms of infection, incision care/dressing changes, and if any questions or concerns arise to call orthopedic office. Pt verbalized understanding. Pt participated in physical therapy and will follow up after discharge with Wojciech Robbins starting 11/13/24 at 1300.

## 2024-11-12 NOTE — PLAN OF CARE
Goal Outcome Evaluation:  Plan of Care Reviewed With: patient        Progress: improving (Through ADL/ transfer retraining, patient is now able to perform basic ADL and transfers with RW, setup and cues for safe positioning and adaptive techniques.)  Outcome Evaluation: Patient presents with limitations of balance and standing endurance/ activity tolerance which impede her ability to perform ADL and transfers as prior.  The skills of a therapist will be required to safely and effectively implement treatment plan to restore maximal level of function.    Anticipated Discharge Disposition (OT):  (outpatient PT. daughter available to assist as needed.)

## 2024-11-12 NOTE — DISCHARGE INSTR - APPOINTMENTS
Outpatient PT  Wojciech Robbins  APPT: 11/13/24 @ 1:00    PCP office will call patient to schedule a hospital follow-up appointment.

## 2024-11-12 NOTE — PROGRESS NOTES
" Orthopedic Total Hip Progress Note    Assessment/Plan outpatient PT in [place], follow-up 2 weeks, DVT prophylaxis    Status post-left total hip arthroplasty: Doing well postoperatively.    Pain Relief: some relief    Continues current post-op course    Activity: up with assistance    Weight Bearing: WBAT     LOS: 0 days     Subjective     Post-Operative Day: 1 post-op total hip arthroplasty  Systemic or Specific Complaints: No Complaints    Objective     Vital signs in last 24 hours:  Vitals:    11/11/24 2326 11/12/24 0115 11/12/24 0343 11/12/24 0410   BP: 125/59  107/57    BP Location: Left arm  Left arm    Patient Position: Lying  Lying    Pulse: 80  73    Resp: 18  18    Temp: 98.1 °F (36.7 °C)  98.6 °F (37 °C)    TempSrc: Oral  Oral    SpO2: 96% 95% 95% 96%   Weight:       Height:            General: alert, appears stated age, and cooperative   Neurovascular: Tibial nerve: Intact, Superficial peroneal nerve: Intact, and Deep peroneal nerve: Intact  Capillary refill: Normal   Wound: Wound clean and dry no evidence of infection.   Range of Motion: Limited flexsion and Limited extension   DVT Exam: No evidence of DVT seen on physical exam.      Hemoglobin   Date Value Ref Range Status   11/12/2024 10.8 (L) 12.0 - 15.9 g/dL Final     Hematocrit   Date Value Ref Range Status   11/12/2024 34.2 34.0 - 46.6 % Final        Basic Metabolic Panel    Sodium Sodium   Date Value Ref Range Status   11/12/2024 137 136 - 145 mmol/L Final      Potassium Potassium   Date Value Ref Range Status   11/12/2024 4.1 3.5 - 5.2 mmol/L Final      Chloride Chloride   Date Value Ref Range Status   11/12/2024 102 98 - 107 mmol/L Final      Bicarbonate No results found for: \"PLASMABICARB\"   BUN BUN   Date Value Ref Range Status   11/12/2024 19 8 - 23 mg/dL Final      Creatinine Creatinine   Date Value Ref Range Status   11/12/2024 0.94 0.57 - 1.00 mg/dL Final      Calcium Calcium   Date Value Ref Range Status   11/12/2024 8.6 8.6 - 10.5 " "mg/dL Final      Glucose      No components found for: \"GLUCOSE.*\"      XR Hip With or Without Pelvis 2 - 3 View Left   Final Result   Impression:      1. Expected postsurgical changes of interval left total hip arthroplasty without complication.         Electronically Signed: Lisa Palafox MD     11/11/2024 10:28 AM EST     Workstation ID: [id number]      FL Surgery Fluoro   Final Result           "
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
## 2024-11-15 ENCOUNTER — PATIENT MESSAGE (OUTPATIENT)
Dept: ORTHOPEDIC SURGERY | Facility: CLINIC | Age: 69
End: 2024-11-15
Payer: MEDICARE

## 2024-11-15 ENCOUNTER — TELEPHONE (OUTPATIENT)
Dept: ORTHOPEDIC SURGERY | Facility: CLINIC | Age: 69
End: 2024-11-15
Payer: MEDICARE

## 2024-11-15 DIAGNOSIS — Z96.642 AFTERCARE FOLLOWING LEFT HIP JOINT REPLACEMENT SURGERY: Primary | ICD-10-CM

## 2024-11-15 DIAGNOSIS — Z47.1 AFTERCARE FOLLOWING LEFT HIP JOINT REPLACEMENT SURGERY: Primary | ICD-10-CM

## 2024-11-15 RX ORDER — GABAPENTIN 300 MG/1
300 CAPSULE ORAL NIGHTLY
Qty: 30 CAPSULE | Refills: 0 | Status: SHIPPED | OUTPATIENT
Start: 2024-11-15 | End: 2024-11-18 | Stop reason: SDUPTHER

## 2024-11-15 NOTE — TELEPHONE ENCOUNTER
PATIENT CALLED WITH CONCERN OF THE INCISION HEALING.  PICTURE WAS SENT AND VIEWED BY MYSELF AND DR PÉREZ.  PATIENT WAS INSTRUCTED TO KEEP INCISION CLEAN AND DRY AND OPEN TO AIR TO HELP WITH MOISTURE.  ADDITIONALLY, PATIENT WILL BE GIVEN A SCRIP OF GABAPENTIN TO HELP WITH NERVE PAIN.  PATIENT VOICED UNDERSTANDING AND WILL CALL THE OFFICE WITH NEW ISSUES AND CONCERNS.

## 2024-11-18 ENCOUNTER — TELEPHONE (OUTPATIENT)
Dept: ORTHOPEDIC SURGERY | Facility: CLINIC | Age: 69
End: 2024-11-18
Payer: MEDICARE

## 2024-11-18 ENCOUNTER — OFFICE VISIT (OUTPATIENT)
Dept: INTERNAL MEDICINE | Facility: CLINIC | Age: 69
End: 2024-11-18
Payer: MEDICARE

## 2024-11-18 VITALS
BODY MASS INDEX: 35.26 KG/M2 | SYSTOLIC BLOOD PRESSURE: 128 MMHG | DIASTOLIC BLOOD PRESSURE: 70 MMHG | WEIGHT: 219.4 LBS | OXYGEN SATURATION: 96 % | HEIGHT: 66 IN | RESPIRATION RATE: 20 BRPM | TEMPERATURE: 96.4 F | HEART RATE: 78 BPM

## 2024-11-18 DIAGNOSIS — Z96.642 AFTERCARE FOLLOWING LEFT HIP JOINT REPLACEMENT SURGERY: Primary | ICD-10-CM

## 2024-11-18 DIAGNOSIS — Z47.1 AFTERCARE FOLLOWING LEFT HIP JOINT REPLACEMENT SURGERY: ICD-10-CM

## 2024-11-18 DIAGNOSIS — D64.9 ANEMIA, UNSPECIFIED TYPE: Primary | ICD-10-CM

## 2024-11-18 DIAGNOSIS — Z47.1 AFTERCARE FOLLOWING LEFT HIP JOINT REPLACEMENT SURGERY: Primary | ICD-10-CM

## 2024-11-18 DIAGNOSIS — Z96.642 AFTERCARE FOLLOWING LEFT HIP JOINT REPLACEMENT SURGERY: ICD-10-CM

## 2024-11-18 DIAGNOSIS — F41.9 ANXIETY: ICD-10-CM

## 2024-11-18 DIAGNOSIS — R26.2 DIFFICULTY IN WALKING: ICD-10-CM

## 2024-11-18 LAB
ALBUMIN SERPL-MCNC: 3.7 G/DL (ref 3.5–5.2)
ALBUMIN/GLOB SERPL: 1.2 G/DL
ALP SERPL-CCNC: 140 U/L (ref 39–117)
ALT SERPL W P-5'-P-CCNC: 32 U/L (ref 1–33)
ANION GAP SERPL CALCULATED.3IONS-SCNC: 12.4 MMOL/L (ref 5–15)
AST SERPL-CCNC: 22 U/L (ref 1–32)
BASOPHILS # BLD AUTO: 0.1 10*3/MM3 (ref 0–0.2)
BASOPHILS NFR BLD AUTO: 0.7 % (ref 0–1.5)
BILIRUB SERPL-MCNC: 0.3 MG/DL (ref 0–1.2)
BUN SERPL-MCNC: 20 MG/DL (ref 8–23)
BUN/CREAT SERPL: 23.3 (ref 7–25)
CALCIUM SPEC-SCNC: 9.8 MG/DL (ref 8.6–10.5)
CHLORIDE SERPL-SCNC: 101 MMOL/L (ref 98–107)
CO2 SERPL-SCNC: 24.6 MMOL/L (ref 22–29)
CREAT SERPL-MCNC: 0.86 MG/DL (ref 0.57–1)
DEPRECATED RDW RBC AUTO: 43.4 FL (ref 37–54)
EGFRCR SERPLBLD CKD-EPI 2021: 73.7 ML/MIN/1.73
EOSINOPHIL # BLD AUTO: 0.53 10*3/MM3 (ref 0–0.4)
EOSINOPHIL NFR BLD AUTO: 3.9 % (ref 0.3–6.2)
ERYTHROCYTE [DISTWIDTH] IN BLOOD BY AUTOMATED COUNT: 12.9 % (ref 12.3–15.4)
GLOBULIN UR ELPH-MCNC: 3.2 GM/DL
GLUCOSE SERPL-MCNC: 75 MG/DL (ref 65–99)
HCT VFR BLD AUTO: 38.3 % (ref 34–46.6)
HGB BLD-MCNC: 11.8 G/DL (ref 12–15.9)
IMM GRANULOCYTES # BLD AUTO: 0.18 10*3/MM3 (ref 0–0.05)
IMM GRANULOCYTES NFR BLD AUTO: 1.3 % (ref 0–0.5)
LYMPHOCYTES # BLD AUTO: 2.56 10*3/MM3 (ref 0.7–3.1)
LYMPHOCYTES NFR BLD AUTO: 18.8 % (ref 19.6–45.3)
MCH RBC QN AUTO: 28.8 PG (ref 26.6–33)
MCHC RBC AUTO-ENTMCNC: 30.8 G/DL (ref 31.5–35.7)
MCV RBC AUTO: 93.4 FL (ref 79–97)
MONOCYTES # BLD AUTO: 1.04 10*3/MM3 (ref 0.1–0.9)
MONOCYTES NFR BLD AUTO: 7.6 % (ref 5–12)
NEUTROPHILS NFR BLD AUTO: 67.7 % (ref 42.7–76)
NEUTROPHILS NFR BLD AUTO: 9.2 10*3/MM3 (ref 1.7–7)
NRBC BLD AUTO-RTO: 0 /100 WBC (ref 0–0.2)
PLATELET # BLD AUTO: 554 10*3/MM3 (ref 140–450)
PMV BLD AUTO: 9.1 FL (ref 6–12)
POTASSIUM SERPL-SCNC: 4.5 MMOL/L (ref 3.5–5.2)
PROT SERPL-MCNC: 6.9 G/DL (ref 6–8.5)
RBC # BLD AUTO: 4.1 10*6/MM3 (ref 3.77–5.28)
SODIUM SERPL-SCNC: 138 MMOL/L (ref 136–145)
WBC NRBC COR # BLD AUTO: 13.61 10*3/MM3 (ref 3.4–10.8)

## 2024-11-18 PROCEDURE — G2211 COMPLEX E/M VISIT ADD ON: HCPCS | Performed by: INTERNAL MEDICINE

## 2024-11-18 PROCEDURE — 80053 COMPREHEN METABOLIC PANEL: CPT | Performed by: INTERNAL MEDICINE

## 2024-11-18 PROCEDURE — 3078F DIAST BP <80 MM HG: CPT | Performed by: INTERNAL MEDICINE

## 2024-11-18 PROCEDURE — 99214 OFFICE O/P EST MOD 30 MIN: CPT | Performed by: INTERNAL MEDICINE

## 2024-11-18 PROCEDURE — 3074F SYST BP LT 130 MM HG: CPT | Performed by: INTERNAL MEDICINE

## 2024-11-18 PROCEDURE — 36415 COLL VENOUS BLD VENIPUNCTURE: CPT | Performed by: INTERNAL MEDICINE

## 2024-11-18 PROCEDURE — 1126F AMNT PAIN NOTED NONE PRSNT: CPT | Performed by: INTERNAL MEDICINE

## 2024-11-18 PROCEDURE — 85025 COMPLETE CBC W/AUTO DIFF WBC: CPT | Performed by: INTERNAL MEDICINE

## 2024-11-18 RX ORDER — HYDROCODONE BITARTRATE AND ACETAMINOPHEN 7.5; 325 MG/1; MG/1
1 TABLET ORAL EVERY 4 HOURS PRN
Qty: 42 TABLET | Refills: 0 | Status: SHIPPED | OUTPATIENT
Start: 2024-11-18

## 2024-11-18 RX ORDER — GABAPENTIN 300 MG/1
300 CAPSULE ORAL 3 TIMES DAILY
Qty: 90 CAPSULE | Refills: 0 | Status: SHIPPED | OUTPATIENT
Start: 2024-11-18

## 2024-11-18 RX ORDER — DICLOFENAC SODIUM 75 MG/1
75 TABLET, DELAYED RELEASE ORAL 2 TIMES DAILY
COMMUNITY

## 2024-11-18 NOTE — PROGRESS NOTES
"Chief Complaint  Transitional Care Management      Subjective      History of Present Illness  The patient presents for evaluation of postoperative pain. She is accompanied by her daughter.    She underwent surgery last Monday, which was successful. However, she experienced significant pain after discontinuing diclofenac prior to the procedure. Post-surgery, she was sedated, leading to confusion and missed physical therapy sessions. She experienced nausea, vomiting, and migraines, which were managed with a patch and premedication with Zofran. She has a follow-up appointment with her surgeon next week.    She reports bruising on her knee, which she attributes to Eliquis. She was doing well until yesterday when she started experiencing a burning sensation. She was prescribed gabapentin 300 mg once daily, but it has not been effective. She describes the pain as stabbing and burning, now extending to her groin. Her mobility has decreased. She has not experienced any falls or injuries. She has never experienced groin pain before, but she was on arthritis medication prior to the surgery.    She has started physical therapy but experiences excessive sweating during sessions. She has discontinued diclofenac and multivitamins but continues to take Pristiq and Wellbutrin. She stopped taking Contrave due to side effects. She has not taken Tylenol or any other pain medication. She has been using ice for relief.         Objective   Vital Signs:   Vitals:    11/18/24 1247   BP: 128/70   BP Location: Left arm   Patient Position: Sitting   Cuff Size: Adult   Pulse: 78   Resp: 20   Temp: 96.4 °F (35.8 °C)   TempSrc: Temporal   SpO2: 96%   Weight: 99.5 kg (219 lb 6.4 oz)   Height: 167.6 cm (65.98\")     Body mass index is 35.43 kg/m².    Wt Readings from Last 3 Encounters:   11/18/24 99.5 kg (219 lb 6.4 oz)   11/11/24 98.7 kg (217 lb 9.5 oz)   11/06/24 98.2 kg (216 lb 7.9 oz)     BP Readings from Last 3 Encounters:   11/18/24 128/70 "   11/12/24 125/73   11/06/24 132/82       Health Maintenance   Topic Date Due    ANNUAL WELLNESS VISIT  03/08/2025    LIPID PANEL  06/26/2025    BMI FOLLOWUP  11/12/2025    MAMMOGRAM  12/13/2025    DXA SCAN  10/25/2026    COLORECTAL CANCER SCREENING  04/23/2029    TDAP/TD VACCINES (2 - Tdap) 10/13/2031    HEPATITIS C SCREENING  Completed    COVID-19 Vaccine  Completed    INFLUENZA VACCINE  Completed    Pneumococcal Vaccine 65+  Completed    ZOSTER VACCINE  Completed       Physical Exam  Vitals reviewed.   Constitutional:       Appearance: Normal appearance. She is well-developed.   HENT:      Head: Normocephalic and atraumatic.      Right Ear: External ear normal.      Left Ear: External ear normal.   Eyes:      Conjunctiva/sclera: Conjunctivae normal.      Pupils: Pupils are equal, round, and reactive to light.   Cardiovascular:      Rate and Rhythm: Normal rate and regular rhythm.      Heart sounds: No murmur heard.     No friction rub. No gallop.   Pulmonary:      Effort: Pulmonary effort is normal.      Breath sounds: Normal breath sounds. No wheezing or rhonchi.   Skin:     General: Skin is warm and dry.      Comments: Some mild erythema around surgical site does not appear infected at this time   Neurological:      Mental Status: She is alert and oriented to person, place, and time.   Psychiatric:         Mood and Affect: Affect normal.         Behavior: Behavior normal.         Thought Content: Thought content normal.        Physical Exam        Result Review :  The following data was reviewed by: Teresa Herman MD on 11/18/2024:         Results              Procedures            Assessment & Plan  Aftercare following left hip joint replacement surgery    Orders:    gabapentin (NEURONTIN) 300 MG capsule; Take 1 capsule by mouth 3 (Three) Times a Day.    Comprehensive Metabolic Panel    CBC & Differential    Anemia, unspecified type    Orders:    Comprehensive Metabolic Panel    CBC &  Differential    Anxiety              Assessment & Plan  1. Postoperative pain.  She reports significant pain relief with the use of a patch and premedication with Zofran. Gabapentin 300 mg once a day was prescribed for burning pain, but it has not been effective. The dosage of gabapentin will be increased to three times daily. She is advised to continue with her current regimen of Wellbutrin and Pristiq, ensure adequate hydration, and engage in regular exercise. The use of diclofenac and Tylenol is recommended, with gabapentin to be used as needed. She is advised to monitor for any new bruising and consult her surgeon before restarting diclofenac.    2. Postoperative groin pain.  The groin pain is likely due to surgical recovery. Gabapentin may help with the stinging pain, but its effectiveness for groin pain is uncertain. If the pain persists, she is advised to continue monitoring and report any worsening symptoms.    3. Postoperative sweating and exertion-related symptoms.  She experiences excessive sweating and near-fainting during physical therapy. This may be due to residual anesthesia or deconditioning. She is advised to continue physical therapy, stay well-hydrated, and use electrolyte rehydration solutions like Gatorade. She should also ensure her vitals are monitored during physical therapy sessions.    4.  Anemia  Will repeat lab work today    Follow-up  Patient is scheduled for a follow-up visit on 01/17/2025.    Patient or patient representative verbalized consent for the use of Ambient Listening during the visit with  Teresa Herman MD for chart documentation. 11/18/2024  13:13 EST      FOLLOW UP  No follow-ups on file.  Patient was given instructions and counseling regarding her condition or for health maintenance advice. Please see specific information pulled into the AVS if appropriate.     Teresa Herman MD  11/18/24  18:09 EST    CURRENT & DISCONTINUED MEDICATIONS  Current Outpatient  Medications   Medication Instructions    acetaminophen (TYLENOL) 650 mg, Oral, Every 6 Hours PRN    buPROPion XL (WELLBUTRIN XL) 150 mg, Oral, Daily    cetirizine (ZYRTEC) 10 mg, Oral, Daily    ciclopirox (PENLAC) 8 % solution Topical, Nightly    cyclobenzaprine (FLEXERIL) 5 mg, Oral, 3 Times Daily PRN    desvenlafaxine (PRISTIQ) 50 MG 24 hr tablet Take one tab po daily    diclofenac (VOLTAREN) 75 mg, Oral, 2 Times Daily    Diclofenac Sodium (VOLTAREN) 1 % gel gel Topical, 2 Times Daily    docusate sodium (COLACE) 50 mg, 2 Times Daily    Eliquis 2.5 mg, Oral, Every 12 Hours Scheduled, Once Eliquis is completed, begin enteric-coated aspirin 325 mg by mouth daily for 4 weeks    fluticasone (FLONASE) 50 MCG/ACT nasal spray 2 sprays, Nasal, Daily    gabapentin (NEURONTIN) 300 mg, Oral, 3 Times Daily    HYDROcodone-acetaminophen (Norco) 7.5-325 MG per tablet 1 tablet, Oral, Every 4 Hours PRN    Melatonin 5 mg, Oral, Every Night at Bedtime    multivitamin with minerals tablet tablet 2 tablets, Daily    naloxone (NARCAN) 4 MG/0.1ML nasal spray     omeprazole (PRILOSEC) 40 mg, Oral, Daily    ondansetron (ZOFRAN) 4 mg, Oral, Every 8 Hours PRN    ondansetron ODT (ZOFRAN ODT) 4 mg, Translingual, Every 8 Hours PRN    oxybutynin XL (DITROPAN-XL) 5 mg, Oral, Daily    pravastatin (PRAVACHOL) 20 mg, Oral, Daily    rizatriptan (MAXALT) 10 mg, Oral, Once As Needed, May repeat in 2 hours if needed    spironolactone (ALDACTONE) 25 mg, Oral, Daily    valACYclovir (VALTREX) 500 mg, Oral, Daily       Medications Discontinued During This Encounter   Medication Reason    gabapentin (NEURONTIN) 300 MG capsule Reorder

## 2024-11-19 ENCOUNTER — TELEPHONE (OUTPATIENT)
Dept: ORTHOPEDIC SURGERY | Facility: CLINIC | Age: 69
End: 2024-11-19
Payer: MEDICARE

## 2024-11-19 ENCOUNTER — OFFICE VISIT (OUTPATIENT)
Dept: ORTHOPEDIC SURGERY | Facility: CLINIC | Age: 69
End: 2024-11-19
Payer: MEDICARE

## 2024-11-19 VITALS
OXYGEN SATURATION: 97 % | WEIGHT: 219 LBS | DIASTOLIC BLOOD PRESSURE: 65 MMHG | HEART RATE: 79 BPM | HEIGHT: 66 IN | SYSTOLIC BLOOD PRESSURE: 128 MMHG | BODY MASS INDEX: 35.2 KG/M2

## 2024-11-19 DIAGNOSIS — Z47.1 AFTERCARE FOLLOWING LEFT HIP JOINT REPLACEMENT SURGERY: Primary | ICD-10-CM

## 2024-11-19 DIAGNOSIS — Z96.642 AFTERCARE FOLLOWING LEFT HIP JOINT REPLACEMENT SURGERY: Primary | ICD-10-CM

## 2024-11-19 PROCEDURE — 1159F MED LIST DOCD IN RCRD: CPT

## 2024-11-19 PROCEDURE — 99024 POSTOP FOLLOW-UP VISIT: CPT

## 2024-11-19 PROCEDURE — 3078F DIAST BP <80 MM HG: CPT

## 2024-11-19 PROCEDURE — 1160F RVW MEDS BY RX/DR IN RCRD: CPT

## 2024-11-19 PROCEDURE — 3074F SYST BP LT 130 MM HG: CPT

## 2024-11-19 NOTE — TELEPHONE ENCOUNTER
PATIENT CALLED THIS MORNING TO DISCUSS GROIN PAIN. SHE STATES SHE MUST HAVE DONE SOMETHING TO THE LEFT HIP SUNDAY WHEN GETTING UP THAT MORNING TO TWEAK THE HIP. SHE REPORTS PAIN IN THE GROIN THAT IS NOT GETTING BETTER. SHE WANTED TO KNOW IF SHE SHOULD COME IN OR GO HAVE AN OUTPATIENT XRAY.   I LET HER KNOW THAT WE WOULD LIKE TO EVALUATE THE HIP AND POTENTIALLY RE-XRAY TODAY. APPT GIVEN AT 3:15PM WITH ARTHUR DAWN. PATIENT VOICED UNDERSTANDING.

## 2024-11-19 NOTE — PATIENT INSTRUCTIONS
Recommend ice and resting.  Would avoid stairs and squatting down for now. Continue use of walker.  Continue PT, will have activity modified.  Order written for patient to take to physical therapy to modify PT as needed due to hip flexor tendinitis  Once finished with the Eliquis, can consider addition of NSAID  Keep scheduled appointment next week to have staples removed.  No x-rays needed unless pain has worsened or changed.  Call for questions, concerns or worsening symptoms.

## 2024-11-19 NOTE — TELEPHONE ENCOUNTER
PATIENT'S DAUGHTER CALLED AND STATES PATIENT SAW HER PCP TODAY AND DR STEARNS IS UPING THE GABAPENTIN TO BID.  SHE IS ASKING IF PATIENT CAN RESTART THE VOLTAREN EVEN  THOUGH SHE IS ON ASPIRIN.  DR PÉREZ WAS PAGED AND AND STATES PATIENT CAN RESTRART VOLTAREN.  DAUGHTER VOICED UNDERSTANDING.  PAIN MED REFILL REQUEST WAS PLACED ALSO

## 2024-11-19 NOTE — PROGRESS NOTES
"Chief Complaint  Follow-up of the Left Hip    Subjective      Casandra Arango presents to [facility] for follow up on the left hip. Patient is status post left total hip arthroplasty on 11/1/24 with Dr. Denny.  Patient is currently 8 days postop.  Presents with use of a walker. Patient states over the weekend she had some burning pain to outside of the hip and was given Neurontin. She called our office yesterday with complaints of increased pain in the groin with increased pain walking, concern for a pulled muscle. Denies any fall or injury. Does have increased difficulty and discomfort turning in bed. She is going to [facility] in [place]. She is still taking the Eliquis. Her pain medication was refilled yesterday.     Objective   Allergies   Allergen Reactions    Lamotrigine Hives    Morphine Nausea And Vomiting and Headache     REPORTS SEVERE NAUSEA AND VOMITING  AND BRINGS ON A MIGRAINE    Amlodipine Itching and Rash    Celebrex [Celecoxib] Other (See Comments)     REPORTS VESSELS IN HER EYES BURST    Lisinopril Cough    Losartan Rash    Nabumetone Itching    Oxycodone Itching    Propoxyphene Rash     Darvocet  Other reaction(s): Rash to arms, torso    Terbinafine Rash       Vital Signs:   /65   Pulse 79   Ht 167.6 cm (65.98\")   Wt 99.3 kg (219 lb)   SpO2 97%   BMI 35.36 kg/m²       Physical Exam    Constitutional: Awake, alert. Well nourished appearance.    Integumentary: Warm, dry, intact. No obvious rashes.    HENT: Atraumatic, normocephalic.   Respiratory: Non labored respirations .   Cardiovascular: Intact peripheral pulses.    Psychiatric: Normal mood and affect. A&O X3    Ortho Exam  Left hip: Incision has bandage from surgery still in place.  No significant edema or discoloration noted.  3/5 strength to hip flexion with pain, 4/5 strength to hip extensors, abductors and adductor's.  No pain elicited in the groin with passive internal and external rotation of " the hip.  Neurovascular intact.    Imaging Results (Most Recent)       Procedure Component Value Units Date/Time    XR Hip With or Without Pelvis 2 - 3 View Left [[id number]] Resulted: 11/19/24 1525     Updated: 11/19/24 1525    Narrative:      X-Ray Report:  Study: X-rays ordered, taken in the office, and reviewed today.   Site: Left hip xray  Indication: Left TANNER  View: AP/Lateral view(s)  Findings: Intact left total hip arthroplasty. No evidence of hardware   malfunction or periprosthetic fractures.   Prior studies available for comparison: yes                       Assessment and Plan   Problem List Items Addressed This Visit    None  Visit Diagnoses       Aftercare following left hip joint replacement surgery    -  Primary    Relevant Orders    XR Hip With or Without Pelvis 2 - 3 View Left (Completed)            Follow Up   Return in about 1 week (around 11/26/2024).    Patient is a non-smoker, did not discuss options for smoking cessation.     Social History     Socioeconomic History    Marital status:    Tobacco Use    Smoking status: Never    Smokeless tobacco: Never    Tobacco comments:     Casualty of second hand smoke   Vaping Use    Vaping status: Never Used   Substance and Sexual Activity    Alcohol use: Yes     Comment: RARELY MAYBE ONCE A YEAR    Drug use: Never    Sexual activity: Defer       Patient Instructions   Recommend ice and resting.  Would avoid stairs and squatting down for now. Continue use of walker.  Continue PT, will have activity modified.  Order written for patient to take to physical therapy to modify PT as needed due to hip flexor tendinitis  Once finished with the Eliquis, can consider addition of NSAID  Keep scheduled appointment next week to have staples removed.  No x-rays needed unless pain has worsened or changed.  Call for questions, concerns or worsening symptoms.  Patient was given instructions and counseling regarding her condition or for health maintenance advice.  Please see specific information pulled into the AVS if appropriate.

## 2024-11-26 ENCOUNTER — OFFICE VISIT (OUTPATIENT)
Dept: ORTHOPEDIC SURGERY | Facility: CLINIC | Age: 69
End: 2024-11-26
Payer: MEDICARE

## 2024-11-26 VITALS
WEIGHT: 219 LBS | BODY MASS INDEX: 35.2 KG/M2 | OXYGEN SATURATION: 93 % | DIASTOLIC BLOOD PRESSURE: 79 MMHG | SYSTOLIC BLOOD PRESSURE: 152 MMHG | HEIGHT: 66 IN | HEART RATE: 90 BPM

## 2024-11-26 DIAGNOSIS — Z47.1 AFTERCARE FOLLOWING LEFT HIP JOINT REPLACEMENT SURGERY: Primary | ICD-10-CM

## 2024-11-26 DIAGNOSIS — Z96.642 AFTERCARE FOLLOWING LEFT HIP JOINT REPLACEMENT SURGERY: Primary | ICD-10-CM

## 2024-11-26 DIAGNOSIS — R26.2 DIFFICULTY IN WALKING: ICD-10-CM

## 2024-11-26 DIAGNOSIS — Z47.1 AFTERCARE FOLLOWING LEFT HIP JOINT REPLACEMENT SURGERY: ICD-10-CM

## 2024-11-26 DIAGNOSIS — Z96.642 AFTERCARE FOLLOWING LEFT HIP JOINT REPLACEMENT SURGERY: ICD-10-CM

## 2024-11-26 PROCEDURE — 99024 POSTOP FOLLOW-UP VISIT: CPT

## 2024-11-26 PROCEDURE — 1159F MED LIST DOCD IN RCRD: CPT

## 2024-11-26 PROCEDURE — 3078F DIAST BP <80 MM HG: CPT

## 2024-11-26 PROCEDURE — 1160F RVW MEDS BY RX/DR IN RCRD: CPT

## 2024-11-26 PROCEDURE — 3077F SYST BP >= 140 MM HG: CPT

## 2024-11-26 RX ORDER — CEPHALEXIN 500 MG/1
500 CAPSULE ORAL EVERY 6 HOURS
Qty: 40 CAPSULE | Refills: 0 | Status: SHIPPED | OUTPATIENT
Start: 2024-11-26

## 2024-11-26 RX ORDER — HYDROCODONE BITARTRATE AND ACETAMINOPHEN 7.5; 325 MG/1; MG/1
1 TABLET ORAL EVERY 4 HOURS PRN
Qty: 42 TABLET | Refills: 0 | Status: SHIPPED | OUTPATIENT
Start: 2024-11-26

## 2024-11-26 NOTE — PROGRESS NOTES
"Chief Complaint  Follow-up of the Left Hip and Suture / Staple Removal    Subjective      Casandra Arango presents to [facility] for 2-week follow-up of left total hip arthroplasty anterior approach with Dr. Denny on 11/1/2024.  Patient had previously been seen due to increasing pain in the groin.  Patient reports that today the hip is doing \"wonderful\", however she is having a lot of pain in the thigh and knee.  Denies injury.  She is here today for staple removal.  She is attending physical therapy with [facility].  Patient has had a lot of nerve pain and is currently taking Neurontin.She is ambulatory with use of a cane.    Objective   Allergies   Allergen Reactions    Lamotrigine Hives    Morphine Nausea And Vomiting and Headache     REPORTS SEVERE NAUSEA AND VOMITING  AND BRINGS ON A MIGRAINE    Amlodipine Itching and Rash    Celebrex [Celecoxib] Other (See Comments)     REPORTS VESSELS IN HER EYES BURST    Lisinopril Cough    Losartan Rash    Nabumetone Itching    Oxycodone Itching    Propoxyphene Rash     Darvocet  Other reaction(s): Rash to arms, torso    Terbinafine Rash       Vital Signs:   /79   Pulse 90   Ht 167.6 cm (65.98\")   Wt 99.3 kg (219 lb)   SpO2 93%   BMI 35.36 kg/m²       Physical Exam    Constitutional: Awake, alert. Well nourished appearance.    Integumentary: Warm, dry, intact. No obvious rashes.    HENT: Atraumatic, normocephalic.   Respiratory: Non labored respirations .   Cardiovascular: Intact peripheral pulses.    Psychiatric: Normal mood and affect. A&O X3    Ortho Exam  Left hip: Incision is well-approximated healing appropriately.  There is erythema and small amount of drainage noted at the incision.  No odor noted.  Nontender to palpation.  4/5 strength to hip flexors, extensors, abductors and adductor's.  No pain elicited with internal and external rotation of the hip.  Neurovascular tact.  Sensation is intact.    Imaging Results (Most " Recent)       None                      Assessment and Plan   Problem List Items Addressed This Visit    None  Visit Diagnoses       Aftercare following left hip joint replacement surgery    -  Primary    Relevant Orders    XR Hip With or Without Pelvis 2 - 3 View Left (Completed)            Follow Up   Return in about 4 weeks (around 12/24/2024).    Patient is a non-smoker, did not discuss options for smoking cessation.     Social History     Socioeconomic History    Marital status:    Tobacco Use    Smoking status: Never    Smokeless tobacco: Never    Tobacco comments:     Casualty of second hand smoke   Vaping Use    Vaping status: Never Used   Substance and Sexual Activity    Alcohol use: Yes     Comment: RARELY MAYBE ONCE A YEAR    Drug use: Never    Sexual activity: Defer       Patient Instructions   X-rays reviewed, showing intact hardware.     Staples removed in office, steri-strips applied. Keep incision clean and dry, allow steri-strips to fall off on their own in 7-10 days.  Educated on avoiding submersion in water until incision is fully healed.     Continue PT and use of cane until recommended by PT. Continue icing hip for approximately 15-20 minutes, three times daily, and as needed following PT.     Keflex sent to pharmacy.  Advised that if her thigh/knee pain does not improve by Monday she is to make an appointment for evaluation.    Follow-up in 4 weeks. Repeat imaging not needed at this visit.    Patient was given instructions and counseling regarding her condition or for health maintenance advice. Please see specific information pulled into the AVS if appropriate.

## 2024-11-26 NOTE — PATIENT INSTRUCTIONS
X-rays reviewed, showing intact hardware.     Staples removed in office, steri-strips applied. Keep incision clean and dry, allow steri-strips to fall off on their own in 7-10 days.  Educated on avoiding submersion in water until incision is fully healed.     Continue PT and use of cane until recommended by PT. Continue icing hip for approximately 15-20 minutes, three times daily, and as needed following PT.     Keflex sent to pharmacy.  Advised that if her thigh/knee pain does not improve by Monday she is to make an appointment for evaluation.    Follow-up in 4 weeks. Repeat imaging not needed at this visit.

## 2024-12-09 ENCOUNTER — TELEPHONE (OUTPATIENT)
Dept: ORTHOPEDIC SURGERY | Facility: CLINIC | Age: 69
End: 2024-12-09
Payer: MEDICARE

## 2024-12-09 NOTE — TELEPHONE ENCOUNTER
PATIENT CALLED STATING SHE IS HAVING SEVERE GROIN PAIN TO THE POINT SHE IS HAVING TO USE HER WALKER AGAIN. PATIENT IS ALSO ASKING WHEN SHE CAN START DRIVING. PATIENT ADVISED DRIVING WILL BE RE-EVALUATED AT HER 12/26/24 APPOINTMENT. FOR THE SEVERE GROINN PAIN DR PÉREZ/ LÓPEZ KAT WILL NEED TO SEE HER IN THE OFFICE TOMORROW. PATIENT TRANSFERRED TO SCHEDULING TO SCHEDULE APPOINTMENT.

## 2024-12-10 ENCOUNTER — TELEPHONE (OUTPATIENT)
Dept: INTERNAL MEDICINE | Facility: CLINIC | Age: 69
End: 2024-12-10
Payer: MEDICARE

## 2024-12-10 ENCOUNTER — OFFICE VISIT (OUTPATIENT)
Dept: ORTHOPEDIC SURGERY | Facility: CLINIC | Age: 69
End: 2024-12-10
Payer: MEDICARE

## 2024-12-10 VITALS
BODY MASS INDEX: 35.18 KG/M2 | HEART RATE: 75 BPM | OXYGEN SATURATION: 98 % | HEIGHT: 66 IN | DIASTOLIC BLOOD PRESSURE: 68 MMHG | WEIGHT: 218.92 LBS | SYSTOLIC BLOOD PRESSURE: 125 MMHG

## 2024-12-10 DIAGNOSIS — Z47.1 AFTERCARE FOLLOWING LEFT HIP JOINT REPLACEMENT SURGERY: Primary | ICD-10-CM

## 2024-12-10 DIAGNOSIS — R26.2 DIFFICULTY IN WALKING: ICD-10-CM

## 2024-12-10 DIAGNOSIS — Z47.1 AFTERCARE FOLLOWING LEFT HIP JOINT REPLACEMENT SURGERY: ICD-10-CM

## 2024-12-10 DIAGNOSIS — Z96.642 AFTERCARE FOLLOWING LEFT HIP JOINT REPLACEMENT SURGERY: Primary | ICD-10-CM

## 2024-12-10 DIAGNOSIS — M76.892 TENDINITIS OF LEFT HIP FLEXOR: ICD-10-CM

## 2024-12-10 DIAGNOSIS — Z96.642 AFTERCARE FOLLOWING LEFT HIP JOINT REPLACEMENT SURGERY: ICD-10-CM

## 2024-12-10 RX ORDER — HYDROCODONE BITARTRATE AND ACETAMINOPHEN 7.5; 325 MG/1; MG/1
1 TABLET ORAL EVERY 4 HOURS PRN
Qty: 42 TABLET | Refills: 0 | Status: SHIPPED | OUTPATIENT
Start: 2024-12-10

## 2024-12-10 RX ORDER — DOXYCYCLINE 100 MG/1
100 CAPSULE ORAL 2 TIMES DAILY
Qty: 20 CAPSULE | Refills: 0 | Status: SHIPPED | OUTPATIENT
Start: 2024-12-10

## 2024-12-10 RX ORDER — FLUCONAZOLE 150 MG/1
TABLET ORAL
Qty: 2 TABLET | Refills: 0 | Status: SHIPPED | OUTPATIENT
Start: 2024-12-10

## 2024-12-10 NOTE — PROGRESS NOTES
"Chief Complaint  Follow-up of the Left Hip    Subjective      Casandra Arango presents to [facility] for follow-up of left total hip arthroplasty anterior approach with Dr. Denny on 11/11/2024.  Patient is here today for evaluation of worsening groin pain.  She had previously reported groin pain that resolved.  She notes the pain with weightbearing and with hip flexion.  Denies any specific injury, but reports that she notices it a lot when she is rolling over in the bed and moves the leg across.  She has been working with physical [facility] with [facility].  She is ambulatory with use of a walker today.  Patient currently takes diclofenac.    Objective   Allergies   Allergen Reactions    Lamotrigine Hives    Morphine Nausea And Vomiting and Headache     REPORTS SEVERE NAUSEA AND VOMITING  AND BRINGS ON A MIGRAINE    Amlodipine Itching and Rash    Celebrex [Celecoxib] Other (See Comments)     REPORTS VESSELS IN HER EYES BURST    Lisinopril Cough    Losartan Rash    Nabumetone Itching    Oxycodone Itching    Propoxyphene Rash     Darvocet  Other reaction(s): Rash to arms, torso    Terbinafine Rash       Vital Signs:   /68   Pulse 75   Ht 167.6 cm (66\")   Wt 99.3 kg (218 lb 14.7 oz)   SpO2 98%   BMI 35.33 kg/m²       Physical Exam    Constitutional: Awake, alert. Well nourished appearance.    Integumentary: Warm, dry, intact. No obvious rashes.    HENT: Atraumatic, normocephalic.   Respiratory: Non labored respirations .   Cardiovascular: Intact peripheral pulses.    Psychiatric: Normal mood and affect. A&O X3    Ortho Exam  Left hip: Incision is well-approximated.  There are a few areas that are not completely healed, scant amount of drainage and redness.  No significant edema noted.  3/5 strength with hip flexor with pain, 4/5 strength with hip extensors, abductors and adductor's.  No pain elicited in the groin with passive internal and external rotation of the hip.  Knee " extensor mechanism is intact.  Neurovascular intact.  Sensations intact.  Dorsiflexion plantarflexion of the foot is appropriate.    Imaging Results (Most Recent)       Procedure Component Value Units Date/Time    XR Hip With or Without Pelvis 2 - 3 View Left [[id number]] Resulted: 12/11/24 0919     Updated: 12/11/24 0919    Narrative:      X-Ray Report:  Study: X-rays ordered, taken in the office, and reviewed today.   Site: Left hip xray  Indication: Left TANNER  View: AP/Lateral view(s)  Findings: Intact left total hip arthroplasty. No evidence of hardware   malfunction or periprosthetic fracture.   Prior studies available for comparison: yes                       Assessment and Plan   Problem List Items Addressed This Visit    None  Visit Diagnoses       Aftercare following left hip joint replacement surgery    -  Primary    Relevant Orders    XR Hip With or Without Pelvis 2 - 3 View Left (Completed)    CBC & Differential    C-reactive Protein    Sedimentation Rate    Ambulatory Referral to Physical Therapy for Evaluation & Treatment (Completed)    Tendinitis of left hip flexor        Relevant Orders    Ambulatory Referral to Physical Therapy for Evaluation & Treatment (Completed)            Follow Up   Return in about 2 weeks (around 12/24/2024).    Patient is a non-smoker, did not discuss options for smoking cessation.     Social History     Socioeconomic History    Marital status:    Tobacco Use    Smoking status: Never    Smokeless tobacco: Never    Tobacco comments:     Casualty of second hand smoke   Vaping Use    Vaping status: Never Used   Substance and Sexual Activity    Alcohol use: Yes     Comment: RARELY MAYBE ONCE A YEAR    Drug use: Never    Sexual activity: Defer       Patient Instructions   X-rays taken and reviewed.  Reviewed images with Dr. Denny as well.  Hardware is intact.    Discussed that patient's symptoms are consistent with hip flexor tendinitis, however would like to draw labs to  rule out infection as well.  She denies any fevers or chills.    Doxycycline sent for incision.    Order updated to continue physical therapy, specific exercises for hip flexor tendinitis.  Recommend dry needling if PT feels that appropriate.  Okay for patient to do water therapy as long as she has a waterproof dressing over incision.  Advised patient to allow the incision to be open to air some throughout the day.  Apply gauze in the area of the skin fold.    Continue anti-inflammatory.    Keep scheduled follow-up appointment.  No x-rays needed.  Call for questions, concerns or worsening symptoms.      Patient was given instructions and counseling regarding her condition or for health maintenance advice. Please see specific information pulled into the AVS if appropriate.

## 2024-12-10 NOTE — TELEPHONE ENCOUNTER
Caller: Casandra Arango    Relationship to patient: Self    Best call back number: [phone]    Chief complaint: N/A    Type of visit: NURSE VISIT FOR BLOOD WORK

## 2024-12-11 NOTE — TELEPHONE ENCOUNTER
LVM for pt that she will need to go to [facility] or the [facility] to have these labs done due to another provider not in our office ordered them. Ok for HUB to relay

## 2024-12-11 NOTE — PATIENT INSTRUCTIONS
X-rays taken and reviewed.  Reviewed images with Dr. Denny as well.  Hardware is intact.    Discussed that patient's symptoms are consistent with hip flexor tendinitis, however would like to draw labs to rule out infection as well.  She denies any fevers or chills.    Doxycycline sent for incision.    Order updated to continue physical therapy, specific exercises for hip flexor tendinitis.  Recommend dry needling if PT feels that appropriate.  Okay for patient to do water therapy as long as she has a waterproof dressing over incision.  Advised patient to allow the incision to be open to air some throughout the day.  Apply gauze in the area of the skin fold.    Continue anti-inflammatory.    Keep scheduled follow-up appointment.  No x-rays needed.  Call for questions, concerns or worsening symptoms.

## 2024-12-13 ENCOUNTER — CLINICAL SUPPORT (OUTPATIENT)
Dept: INTERNAL MEDICINE | Facility: CLINIC | Age: 69
End: 2024-12-13
Payer: MEDICARE

## 2024-12-13 DIAGNOSIS — Z96.642 AFTERCARE FOLLOWING LEFT HIP JOINT REPLACEMENT SURGERY: ICD-10-CM

## 2024-12-13 DIAGNOSIS — Z47.1 AFTERCARE FOLLOWING LEFT HIP JOINT REPLACEMENT SURGERY: ICD-10-CM

## 2024-12-13 LAB
BASOPHILS # BLD AUTO: 0.1 10*3/MM3 (ref 0–0.2)
BASOPHILS NFR BLD AUTO: 1.2 % (ref 0–1.5)
CRP SERPL-MCNC: 0.32 MG/DL (ref 0–0.5)
DEPRECATED RDW RBC AUTO: 48.8 FL (ref 37–54)
EOSINOPHIL # BLD AUTO: 0.94 10*3/MM3 (ref 0–0.4)
EOSINOPHIL NFR BLD AUTO: 11.7 % (ref 0.3–6.2)
ERYTHROCYTE [DISTWIDTH] IN BLOOD BY AUTOMATED COUNT: 13.8 % (ref 12.3–15.4)
ERYTHROCYTE [SEDIMENTATION RATE] IN BLOOD: 16 MM/HR (ref 0–30)
HCT VFR BLD AUTO: 40.3 % (ref 34–46.6)
HGB BLD-MCNC: 12 G/DL (ref 12–15.9)
IMM GRANULOCYTES # BLD AUTO: 0.03 10*3/MM3 (ref 0–0.05)
IMM GRANULOCYTES NFR BLD AUTO: 0.4 % (ref 0–0.5)
LYMPHOCYTES # BLD AUTO: 1.98 10*3/MM3 (ref 0.7–3.1)
LYMPHOCYTES NFR BLD AUTO: 24.7 % (ref 19.6–45.3)
MCH RBC QN AUTO: 28.6 PG (ref 26.6–33)
MCHC RBC AUTO-ENTMCNC: 29.8 G/DL (ref 31.5–35.7)
MCV RBC AUTO: 96 FL (ref 79–97)
MONOCYTES # BLD AUTO: 0.46 10*3/MM3 (ref 0.1–0.9)
MONOCYTES NFR BLD AUTO: 5.7 % (ref 5–12)
NEUTROPHILS NFR BLD AUTO: 4.5 10*3/MM3 (ref 1.7–7)
NEUTROPHILS NFR BLD AUTO: 56.3 % (ref 42.7–76)
NRBC BLD AUTO-RTO: 0 /100 WBC (ref 0–0.2)
PLATELET # BLD AUTO: 313 10*3/MM3 (ref 140–450)
PMV BLD AUTO: 9.1 FL (ref 6–12)
RBC # BLD AUTO: 4.2 10*6/MM3 (ref 3.77–5.28)
WBC NRBC COR # BLD AUTO: 8.01 10*3/MM3 (ref 3.4–10.8)

## 2024-12-13 PROCEDURE — 85025 COMPLETE CBC W/AUTO DIFF WBC: CPT

## 2024-12-13 PROCEDURE — 36415 COLL VENOUS BLD VENIPUNCTURE: CPT | Performed by: INTERNAL MEDICINE

## 2024-12-13 PROCEDURE — 85652 RBC SED RATE AUTOMATED: CPT

## 2024-12-13 PROCEDURE — 86140 C-REACTIVE PROTEIN: CPT

## 2024-12-26 ENCOUNTER — OFFICE VISIT (OUTPATIENT)
Dept: ORTHOPEDIC SURGERY | Facility: CLINIC | Age: 69
End: 2024-12-26
Payer: MEDICARE

## 2024-12-26 VITALS
HEART RATE: 67 BPM | DIASTOLIC BLOOD PRESSURE: 84 MMHG | SYSTOLIC BLOOD PRESSURE: 134 MMHG | BODY MASS INDEX: 35.18 KG/M2 | WEIGHT: 218.92 LBS | OXYGEN SATURATION: 97 % | HEIGHT: 66 IN

## 2024-12-26 DIAGNOSIS — T81.89XD DELAYED SURGICAL WOUND HEALING, SUBSEQUENT ENCOUNTER: ICD-10-CM

## 2024-12-26 DIAGNOSIS — Z47.1 AFTERCARE FOLLOWING LEFT HIP JOINT REPLACEMENT SURGERY: ICD-10-CM

## 2024-12-26 DIAGNOSIS — Z96.642 AFTERCARE FOLLOWING LEFT HIP JOINT REPLACEMENT SURGERY: ICD-10-CM

## 2024-12-26 DIAGNOSIS — R26.2 DIFFICULTY IN WALKING: ICD-10-CM

## 2024-12-26 DIAGNOSIS — Z96.642 S/P TOTAL LEFT HIP ARTHROPLASTY: Primary | ICD-10-CM

## 2024-12-26 RX ORDER — HYDROCODONE BITARTRATE AND ACETAMINOPHEN 5; 325 MG/1; MG/1
1 TABLET ORAL EVERY 6 HOURS PRN
Qty: 28 TABLET | Refills: 0 | Status: SHIPPED | OUTPATIENT
Start: 2024-12-26

## 2024-12-26 NOTE — PROGRESS NOTES
"Chief Complaint  Follow-up of the Left Hip    Subjective      Casandra Arango presents to [facility] for 6-week follow-up of left total hip arthroplasty anterior approach with Dr. Denny on 11/11/2024.  Patient is attending physical therapy with [facility] and doing well.  She had previously been having a lot of groin pain and had lab work to rule out infection.  Labs showed no signs of infection.  Patient had hip flexor tendinitis.  Reports that the groin pain has gotten much better.  She presents today ambulating with use of a cane.     Objective   Allergies   Allergen Reactions    Lamotrigine Hives    Morphine Nausea And Vomiting and Headache     REPORTS SEVERE NAUSEA AND VOMITING  AND BRINGS ON A MIGRAINE    Gabapentin Hallucinations    Amlodipine Itching and Rash    Celebrex [Celecoxib] Other (See Comments)     REPORTS VESSELS IN HER EYES BURST    Lisinopril Cough    Losartan Rash    Nabumetone Itching    Oxycodone Itching    Propoxyphene Rash     Darvocet  Other reaction(s): Rash to arms, torso    Terbinafine Rash       Vital Signs:   /84   Pulse 67   Ht 167.6 cm (66\")   Wt 99.3 kg (218 lb 14.7 oz)   SpO2 97%   BMI 35.33 kg/m²       Physical Exam    Constitutional: Awake, alert. Well nourished appearance.    Integumentary: Warm, dry, intact. No obvious rashes.    HENT: Atraumatic, normocephalic.   Respiratory: Non labored respirations .   Cardiovascular: Intact peripheral pulses.    Psychiatric: Normal mood and affect. A&O X3    Ortho Exam  Left hip: Incision is mostly well-approximated with the exception of a 1 inch portion of the proximal portion of the incision in the skin fold that is not completely healed.  No redness, drainage or tenderness.  4/5 strength to hip flexors, extensors, abductors and adductor's.  No pain elicited in the groin with passive internal and external rotation of the hip.  Hip flexor pain noted with flexion and extension.  Neurovascular intact.  "     Imaging Results (Most Recent)       None                      Assessment and Plan   Problem List Items Addressed This Visit    None  Visit Diagnoses       S/P total left hip arthroplasty    -  Primary    Relevant Orders    Ambulatory Referral to Physical Therapy for Evaluation & Treatment (Completed)    Delayed surgical wound healing, subsequent encounter        Relevant Orders    Ambulatory Referral to Wound Clinic            Follow Up   Return in about 6 weeks (around 2/6/2025).    Patient is a non-smoker, did not discuss options for smoking cessation.     Social History     Socioeconomic History    Marital status:    Tobacco Use    Smoking status: Never    Smokeless tobacco: Never    Tobacco comments:     Casualty of second hand smoke   Vaping Use    Vaping status: Never Used   Substance and Sexual Activity    Alcohol use: Yes     Comment: RARELY MAYBE ONCE A YEAR    Drug use: Never    Sexual activity: Defer       Patient Instructions   Patient is doing very well. Advised to continue PT to completion to progress strength and ROM. Continue home exercises.  Order updated to continue physical therapy.  Continue cleaning incision daily with soap and water.  Do not submerge in water.  Avoid any cream, lotion or ointment on the incision.    Continue icing hip as needed up to 4 times daily for no longer than 15-20 mins at a time.   Wound care referral placed for delayed wound healing.     Follow-up in 6 weeks. Repeat x-rays needed. Call with changes or concerns.    Patient was given instructions and counseling regarding her condition or for health maintenance advice. Please see specific information pulled into the AVS if appropriate.

## 2024-12-26 NOTE — PATIENT INSTRUCTIONS
Patient is doing very well. Advised to continue PT to completion to progress strength and ROM. Continue home exercises.  Order updated to continue physical therapy.  Continue cleaning incision daily with soap and water.  Do not submerge in water.  Avoid any cream, lotion or ointment on the incision.    Continue icing hip as needed up to 4 times daily for no longer than 15-20 mins at a time.   Wound care referral placed for delayed wound healing.     Follow-up in 6 weeks. Repeat x-rays needed. Call with changes or concerns.

## 2025-01-14 NOTE — TELEPHONE ENCOUNTER
Caller: Casandra Arangoe    Relationship: Self    Best call back number:     [phone]       Requested Prescriptions:   Requested Prescriptions     Pending Prescriptions Disp Refills    pravastatin (Pravachol) 20 MG tablet 90 tablet 1     Sig: Take 1 tablet by mouth Daily.    desvenlafaxine (PRISTIQ) 50 MG 24 hr tablet 90 tablet 1     Sig: Take one tab po daily    diclofenac (VOLTAREN) 75 MG EC tablet       Sig: Take 1 tablet by mouth 2 (Two) Times a Day.    spironolactone (Aldactone) 25 MG tablet 90 tablet 1     Sig: Take 1 tablet by mouth Daily.    cetirizine (zyrTEC) 10 MG tablet 90 tablet 1     Sig: Take 1 tablet by mouth Daily.        Pharmacy where request should be sent: [facility] [phone] [place], [place] [id number]      Last office visit with prescribing clinician: 11/18/2024   Last telemedicine visit with prescribing clinician: Visit date not found   Next office visit with prescribing clinician: 1/17/2025     Additional details provided by patient:     Does the patient have less than a 3 day supply:  [x] Yes  [] No    Would you like a call back once the refill request has been completed: [] Yes [] No    If the office needs to give you a call back, can they leave a voicemail: [] Yes [] No    Satya Coronado Rep   01/14/25 11:12 EST

## 2025-01-15 RX ORDER — DICLOFENAC SODIUM 75 MG/1
75 TABLET, DELAYED RELEASE ORAL 2 TIMES DAILY
Qty: 90 TABLET | Refills: 1 | Status: SHIPPED | OUTPATIENT
Start: 2025-01-15

## 2025-01-15 RX ORDER — DESVENLAFAXINE 50 MG/1
TABLET, FILM COATED, EXTENDED RELEASE ORAL
Qty: 90 TABLET | Refills: 1 | Status: SHIPPED | OUTPATIENT
Start: 2025-01-15

## 2025-01-15 RX ORDER — CETIRIZINE HYDROCHLORIDE 10 MG/1
10 TABLET ORAL DAILY
Qty: 90 TABLET | Refills: 1 | Status: SHIPPED | OUTPATIENT
Start: 2025-01-15

## 2025-01-15 RX ORDER — PRAVASTATIN SODIUM 20 MG
20 TABLET ORAL DAILY
Qty: 90 TABLET | Refills: 1 | Status: SHIPPED | OUTPATIENT
Start: 2025-01-15

## 2025-01-15 RX ORDER — SPIRONOLACTONE 25 MG/1
25 TABLET ORAL DAILY
Qty: 90 TABLET | Refills: 1 | Status: SHIPPED | OUTPATIENT
Start: 2025-01-15

## 2025-01-17 ENCOUNTER — OFFICE VISIT (OUTPATIENT)
Dept: INTERNAL MEDICINE | Facility: CLINIC | Age: 70
End: 2025-01-17
Payer: MEDICARE

## 2025-01-17 VITALS
OXYGEN SATURATION: 98 % | WEIGHT: 221 LBS | HEART RATE: 86 BPM | DIASTOLIC BLOOD PRESSURE: 73 MMHG | RESPIRATION RATE: 19 BRPM | SYSTOLIC BLOOD PRESSURE: 132 MMHG | HEIGHT: 66 IN | BODY MASS INDEX: 35.52 KG/M2 | TEMPERATURE: 98.2 F

## 2025-01-17 DIAGNOSIS — E78.2 MIXED HYPERLIPIDEMIA: ICD-10-CM

## 2025-01-17 DIAGNOSIS — R73.01 IMPAIRED FASTING GLUCOSE: ICD-10-CM

## 2025-01-17 DIAGNOSIS — F33.2 MAJOR DEPRESSIVE DISORDER, RECURRENT, SEVERE WITHOUT PSYCHOTIC FEATURES: Primary | ICD-10-CM

## 2025-01-17 DIAGNOSIS — I10 ESSENTIAL HYPERTENSION: ICD-10-CM

## 2025-01-17 DIAGNOSIS — M16.12 PRIMARY OSTEOARTHRITIS OF LEFT HIP: ICD-10-CM

## 2025-01-17 DIAGNOSIS — S31.109D WOUND OF RIGHT GROIN, SUBSEQUENT ENCOUNTER: ICD-10-CM

## 2025-01-17 PROCEDURE — 1160F RVW MEDS BY RX/DR IN RCRD: CPT | Performed by: INTERNAL MEDICINE

## 2025-01-17 PROCEDURE — G2211 COMPLEX E/M VISIT ADD ON: HCPCS | Performed by: INTERNAL MEDICINE

## 2025-01-17 PROCEDURE — 1126F AMNT PAIN NOTED NONE PRSNT: CPT | Performed by: INTERNAL MEDICINE

## 2025-01-17 PROCEDURE — 99214 OFFICE O/P EST MOD 30 MIN: CPT | Performed by: INTERNAL MEDICINE

## 2025-01-17 PROCEDURE — 1159F MED LIST DOCD IN RCRD: CPT | Performed by: INTERNAL MEDICINE

## 2025-01-17 PROCEDURE — 3078F DIAST BP <80 MM HG: CPT | Performed by: INTERNAL MEDICINE

## 2025-01-17 PROCEDURE — 3075F SYST BP GE 130 - 139MM HG: CPT | Performed by: INTERNAL MEDICINE

## 2025-01-17 RX ORDER — SILVER/SILICONE/FOAM BANDAGE 2" X 2"
1 BANDAGE TOPICAL DAILY
Qty: 10 EACH | Refills: 1 | Status: SHIPPED | OUTPATIENT
Start: 2025-01-17

## 2025-01-17 RX ORDER — TOPIRAMATE 25 MG/1
25 TABLET, FILM COATED ORAL 2 TIMES DAILY
Qty: 60 TABLET | Refills: 1 | Status: SHIPPED | OUTPATIENT
Start: 2025-01-17

## 2025-01-17 NOTE — PROGRESS NOTES
Chief Complaint  Follow-up (3 Month Follow-Up)      Subjective      History of Present Illness  The patient presents for evaluation of left hip pain, back pain, weight management, cold symptoms, and wound care.    She underwent a total left hip replacement, complicated by severe burning sensations in her legs. Gabapentin 300 mg daily was discontinued due to hallucinations. Burning and numbness have subsided. She participates in physical therapy, missed a session last week due to weather, and developed tendinitis in her left inner groin, causing temporary cessation. Resumed therapy, including water-based exercises, but experienced motion sickness. Two normal x-rays were performed. Follow-up with Dr. Denny in February 2024.    She has a small abdominal wound with an upcoming appointment with a wound care specialist on 01/28/2024. Advised to keep the wound exposed to air and use waterproof dressings during water therapy. Reports wound feels moist without drainage.    History of back issues treated with physical therapy, back braces, and spinal injections. Severe hip joint damage noted when off arthritis medication, leading to difficulty walking. Suffers from sciatic pain, varying in location. Consulted Dr. Solorio 7-8 years ago, advised against intervention at L4, L5, S1, and S2, recommended pain management. Recent SI joint injection in September or October 2023 provided 2 days of relief. Severe sciatic pain limits household chores and lifting. Appointment with Dr. Solorio on Monday at 3:00 PM. Offered an injection by pain management team. Previous laminectomy and MRI 2 years ago. Uses back brace for prolonged standing.    Has not taken topiramate and is reluctant to try injectable weight loss medications. Previously tried Contrave but found the dosage too high.     Recently had a severe cold with runny eyes, ear congestion, hearing loss, and persistent cough. Continues to experience postnasal drainage. Switched from  "NyQuil and DayQuil to Mucinex, which provided the most relief. Ears remain congested. Taking Zyrtec and Flonase.    Stress and anxiety levels have improved. Reports blood pressure was 160 in the office, but personal device recorded 137.             Objective   Vital Signs:   Vitals:    01/17/25 1344   BP: 132/73   Pulse: 86   Resp: 19   Temp: 98.2 °F (36.8 °C)   TempSrc: Temporal   SpO2: 98%   Weight: 100 kg (221 lb)   Height: 167.6 cm (66\")     Body mass index is 35.67 kg/m².    Wt Readings from Last 3 Encounters:   01/17/25 100 kg (221 lb)   12/26/24 99.3 kg (218 lb 14.7 oz)   12/10/24 99.3 kg (218 lb 14.7 oz)     BP Readings from Last 3 Encounters:   01/17/25 132/73   12/26/24 134/84   12/10/24 125/68       Health Maintenance   Topic Date Due    ANNUAL WELLNESS VISIT  03/08/2025    LIPID PANEL  06/26/2025    MAMMOGRAM  12/13/2025    BMI FOLLOWUP  12/26/2025    DXA SCAN  10/25/2026    COLORECTAL CANCER SCREENING  04/23/2029    TDAP/TD VACCINES (2 - Tdap) 10/13/2031    HEPATITIS C SCREENING  Completed    COVID-19 Vaccine  Completed    INFLUENZA VACCINE  Completed    Pneumococcal Vaccine 65+  Completed    ZOSTER VACCINE  Completed       Physical Exam  Vitals reviewed.   Constitutional:       Appearance: Normal appearance. She is well-developed. She is obese.   HENT:      Head: Normocephalic and atraumatic.      Right Ear: External ear normal.      Left Ear: External ear normal.   Eyes:      Conjunctiva/sclera: Conjunctivae normal.      Pupils: Pupils are equal, round, and reactive to light.   Cardiovascular:      Rate and Rhythm: Normal rate and regular rhythm.      Heart sounds: No murmur heard.     No friction rub. No gallop.   Pulmonary:      Effort: Pulmonary effort is normal.      Breath sounds: Normal breath sounds. No wheezing or rhonchi.   Skin:     General: Skin is warm and dry.      Comments: Top of left surgical scar still open approximately 4 mm clean and not oozing   Neurological:      Mental Status: " She is alert and oriented to person, place, and time.   Psychiatric:         Mood and Affect: Affect normal.         Behavior: Behavior normal.         Thought Content: Thought content normal.        Physical Exam        Result Review :  The following data was reviewed by: Teresa Herman MD on 01/17/2025:         Results  Reviewed recent hip x-rays            Procedures            Assessment & Plan  Major depressive disorder, recurrent, severe without psychotic features           Mixed hyperlipidemia            Essential hypertension           Primary osteoarthritis of left hip         Impaired fasting glucose         Wound of right groin, subsequent encounter              Assessment & Plan  Left hip pain  - Total hip replacement on the left side with complications of burning sensations in legs  - Gabapentin discontinued due to hallucinations  - Burning and numbness resolving  - Continuing physical and water therapy  - Follow-up with Dr. Denny in February for final x-ray    Back pain  - History of back and sciatic pain, varying sides  - Previous injections and ablations, last SI joint injection lasted 2 days  - Appointment with Dr. Solorio on Monday for further evaluation  - Medrol Dosepak discussed for inflammation, decision pending consultation  - MRI considered if symptoms worsen    Weight management  - Add Topamax 25 mg once or twice daily to Wellbutrin for appetite suppression  - Discussed potential side effects  - Monitor for worsening leg symptoms  - Consider Contrave if Topamax ineffective    Cold symptoms  - Severe cold with runny eyes, ear congestion, and cough  - Switched to Mucinex, most relief  - Residual ear stuffiness and postnasal drainage  - No sinus infection  - Continue over-the-counter medications, contact office if symptoms worsen    Wound care  - Small abdominal wound  - Provide wound patch to keep area dry and protected  - Clean with soap and water, pat dry, apply dressing after  "showering  - Dressing can stay on for up to 3 days unless saturated  - Appointment with wound care specialist on January 28    Stress and anxiety  - Maintain current medication regimen    Patient or patient representative verbalized consent for the use of Ambient Listening during the visit with  Teresa Herman MD for chart documentation. 1/27/2025  14:40 EST      FOLLOW UP  Return in about 3 months (around 4/17/2025).  Patient was given instructions and counseling regarding her condition or for health maintenance advice. Please see specific information pulled into the AVS if appropriate.     Teresa Herman MD  01/27/25  15:06 EST    CURRENT & DISCONTINUED MEDICATIONS  Current Outpatient Medications   Medication Instructions    acetaminophen (TYLENOL) 650 mg, Oral, Every 6 Hours PRN    buPROPion XL (WELLBUTRIN XL) 150 mg, Oral, Daily    cetirizine (ZYRTEC) 10 mg, Oral, Daily    cyclobenzaprine (FLEXERIL) 5 mg, Oral, 3 Times Daily PRN    desvenlafaxine (PRISTIQ) 50 MG 24 hr tablet Take one tab po daily    diclofenac (VOLTAREN) 75 mg, Oral, 2 Times Daily    Diclofenac Sodium (VOLTAREN) 1 % gel gel Topical, 2 Times Daily    fluticasone (FLONASE) 50 MCG/ACT nasal spray 2 sprays, Nasal, Daily    HYDROcodone-acetaminophen (NORCO) 5-325 MG per tablet 1 tablet, Oral, Every 6 Hours PRN    Melatonin 5 mg, Oral, Every Night at Bedtime    multivitamin with minerals tablet tablet 2 tablets, Daily    omeprazole (PRILOSEC) 40 mg, Oral, Daily    ondansetron ODT (ZOFRAN ODT) 4 mg, Translingual, Every 8 Hours PRN    oxybutynin XL (DITROPAN-XL) 5 mg, Oral, Daily    pravastatin (PRAVACHOL) 20 mg, Oral, Daily    rizatriptan (MAXALT) 10 mg, Oral, Once As Needed, May repeat in 2 hours if needed    SiliGentle AG Silver Foam Dres 2\"X2\" pads 1 patch, Apply externally, Daily    spironolactone (ALDACTONE) 25 mg, Oral, Daily    topiramate (TOPAMAX) 25 mg, Oral, 2 Times Daily    valACYclovir (VALTREX) 500 mg, Oral, Daily       Medications " Discontinued During This Encounter   Medication Reason    cephalexin (KEFLEX) 500 MG capsule *Therapy completed    doxycycline (VIBRAMYCIN) 100 MG capsule *Therapy completed    fluconazole (Diflucan) 150 MG tablet *Therapy completed    gabapentin (NEURONTIN) 300 MG capsule *Therapy completed    ondansetron (Zofran) 4 MG tablet *Re-Entry    docusate sodium (COLACE) 50 MG capsule     naloxone (NARCAN) 4 MG/0.1ML nasal spray

## 2025-01-28 ENCOUNTER — OFFICE VISIT (OUTPATIENT)
Dept: WOUND CARE | Facility: HOSPITAL | Age: 70
End: 2025-01-28
Payer: MEDICARE

## 2025-01-28 VITALS
SYSTOLIC BLOOD PRESSURE: 146 MMHG | DIASTOLIC BLOOD PRESSURE: 91 MMHG | TEMPERATURE: 97.4 F | HEART RATE: 64 BPM | RESPIRATION RATE: 18 BRPM

## 2025-01-28 DIAGNOSIS — S71.002A OPEN WOUND OF LEFT HIP, INITIAL ENCOUNTER: Primary | ICD-10-CM

## 2025-01-28 PROCEDURE — 1159F MED LIST DOCD IN RCRD: CPT | Performed by: PHYSICIAN ASSISTANT

## 2025-01-28 PROCEDURE — 1160F RVW MEDS BY RX/DR IN RCRD: CPT | Performed by: PHYSICIAN ASSISTANT

## 2025-01-28 PROCEDURE — 3077F SYST BP >= 140 MM HG: CPT | Performed by: PHYSICIAN ASSISTANT

## 2025-01-28 PROCEDURE — G0463 HOSPITAL OUTPT CLINIC VISIT: HCPCS | Performed by: PHYSICIAN ASSISTANT

## 2025-01-28 PROCEDURE — 99204 OFFICE O/P NEW MOD 45 MIN: CPT | Performed by: PHYSICIAN ASSISTANT

## 2025-01-28 PROCEDURE — 3080F DIAST BP >= 90 MM HG: CPT | Performed by: PHYSICIAN ASSISTANT
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
## 2025-02-05 NOTE — PATIENT INSTRUCTIONS
X-rays taken and reviewed with patient today in office. Patient is doing well.    Encouraged to continue home exercises for ROM and strengthening.   May continue icing as needed for no more than 20 minutes at a time for comfort and inflammation.   Encouraged to continue avoiding outward pivoting and avoid deep squatting.     Incision well healed. May apply Vitamin E, cocoa butter, etc. over incision to aid in scar appearance. Educated that numbness over the incision can still remain at this point, and should continue to improve for up to 1 year postop. However, at the year chai if still experiencing numbness it may not return.    Discussed the need for antibiotic prophylaxis with dental procedures following total joint replacement for life. Patient instructed to contact office if dental office is reluctant to prescribe antibiotics prior to procedure and we will prescribe them.       Follow-up in 9 months. We will repeat xrays of the left hip at that visit.     Call with questions or concerns.

## 2025-02-05 NOTE — PROGRESS NOTES
Chief Complaint  Follow-up of the Left Hip    Subjective          History of Present Illness     Casandra Arango is a 69 y.o. female presents today for a follow-up of left hip.  Patient is 12 weeks postop left total hip arthroplasty performed by Dr. Denny on 11/11/2024.  During the postoperative course patient had a lot of groin pain and had labs to rule out infection.  This groin pain was from hip flexor tendinitis and at last visit that had gotten much better.  She was going to physical therapy at [facility] and ambulating with a cane last time.  She has been following with wound care secondary to delayed healing as the area stays moist and abdominal fold.    Patient presents today without use of assistive devices. She states it has been a long road with the hip, but it has come along and the hip is now doing well without complaint. Still seeing wound care, using Aquacel but it is nearly healed. Has upcoming appointment with them and hopeful discharge soon.  She has upcoming dental appointment for a chipped tooth and states that she will actually have to have 2 dental appointments for this and requires to prescriptions of amoxicillin.        Allergies   Allergen Reactions    Lamotrigine Hives    Morphine Nausea And Vomiting and Headache     REPORTS SEVERE NAUSEA AND VOMITING  AND BRINGS ON A MIGRAINE    Gabapentin Hallucinations    Amlodipine Itching and Rash    Celebrex [Celecoxib] Other (See Comments)     REPORTS VESSELS IN HER EYES BURST    Lisinopril Cough    Losartan Rash    Nabumetone Itching    Oxycodone Itching    Propoxyphene Rash     Darvocet  Other reaction(s): Rash to arms, torso    Terbinafine Rash        Social History     Socioeconomic History    Marital status:    Tobacco Use    Smoking status: Never    Smokeless tobacco: Never    Tobacco comments:     Casualty of second hand smoke   Vaping Use    Vaping status: Never Used   Substance and Sexual Activity    Alcohol use: Yes     Comment:  "RARELY MAYBE ONCE A YEAR    Drug use: Never    Sexual activity: Not Currently     Partners: Male     Birth control/protection: None, Hysterectomy        I reviewed the patient's chief complaint, history of present illness, review of systems, past medical history, surgical history, family history, social history, medications, and allergy list.     REVIEW OF SYSTEMS    Constitutional: Denies fevers, chills, weight loss  Cardiovascular: Denies chest pain, shortness of breath  Skin: Denies rashes, acute skin changes  Neurologic: Denies headache, loss of consciousness  MSK: Left hip pain      Objective   Vital Signs:   /63   Pulse 81   Ht 167.6 cm (66\")   Wt 102 kg (225 lb)   SpO2 97%   BMI 36.32 kg/m²     Body mass index is 36.32 kg/m².    Physical Exam    Tobacco Use: Low Risk  (2/6/2025)    Patient History     Smoking Tobacco Use: Never     Smokeless Tobacco Use: Never     Passive Exposure: Not on file     Patient reports that they are a nonsmoker; cessation education not applicable.     General: Alert, no acute distress  Gait: Nonantalgic  Left lower extremity: Healed surgical incision with exception of small wound  which measures approximately 1-2 mm and is superficial with very little depth.  no concerning signs of infection.  Trace 9 swelling. No pain with passive hip ROM.  Knee extensor mechanism intact. Intact hip flexion 100% in comparison to contralateral side with 5/5 strength. Hip abduction/adduction intact with 5/5 strength. Leg lengths appear symmetric.  Calf soft, nontender.  Demonstrates active ankle plantarflexion dorsiflexion.  Sensation intact over the dorsal and plantar foot.  Palpable pedal pulses.        Imaging Results (Most Recent)       Procedure Component Value Units Date/Time    XR Hip With or Without Pelvis 2 - 3 View Left [[id number]] Resulted: 02/06/25 1450     Updated: 02/06/25 1450    Narrative:      X-Ray Report:  Study: X-rays ordered, taken in the office, and reviewed today. "   Site: Left Hip Xray  Indication: Left total hip arthroplasty follow-up  View: AP, frog lateral left hip  Findings: Intact left total hip arthroplasty.  No evidence of hardware   complications or loosening.  No periprosthetic fractures are visualized.    Hip joint is reduced.  Prior studies available for comparison: yes                         Assessment and Plan    Diagnoses and all orders for this visit:    1. S/P total left hip arthroplasty (Primary)  -     XR Hip With or Without Pelvis 2 - 3 View Left    2. Left hip pain    Other orders  -     amoxicillin (AMOXIL) 500 MG capsule; Take 2 capsules by mouth 1 time for 1 dose. Take 4 capsules 1 hour prior to procedure  Dispense: 4 capsule; Refill: 1        Of note, she is planning operative intervention for her low back.  L5 L4, S1 and S2.  She states is going to be a big operation.  As long as she is recovering well from that she will plan to be back in 9 months for x-rays of the left hip.  She would also like to consider x-rays of the bilateral knees at that time if possible.    Advised patient that if she needs to push out her annual checkup for the hip, that is okay as long as the hip is doing well.    Follow Up   Return in about 9 months (around 11/6/2025).  Patient Instructions     X-rays taken and reviewed with patient today in office. Patient is doing well.    Encouraged to continue home exercises for ROM and strengthening.   May continue icing as needed for no more than 20 minutes at a time for comfort and inflammation.   Encouraged to continue avoiding outward pivoting and avoid deep squatting.     Incision well healed. May apply Vitamin E, cocoa butter, etc. over incision to aid in scar appearance. Educated that numbness over the incision can still remain at this point, and should continue to improve for up to 1 year postop. However, at the year chai if still experiencing numbness it may not return.    Discussed the need for antibiotic prophylaxis with  dental procedures following total joint replacement for life. Patient instructed to contact office if dental office is reluctant to prescribe antibiotics prior to procedure and we will prescribe them.       Follow-up in 9 months. We will repeat xrays of the left hip at that visit.     Call with questions or concerns.    Patient was given instructions and counseling regarding her condition or for health maintenance advice. Please see specific information pulled into the AVS if appropriate.       Nancy Estrada PA-C  02/06/25  17:09 EST

## 2025-02-06 ENCOUNTER — OFFICE VISIT (OUTPATIENT)
Dept: ORTHOPEDIC SURGERY | Facility: CLINIC | Age: 70
End: 2025-02-06
Payer: MEDICARE

## 2025-02-06 VITALS
BODY MASS INDEX: 36.16 KG/M2 | OXYGEN SATURATION: 97 % | SYSTOLIC BLOOD PRESSURE: 118 MMHG | HEIGHT: 66 IN | DIASTOLIC BLOOD PRESSURE: 63 MMHG | HEART RATE: 81 BPM | WEIGHT: 225 LBS

## 2025-02-06 DIAGNOSIS — M25.552 LEFT HIP PAIN: ICD-10-CM

## 2025-02-06 DIAGNOSIS — Z96.642 S/P TOTAL LEFT HIP ARTHROPLASTY: Primary | ICD-10-CM

## 2025-02-06 RX ORDER — AMOXICILLIN 500 MG/1
1000 CAPSULE ORAL ONCE
Qty: 4 CAPSULE | Refills: 1 | Status: SHIPPED | OUTPATIENT
Start: 2025-02-06 | End: 2025-02-06

## 2025-02-06 RX ORDER — GABAPENTIN 300 MG/1
1 CAPSULE ORAL NIGHTLY
COMMUNITY
End: 2025-02-06

## 2025-02-06 RX ORDER — ONDANSETRON 4 MG/1
TABLET, FILM COATED ORAL
COMMUNITY

## 2025-02-06 RX ORDER — HYDROCODONE BITARTRATE AND ACETAMINOPHEN 7.5; 325 MG/1; MG/1
1 TABLET ORAL EVERY 4 HOURS PRN
COMMUNITY

## 2025-02-25 ENCOUNTER — OFFICE VISIT (OUTPATIENT)
Dept: WOUND CARE | Facility: HOSPITAL | Age: 70
End: 2025-02-25
Payer: MEDICARE

## 2025-02-25 VITALS
DIASTOLIC BLOOD PRESSURE: 83 MMHG | TEMPERATURE: 98.3 F | HEART RATE: 77 BPM | RESPIRATION RATE: 18 BRPM | SYSTOLIC BLOOD PRESSURE: 129 MMHG

## 2025-02-25 DIAGNOSIS — S71.002D OPEN WOUND OF LEFT HIP, SUBSEQUENT ENCOUNTER: Primary | ICD-10-CM

## 2025-02-25 PROCEDURE — 1159F MED LIST DOCD IN RCRD: CPT | Performed by: PHYSICIAN ASSISTANT

## 2025-02-25 PROCEDURE — 3074F SYST BP LT 130 MM HG: CPT | Performed by: PHYSICIAN ASSISTANT

## 2025-02-25 PROCEDURE — G0463 HOSPITAL OUTPT CLINIC VISIT: HCPCS | Performed by: PHYSICIAN ASSISTANT

## 2025-02-25 PROCEDURE — 99213 OFFICE O/P EST LOW 20 MIN: CPT | Performed by: PHYSICIAN ASSISTANT

## 2025-02-25 PROCEDURE — 1160F RVW MEDS BY RX/DR IN RCRD: CPT | Performed by: PHYSICIAN ASSISTANT

## 2025-02-25 PROCEDURE — 3079F DIAST BP 80-89 MM HG: CPT | Performed by: PHYSICIAN ASSISTANT
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
## 2025-03-24 ENCOUNTER — TRANSCRIBE ORDERS (OUTPATIENT)
Dept: ADMINISTRATIVE | Facility: HOSPITAL | Age: 70
End: 2025-03-24
Payer: MEDICARE

## 2025-03-24 DIAGNOSIS — D64.9 ANEMIA, UNSPECIFIED TYPE: Primary | ICD-10-CM

## 2025-03-24 DIAGNOSIS — R42 DIZZINESS: ICD-10-CM

## 2025-03-26 ENCOUNTER — HOSPITAL ENCOUNTER (OUTPATIENT)
Dept: INFUSION THERAPY | Facility: HOSPITAL | Age: 70
Discharge: HOME | End: 2025-03-26
Admitting: PHYSICAL MEDICINE & REHABILITATION
Payer: COMMERCIAL

## 2025-03-26 VITALS
RESPIRATION RATE: 16 BRPM | SYSTOLIC BLOOD PRESSURE: 126 MMHG | DIASTOLIC BLOOD PRESSURE: 51 MMHG | TEMPERATURE: 98.6 F | OXYGEN SATURATION: 99 % | HEART RATE: 78 BPM

## 2025-03-26 LAB
ABO GROUP BLD: NORMAL
ABO GROUP BLD: NORMAL
BASOPHILS # BLD AUTO: 0.08 10*3/MM3 (ref 0–0.2)
BASOPHILS NFR BLD AUTO: 0.6 % (ref 0–1.5)
BLD GP AB SCN SERPL QL: NEGATIVE
DEPRECATED RDW RBC AUTO: 47.7 FL (ref 37–54)
EOSINOPHIL # BLD AUTO: 0.7 10*3/MM3 (ref 0–0.4)
EOSINOPHIL NFR BLD AUTO: 4.9 % (ref 0.3–6.2)
ERYTHROCYTE [DISTWIDTH] IN BLOOD BY AUTOMATED COUNT: 14.8 % (ref 12.3–15.4)
HCT VFR BLD AUTO: 24.4 % (ref 34–46.6)
HGB BLD-MCNC: 7.3 G/DL (ref 12–15.9)
IMM GRANULOCYTES # BLD AUTO: 0.45 10*3/MM3 (ref 0–0.05)
IMM GRANULOCYTES NFR BLD AUTO: 3.2 % (ref 0–0.5)
LYMPHOCYTES # BLD AUTO: 2.35 10*3/MM3 (ref 0.7–3.1)
LYMPHOCYTES NFR BLD AUTO: 16.5 % (ref 19.6–45.3)
MCH RBC QN AUTO: 26.2 PG (ref 26.6–33)
MCHC RBC AUTO-ENTMCNC: 29.9 G/DL (ref 31.5–35.7)
MCV RBC AUTO: 87.5 FL (ref 79–97)
MONOCYTES # BLD AUTO: 1.1 10*3/MM3 (ref 0.1–0.9)
MONOCYTES NFR BLD AUTO: 7.7 % (ref 5–12)
NEUTROPHILS NFR BLD AUTO: 67.1 % (ref 42.7–76)
NEUTROPHILS NFR BLD AUTO: 9.54 10*3/MM3 (ref 1.7–7)
NRBC BLD AUTO-RTO: 0.1 /100 WBC (ref 0–0.2)
PLATELET # BLD AUTO: 464 10*3/MM3 (ref 140–450)
PMV BLD AUTO: 9.1 FL (ref 6–12)
RBC # BLD AUTO: 2.79 10*6/MM3 (ref 3.77–5.28)
RH BLD: POSITIVE
RH BLD: POSITIVE
T&S EXPIRATION DATE: NORMAL
WBC NRBC COR # BLD AUTO: 14.22 10*3/MM3 (ref 3.4–10.8)

## 2025-03-26 PROCEDURE — 86850 RBC ANTIBODY SCREEN: CPT | Performed by: PHYSICAL MEDICINE & REHABILITATION

## 2025-03-26 PROCEDURE — 86900 BLOOD TYPING SEROLOGIC ABO: CPT | Performed by: PHYSICAL MEDICINE & REHABILITATION

## 2025-03-26 PROCEDURE — 86901 BLOOD TYPING SEROLOGIC RH(D): CPT | Performed by: PHYSICAL MEDICINE & REHABILITATION

## 2025-03-26 PROCEDURE — 85025 COMPLETE CBC W/AUTO DIFF WBC: CPT | Performed by: PHYSICAL MEDICINE & REHABILITATION

## 2025-03-26 NOTE — NURSING NOTE
Patient received on unit of RBCs.  Tolerated well.  Discharging back to [facility] to complete rehab.

## 2025-03-27 LAB
BH BB BLOOD EXPIRATION DATE: NORMAL
BH BB BLOOD TYPE BARCODE: 5100
BH BB DISPENSE STATUS: NORMAL
BH BB PRODUCT CODE: NORMAL
BH BB UNIT NUMBER: NORMAL
CROSSMATCH INTERPRETATION: NORMAL
UNIT  ABO: NORMAL
UNIT  RH: NORMAL

## 2025-03-31 ENCOUNTER — TELEPHONE (OUTPATIENT)
Dept: INTERNAL MEDICINE | Facility: CLINIC | Age: 70
End: 2025-03-31
Payer: MEDICARE

## 2025-03-31 NOTE — TELEPHONE ENCOUNTER
Caller: BETY COOPER [facility]    Relationship: Home Health    Best call back number: [phone]     What orders are you requesting (i.e. lab or imaging): HOME HEALTH CARE- NURSING WITH PHYSICAL AND OCCUPATIONAL THERAPY TO EVALUATE       Additional notes: NURSING ORDERS: MEDICAL MANAGEMENT WITH MULTIPLE DIAGNOSIS, NEW MEDICATION EDUCATION  ONCE A WEEK FOR 6 WEEKS UNTIL CLEAR TO BE ABLE TO DRIVE.

## 2025-04-01 ENCOUNTER — TELEPHONE (OUTPATIENT)
Dept: INTERNAL MEDICINE | Facility: CLINIC | Age: 70
End: 2025-04-01
Payer: MEDICARE

## 2025-04-01 NOTE — TELEPHONE ENCOUNTER
Hallie with [facility] is calling regarding patient. She is calling reporting the PT frequency. Will see 2 x a time for 2 weeks and then 1 x a week for 4 weeks. Hallie just wanted to make you aware.

## 2025-04-02 ENCOUNTER — HOSPITAL ENCOUNTER (EMERGENCY)
Facility: HOSPITAL | Age: 70
Discharge: HOME | End: 2025-04-02
Attending: EMERGENCY MEDICINE | Admitting: EMERGENCY MEDICINE
Payer: MEDICARE

## 2025-04-02 ENCOUNTER — APPOINTMENT (OUTPATIENT)
Facility: HOSPITAL | Age: 70
End: 2025-04-02
Payer: MEDICARE

## 2025-04-02 VITALS
RESPIRATION RATE: 18 BRPM | HEIGHT: 66 IN | SYSTOLIC BLOOD PRESSURE: 128 MMHG | WEIGHT: 222 LBS | BODY MASS INDEX: 35.68 KG/M2 | DIASTOLIC BLOOD PRESSURE: 62 MMHG | OXYGEN SATURATION: 100 % | TEMPERATURE: 98.5 F | HEART RATE: 88 BPM

## 2025-04-02 DIAGNOSIS — M79.604 RIGHT LEG PAIN: Primary | ICD-10-CM

## 2025-04-02 LAB
BH CV LOWER VASCULAR RIGHT COMMON FEMORAL AUGMENT: NORMAL
BH CV LOWER VASCULAR RIGHT COMMON FEMORAL COMPETENT: NORMAL
BH CV LOWER VASCULAR RIGHT COMMON FEMORAL COMPRESS: NORMAL
BH CV LOWER VASCULAR RIGHT COMMON FEMORAL PHASIC: NORMAL
BH CV LOWER VASCULAR RIGHT COMMON FEMORAL SPONT: NORMAL
BH CV LOWER VASCULAR RIGHT DISTAL FEMORAL COMPRESS: NORMAL
BH CV LOWER VASCULAR RIGHT GASTRONEMIUS COMPRESS: NORMAL
BH CV LOWER VASCULAR RIGHT GREATER SAPH AK COMPRESS: NORMAL
BH CV LOWER VASCULAR RIGHT GREATER SAPH BK COMPRESS: NORMAL
BH CV LOWER VASCULAR RIGHT LESSER SAPH COMPRESS: NORMAL
BH CV LOWER VASCULAR RIGHT MID FEMORAL AUGMENT: NORMAL
BH CV LOWER VASCULAR RIGHT MID FEMORAL COMPETENT: NORMAL
BH CV LOWER VASCULAR RIGHT MID FEMORAL COMPRESS: NORMAL
BH CV LOWER VASCULAR RIGHT MID FEMORAL PHASIC: NORMAL
BH CV LOWER VASCULAR RIGHT MID FEMORAL SPONT: NORMAL
BH CV LOWER VASCULAR RIGHT PERONEAL COMPRESS: NORMAL
BH CV LOWER VASCULAR RIGHT POPLITEAL AUGMENT: NORMAL
BH CV LOWER VASCULAR RIGHT POPLITEAL COMPETENT: NORMAL
BH CV LOWER VASCULAR RIGHT POPLITEAL COMPRESS: NORMAL
BH CV LOWER VASCULAR RIGHT POPLITEAL PHASIC: NORMAL
BH CV LOWER VASCULAR RIGHT POPLITEAL SPONT: NORMAL
BH CV LOWER VASCULAR RIGHT POSTERIOR TIBIAL COMPRESS: NORMAL
BH CV LOWER VASCULAR RIGHT PROXIMAL FEMORAL COMPRESS: NORMAL
BH CV LOWER VASCULAR RIGHT SAPHENOFEMORAL JUNCTION COMPRESS: NORMAL
BH CV VAS PRELIMINARY FINDINGS SCRIPTING: 1

## 2025-04-02 PROCEDURE — 99284 EMERGENCY DEPT VISIT MOD MDM: CPT

## 2025-04-02 PROCEDURE — 93971 EXTREMITY STUDY: CPT | Performed by: SURGERY

## 2025-04-02 PROCEDURE — 93971 EXTREMITY STUDY: CPT
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
## 2025-04-02 NOTE — DISCHARGE INSTRUCTIONS
Your ultrasound today did not show any blood clots.  However, flow was sluggish.  This could be due to dehydration.  Be sure to drink plenty of water throughout the day.  Also be sure to get an active activity every 2 hours.  If you are unable to get up you can also do calf raises to improve circulation in your legs.    Return for worsening symptoms such as increased pain, redness, swelling, chest pain, or worsening shortness of breath.

## 2025-04-04 ENCOUNTER — TELEPHONE (OUTPATIENT)
Dept: INTERNAL MEDICINE | Facility: CLINIC | Age: 70
End: 2025-04-04
Payer: MEDICARE

## 2025-04-04 NOTE — TELEPHONE ENCOUNTER
Caller: MARI WITH [facility] OT    Relationship:     Best call back number: [phone]    What orders are you requesting (i.e. lab or imaging): OT ONE TIME A WEEK FOR 5 WEEKS     In what timeframe would the patient need to come in: ASAP     Where will you receive your lab/imaging services: IN HOME     Additional notes: NEEDING A VERBAL ORDER

## 2025-04-07 NOTE — TELEPHONE ENCOUNTER
Attempted to contact Lexii with [facility], unable to reach Lexii, left voicemail for a return call to the office.    Spoke to [facility] and she wanted to add a visit for this week.   Ok to add visit

## 2025-04-08 ENCOUNTER — TELEPHONE (OUTPATIENT)
Dept: CASE MANAGEMENT | Facility: OTHER | Age: 70
End: 2025-04-08
Payer: MEDICARE

## 2025-04-09 ENCOUNTER — OUTSIDE FACILITY SERVICE (OUTPATIENT)
Dept: INTERNAL MEDICINE | Facility: CLINIC | Age: 70
End: 2025-04-09
Payer: MEDICARE

## 2025-04-09 ENCOUNTER — TELEPHONE (OUTPATIENT)
Dept: INTERNAL MEDICINE | Facility: CLINIC | Age: 70
End: 2025-04-09

## 2025-04-09 PROCEDURE — G0180 MD CERTIFICATION HHA PATIENT: HCPCS | Performed by: INTERNAL MEDICINE

## 2025-04-09 NOTE — TELEPHONE ENCOUNTER
Ciara with [facility] called office to report pt , she stated the lowest was 94 but has consistently stayed around 114-117, she is going to see pt next week again for PT.

## 2025-04-10 ENCOUNTER — PATIENT MESSAGE (OUTPATIENT)
Dept: INTERNAL MEDICINE | Facility: CLINIC | Age: 70
End: 2025-04-10
Payer: MEDICARE

## 2025-04-10 NOTE — TELEPHONE ENCOUNTER
Yes I was aware and have been following her notes.  Home health called about the elevated heart rate.  As long as she feels okay and it starts to come down it should be fine.

## 2025-04-10 NOTE — TELEPHONE ENCOUNTER
Spoke with patient and she stated she has not been having any chest pain or palpitations, patient did stated she has been drinking caffeine last night and today, patient also stated she just had a spinal infusion and she still in recovery from this, she was not sure if pcp was aware of that.

## 2025-04-10 NOTE — TELEPHONE ENCOUNTER
Please call patient and ask if she is feeling any palpitations or having any chest pain.  Please also ask if she is still having a heart rate that fast and/or if she has been taking any caffeine or nicotine recently.

## 2025-04-11 ENCOUNTER — TELEPHONE (OUTPATIENT)
Dept: INTERNAL MEDICINE | Facility: CLINIC | Age: 70
End: 2025-04-11
Payer: MEDICARE

## 2025-04-11 NOTE — TELEPHONE ENCOUNTER
Pt called into office today stating that she has just been diagnosed with COVID and just recently had back surgery and has been coughing a lot. Pt stated you had called her in something before to help with the symptoms and she is wondering if something can be called in again for her.

## 2025-04-14 NOTE — TELEPHONE ENCOUNTER
Spoke with patient rely message, pt stated she does not need anything her symptoms had gotten better, she also stated she has an upcoming appt this week with pcp.

## 2025-04-14 NOTE — TELEPHONE ENCOUNTER
Yes,  but not sure if she meant cough syrup or paxlovid.  Paxlovid isn't helping as much with new virus strain and has some side effects so usually we don't use that any more.

## 2025-04-16 ENCOUNTER — OFFICE VISIT (OUTPATIENT)
Dept: INTERNAL MEDICINE | Facility: CLINIC | Age: 70
End: 2025-04-16
Payer: MEDICARE

## 2025-04-16 VITALS
RESPIRATION RATE: 16 BRPM | BODY MASS INDEX: 35.48 KG/M2 | OXYGEN SATURATION: 97 % | DIASTOLIC BLOOD PRESSURE: 80 MMHG | SYSTOLIC BLOOD PRESSURE: 140 MMHG | WEIGHT: 220.8 LBS | HEIGHT: 66 IN | TEMPERATURE: 99 F | HEART RATE: 73 BPM

## 2025-04-16 DIAGNOSIS — M79.2 NEUROPATHIC PAIN: ICD-10-CM

## 2025-04-16 DIAGNOSIS — R25.2 LEG CRAMPS: ICD-10-CM

## 2025-04-16 DIAGNOSIS — R73.09 ELEVATED GLUCOSE: ICD-10-CM

## 2025-04-16 DIAGNOSIS — M79.604 RIGHT LEG PAIN: ICD-10-CM

## 2025-04-16 DIAGNOSIS — M79.671 PAIN IN RIGHT FOOT: Primary | ICD-10-CM

## 2025-04-16 LAB
ANION GAP SERPL CALCULATED.3IONS-SCNC: 11 MMOL/L (ref 5–15)
BUN SERPL-MCNC: 29 MG/DL (ref 8–23)
BUN/CREAT SERPL: 33 (ref 7–25)
CALCIUM SPEC-SCNC: 9.8 MG/DL (ref 8.6–10.5)
CHLORIDE SERPL-SCNC: 105 MMOL/L (ref 98–107)
CO2 SERPL-SCNC: 24 MMOL/L (ref 22–29)
CREAT SERPL-MCNC: 0.88 MG/DL (ref 0.57–1)
EGFRCR SERPLBLD CKD-EPI 2021: 71.7 ML/MIN/1.73
GLUCOSE SERPL-MCNC: 76 MG/DL (ref 65–99)
MAGNESIUM SERPL-MCNC: 2.2 MG/DL (ref 1.6–2.4)
POTASSIUM SERPL-SCNC: 4.4 MMOL/L (ref 3.5–5.2)
SODIUM SERPL-SCNC: 140 MMOL/L (ref 136–145)
VIT B12 BLD-MCNC: 431 PG/ML (ref 211–946)

## 2025-04-16 PROCEDURE — 80048 BASIC METABOLIC PNL TOTAL CA: CPT | Performed by: NURSE PRACTITIONER

## 2025-04-16 PROCEDURE — 82607 VITAMIN B-12: CPT | Performed by: NURSE PRACTITIONER

## 2025-04-16 PROCEDURE — 83735 ASSAY OF MAGNESIUM: CPT | Performed by: NURSE PRACTITIONER

## 2025-04-16 NOTE — PROGRESS NOTES
Chief Complaint  Hospital Follow Up Visit (4/2/2025 ER Right leg pain) and Back Problem (Follow up from Back surgery March 18th. )      Subjective      History of Present Illness  The patient is a 69-year-old female presenting for a follow-up after recent hospitalization and emergency room visits.    The patient underwent lumbar decompression surgery for neurogenic claudication on 03/18/2025. Postoperatively, she reported experiencing a cramp-like sensation and an indentation in her foot, accompanied by persistent pain radiating throughout the foot, with heightened sensitivity on the dorsum, described as an electric shock-like sensation. Radiographic imaging revealed severe osteoarthritis without evidence of new stress fractures. Despite a week of rehabilitation, there was no improvement in foot pain. While her back pain showed improvement at [facility], her foot pain exacerbated. She describes her back as feeling raw and stiff. She adheres to postoperative restrictions, including avoiding lifting objects heavier than 8 pounds, and refrains from bending, leaning, twisting, or turning. Pain is aggravated by dorsiflexion exercises and ambulation. She has a scheduled follow-up appointment on Monday at 1:20 PM. The patient receives occupational therapy, physical therapy, and nursing services at home. She takes Flexeril (cyclobenzaprine) three times daily, which provides some relief for her back pain. She has previously taken gabapentin previously which provided some relief. She reports Gabapentin was discontinued due to hallucinations after day 5 of continuous use.     On 04/02/2025, she presented to the emergency room with right leg pain. A venous Doppler ultrasound of the right lower extremity was normal. She was advised to apply heat and maintain hydration, but these measures did not yield significant improvement. A corticosteroid pack prescribed by Dr. Crane last week was ineffective. Her daughter, a nurse,  "observed the foot indentation and noted that massaging provided temporary relief. The pain has since spread to her leg and buttock, becoming constant and worsening. Hydrocodone provides minimal relief and disrupts her sleep. She also reports some relief with epsom salt soaks.     During her rehabilitation at [facility], the patient developed steroid-induced diabetes mellitus, necessitating insulin therapy. Her blood glucose levels have not been monitored since discharge.     The patient experienced hypotension during rehabilitation, leading to the administration of Norco 10 mg instead of a pain pump. Her blood pressure fluctuates between 117/60 mmHg and 130 mmHg systolic.             Objective   Vital Signs:   Vitals:    04/16/25 0915   BP: 140/80   BP Location: Left arm   Patient Position: Sitting   Cuff Size: Adult   Pulse: 73   Resp: 16   Temp: 99 °F (37.2 °C)   TempSrc: Temporal   SpO2: 97%   Weight: 100 kg (220 lb 12.8 oz)   Height: 167.6 cm (66\")     Body mass index is 35.64 kg/m².    Wt Readings from Last 3 Encounters:   04/16/25 100 kg (220 lb 12.8 oz)   04/02/25 101 kg (222 lb 0.1 oz)   02/06/25 102 kg (225 lb)     BP Readings from Last 3 Encounters:   04/16/25 140/80   04/02/25 128/62   03/26/25 126/51       Health Maintenance   Topic Date Due    ANNUAL WELLNESS VISIT  03/08/2025    COVID-19 Vaccine (7 - 2024-25 season) 04/18/2025    LIPID PANEL  06/26/2025    INFLUENZA VACCINE  07/01/2025    MAMMOGRAM  12/13/2025    DXA SCAN  10/25/2026    COLORECTAL CANCER SCREENING  04/23/2029    TDAP/TD VACCINES (2 - Tdap) 10/13/2031    HEPATITIS C SCREENING  Completed    Pneumococcal Vaccine 50+  Completed    ZOSTER VACCINE  Completed       Physical Exam  Vitals and nursing note reviewed.   Constitutional:       General: She is not in acute distress.     Appearance: Normal appearance.   HENT:      Head: Normocephalic and atraumatic.      Right Ear: External ear normal.      Left Ear: External ear normal.      Nose: " Nose normal.      Mouth/Throat:      Mouth: Mucous membranes are moist.   Eyes:      Conjunctiva/sclera: Conjunctivae normal.   Cardiovascular:      Rate and Rhythm: Normal rate and regular rhythm.      Pulses: Normal pulses.      Heart sounds: Normal heart sounds. No murmur heard.     No friction rub. No gallop.   Pulmonary:      Effort: Pulmonary effort is normal. No respiratory distress.      Breath sounds: No wheezing, rhonchi or rales.   Musculoskeletal:      Cervical back: Neck supple.      Right lower leg: No edema.      Left lower leg: No edema.      Right foot: Swelling and tenderness present.   Skin:     General: Skin is warm and dry.   Neurological:      General: No focal deficit present.      Mental Status: She is alert and oriented to person, place, and time.   Psychiatric:         Mood and Affect: Mood normal.         Behavior: Behavior normal.        Physical Exam        Result Review :  The following data was reviewed by: SHEY Espinoza on 04/16/2025:         Results      Imaging   - Venous Doppler of the right lower extremity: 04/02/2025, Normal   - X-ray of the foot: No new findings            Procedures            Assessment & Plan  Pain in right foot         Right leg pain         Elevated glucose         Leg cramps    Orders:    Magnesium    Basic Metabolic Panel    Vitamin B12    Neuropathic pain    Orders:    Magnesium    Basic Metabolic Panel    Vitamin B12         Assessment & Plan  1. Post-surgical follow-up for lumbar stenosis with neurogenic claudication  - Persistent right leg pain since 04/02/2025, described as electrical shock-like, radiating from foot to calf and buttock  - X-ray and venous Doppler normal  - Likely nerve-related pain  - Reintroduce gabapentin at lower dose once daily since this previously offered some relief, close monitoring for hallucinations. Patient agreeable to plan as she awaits follow up phone call from surgeon  - Recommend Epsom salt soaks  - Contact  clinic if symptoms worsen or new symptoms arise    2. Right leg pain  - Persistent, worsening pain since 04/02/2025, affecting foot, calf, and buttock  - Likely related to recent back surgery  - Follow-up with Dr. Roberts on Monday at 1:20 PM for further evaluation and potential medication adjustments  - Gabapentin may be tried again at lower dose for nerve pain  - Recommend Epsom salt soaks and moist heat    3. Elevated blood pressure  - home readings 117/60 to 130 systolic  - Today's reading 140/80, likely due to pain  - Continue monitoring blood pressure  - Employ pain management strategies to potentially reduce blood pressure  - Follow-up with home health services for ongoing monitoring    4. Steroid-induced diabetes  - Developed diabetes due to steroid use during hospital stay, requiring insulin  - Conduct fingerstick glucose test today to assess current levels- 76 normal  - no medications needed at this time.   - recommend rechecking A1C at future visit to assure resolution    5. Foot cramping  - Labs to be drawn today to check electrolyte levels, kidney function, and B12 levels due to prolonged indoor stay and potential deficiencies  - Conduct comprehensive lab work to ensure no deficiencies contributing to symptoms  - Results will guide necessary treatment plan adjustments    Patient or patient representative verbalized consent for the use of Ambient Listening during the visit with  SHEY Espinoza for chart documentation. 4/16/2025  10:16 EDT      FOLLOW UP  No follow-ups on file.  Patient was given instructions and counseling regarding her condition or for health maintenance advice. Please see specific information pulled into the AVS if appropriate.     SHEY Espinoza  04/16/25  10:17 EDT    CURRENT & DISCONTINUED MEDICATIONS  Current Outpatient Medications   Medication Instructions    acetaminophen (TYLENOL) 650 mg, Oral, Every 6 Hours PRN    buPROPion XL (WELLBUTRIN XL) 150 mg, Oral, Daily     "cetirizine (ZYRTEC) 10 mg, Oral, Daily    cyclobenzaprine (FLEXERIL) 5 mg, Oral, 3 Times Daily PRN    desvenlafaxine (PRISTIQ) 50 MG 24 hr tablet Take one tab po daily    diclofenac (VOLTAREN) 75 mg, Oral, 2 Times Daily    Diclofenac Sodium (VOLTAREN) 1 % gel gel Topical, 2 Times Daily    fluticasone (FLONASE) 50 MCG/ACT nasal spray 2 sprays, Nasal, Daily    HYDROcodone-acetaminophen (NORCO) 7.5-325 MG per tablet 1 tablet, Every 4 Hours PRN    Melatonin 5 mg, Oral, Every Night at Bedtime    multivitamin with minerals tablet tablet 2 tablets, Daily    omeprazole (PRILOSEC) 40 mg, Oral, Daily    ondansetron ODT (ZOFRAN ODT) 4 mg, Translingual, Every 8 Hours PRN    oxybutynin XL (DITROPAN-XL) 5 mg, Oral, Daily    pravastatin (PRAVACHOL) 20 mg, Oral, Daily    rizatriptan (MAXALT) 10 mg, Oral, Once As Needed, May repeat in 2 hours if needed    spironolactone (ALDACTONE) 25 mg, Oral, Daily    valACYclovir (VALTREX) 500 mg, Oral, Daily       Medications Discontinued During This Encounter   Medication Reason    HYDROcodone-acetaminophen (NORCO) 5-325 MG per tablet *Therapy completed    SiliGentle  Silver Foam Dres 2\"X2\" pads *Therapy completed    topiramate (Topamax) 25 MG tablet *Therapy completed    ondansetron (ZOFRAN) 4 MG tablet Duplicate order          "

## 2025-04-18 ENCOUNTER — TELEPHONE (OUTPATIENT)
Dept: CASE MANAGEMENT | Facility: OTHER | Age: 70
End: 2025-04-18
Payer: MEDICARE

## 2025-04-21 ENCOUNTER — PATIENT OUTREACH (OUTPATIENT)
Dept: CASE MANAGEMENT | Facility: OTHER | Age: 70
End: 2025-04-21
Payer: MEDICARE

## 2025-04-21 DIAGNOSIS — I10 ESSENTIAL HYPERTENSION: Primary | ICD-10-CM

## 2025-04-21 NOTE — OUTREACH NOTE
AMBULATORY CASE MANAGEMENT NOTE    Names and Relationships of Patient/Support Persons: Contact: Casandra Arango; Relationship: Self -     Called and spoke with patient to discuss CCM program. Patient is not interested in CCM at this time due to financial reasons and she does not think there is a need for chronic management follow up at this time.    Maria Elena RESENDIZ  Ambulatory Case Management    4/21/2025, 10:08 EDT

## 2025-04-27 ENCOUNTER — PATIENT MESSAGE (OUTPATIENT)
Dept: INTERNAL MEDICINE | Facility: CLINIC | Age: 70
End: 2025-04-27
Payer: MEDICARE

## 2025-04-29 RX ORDER — FLUCONAZOLE 100 MG/1
100 TABLET ORAL DAILY
Qty: 7 TABLET | Refills: 0 | Status: SHIPPED | OUTPATIENT
Start: 2025-04-29 | End: 2025-04-29 | Stop reason: SDUPTHER

## 2025-04-29 RX ORDER — FLUCONAZOLE 100 MG/1
100 TABLET ORAL DAILY
Qty: 7 TABLET | Refills: 0 | Status: SHIPPED | OUTPATIENT
Start: 2025-04-29 | End: 2025-05-06

## 2025-05-01 RX ORDER — OMEPRAZOLE 40 MG/1
40 CAPSULE, DELAYED RELEASE ORAL DAILY
Qty: 90 CAPSULE | Refills: 1 | Status: SHIPPED | OUTPATIENT
Start: 2025-05-01

## 2025-05-01 RX ORDER — GABAPENTIN 300 MG/1
300 CAPSULE ORAL NIGHTLY
Qty: 90 CAPSULE | Refills: 0 | Status: SHIPPED | OUTPATIENT
Start: 2025-05-01

## 2025-05-01 RX ORDER — PRAVASTATIN SODIUM 20 MG
20 TABLET ORAL DAILY
Qty: 90 TABLET | Refills: 1 | Status: SHIPPED | OUTPATIENT
Start: 2025-05-01

## 2025-05-01 RX ORDER — SPIRONOLACTONE 25 MG/1
25 TABLET ORAL DAILY
Qty: 90 TABLET | Refills: 1 | Status: SHIPPED | OUTPATIENT
Start: 2025-05-01

## 2025-05-01 NOTE — TELEPHONE ENCOUNTER
The original prescription was discontinued on 2/6/2025 by Nancy Estrada PA-C for the following reason: *Therapy completed. Renewing this prescription may not be appropriate.

## 2025-05-01 NOTE — TELEPHONE ENCOUNTER
Caller: Casandra Arango    Relationship: Self    Best call back number: [phone]    Requested Prescriptions:   Requested Prescriptions     Pending Prescriptions Disp Refills    pravastatin (Pravachol) 20 MG tablet 90 tablet 1     Sig: Take 1 tablet by mouth Daily.    omeprazole (priLOSEC) 40 MG capsule 90 capsule 1     Sig: Take 1 capsule by mouth Daily.    Melatonin 5 MG capsule 90 each 1     Sig: Take 5 mg by mouth every night at bedtime.    spironolactone (Aldactone) 25 MG tablet 90 tablet 1     Sig: Take 1 tablet by mouth Daily.    gabapentin (NEURONTIN) 300 MG capsule       Sig: Take 1 capsule by mouth Every Night.        Pharmacy where request should be sent: [facility] [place] [phone] [place], [place] [phone]      Last office visit with prescribing clinician: 1/17/2025   Last telemedicine visit with prescribing clinician: Visit date not found   Next office visit with prescribing clinician: 6/6/2025     Additional details provided by patient: PATIENT STATES SHE HAD PREVIOUSLY TOLD PROVIDER SHE WAS NOT TAKING THE GABAPENTIN DUE TO IT UPSETTING HER STOMACH BUT SHE HAS RECENTLY HAD BACK SURGERY AND STARTED TAKING IT AGAIN AND IT HAS HELPED A LOT.     Does the patient have less than a 3 day supply:  [x] Yes  [] No    Would you like a call back once the refill request has been completed: [] Yes [x] No    If the office needs to give you a call back, can they leave a voicemail: [] Yes [x] No    Satya Mccullough Rep   05/01/25 12:17 EDT

## 2025-05-02 ENCOUNTER — OUTSIDE FACILITY SERVICE (OUTPATIENT)
Dept: INTERNAL MEDICINE | Facility: CLINIC | Age: 70
End: 2025-05-02
Payer: MEDICARE

## 2025-06-06 ENCOUNTER — OFFICE VISIT (OUTPATIENT)
Dept: INTERNAL MEDICINE | Facility: CLINIC | Age: 70
End: 2025-06-06
Payer: MEDICARE

## 2025-06-06 VITALS
DIASTOLIC BLOOD PRESSURE: 72 MMHG | HEART RATE: 94 BPM | WEIGHT: 227 LBS | SYSTOLIC BLOOD PRESSURE: 128 MMHG | OXYGEN SATURATION: 99 % | HEIGHT: 66 IN | TEMPERATURE: 98.5 F | BODY MASS INDEX: 36.48 KG/M2

## 2025-06-06 DIAGNOSIS — Z00.00 ENCOUNTER FOR SUBSEQUENT ANNUAL WELLNESS VISIT (AWV) IN MEDICARE PATIENT: ICD-10-CM

## 2025-06-06 DIAGNOSIS — M25.561 RIGHT KNEE PAIN, UNSPECIFIED CHRONICITY: ICD-10-CM

## 2025-06-06 DIAGNOSIS — Z13.220 SCREENING, LIPID: Primary | ICD-10-CM

## 2025-06-06 DIAGNOSIS — G25.81 RESTLESS LEG SYNDROME: ICD-10-CM

## 2025-06-06 DIAGNOSIS — E55.9 VITAMIN D DEFICIENCY: ICD-10-CM

## 2025-06-06 DIAGNOSIS — M79.10 MYALGIA, UNSPECIFIED SITE: ICD-10-CM

## 2025-06-06 DIAGNOSIS — R79.9 ABNORMAL FINDING OF BLOOD CHEMISTRY, UNSPECIFIED: ICD-10-CM

## 2025-06-06 DIAGNOSIS — M79.671 PAIN IN RIGHT FOOT: ICD-10-CM

## 2025-06-06 LAB
ALBUMIN SERPL-MCNC: 3.9 G/DL (ref 3.5–5.2)
ALBUMIN/GLOB SERPL: 1.3 G/DL
ALP SERPL-CCNC: 159 U/L (ref 39–117)
ALT SERPL W P-5'-P-CCNC: 8 U/L (ref 1–33)
ANION GAP SERPL CALCULATED.3IONS-SCNC: 11 MMOL/L (ref 5–15)
AST SERPL-CCNC: 17 U/L (ref 1–32)
BASOPHILS # BLD AUTO: 0.05 10*3/MM3 (ref 0–0.2)
BASOPHILS NFR BLD AUTO: 0.6 % (ref 0–1.5)
BILIRUB SERPL-MCNC: 0.2 MG/DL (ref 0–1.2)
BUN SERPL-MCNC: 16 MG/DL (ref 8–23)
BUN/CREAT SERPL: 14 (ref 7–25)
CALCIUM SPEC-SCNC: 9.7 MG/DL (ref 8.6–10.5)
CHLORIDE SERPL-SCNC: 105 MMOL/L (ref 98–107)
CHOLEST SERPL-MCNC: 170 MG/DL (ref 0–200)
CO2 SERPL-SCNC: 24 MMOL/L (ref 22–29)
CREAT SERPL-MCNC: 1.14 MG/DL (ref 0.57–1)
DEPRECATED RDW RBC AUTO: 42.9 FL (ref 37–54)
EGFRCR SERPLBLD CKD-EPI 2021: 52.5 ML/MIN/1.73
EOSINOPHIL # BLD AUTO: 0.32 10*3/MM3 (ref 0–0.4)
EOSINOPHIL NFR BLD AUTO: 3.5 % (ref 0.3–6.2)
ERYTHROCYTE [DISTWIDTH] IN BLOOD BY AUTOMATED COUNT: 14.9 % (ref 12.3–15.4)
GLOBULIN UR ELPH-MCNC: 3 GM/DL
GLUCOSE SERPL-MCNC: 83 MG/DL (ref 65–99)
HCT VFR BLD AUTO: 39 % (ref 34–46.6)
HDLC SERPL-MCNC: 54 MG/DL (ref 40–60)
HGB BLD-MCNC: 11.6 G/DL (ref 12–15.9)
IMM GRANULOCYTES # BLD AUTO: 0.06 10*3/MM3 (ref 0–0.05)
IMM GRANULOCYTES NFR BLD AUTO: 0.7 % (ref 0–0.5)
IRON 24H UR-MRATE: 21 MCG/DL (ref 37–145)
IRON SATN MFR SERPL: 5 % (ref 20–50)
LDLC SERPL CALC-MCNC: 85 MG/DL (ref 0–100)
LDLC/HDLC SERPL: 1.47 {RATIO}
LYMPHOCYTES # BLD AUTO: 2.22 10*3/MM3 (ref 0.7–3.1)
LYMPHOCYTES NFR BLD AUTO: 24.5 % (ref 19.6–45.3)
MCH RBC QN AUTO: 23.9 PG (ref 26.6–33)
MCHC RBC AUTO-ENTMCNC: 29.7 G/DL (ref 31.5–35.7)
MCV RBC AUTO: 80.4 FL (ref 79–97)
MONOCYTES # BLD AUTO: 0.66 10*3/MM3 (ref 0.1–0.9)
MONOCYTES NFR BLD AUTO: 7.3 % (ref 5–12)
NEUTROPHILS NFR BLD AUTO: 5.74 10*3/MM3 (ref 1.7–7)
NEUTROPHILS NFR BLD AUTO: 63.4 % (ref 42.7–76)
NRBC BLD AUTO-RTO: 0 /100 WBC (ref 0–0.2)
PLATELET # BLD AUTO: 474 10*3/MM3 (ref 140–450)
PMV BLD AUTO: 9.3 FL (ref 6–12)
POTASSIUM SERPL-SCNC: 4.3 MMOL/L (ref 3.5–5.2)
PROT SERPL-MCNC: 6.9 G/DL (ref 6–8.5)
RBC # BLD AUTO: 4.85 10*6/MM3 (ref 3.77–5.28)
SODIUM SERPL-SCNC: 140 MMOL/L (ref 136–145)
TIBC SERPL-MCNC: 447 MCG/DL (ref 298–536)
TRANSFERRIN SERPL-MCNC: 300 MG/DL (ref 200–360)
TRIGL SERPL-MCNC: 182 MG/DL (ref 0–150)
TSH SERPL DL<=0.05 MIU/L-ACNC: 1.61 UIU/ML (ref 0.27–4.2)
URATE SERPL-MCNC: 5.2 MG/DL (ref 2.4–5.7)
VLDLC SERPL-MCNC: 31 MG/DL (ref 5–40)
WBC NRBC COR # BLD AUTO: 9.05 10*3/MM3 (ref 3.4–10.8)

## 2025-06-06 PROCEDURE — 85025 COMPLETE CBC W/AUTO DIFF WBC: CPT | Performed by: INTERNAL MEDICINE

## 2025-06-06 PROCEDURE — 80053 COMPREHEN METABOLIC PANEL: CPT | Performed by: INTERNAL MEDICINE

## 2025-06-06 PROCEDURE — 84550 ASSAY OF BLOOD/URIC ACID: CPT | Performed by: INTERNAL MEDICINE

## 2025-06-06 PROCEDURE — 80061 LIPID PANEL: CPT | Performed by: INTERNAL MEDICINE

## 2025-06-06 PROCEDURE — 84443 ASSAY THYROID STIM HORMONE: CPT | Performed by: INTERNAL MEDICINE

## 2025-06-06 PROCEDURE — 83540 ASSAY OF IRON: CPT | Performed by: INTERNAL MEDICINE

## 2025-06-06 PROCEDURE — 84466 ASSAY OF TRANSFERRIN: CPT | Performed by: INTERNAL MEDICINE

## 2025-06-06 RX ORDER — BUPROPION HYDROCHLORIDE 150 MG/1
150 TABLET ORAL DAILY
Qty: 90 TABLET | Refills: 1 | Status: SHIPPED | OUTPATIENT
Start: 2025-06-06 | End: 2025-06-12 | Stop reason: SDUPTHER

## 2025-06-06 RX ORDER — ONDANSETRON 4 MG/1
4 TABLET, ORALLY DISINTEGRATING ORAL EVERY 8 HOURS PRN
Qty: 30 TABLET | Refills: 1 | Status: SHIPPED | OUTPATIENT
Start: 2025-06-06 | End: 2025-06-12 | Stop reason: SDUPTHER

## 2025-06-06 RX ORDER — PRAVASTATIN SODIUM 20 MG
20 TABLET ORAL DAILY
Qty: 90 TABLET | Refills: 1 | Status: SHIPPED | OUTPATIENT
Start: 2025-06-06 | End: 2025-06-12 | Stop reason: SDUPTHER

## 2025-06-06 RX ORDER — SPIRONOLACTONE 25 MG/1
25 TABLET ORAL DAILY
Qty: 90 TABLET | Refills: 1 | Status: SHIPPED | OUTPATIENT
Start: 2025-06-06 | End: 2025-06-12 | Stop reason: SDUPTHER

## 2025-06-06 RX ORDER — FLUTICASONE PROPIONATE 50 MCG
2 SPRAY, SUSPENSION (ML) NASAL DAILY PRN
Qty: 16 G | Refills: 1 | Status: SHIPPED | OUTPATIENT
Start: 2025-06-06 | End: 2025-06-12 | Stop reason: SDUPTHER

## 2025-06-06 RX ORDER — OMEPRAZOLE 40 MG/1
40 CAPSULE, DELAYED RELEASE ORAL DAILY
Qty: 90 CAPSULE | Refills: 1 | Status: SHIPPED | OUTPATIENT
Start: 2025-06-06 | End: 2025-06-12 | Stop reason: SDUPTHER

## 2025-06-06 RX ORDER — DICLOFENAC SODIUM 75 MG/1
75 TABLET, DELAYED RELEASE ORAL 2 TIMES DAILY
Qty: 90 TABLET | Refills: 1 | Status: SHIPPED | OUTPATIENT
Start: 2025-06-06 | End: 2025-06-12 | Stop reason: SDUPTHER

## 2025-06-06 RX ORDER — RIZATRIPTAN BENZOATE 10 MG/1
10 TABLET ORAL ONCE AS NEEDED
Qty: 9 TABLET | Refills: 2 | Status: SHIPPED | OUTPATIENT
Start: 2025-06-06 | End: 2025-06-12 | Stop reason: SDUPTHER

## 2025-06-06 RX ORDER — CETIRIZINE HYDROCHLORIDE 10 MG/1
10 TABLET ORAL DAILY
Qty: 90 TABLET | Refills: 1 | Status: SHIPPED | OUTPATIENT
Start: 2025-06-06 | End: 2025-06-12 | Stop reason: SDUPTHER

## 2025-06-06 RX ORDER — VALACYCLOVIR HYDROCHLORIDE 500 MG/1
1000 TABLET, FILM COATED ORAL TAKE AS DIRECTED
Qty: 90 TABLET | Refills: 1 | Status: SHIPPED | OUTPATIENT
Start: 2025-06-06 | End: 2025-06-12 | Stop reason: SDUPTHER

## 2025-06-06 RX ORDER — DESVENLAFAXINE 50 MG/1
50 TABLET, FILM COATED, EXTENDED RELEASE ORAL DAILY
Qty: 90 TABLET | Refills: 1 | Status: SHIPPED | OUTPATIENT
Start: 2025-06-06 | End: 2025-06-12 | Stop reason: SDUPTHER

## 2025-06-06 NOTE — PROGRESS NOTES
Subjective   The ABCs of the Annual Wellness Visit  Medicare Wellness Visit      Casandra Arango is a 69 y.o. patient who presents for a Medicare Wellness Visit.    The following portions of the patient's history were reviewed and   updated as appropriate: allergies, current medications, past family history, past medical history, past social history, past surgical history, and problem list.    Compared to one year ago, the patient's physical   health is the same.  Compared to one year ago, the patient's mental   health is the same.    Recent Hospitalizations:  She was admitted within the past 365 days at [facility] for back surgery.     Current Medical Providers:  Patient Care Team:  Teresa Herman MD as PCP - General (Internal Medicine)  Kameron Crane MD as Surgeon (Orthopedic Surgery)  Jacob Haskins DPM as Consulting Physician (Podiatry)  Kar Denny MD as Consulting Physician (Orthopedic Surgery)  Gavino Gaston MD as Consulting Physician (Sleep Medicine)    Outpatient Medications Prior to Visit   Medication Sig Dispense Refill    acetaminophen (Tylenol) 325 MG tablet Take 2 tablets by mouth Every 6 (Six) Hours As Needed for Mild Pain. 90 tablet 1    HYDROcodone-acetaminophen (NORCO) 7.5-325 MG per tablet Take 1 tablet by mouth Every 4 (Four) Hours As Needed.      multivitamin with minerals tablet tablet Take 2 tablets by mouth Daily. 2 GUMMIES      buPROPion XL (Wellbutrin XL) 150 MG 24 hr tablet Take 1 tablet by mouth Daily. 90 tablet 1    cetirizine (zyrTEC) 10 MG tablet Take 1 tablet by mouth Daily. 90 tablet 1    cyclobenzaprine (FLEXERIL) 5 MG tablet Take 1 tablet by mouth 3 (Three) Times a Day As Needed for Muscle Spasms. 30 tablet 3    desvenlafaxine (PRISTIQ) 50 MG 24 hr tablet Take one tab po daily 90 tablet 1    Diclofenac Sodium (VOLTAREN) 1 % gel gel Apply  topically to the appropriate area as directed 2 (Two) Times a Day. (Patient taking  differently: Apply  topically to the appropriate area as directed 2 (Two) Times a Day As Needed.) 100 g 2    fluticasone (FLONASE) 50 MCG/ACT nasal spray 2 sprays into the nostril(s) as directed by provider Daily. (Patient taking differently: Administer 2 sprays into the nostril(s) as directed by provider Daily As Needed.) 16 g 1    Melatonin 5 MG capsule Take 5 mg by mouth every night at bedtime. 90 each 1    omeprazole (priLOSEC) 40 MG capsule Take 1 capsule by mouth Daily. 90 capsule 1    ondansetron ODT (Zofran ODT) 4 MG disintegrating tablet Place 1 tablet on the tongue Every 8 (Eight) Hours As Needed for Nausea or Vomiting. 30 tablet 1    pravastatin (Pravachol) 20 MG tablet Take 1 tablet by mouth Daily. 90 tablet 1    rizatriptan (MAXALT) 10 MG tablet Take 1 tablet by mouth 1 (One) Time As Needed for Migraine. May repeat in 2 hours if needed 9 tablet 2    spironolactone (Aldactone) 25 MG tablet Take 1 tablet by mouth Daily. 90 tablet 1    valACYclovir (VALTREX) 500 MG tablet Take 1 tablet by mouth Daily. (Patient taking differently: Take 2 tablets by mouth Take As Directed. REPORTS WILL TAKE 2 INITIALLY AND THEN TAKE 2 LATER ON THAT NIGHT) 90 tablet 1    diclofenac (VOLTAREN) 75 MG EC tablet Take 1 tablet by mouth 2 (Two) Times a Day. (Patient not taking: Reported on 6/6/2025) 90 tablet 1    gabapentin (NEURONTIN) 300 MG capsule Take 1 capsule by mouth Every Night. (Patient not taking: Reported on 6/6/2025) 90 capsule 0    oxybutynin XL (DITROPAN-XL) 5 MG 24 hr tablet Take 1 tablet by mouth Daily. (Patient not taking: Reported on 6/6/2025) 30 tablet 1     No facility-administered medications prior to visit.     Opioid medication/s are on active medication list.  and I have evaluated her active treatment plan and pain score trends (see table).  There were no vitals filed for this visit.  I have reviewed the chart for potential of high risk medication and harmful drug interactions in the elderly.        Aspirin  "is not on active medication list.  Aspirin use is not indicated based on review of current medical condition/s. Risk of harm outweighs potential benefits.  .    Patient Active Problem List   Diagnosis    Right shoulder pain    Class 1 obesity    GERD without esophagitis    Essential hypertension    Obstructive uropathy    Nephrolithiasis    Seasonal allergic rhinitis    Anxiety    Arthritis    Arthritis of first metatarsophalangeal (MTP) joint of right foot    Arthritis of left knee    Degeneration of lumbar intervertebral disc    Hemorrhoids    Hyperlipemia    Insomnia    Lumbosacral spondylosis without myelopathy    Major depressive disorder, recurrent, severe without psychotic features    Vitamin D deficiency    KARINA on CPAP    Lumbar post-laminectomy syndrome    ACE inhibitor intolerance    Tick bite    Injury of right foot    Polyp of colon    Impaired fasting glucose    OA (osteoarthritis) of hip     Advance Care Planning Advance Directive is not on file.  ACP discussion was held with the patient during this visit. Patient does not have an advance directive, information provided.            Objective   Vitals:    06/06/25 1428   BP: 128/72   Pulse: 94   Temp: 98.5 °F (36.9 °C)   SpO2: 99%   Weight: 103 kg (227 lb)   Height: 167.6 cm (65.98\")       Estimated body mass index is 36.66 kg/m² as calculated from the following:    Height as of this encounter: 167.6 cm (65.98\").    Weight as of this encounter: 103 kg (227 lb).                Does the patient have evidence of cognitive impairment? No                                                                                                Health  Risk Assessment    Smoking Status:  Social History     Tobacco Use   Smoking Status Never   Smokeless Tobacco Never   Tobacco Comments    Casualty of second hand smoke     Alcohol Consumption:  Social History     Substance and Sexual Activity   Alcohol Use Yes    Comment: RARELY MAYBE ONCE A YEAR       Fall Risk " Screen  STEADI Fall Risk Assessment was completed, and patient is at LOW risk for falls.Assessment completed on:1/17/2025    Depression Screening   The PHQ has not been completed during this encounter.     Health Habits and Functional and Cognitive Screening:      3/8/2024     1:00 PM   Functional & Cognitive Status   Do you have difficulty preparing food and eating? No    Do you have difficulty bathing yourself, getting dressed or grooming yourself? No    Do you have difficulty using the toilet? No    Do you have difficulty moving around from place to place? No    Do you have trouble with steps or getting out of a bed or a chair? No   Current Diet Well Balanced Diet   Dental Exam Not up to date   Eye Exam Up to date   Exercise (times per week) 0 times per week   Current Exercises Include No Regular Exercise   Do you need help using the phone?  No   Are you deaf or do you have serious difficulty hearing?  No   Do you need help to go to places out of walking distance? No   Do you need help shopping? No   Do you need help preparing meals?  No   Do you need help with housework?  No   Do you need help with laundry? No   Do you need help taking your medications? No   Do you need help managing money? No   Do you ever drive or ride in a car without wearing a seat belt? No   Have you felt unusual stress, anger or loneliness in the last month? Yes   Who do you live with? Child   If you need help, do you have trouble finding someone available to you? No   Have you been bothered in the last four weeks by sexual problems? No   Do you have difficulty concentrating, remembering or making decisions? No       Data saved with a previous flowsheet row definition           Age-appropriate Screening Schedule:  Refer to the list below for future screening recommendations based on patient's age, sex and/or medical conditions. Orders for these recommended tests are listed in the plan section. The patient has been provided with a written  plan.    Health Maintenance List  Health Maintenance   Topic Date Due    ANNUAL WELLNESS VISIT  03/08/2025    COVID-19 Vaccine (7 - 2024-25 season) 04/18/2025    LIPID PANEL  06/26/2025    INFLUENZA VACCINE  07/01/2025    MAMMOGRAM  12/13/2025    DXA SCAN  10/25/2026    COLORECTAL CANCER SCREENING  04/23/2029    TDAP/TD VACCINES (2 - Tdap) 10/13/2031    HEPATITIS C SCREENING  Completed    Pneumococcal Vaccine 50+  Completed    ZOSTER VACCINE  Completed                                                                                                                                                CMS Preventative Services Quick Reference  Risk Factors Identified During Encounter  None Identified    The above risks/problems have been discussed with the patient.  Pertinent information has been shared with the patient in the After Visit Summary.  An After Visit Summary and PPPS were made available to the patient.    Follow Up:   Next Medicare Wellness visit to be scheduled in 1 year.         Additional E&M Note during same encounter follows:  Patient has additional, significant, and separately identifiable condition(s)/problem(s) that require work above and beyond the Medicare Wellness Visit     Chief Complaint  Hypertension (3 mo f/u ), Hyperlipidemia, Depression, Weight Management, Medicare Wellness-subsequent, and Medication Reaction (Patient stated that when she was taking the Gabapentin she was having hallucinations )    Subjective    HPI  Casandra is also being seen today for additional medical problem/s.       The patient presents for evaluation of foot pain, restless leg syndrome, depression, and hiatal hernia.    She underwent a hip replacement in 11/2024, followed by physical therapy. On 03/18/2025, she had a spinal fusion at L2-S1, lasting 8 hours. During her hospital stay, she required a blood transfusion due to a drop in hemoglobin levels. She received daily insulin injections due to steroid use and could not  "receive Dilaudid due to hypotension. She was prescribed hydrocodone 7.5 mg for pain. She has been using a cane for mobility for the past 5 weeks, transitioning from a walker, due to persistent foot discomfort. She has an upcoming appointment with her spine surgeon on 06/19/2025.    On the second day post-surgery, she experienced a persistent charley horse in her foot, initially thought to be neurological. An x-ray of the foot was performed. A CT scan of her back revealed a nondisplaced fracture of the L3 pars lateral to the L3 pedicle screw, not believed to be the source of her pain. The pain has since improved, now localized to half of her foot from the arch to the big toe, with a burning sensation. She has undergone two rounds of steroids, each lasting 10 days, which provided some relief. She has been advised against taking diclofenac due to her ongoing healing process. She manages her symptoms with Tylenol and requires a refill. She tried gabapentin and Lyrica but discontinued them due to side effects.    She reports experiencing restless leg syndrome and is considering another prescription for iron supplementation.    Approximately 3 weeks ago, she experienced a depressive episode characterized by excessive sleepiness, which she attributes to grief. She reports feeling better now and does not endorse suicidal ideation. She is currently on Wellbutrin and Pristiq.    She has a hiatal hernia that bothers her. She takes omeprazole daily.    She follows up with Dr. Gaston for CPAP once a year.          Objective   Vital Signs:  /72   Pulse 94   Temp 98.5 °F (36.9 °C)   Ht 167.6 cm (65.98\")   Wt 103 kg (227 lb)   SpO2 99%   BMI 36.66 kg/m²   Physical Exam  Vitals reviewed.   Constitutional:       Appearance: Normal appearance. She is well-developed.   HENT:      Head: Normocephalic and atraumatic.      Right Ear: External ear normal.      Left Ear: External ear normal.   Eyes:      Conjunctiva/sclera: " Conjunctivae normal.      Pupils: Pupils are equal, round, and reactive to light.   Cardiovascular:      Rate and Rhythm: Normal rate and regular rhythm.      Heart sounds: No murmur heard.     No friction rub. No gallop.   Pulmonary:      Effort: Pulmonary effort is normal.      Breath sounds: Normal breath sounds. No wheezing or rhonchi.   Skin:     General: Skin is warm and dry.   Neurological:      Mental Status: She is alert and oriented to person, place, and time.   Psychiatric:         Mood and Affect: Affect normal.         Behavior: Behavior normal.         Thought Content: Thought content normal.                       Results  - Labs:    - B12 level: Normal    - Imaging:    - CAT scan of the back (05/01/2025):      - Nondisplaced fracture of L3 pars lateral to the L3 pedicle screw      - Large hiatal hernia           Assessment and Plan        Post-surgical foot pain:  - Symptoms suggest irritation from recent spinal surgery, expected to improve as irritation subsides  - Discomfort aligns with gout-like symptoms but does not appear to be gout  - Basic blood work today to assess vitamin D, iron, and uric acid levels to rule out secondary factors  - Discuss symptoms with spine surgeon during upcoming appointment on 06/19/2025  - EMG can be ordered if needed    Restless leg syndrome:  - Reports restless leg symptoms and may need another prescription for iron  - B12 level was normal  - Blood work today to check iron levels  - Further evaluation if symptoms worsen    Depression:  - Experienced a depressive episode about 3 weeks ago but is feeling better now  - Does not endorse suicidal ideation  - Continue current medications, including Wellbutrin and Pristiq    Hiatal hernia:  - A large hiatal hernia noted on recent CAT scan  - Managing with omeprazole and does not wish to pursue surgery unless necessary    Medication management:  - Refills provided for Wellbutrin, Zyrtec (cetirizine), Pristiq, Voltaren,  Flonase, melatonin, omeprazole, Zofran, pravastatin, triptan, spironolactone, and valacyclovir  - Diclofenac sodium prescribed but should be used cautiously due to potential heartburn and bleeding issues  - Advised to use Tylenol for arthritis pain if possible    Follow-up:  - Follow-up in 3 to 4 months         Follow Up   Return in about 3 months (around 9/6/2025).  Patient was given instructions and counseling regarding her condition or for health maintenance advice. Please see specific information pulled into the AVS if appropriate.  Patient or patient representative verbalized consent for the use of Ambient Listening during the visit with  Teresa Herman MD for chart documentation. 6/6/2025  15:29 EDT

## 2025-07-01 ENCOUNTER — TELEPHONE (OUTPATIENT)
Dept: INTERNAL MEDICINE | Facility: CLINIC | Age: 70
End: 2025-07-01
Payer: MEDICARE

## 2025-07-01 NOTE — TELEPHONE ENCOUNTER
[facility] [place] pharmacy call office stating the prescription for valtrex is wrong it needs to say 1g and the directions should be 2g on set and 2g 12 hrs later, please advise if ok to send.

## 2025-07-02 RX ORDER — VALACYCLOVIR HYDROCHLORIDE 1 G/1
1000 TABLET, FILM COATED ORAL 2 TIMES DAILY
Qty: 90 TABLET | Refills: 1 | Status: SHIPPED | OUTPATIENT
Start: 2025-07-02

## 2025-07-02 NOTE — TELEPHONE ENCOUNTER
Medication was sent.    [Dust Mites] : dust mites [Cockroach] : cockroach [Trees] : trees [0 x/month] : 0 x/month [None] : None [0 - 1/year] : 0 - 1/year [< or = 2 days/wk] : < than or = 2 days/week [de-identified] : 47 year old female here for f/u for allergic rhinitis and asthma.\par \par Asthma\par Pt reports doing well with asthma since last visit. Pt reports using her Flovent everyday, twice a day. Pt also has ventolin, which she needs 2x/week in the spring, didn't use over the summer, and has used about 1-2x/week since fall started. Reports some SOB, and cough which prompted her to use ventolin. The episodes have happened at rest. Ventolin alleviates any symptoms right away.  No ED visits, no steroids needed for asthma. Does not report worsening of symptoms with exertion, cites seasonal changes as triggers (fall/spring). Does not report SOB, wheeze currently.\par \par Allergic Rhinitis\par Symptoms have been tolerable; pt reports some feeling itchy/watery eyes, nasal congestion, sore throat, sneezing, symptoms worse at night. Ok in summer, worse in fall and spring. Take xyzal every night, since last December. Eye drops PRN. Flonase every night.  [FreeTextEntry2] : 1-2 days week in fall/spring

## 2025-07-23 ENCOUNTER — OFFICE VISIT (OUTPATIENT)
Dept: INTERNAL MEDICINE | Facility: CLINIC | Age: 70
End: 2025-07-23
Payer: MEDICARE

## 2025-07-23 VITALS
OXYGEN SATURATION: 97 % | HEART RATE: 65 BPM | TEMPERATURE: 98.5 F | HEIGHT: 66 IN | RESPIRATION RATE: 14 BRPM | SYSTOLIC BLOOD PRESSURE: 130 MMHG | BODY MASS INDEX: 36.52 KG/M2 | DIASTOLIC BLOOD PRESSURE: 68 MMHG | WEIGHT: 227.2 LBS

## 2025-07-23 DIAGNOSIS — F11.90 CHRONIC, CONTINUOUS USE OF OPIOIDS: ICD-10-CM

## 2025-07-23 DIAGNOSIS — M25.512 CHRONIC LEFT SHOULDER PAIN: ICD-10-CM

## 2025-07-23 DIAGNOSIS — G89.29 CHRONIC LEFT SHOULDER PAIN: ICD-10-CM

## 2025-07-23 DIAGNOSIS — M19.012 OSTEOARTHRITIS OF LEFT SHOULDER, UNSPECIFIED OSTEOARTHRITIS TYPE: Primary | ICD-10-CM

## 2025-07-23 PROCEDURE — 3075F SYST BP GE 130 - 139MM HG: CPT | Performed by: NURSE PRACTITIONER

## 2025-07-23 PROCEDURE — 3078F DIAST BP <80 MM HG: CPT | Performed by: NURSE PRACTITIONER

## 2025-07-23 PROCEDURE — 99214 OFFICE O/P EST MOD 30 MIN: CPT | Performed by: NURSE PRACTITIONER

## 2025-07-23 PROCEDURE — 1125F AMNT PAIN NOTED PAIN PRSNT: CPT | Performed by: NURSE PRACTITIONER

## 2025-07-23 PROCEDURE — G2211 COMPLEX E/M VISIT ADD ON: HCPCS | Performed by: NURSE PRACTITIONER

## 2025-07-23 RX ORDER — PREDNISONE 20 MG/1
40 TABLET ORAL DAILY
Qty: 10 TABLET | Refills: 0 | Status: SHIPPED | OUTPATIENT
Start: 2025-07-23

## 2025-07-23 NOTE — PROGRESS NOTES
"Chief Complaint  Shoulder Pain (Left should discomfort )    Subjective        Casandra Arango presents to [facility] for concerns regarding left shoulder pain.  Patient not known to me, normally sees one of my colleagues in the office.  She is here today for ongoing left shoulder pain.  Patient reports that she has had chronic left shoulder pain, known osteoarthritis in that left shoulder.  She has had this injected previously, which I did go back and review notes from orthopedics which was in the fall 2024.  Patient reports the injection was very successful.  She was not having any discomfort until about March.  This is when she had spine surgery.  Since having spine surgery, she has had to use her left arm a little differently, especially with getting up from a laying position.  She does feel like this has aggravated, she has had to use this for compensation after her spine surgery.  Her pain is a little different, her pain previously was more scapular, today it is more anterior.  She does report having used oral steroids a couple of times in the last several months.  None in the last 3.  This was postoperatively for pain in her back.  She does report that oral steroids usually work quite well.  She would be interested in an injection if possible.  She is not currently on any anti-inflammatories.  She was advised to withhold her diclofenac by spine surgery.  She is on chronic opioid therapy, managed by spine surgeon currently.    Objective   Vital Signs:   /68 (BP Location: Left arm, Patient Position: Sitting, Cuff Size: Adult)   Pulse 65   Temp 98.5 °F (36.9 °C) (Temporal)   Resp 14   Ht 167.6 cm (66\")   Wt 103 kg (227 lb 3.2 oz)   SpO2 97%   BMI 36.68 kg/m²     Physical Exam  Vitals and nursing note reviewed.   Constitutional:       Appearance: Normal appearance.   Cardiovascular:      Rate and Rhythm: Normal rate.   Pulmonary:      Effort: Pulmonary effort is normal. "   Musculoskeletal:      Comments: Patient with full range of motion, however this does not come without discomfort.  Primarily located anterior shoulder.   Neurological:      Mental Status: She is alert.   Psychiatric:         Mood and Affect: Mood normal.        Result Review :  {The following data was reviewed by SHEY Pierre on 07/24/25                Diagnoses and all orders for this visit:    1. Osteoarthritis of left shoulder, unspecified osteoarthritis type (Primary)    2. Chronic left shoulder pain    3. Chronic, continuous use of opioids    Other orders  -     predniSONE (DELTASONE) 20 MG tablet; Take 2 tablets by mouth Daily.  Dispense: 10 tablet; Refill: 0    Reviewed patient's chart today, including recent visit with her PCP, recent visits with her spine surgeon, previous notes with orthopedics.  Patient with 1 previous injection in the left shoulder, this was in October 2024.  Very successful.  She did have spine surgery in March, which is complicated the shoulder pain.  Reviewed previous imaging from her left shoulder, which was consistent with osteoarthritis.  I reviewed her medications in detail today.  She is on chronic opioids currently postoperatively for her spinal surgery.  She had been on diclofenac previously, however this was advised by spine surgery team to withhold until advised differently.  We did discuss options today which included oral medications, Tylenol could be considered, would withhold NSAIDs for now as advised by spinal surgery, oral steroids like prednisone.  Also discussed injections, unfortunately I do not perform injections into the shoulder, so I advised patient she would need to follow-up with either orthopedics, Dr. Tee Denny, or she could follow-up with her spine surgery team, as the PA in that office does do shoulder injections.  Patient opted today for oral steroids, we discussed the risk, benefits, side effects of these medications.  Patient will be given  5-day course.  Advised that if this does help improve her symptoms, I would withhold any injections as long as possible.  If pain returns, I would encourage patient to follow-up with 1 of those providers as soon as possible to get injection.  She can otherwise follow-up as needed.      Follow Up   No follow-ups on file.  Patient was given instructions and counseling regarding her condition or for health maintenance advice. Please see specific information pulled into the AVS if appropriate.     SHEY Pierre  7/24/2025  This note was electronically signed.

## (undated) DEVICE — SOL IRR NACL 0.9PCT 3000ML

## (undated) DEVICE — TOWEL,OR,DSP,ST,BLUE,STD,4/PK,20PK/CS: Brand: MEDLINE

## (undated) DEVICE — PROXIMATE RH ROTATING HEAD SKIN STAPLERS (35 WIDE) CONTAINS 35 STAINLESS STEEL STAPLES: Brand: PROXIMATE

## (undated) DEVICE — SYR LUERLOK 30CC

## (undated) DEVICE — Device

## (undated) DEVICE — APPL CHLORAPREP HI/LITE 26ML ORNG

## (undated) DEVICE — Device: Brand: DEFENDO AIR/WATER/SUCTION AND BIOPSY VALVE

## (undated) DEVICE — PULLOVER TOGA, 2X LARGE: Brand: FLYTE, SURGICOOL

## (undated) DEVICE — TRY PREP SCRB VAG PVP

## (undated) DEVICE — CYSTO PACK: Brand: MEDLINE INDUSTRIES, INC.

## (undated) DEVICE — PENCL ES MEGADINE EZ/CLEAN BUTN W/HOLSTR 10FT

## (undated) DEVICE — CVR LEG BOOTLEG F/R NOSKID 33IN

## (undated) DEVICE — Y-TYPE TUR/BLADDER IRRIGATION SET, REGULATING CLAMP

## (undated) DEVICE — GW PTFE/COAT STR/TP STFF/BDY .038 150CM STRL

## (undated) DEVICE — LINER SURG CANSTR SXN S/RIGD 1500CC

## (undated) DEVICE — GLV SURG SENSICARE SLT PF LF 8 STRL

## (undated) DEVICE — GLOVE,SURG,SENSICARE SLT,LF,PF,7: Brand: MEDLINE

## (undated) DEVICE — CATH URETRL FLXITP POLLACK STD 5F 70CM

## (undated) DEVICE — GLOVE,SURG,SENSICARE SLT,LF,PF,6.5: Brand: MEDLINE

## (undated) DEVICE — FIBR LASR HOLMIUM COMPAT 272MH DISP

## (undated) DEVICE — BASIC SINGLE BASIN-LF: Brand: MEDLINE INDUSTRIES, INC.

## (undated) DEVICE — KT PT POSITION SUPINE HANA/PROFX TABL

## (undated) DEVICE — SST TWIST DRILL, STANDARD, 2.4MM DIA. X 127MM: Brand: MICROAIRE®

## (undated) DEVICE — SCRW ACET CORT TRILOGY S/TAP 6.5X30
Type: IMPLANTABLE DEVICE | Site: HIP | Status: NON-FUNCTIONAL
Removed: 2024-11-11

## (undated) DEVICE — DRP SURG U/DRP INVISISHIELD PA 48X52IN

## (undated) DEVICE — BIPOLAR SEALER 23-112-1 AQM 6.0: Brand: AQUAMANTYS™

## (undated) DEVICE — ANTIBACTERIAL VIOLET BRAIDED (POLYGLACTIN 910), SYNTHETIC ABSORBABLE SURGICAL SUTURE: Brand: COATED VICRYL

## (undated) DEVICE — GW SUREGLIDE FLXTIP ANG/TP .038 3X150CM

## (undated) DEVICE — PEEL-AWAY TOGA, 2X LARGE: Brand: FLYTE

## (undated) DEVICE — STRYKER PERFORMANCE SERIES SAGITTAL BLADE: Brand: STRYKER PERFORMANCE SERIES

## (undated) DEVICE — SLV SCD KN/LEN ADJ EXPRSS BLENDED MD 1P/U

## (undated) DEVICE — SOLIDIFIER LIQLOC PLS 1500CC BT

## (undated) DEVICE — NITINOL STONE RETRIEVAL BASKET: Brand: ESCAPE

## (undated) DEVICE — LINER ACET G7 NTRL E1 SZE 36MM
Type: IMPLANTABLE DEVICE | Site: HIP | Status: NON-FUNCTIONAL
Removed: 2024-11-11

## (undated) DEVICE — SOL IRRG H2O PL/BG 1000ML STRL

## (undated) DEVICE — SUT VIC 2/0 CT1 36IN

## (undated) DEVICE — SYS IRR PUMP SGL ACTN VAC SYR 10CC

## (undated) DEVICE — SOL NACL 0.9PCT 100ML SGL

## (undated) DEVICE — GAUZE,SPONGE,4"X4",16PLY,STRL,LF,10/TRAY: Brand: MEDLINE

## (undated) DEVICE — BNDG COBAN S/ADHR WRP LF 4IN 5YD TN

## (undated) DEVICE — STERILE POLYISOPRENE POWDER-FREE SURGICAL GLOVES WITH EMOLLIENT COATING: Brand: PROTEXIS

## (undated) DEVICE — GLOVE,SURG,SENSICARE SLT,LF,PF,8.5: Brand: MEDLINE

## (undated) DEVICE — TOTAL ANTERIOR HIP-LF: Brand: MEDLINE INDUSTRIES, INC.

## (undated) DEVICE — MAT FLR ABS W/BLU/LINER 56X72IN WHT

## (undated) DEVICE — GLV SURG BIOGEL LTX PF 8 1/2

## (undated) DEVICE — CONN JET HYDRA H20 AUXILIARY DISP